# Patient Record
Sex: MALE | Race: WHITE | Employment: UNEMPLOYED | URBAN - METROPOLITAN AREA
[De-identification: names, ages, dates, MRNs, and addresses within clinical notes are randomized per-mention and may not be internally consistent; named-entity substitution may affect disease eponyms.]

---

## 2018-01-15 PROBLEM — I73.9 PVD (PERIPHERAL VASCULAR DISEASE) (HCC): Chronic | Status: ACTIVE | Noted: 2018-01-15

## 2018-01-15 PROBLEM — E11.51 TYPE 2 DIABETES MELLITUS WITH DIABETIC PERIPHERAL ANGIOPATHY WITHOUT GANGRENE, WITHOUT LONG-TERM CURRENT USE OF INSULIN (HCC): Chronic | Status: ACTIVE | Noted: 2018-01-15

## 2018-01-15 PROBLEM — Z71.6 TOBACCO ABUSE COUNSELING: Chronic | Status: ACTIVE | Noted: 2018-01-15

## 2018-04-20 ENCOUNTER — TELEPHONE (OUTPATIENT)
Dept: VASCULAR SURGERY | Age: 48
End: 2018-04-20

## 2018-06-11 ENCOUNTER — OFFICE VISIT (OUTPATIENT)
Dept: VASCULAR SURGERY | Age: 48
End: 2018-06-11
Payer: COMMERCIAL

## 2018-06-11 DIAGNOSIS — I70.212 ATHEROSCLER OF NATIVE ARTERY OF LEFT LEG WITH INTERMIT CLAUDICATION (HCC): ICD-10-CM

## 2018-06-11 DIAGNOSIS — I73.9 PVD (PERIPHERAL VASCULAR DISEASE) (HCC): Primary | ICD-10-CM

## 2018-06-11 PROCEDURE — 1036F TOBACCO NON-USER: CPT | Performed by: SURGERY

## 2018-06-11 PROCEDURE — G8598 ASA/ANTIPLAT THER USED: HCPCS | Performed by: SURGERY

## 2018-06-11 PROCEDURE — 99213 OFFICE O/P EST LOW 20 MIN: CPT | Performed by: NURSE PRACTITIONER

## 2018-06-11 PROCEDURE — G8427 DOCREV CUR MEDS BY ELIG CLIN: HCPCS | Performed by: SURGERY

## 2018-06-11 PROCEDURE — G8421 BMI NOT CALCULATED: HCPCS | Performed by: NURSE PRACTITIONER

## 2018-06-11 PROCEDURE — G8598 ASA/ANTIPLAT THER USED: HCPCS | Performed by: NURSE PRACTITIONER

## 2018-06-11 PROCEDURE — G8421 BMI NOT CALCULATED: HCPCS | Performed by: SURGERY

## 2018-06-11 PROCEDURE — G8427 DOCREV CUR MEDS BY ELIG CLIN: HCPCS | Performed by: NURSE PRACTITIONER

## 2018-06-11 PROCEDURE — 1036F TOBACCO NON-USER: CPT | Performed by: NURSE PRACTITIONER

## 2018-06-19 ENCOUNTER — HOSPITAL ENCOUNTER (OUTPATIENT)
Dept: CARDIAC CATH/INVASIVE PROCEDURES | Age: 48
Discharge: HOME | End: 2018-06-20
Attending: SURGERY | Admitting: SURGERY
Payer: COMMERCIAL

## 2018-06-19 LAB
ABO/RH: NORMAL
ANION GAP SERPL CALCULATED.3IONS-SCNC: 11 MMOL/L (ref 7–16)
ANTIBODY SCREEN: NORMAL
BUN BLDV-MCNC: 15 MG/DL (ref 6–20)
CALCIUM SERPL-MCNC: 8.9 MG/DL (ref 8.6–10.2)
CHLORIDE BLD-SCNC: 100 MMOL/L (ref 98–107)
CO2: 28 MMOL/L (ref 22–29)
CREAT SERPL-MCNC: 0.8 MG/DL (ref 0.7–1.2)
GFR AFRICAN AMERICAN: >60
GFR NON-AFRICAN AMERICAN: >60 ML/MIN/1.73
GLUCOSE BLD-MCNC: 153 MG/DL (ref 74–109)
HBA1C MFR BLD: 8.7 % (ref 4.8–5.9)
HCT VFR BLD CALC: 37.3 % (ref 37–54)
HEMOGLOBIN: 12.8 G/DL (ref 12.5–16.5)
INR BLD: 1
MCH RBC QN AUTO: 31.6 PG (ref 26–35)
MCHC RBC AUTO-ENTMCNC: 34.3 % (ref 32–34.5)
MCV RBC AUTO: 92.1 FL (ref 80–99.9)
METER GLUCOSE: 135 MG/DL (ref 70–110)
METER GLUCOSE: 143 MG/DL (ref 70–110)
METER GLUCOSE: 261 MG/DL (ref 70–110)
PDW BLD-RTO: 12.8 FL (ref 11.5–15)
PLATELET # BLD: 222 E9/L (ref 130–450)
PMV BLD AUTO: 9.5 FL (ref 7–12)
POTASSIUM REFLEX MAGNESIUM: 4.3 MMOL/L (ref 3.5–5)
PROTHROMBIN TIME: 11.4 SEC (ref 9.3–12.4)
RBC # BLD: 4.05 E12/L (ref 3.8–5.8)
SODIUM BLD-SCNC: 139 MMOL/L (ref 132–146)
WBC # BLD: 6.5 E9/L (ref 4.5–11.5)

## 2018-06-19 PROCEDURE — 86850 RBC ANTIBODY SCREEN: CPT

## 2018-06-19 PROCEDURE — 6370000000 HC RX 637 (ALT 250 FOR IP): Performed by: SURGERY

## 2018-06-19 PROCEDURE — 2709999900 HC NON-CHARGEABLE SUPPLY

## 2018-06-19 PROCEDURE — C1894 INTRO/SHEATH, NON-LASER: HCPCS

## 2018-06-19 PROCEDURE — C2623 CATH, TRANSLUMIN, DRUG-COAT: HCPCS

## 2018-06-19 PROCEDURE — C1714 CATH, TRANS ATHERECTOMY, DIR: HCPCS

## 2018-06-19 PROCEDURE — 85027 COMPLETE CBC AUTOMATED: CPT

## 2018-06-19 PROCEDURE — C1884 EMBOLIZATION PROTECT SYST: HCPCS

## 2018-06-19 PROCEDURE — 2500000003 HC RX 250 WO HCPCS

## 2018-06-19 PROCEDURE — 6360000002 HC RX W HCPCS: Performed by: SURGERY

## 2018-06-19 PROCEDURE — 37225 HC FEM POP TERRITORY ATHERECTOMY: CPT | Performed by: SURGERY

## 2018-06-19 PROCEDURE — 83036 HEMOGLOBIN GLYCOSYLATED A1C: CPT

## 2018-06-19 PROCEDURE — 82962 GLUCOSE BLOOD TEST: CPT

## 2018-06-19 PROCEDURE — 75774 ARTERY X-RAY EACH VESSEL: CPT | Performed by: SURGERY

## 2018-06-19 PROCEDURE — 2580000003 HC RX 258: Performed by: SURGERY

## 2018-06-19 PROCEDURE — 86900 BLOOD TYPING SEROLOGIC ABO: CPT

## 2018-06-19 PROCEDURE — C1769 GUIDE WIRE: HCPCS

## 2018-06-19 PROCEDURE — 37225 PR REVSC OPN/PRQ FEM/POP W/ATHRC/ANGIOP SM VSL: CPT | Performed by: SURGERY

## 2018-06-19 PROCEDURE — 85610 PROTHROMBIN TIME: CPT

## 2018-06-19 PROCEDURE — 36415 COLL VENOUS BLD VENIPUNCTURE: CPT

## 2018-06-19 PROCEDURE — 75716 ARTERY X-RAYS ARMS/LEGS: CPT | Performed by: SURGERY

## 2018-06-19 PROCEDURE — 6360000002 HC RX W HCPCS

## 2018-06-19 PROCEDURE — C1725 CATH, TRANSLUMIN NON-LASER: HCPCS

## 2018-06-19 PROCEDURE — 86901 BLOOD TYPING SEROLOGIC RH(D): CPT

## 2018-06-19 PROCEDURE — 80048 BASIC METABOLIC PNL TOTAL CA: CPT

## 2018-06-19 PROCEDURE — C1887 CATHETER, GUIDING: HCPCS

## 2018-06-19 RX ORDER — LISINOPRIL 20 MG/1
40 TABLET ORAL DAILY
Status: DISCONTINUED | OUTPATIENT
Start: 2018-06-20 | End: 2018-06-20 | Stop reason: HOSPADM

## 2018-06-19 RX ORDER — GLIPIZIDE 5 MG/1
5 TABLET ORAL
Status: DISCONTINUED | OUTPATIENT
Start: 2018-06-20 | End: 2018-06-20 | Stop reason: HOSPADM

## 2018-06-19 RX ORDER — SODIUM CHLORIDE 9 MG/ML
INJECTION, SOLUTION INTRAVENOUS CONTINUOUS
Status: DISCONTINUED | OUTPATIENT
Start: 2018-06-19 | End: 2018-06-19

## 2018-06-19 RX ORDER — SODIUM CHLORIDE 0.9 % (FLUSH) 0.9 %
10 SYRINGE (ML) INJECTION EVERY 12 HOURS SCHEDULED
Status: DISCONTINUED | OUTPATIENT
Start: 2018-06-19 | End: 2018-06-20 | Stop reason: HOSPADM

## 2018-06-19 RX ORDER — ATORVASTATIN CALCIUM 40 MG/1
40 TABLET, FILM COATED ORAL DAILY
Status: DISCONTINUED | OUTPATIENT
Start: 2018-06-19 | End: 2018-06-20 | Stop reason: HOSPADM

## 2018-06-19 RX ORDER — ASPIRIN 81 MG/1
81 TABLET, CHEWABLE ORAL DAILY
Status: DISCONTINUED | OUTPATIENT
Start: 2018-06-19 | End: 2018-06-20 | Stop reason: HOSPADM

## 2018-06-19 RX ORDER — DEXTROSE MONOHYDRATE 50 MG/ML
100 INJECTION, SOLUTION INTRAVENOUS PRN
Status: DISCONTINUED | OUTPATIENT
Start: 2018-06-19 | End: 2018-06-20 | Stop reason: HOSPADM

## 2018-06-19 RX ORDER — DEXTROSE MONOHYDRATE 25 G/50ML
12.5 INJECTION, SOLUTION INTRAVENOUS PRN
Status: DISCONTINUED | OUTPATIENT
Start: 2018-06-19 | End: 2018-06-20 | Stop reason: HOSPADM

## 2018-06-19 RX ORDER — NICOTINE POLACRILEX 4 MG
15 LOZENGE BUCCAL PRN
Status: DISCONTINUED | OUTPATIENT
Start: 2018-06-19 | End: 2018-06-20 | Stop reason: HOSPADM

## 2018-06-19 RX ORDER — CLOPIDOGREL BISULFATE 75 MG/1
75 TABLET ORAL DAILY
Status: DISCONTINUED | OUTPATIENT
Start: 2018-06-19 | End: 2018-06-20 | Stop reason: HOSPADM

## 2018-06-19 RX ORDER — SODIUM CHLORIDE 0.9 % (FLUSH) 0.9 %
10 SYRINGE (ML) INJECTION PRN
Status: DISCONTINUED | OUTPATIENT
Start: 2018-06-19 | End: 2018-06-20 | Stop reason: HOSPADM

## 2018-06-19 RX ORDER — CITALOPRAM 20 MG/1
40 TABLET ORAL DAILY
Status: DISCONTINUED | OUTPATIENT
Start: 2018-06-19 | End: 2018-06-20 | Stop reason: HOSPADM

## 2018-06-19 RX ORDER — CILOSTAZOL 100 MG/1
100 TABLET ORAL 2 TIMES DAILY
Status: DISCONTINUED | OUTPATIENT
Start: 2018-06-19 | End: 2018-06-20 | Stop reason: HOSPADM

## 2018-06-19 RX ORDER — PANTOPRAZOLE SODIUM 40 MG/1
40 TABLET, DELAYED RELEASE ORAL
Status: DISCONTINUED | OUTPATIENT
Start: 2018-06-20 | End: 2018-06-20 | Stop reason: HOSPADM

## 2018-06-19 RX ORDER — SODIUM CHLORIDE 9 MG/ML
INJECTION, SOLUTION INTRAVENOUS CONTINUOUS
Status: ACTIVE | OUTPATIENT
Start: 2018-06-19 | End: 2018-06-19

## 2018-06-19 RX ADMIN — INSULIN LISPRO 2 UNITS: 100 INJECTION, SOLUTION INTRAVENOUS; SUBCUTANEOUS at 13:24

## 2018-06-19 RX ADMIN — CILOSTAZOL 100 MG: 100 TABLET ORAL at 21:47

## 2018-06-19 RX ADMIN — SODIUM CHLORIDE: 9 INJECTION, SOLUTION INTRAVENOUS at 12:08

## 2018-06-19 RX ADMIN — CEFAZOLIN SODIUM 2 G: 2 SOLUTION INTRAVENOUS at 09:33

## 2018-06-19 RX ADMIN — INSULIN LISPRO 6 UNITS: 100 INJECTION, SOLUTION INTRAVENOUS; SUBCUTANEOUS at 18:14

## 2018-06-19 RX ADMIN — CITALOPRAM 40 MG: 20 TABLET, FILM COATED ORAL at 14:28

## 2018-06-19 RX ADMIN — SODIUM CHLORIDE: 9 INJECTION, SOLUTION INTRAVENOUS at 14:38

## 2018-06-19 RX ADMIN — CLOPIDOGREL 75 MG: 75 TABLET, FILM COATED ORAL at 14:28

## 2018-06-19 RX ADMIN — ASPIRIN 81 MG CHEWABLE TABLET 81 MG: 81 TABLET CHEWABLE at 12:44

## 2018-06-19 RX ADMIN — Medication 10 ML: at 21:47

## 2018-06-19 RX ADMIN — DESMOPRESSIN ACETATE 40 MG: 0.2 TABLET ORAL at 14:29

## 2018-06-19 RX ADMIN — CILOSTAZOL 100 MG: 100 TABLET ORAL at 14:29

## 2018-06-19 ASSESSMENT — PAIN SCALES - GENERAL
PAINLEVEL_OUTOF10: 0

## 2018-06-20 ENCOUNTER — TELEPHONE (OUTPATIENT)
Dept: VASCULAR SURGERY | Age: 48
End: 2018-06-20

## 2018-06-20 VITALS
HEIGHT: 70 IN | OXYGEN SATURATION: 95 % | RESPIRATION RATE: 16 BRPM | BODY MASS INDEX: 28.63 KG/M2 | WEIGHT: 200 LBS | DIASTOLIC BLOOD PRESSURE: 73 MMHG | TEMPERATURE: 97.6 F | SYSTOLIC BLOOD PRESSURE: 116 MMHG | HEART RATE: 96 BPM

## 2018-06-20 LAB
ANION GAP SERPL CALCULATED.3IONS-SCNC: 13 MMOL/L (ref 7–16)
BUN BLDV-MCNC: 12 MG/DL (ref 6–20)
CALCIUM SERPL-MCNC: 8.4 MG/DL (ref 8.6–10.2)
CHLORIDE BLD-SCNC: 99 MMOL/L (ref 98–107)
CO2: 28 MMOL/L (ref 22–29)
CREAT SERPL-MCNC: 0.9 MG/DL (ref 0.7–1.2)
GFR AFRICAN AMERICAN: >60
GFR NON-AFRICAN AMERICAN: >60 ML/MIN/1.73
GLUCOSE BLD-MCNC: 146 MG/DL (ref 74–109)
HCT VFR BLD CALC: 36.3 % (ref 37–54)
HEMOGLOBIN: 12.6 G/DL (ref 12.5–16.5)
MCH RBC QN AUTO: 32.3 PG (ref 26–35)
MCHC RBC AUTO-ENTMCNC: 34.7 % (ref 32–34.5)
MCV RBC AUTO: 93.1 FL (ref 80–99.9)
PDW BLD-RTO: 13.1 FL (ref 11.5–15)
PLATELET # BLD: 200 E9/L (ref 130–450)
PMV BLD AUTO: 9.6 FL (ref 7–12)
POTASSIUM SERPL-SCNC: 4.3 MMOL/L (ref 3.5–5)
RBC # BLD: 3.9 E12/L (ref 3.8–5.8)
SODIUM BLD-SCNC: 140 MMOL/L (ref 132–146)
WBC # BLD: 9.8 E9/L (ref 4.5–11.5)

## 2018-06-20 PROCEDURE — 85027 COMPLETE CBC AUTOMATED: CPT

## 2018-06-20 PROCEDURE — 6360000002 HC RX W HCPCS: Performed by: SURGERY

## 2018-06-20 PROCEDURE — 36415 COLL VENOUS BLD VENIPUNCTURE: CPT

## 2018-06-20 PROCEDURE — 80048 BASIC METABOLIC PNL TOTAL CA: CPT

## 2018-06-20 PROCEDURE — 6370000000 HC RX 637 (ALT 250 FOR IP): Performed by: SURGERY

## 2018-06-20 RX ADMIN — ASPIRIN 81 MG CHEWABLE TABLET 81 MG: 81 TABLET CHEWABLE at 10:19

## 2018-06-20 RX ADMIN — CILOSTAZOL 100 MG: 100 TABLET ORAL at 08:58

## 2018-06-20 RX ADMIN — INSULIN LISPRO 2 UNITS: 100 INJECTION, SOLUTION INTRAVENOUS; SUBCUTANEOUS at 09:05

## 2018-06-20 RX ADMIN — DESMOPRESSIN ACETATE 40 MG: 0.2 TABLET ORAL at 09:03

## 2018-06-20 RX ADMIN — PANTOPRAZOLE SODIUM 40 MG: 40 TABLET, DELAYED RELEASE ORAL at 06:40

## 2018-06-20 RX ADMIN — GLIPIZIDE 5 MG: 5 TABLET ORAL at 06:40

## 2018-06-20 RX ADMIN — LISINOPRIL 40 MG: 20 TABLET ORAL at 08:58

## 2018-06-20 RX ADMIN — ENOXAPARIN SODIUM 40 MG: 40 INJECTION SUBCUTANEOUS at 09:01

## 2018-06-20 RX ADMIN — CLOPIDOGREL 75 MG: 75 TABLET, FILM COATED ORAL at 08:58

## 2018-06-20 RX ADMIN — CITALOPRAM 40 MG: 20 TABLET, FILM COATED ORAL at 08:58

## 2018-06-20 ASSESSMENT — PAIN SCALES - GENERAL: PAINLEVEL_OUTOF10: 0

## 2018-07-03 ENCOUNTER — HOSPITAL ENCOUNTER (OUTPATIENT)
Dept: CARDIAC CATH/INVASIVE PROCEDURES | Age: 48
Discharge: HOME | End: 2018-07-04
Attending: SURGERY | Admitting: SURGERY
Payer: COMMERCIAL

## 2018-07-03 DIAGNOSIS — I73.9 PERIPHERAL VASCULAR DISEASE OF LOWER EXTREMITY (HCC): ICD-10-CM

## 2018-07-03 DIAGNOSIS — I73.9 PVD (PERIPHERAL VASCULAR DISEASE) (HCC): Chronic | ICD-10-CM

## 2018-07-03 LAB
ABO/RH: NORMAL
ANION GAP SERPL CALCULATED.3IONS-SCNC: 16 MMOL/L (ref 7–16)
ANTIBODY SCREEN: NORMAL
BUN BLDV-MCNC: 14 MG/DL (ref 6–20)
CALCIUM SERPL-MCNC: 8.9 MG/DL (ref 8.6–10.2)
CHLORIDE BLD-SCNC: 99 MMOL/L (ref 98–107)
CO2: 23 MMOL/L (ref 22–29)
CREAT SERPL-MCNC: 0.8 MG/DL (ref 0.7–1.2)
GFR AFRICAN AMERICAN: >60
GFR NON-AFRICAN AMERICAN: >60 ML/MIN/1.73
GLUCOSE BLD-MCNC: 161 MG/DL (ref 74–109)
HBA1C MFR BLD: 8.2 % (ref 4–5.6)
HCT VFR BLD CALC: 35.4 % (ref 37–54)
HEMOGLOBIN: 12.8 G/DL (ref 12.5–16.5)
INR BLD: 1.1
MCH RBC QN AUTO: 32.7 PG (ref 26–35)
MCHC RBC AUTO-ENTMCNC: 36.2 % (ref 32–34.5)
MCV RBC AUTO: 90.3 FL (ref 80–99.9)
METER GLUCOSE: 147 MG/DL (ref 70–110)
METER GLUCOSE: 174 MG/DL (ref 70–110)
METER GLUCOSE: 288 MG/DL (ref 70–110)
PDW BLD-RTO: 12.4 FL (ref 11.5–15)
PLATELET # BLD: 305 E9/L (ref 130–450)
PMV BLD AUTO: 9.2 FL (ref 7–12)
POTASSIUM REFLEX MAGNESIUM: 4 MMOL/L (ref 3.5–5)
PROTHROMBIN TIME: 12.3 SEC (ref 9.3–12.4)
RBC # BLD: 3.92 E12/L (ref 3.8–5.8)
SODIUM BLD-SCNC: 138 MMOL/L (ref 132–146)
WBC # BLD: 10.3 E9/L (ref 4.5–11.5)

## 2018-07-03 PROCEDURE — 86850 RBC ANTIBODY SCREEN: CPT

## 2018-07-03 PROCEDURE — 75710 ARTERY X-RAYS ARM/LEG: CPT | Performed by: SURGERY

## 2018-07-03 PROCEDURE — 37224 HC FEM POP TERRITORY PLASTY: CPT | Performed by: SURGERY

## 2018-07-03 PROCEDURE — 2580000003 HC RX 258: Performed by: SURGERY

## 2018-07-03 PROCEDURE — 85610 PROTHROMBIN TIME: CPT

## 2018-07-03 PROCEDURE — C1769 GUIDE WIRE: HCPCS

## 2018-07-03 PROCEDURE — C1725 CATH, TRANSLUMIN NON-LASER: HCPCS

## 2018-07-03 PROCEDURE — 80048 BASIC METABOLIC PNL TOTAL CA: CPT

## 2018-07-03 PROCEDURE — C1894 INTRO/SHEATH, NON-LASER: HCPCS

## 2018-07-03 PROCEDURE — 83036 HEMOGLOBIN GLYCOSYLATED A1C: CPT

## 2018-07-03 PROCEDURE — 6360000002 HC RX W HCPCS

## 2018-07-03 PROCEDURE — C1760 CLOSURE DEV, VASC: HCPCS

## 2018-07-03 PROCEDURE — 85027 COMPLETE CBC AUTOMATED: CPT

## 2018-07-03 PROCEDURE — 6370000000 HC RX 637 (ALT 250 FOR IP): Performed by: SURGERY

## 2018-07-03 PROCEDURE — 76937 US GUIDE VASCULAR ACCESS: CPT | Performed by: SURGERY

## 2018-07-03 PROCEDURE — 82962 GLUCOSE BLOOD TEST: CPT

## 2018-07-03 PROCEDURE — 36415 COLL VENOUS BLD VENIPUNCTURE: CPT

## 2018-07-03 PROCEDURE — C2623 CATH, TRANSLUMIN, DRUG-COAT: HCPCS

## 2018-07-03 PROCEDURE — 37224 PR REVSC OPN/PRG FEM/POP W/ANGIOPLASTY UNI: CPT | Performed by: SURGERY

## 2018-07-03 PROCEDURE — 2709999900 HC NON-CHARGEABLE SUPPLY

## 2018-07-03 PROCEDURE — C1887 CATHETER, GUIDING: HCPCS

## 2018-07-03 PROCEDURE — 86900 BLOOD TYPING SEROLOGIC ABO: CPT

## 2018-07-03 PROCEDURE — 2500000003 HC RX 250 WO HCPCS

## 2018-07-03 PROCEDURE — 86901 BLOOD TYPING SEROLOGIC RH(D): CPT

## 2018-07-03 RX ORDER — GLIPIZIDE 5 MG/1
10 TABLET ORAL
Status: DISCONTINUED | OUTPATIENT
Start: 2018-07-04 | End: 2018-07-04 | Stop reason: HOSPADM

## 2018-07-03 RX ORDER — NICOTINE POLACRILEX 4 MG
15 LOZENGE BUCCAL PRN
Status: DISCONTINUED | OUTPATIENT
Start: 2018-07-03 | End: 2018-07-04 | Stop reason: HOSPADM

## 2018-07-03 RX ORDER — SODIUM CHLORIDE 0.9 % (FLUSH) 0.9 %
10 SYRINGE (ML) INJECTION PRN
Status: DISCONTINUED | OUTPATIENT
Start: 2018-07-03 | End: 2018-07-03

## 2018-07-03 RX ORDER — SODIUM CHLORIDE 0.9 % (FLUSH) 0.9 %
10 SYRINGE (ML) INJECTION EVERY 12 HOURS SCHEDULED
Status: DISCONTINUED | OUTPATIENT
Start: 2018-07-03 | End: 2018-07-04 | Stop reason: HOSPADM

## 2018-07-03 RX ORDER — ATORVASTATIN CALCIUM 40 MG/1
40 TABLET, FILM COATED ORAL DAILY
Status: DISCONTINUED | OUTPATIENT
Start: 2018-07-03 | End: 2018-07-04 | Stop reason: HOSPADM

## 2018-07-03 RX ORDER — NICOTINE 21 MG/24HR
1 PATCH, TRANSDERMAL 24 HOURS TRANSDERMAL EVERY 24 HOURS
Status: DISCONTINUED | OUTPATIENT
Start: 2018-07-03 | End: 2018-07-04 | Stop reason: HOSPADM

## 2018-07-03 RX ORDER — SODIUM CHLORIDE 9 MG/ML
INJECTION, SOLUTION INTRAVENOUS CONTINUOUS
Status: DISCONTINUED | OUTPATIENT
Start: 2018-07-03 | End: 2018-07-03

## 2018-07-03 RX ORDER — CITALOPRAM 20 MG/1
40 TABLET ORAL DAILY
Status: DISCONTINUED | OUTPATIENT
Start: 2018-07-03 | End: 2018-07-04 | Stop reason: HOSPADM

## 2018-07-03 RX ORDER — DEXTROSE MONOHYDRATE 50 MG/ML
100 INJECTION, SOLUTION INTRAVENOUS PRN
Status: DISCONTINUED | OUTPATIENT
Start: 2018-07-03 | End: 2018-07-04 | Stop reason: HOSPADM

## 2018-07-03 RX ORDER — ASPIRIN 81 MG/1
81 TABLET, CHEWABLE ORAL DAILY
Status: DISCONTINUED | OUTPATIENT
Start: 2018-07-03 | End: 2018-07-04 | Stop reason: HOSPADM

## 2018-07-03 RX ORDER — CILOSTAZOL 100 MG/1
100 TABLET ORAL
Status: DISCONTINUED | OUTPATIENT
Start: 2018-07-03 | End: 2018-07-04 | Stop reason: HOSPADM

## 2018-07-03 RX ORDER — CLOPIDOGREL BISULFATE 75 MG/1
75 TABLET ORAL DAILY
Status: DISCONTINUED | OUTPATIENT
Start: 2018-07-03 | End: 2018-07-04 | Stop reason: HOSPADM

## 2018-07-03 RX ORDER — SODIUM CHLORIDE 0.9 % (FLUSH) 0.9 %
10 SYRINGE (ML) INJECTION PRN
Status: DISCONTINUED | OUTPATIENT
Start: 2018-07-03 | End: 2018-07-04 | Stop reason: HOSPADM

## 2018-07-03 RX ORDER — LISINOPRIL 20 MG/1
40 TABLET ORAL DAILY
Status: DISCONTINUED | OUTPATIENT
Start: 2018-07-03 | End: 2018-07-04 | Stop reason: HOSPADM

## 2018-07-03 RX ORDER — DEXTROSE MONOHYDRATE 25 G/50ML
12.5 INJECTION, SOLUTION INTRAVENOUS PRN
Status: DISCONTINUED | OUTPATIENT
Start: 2018-07-03 | End: 2018-07-04 | Stop reason: HOSPADM

## 2018-07-03 RX ORDER — SODIUM CHLORIDE 9 MG/ML
INJECTION, SOLUTION INTRAVENOUS CONTINUOUS
Status: ACTIVE | OUTPATIENT
Start: 2018-07-03 | End: 2018-07-03

## 2018-07-03 RX ORDER — SODIUM CHLORIDE 0.9 % (FLUSH) 0.9 %
10 SYRINGE (ML) INJECTION EVERY 12 HOURS SCHEDULED
Status: DISCONTINUED | OUTPATIENT
Start: 2018-07-03 | End: 2018-07-03

## 2018-07-03 RX ORDER — PANTOPRAZOLE SODIUM 40 MG/1
40 TABLET, DELAYED RELEASE ORAL
Status: DISCONTINUED | OUTPATIENT
Start: 2018-07-04 | End: 2018-07-04 | Stop reason: HOSPADM

## 2018-07-03 RX ADMIN — CILOSTAZOL 100 MG: 100 TABLET ORAL at 16:16

## 2018-07-03 RX ADMIN — SODIUM CHLORIDE: 9 INJECTION, SOLUTION INTRAVENOUS at 11:42

## 2018-07-03 RX ADMIN — INSULIN LISPRO 2 UNITS: 100 INJECTION, SOLUTION INTRAVENOUS; SUBCUTANEOUS at 17:12

## 2018-07-03 RX ADMIN — CITALOPRAM 40 MG: 20 TABLET, FILM COATED ORAL at 11:42

## 2018-07-03 RX ADMIN — SODIUM CHLORIDE: 9 INJECTION, SOLUTION INTRAVENOUS at 06:50

## 2018-07-03 RX ADMIN — CLOPIDOGREL 75 MG: 75 TABLET, FILM COATED ORAL at 11:42

## 2018-07-03 RX ADMIN — ASPIRIN 81 MG CHEWABLE TABLET 81 MG: 81 TABLET CHEWABLE at 11:41

## 2018-07-03 RX ADMIN — SODIUM CHLORIDE: 9 INJECTION, SOLUTION INTRAVENOUS at 15:44

## 2018-07-03 RX ADMIN — DESMOPRESSIN ACETATE 40 MG: 0.2 TABLET ORAL at 11:41

## 2018-07-03 RX ADMIN — Medication 10 ML: at 22:16

## 2018-07-03 RX ADMIN — INSULIN LISPRO 6 UNITS: 100 INJECTION, SOLUTION INTRAVENOUS; SUBCUTANEOUS at 11:44

## 2018-07-03 ASSESSMENT — PAIN SCALES - GENERAL
PAINLEVEL_OUTOF10: 0

## 2018-07-03 NOTE — OP NOTE
Cardiovascular Lab Procedure Report    Katt Rubalcava  1970    Date : 7/3/2018  Surgeon: Franny Moses M.D. Pre-procedure Diagnosis: L LE lifestyle limiting claudication  Post-procedure Diagnosis: Same  Procedure:      Right common femoral artery access   with US guidance    TF Right lower extremity angiogram   [id number] Angiogram with catheter in left common femoral, superficial femoral, and profunda artery   [id number] Left Common femoral plasty with 7x60 DCB (Impact), 8x40 evercross  Left profunda plasty with 5x60 evercross   Anesthesia: Local with IV sedation  Assistants: Cath Lab Staff  Estimated Blood Loss: Minimal  Complications: none  Findings: Abdominal aorta : Patent   Left Left s/p intervention   Common Femoral Art > 90% stenosis < 30% distally residual stenosis   Superficial Femoral Art     Profunda Femoral Art > 90% stenosis at orifice Patent, < 30% residual stenosis   AK Popliteal Art Recons from collateral, > 60% stenosis at knee Recons from collateral, > 60% stenosis at knee   BK Popliteal Art Patent Patent   Anterior Tibial Art Patent, primary runoff to foot Patent, to foot, slow flow   Tibioperoneal Trunk Patent Patent   Peroneal Art Patent slow flow, to distal calf Patent to ankle   Posterior Tibial Art Patent, slow flow to distal calf Patent, primary outflow to foot     Procedure Details : There was previous CTA  or catheter based diagnostic imaging preformed prior to today's procedure. Timeout preformed identifying pt and procedure. Groins prepped and draped in sterile fashion. Patient given sedation as needed throughout the case. Right common femoral artery was noted to be patent and was accessed under ultrasound guidance after infiltrating with local.  A printer was used to print out an image. Micropuncture placed, exchanged out for 5 fr sheath. Advantage glide wire and contra 2 catheter advanced into distal aorta.   Contra 2 and wire used to access Left iliac system and catheter placed at common femoral and angiogram of the left lower extremity preformed. Significant occlusive disease was noted in the fem pop distribution. Patient was given 9000 units of heparin and than a 7 fr destination sheath advanced to common femoral artery on the left. Imaging with the catheter in the sfa to further evaluate the sfa 0.35 Trail blazer catheter and advantage glide wire used to traverse stenosis but unsuccessful. The catheter was than placed in the profunda and angiogram was preformed. Proximal flow seemed poor which had been better earlier in the case. Due to these issues decision was made to treat common femoral disease and profunda as it was the primary outflow. The profunda was treated with 5x60 balloon. The common femoral was treated with 7x60 DCB and than post dilation with 8x40 evercross. There was evidence of some residual stenosis. Completion angiogram noted much improved filling distally and brisk flow though the stenotic area. Sheath and wire pulled back to Left iliac system. A short  7 fr sheath was exchanged. 7 fr exoseal used for closure.       Postop Exam  Right DP 1+  PT biphasic  Left DP monophasic  PT monophasic    Plan  Continue Medical Management with asa, plavix and statin  Encourage continued tobacco cessation    Will have seen as outpatient for cardiac risk stratification  Will likely need left common femoral endarterectomy, profundoplasty and fem pop bypass in future if symtpoms not improved    Pracucoek S [employer], DO

## 2018-07-03 NOTE — H&P
Vascular Surgery History & Physical Exam    Chief Complaint: Peripheral vascular disease,   L LE lifestyle limiting claudication    HISTORY OF PRESENT ILLNESS:    The patient is a 52 y.o. male who presents to the hospital for elective arteriogram with possible intervention. The patient has a history of peripheral vascular disease and L LE lifestyle limiting claudication.     ROS : All others Negative if blank [], Positive if [x]  General   [] Fevers   [] Chills   [] Weight Loss   Skin   [] Tissue Loss   Eyes   [x] Wears Glasses/Contacts   [] Vision Changes   Respiratory    [] Shortness of breath   Cardiovascular   [] Chest Pain   [] Shortness of breath with exertion   Gastrointestinal   [] Abdominal Pain     Past Medical History:   Diagnosis Date    Anxiety     Diabetes ([facility]     GERD (gastroesophageal reflux disease)     Gout     Hyperlipidemia     Hypertension     Leg pain     [facility] 1/15/2018    Tobacco abuse counseling 1/15/2018    Type 2 diabetes mellitus with diabetic peripheral angiopathy without gangrene, without long-term current use of insulin ([facility] 1/15/2018     Past Surgical History:   Procedure Laterality Date    OTHER SURGICAL HISTORY  10/04/2016    Dr Stella Moise - athrectomy/pci right fem/pop    OTHER SURGICAL HISTORY  06/19/2018    Dr Stella Moise - PCI w/ athrectomy RLE Common Illiac to Popliteal    WISDOM TOOTH EXTRACTION       Current Medications:     Current Outpatient Prescriptions:     cilostazol (PLETAL) 100 MG tablet, TAKE 1 TABLET TWICE A DAY, Disp: 90 tablet, Rfl: 3    clopidogrel (PLAVIX) 75 MG tablet, Take 1 tablet by mouth daily, Disp: 90 tablet, Rfl: 3    GLIPIZIDE XL 5 MG extended release tablet, Take 10 mg by mouth daily , Disp: , Rfl:     atorvastatin (LIPITOR) 20 MG tablet, Take 2 tablets by mouth daily, Disp: 30 tablet, Rfl: 3    lisinopril (PRINIVIL;ZESTRIL) 40 MG tablet, Take 40 mg by mouth daily, Disp: , Rfl:     metFORMIN (GLUCOPHAGE) 500 MG tablet, Take 500 mg by mouth 2 times daily (with meals) , Disp: , Rfl:     citalopram (CELEXA) 40 MG tablet, Take 40 mg by mouth daily, Disp: , Rfl:     omeprazole (PRILOSEC) 20 MG delayed release capsule, Take 20 mg by mouth as needed, Disp: , Rfl:     aspirin (ASPIRIN CHILDRENS) 81 MG chewable tablet, Take 1 tablet by mouth daily, Disp: 30 tablet, Rfl: 3    nicotine (NICODERM CQ) 21 MG/24HR, Place 1 patch onto the skin every 24 hours, Disp: 30 patch, Rfl: 3    Current Facility-Administered Medications:     0.9 % sodium chloride infusion, , Intravenous, Continuous, Teresa Guillermo MD, Last Rate: 75 mL/hr at 07/03/18 0650    sodium chloride flush 0.9 % injection 10 mL, 10 mL, Intravenous, 2 times per day, Teresa Guillermo MD    sodium chloride flush 0.9 % injection 10 mL, 10 mL, Intravenous, PRN, Teresa Guillermo MD  Allergies:  Patient has no known allergies. Social History     Social History    Marital status:      Spouse name: N/A    Number of children: N/A    Years of education: N/A     Occupational History    Not on file.      Social History Main Topics    Smoking status: Former Smoker     Packs/day: 1.50     Types: Cigarettes     Quit date: 10/15/2016    Smokeless tobacco: Former User    Alcohol use No    Drug use: No    Sexual activity: Not on file     Other Topics Concern    Not on file     Social History Narrative    No narrative on file     Family History   Problem Relation Age of Onset    Asthma Mother     Other Father         vascular problems    Cancer Father         prostate, lung    Diabetes Father      PHYSICAL EXAM:    Vitals:    07/03/18 0653   BP: 135/82   Pulse: 92   Temp: 97.3 °F (36.3 °C)   SpO2: 97%     CONSTITUTIONAL:  awake, alert, cooperative, no apparent distress, and appears stated age  NECK:  supple, symmetrical, trachea midline  LUNGS:  no increased work of breathing, good air exchange and clear to auscultation  CARDIOVASCULAR:  regular rate and rhythm  ABDOMEN:  soft, non-distended and non-tenderl   Pulse Exam   R femoral 1+ L femoral Weakly palp   R dorsalis pedis 1+ L dorsalis pedis monophasic   R posterior tibial biphasic L posterior tibial monophasic     LABS:    Lab Results   Component Value Date    WBC 10.3 07/03/2018    HGB 12.8 07/03/2018    HCT 35.4 (L) 07/03/2018     07/03/2018    PROTIME 12.3 07/03/2018    INR 1.1 07/03/2018    K 4.0 07/03/2018    BUN 14 07/03/2018    CREATININE 0.8 07/03/2018     Assesment:  Peripheral vascular disease. L LE lifestyle limiting claudication  PLAN:    · Aortogram,  Left lower extremity arteriogram, possible intervention. · I reviewed the procedure with the patient and family as available. I discussed the procedure, risks, benefits, complications, and alternatives of the procedure. They understand and consent.   All questions were answered    Gia Suarez

## 2018-07-04 VITALS
RESPIRATION RATE: 16 BRPM | HEIGHT: 70 IN | TEMPERATURE: 98.3 F | HEART RATE: 91 BPM | WEIGHT: 210 LBS | BODY MASS INDEX: 30.06 KG/M2 | OXYGEN SATURATION: 96 % | DIASTOLIC BLOOD PRESSURE: 55 MMHG | SYSTOLIC BLOOD PRESSURE: 109 MMHG

## 2018-07-04 LAB
ANION GAP SERPL CALCULATED.3IONS-SCNC: 12 MMOL/L (ref 7–16)
BUN BLDV-MCNC: 13 MG/DL (ref 6–20)
CALCIUM SERPL-MCNC: 8.7 MG/DL (ref 8.6–10.2)
CHLORIDE BLD-SCNC: 102 MMOL/L (ref 98–107)
CO2: 23 MMOL/L (ref 22–29)
CREAT SERPL-MCNC: 0.8 MG/DL (ref 0.7–1.2)
GFR AFRICAN AMERICAN: >60
GFR NON-AFRICAN AMERICAN: >60 ML/MIN/1.73
GLUCOSE BLD-MCNC: 151 MG/DL (ref 74–109)
HCT VFR BLD CALC: 35.6 % (ref 37–54)
HEMOGLOBIN: 12.2 G/DL (ref 12.5–16.5)
MCH RBC QN AUTO: 31.9 PG (ref 26–35)
MCHC RBC AUTO-ENTMCNC: 34.3 % (ref 32–34.5)
MCV RBC AUTO: 93.2 FL (ref 80–99.9)
METER GLUCOSE: 165 MG/DL (ref 70–110)
PDW BLD-RTO: 12.5 FL (ref 11.5–15)
PLATELET # BLD: 282 E9/L (ref 130–450)
PMV BLD AUTO: 9.6 FL (ref 7–12)
POTASSIUM SERPL-SCNC: 4 MMOL/L (ref 3.5–5)
RBC # BLD: 3.82 E12/L (ref 3.8–5.8)
SODIUM BLD-SCNC: 137 MMOL/L (ref 132–146)
WBC # BLD: 9.7 E9/L (ref 4.5–11.5)

## 2018-07-04 PROCEDURE — 36415 COLL VENOUS BLD VENIPUNCTURE: CPT

## 2018-07-04 PROCEDURE — 82962 GLUCOSE BLOOD TEST: CPT

## 2018-07-04 PROCEDURE — 6370000000 HC RX 637 (ALT 250 FOR IP): Performed by: SURGERY

## 2018-07-04 PROCEDURE — 2580000003 HC RX 258: Performed by: SURGERY

## 2018-07-04 PROCEDURE — 80048 BASIC METABOLIC PNL TOTAL CA: CPT

## 2018-07-04 PROCEDURE — 85027 COMPLETE CBC AUTOMATED: CPT

## 2018-07-04 RX ADMIN — LISINOPRIL 40 MG: 20 TABLET ORAL at 08:26

## 2018-07-04 RX ADMIN — CITALOPRAM 40 MG: 20 TABLET, FILM COATED ORAL at 08:26

## 2018-07-04 RX ADMIN — CILOSTAZOL 100 MG: 100 TABLET ORAL at 06:52

## 2018-07-04 RX ADMIN — PANTOPRAZOLE SODIUM 40 MG: 40 TABLET, DELAYED RELEASE ORAL at 06:52

## 2018-07-04 RX ADMIN — Medication 10 ML: at 08:26

## 2018-07-04 RX ADMIN — INSULIN LISPRO 2 UNITS: 100 INJECTION, SOLUTION INTRAVENOUS; SUBCUTANEOUS at 08:34

## 2018-07-04 RX ADMIN — GLIPIZIDE 10 MG: 5 TABLET ORAL at 07:15

## 2018-07-04 RX ADMIN — ASPIRIN 81 MG CHEWABLE TABLET 81 MG: 81 TABLET CHEWABLE at 08:26

## 2018-07-04 RX ADMIN — CLOPIDOGREL 75 MG: 75 TABLET, FILM COATED ORAL at 08:26

## 2018-07-04 RX ADMIN — DESMOPRESSIN ACETATE 40 MG: 0.2 TABLET ORAL at 08:26

## 2018-07-04 ASSESSMENT — PAIN SCALES - GENERAL
PAINLEVEL_OUTOF10: 0
PAINLEVEL_OUTOF10: 0

## 2018-07-04 NOTE — PLAN OF CARE
Problem: Falls - Risk of:  Goal: Will remain free from falls  Will remain free from falls   Outcome: Met This Shift      Problem: Infection - Surgical Site:  Goal: Will show no infection signs and symptoms  Will show no infection signs and symptoms  Outcome: Met This Shift

## 2018-07-04 NOTE — PLAN OF CARE
Problem: Infection - Surgical Site:  Goal: Will show no infection signs and symptoms  Will show no infection signs and symptoms   Outcome: Ongoing

## 2018-07-05 ENCOUNTER — TELEPHONE (OUTPATIENT)
Dept: VASCULAR SURGERY | Age: 48
End: 2018-07-05

## 2018-07-05 DIAGNOSIS — I73.9 PVD (PERIPHERAL VASCULAR DISEASE) WITH CLAUDICATION (HCC): Primary | ICD-10-CM

## 2018-07-06 ENCOUNTER — TELEPHONE (OUTPATIENT)
Dept: ADMINISTRATIVE | Age: 48
End: 2018-07-06

## 2018-07-23 ENCOUNTER — OFFICE VISIT (OUTPATIENT)
Dept: CARDIOLOGY CLINIC | Age: 48
End: 2018-07-23
Payer: COMMERCIAL

## 2018-07-23 VITALS
HEART RATE: 105 BPM | BODY MASS INDEX: 29.49 KG/M2 | HEIGHT: 70 IN | SYSTOLIC BLOOD PRESSURE: 124 MMHG | RESPIRATION RATE: 18 BRPM | WEIGHT: 206 LBS | DIASTOLIC BLOOD PRESSURE: 78 MMHG

## 2018-07-23 DIAGNOSIS — I73.9 PVD (PERIPHERAL VASCULAR DISEASE) (HCC): Primary | Chronic | ICD-10-CM

## 2018-07-23 DIAGNOSIS — E78.00 PURE HYPERCHOLESTEROLEMIA: ICD-10-CM

## 2018-07-23 DIAGNOSIS — E11.51 TYPE 2 DIABETES MELLITUS WITH DIABETIC PERIPHERAL ANGIOPATHY WITHOUT GANGRENE, WITHOUT LONG-TERM CURRENT USE OF INSULIN (HCC): Chronic | ICD-10-CM

## 2018-07-23 PROCEDURE — 99244 OFF/OP CNSLTJ NEW/EST MOD 40: CPT | Performed by: INTERNAL MEDICINE

## 2018-07-23 PROCEDURE — G8419 CALC BMI OUT NRM PARAM NOF/U: HCPCS | Performed by: INTERNAL MEDICINE

## 2018-07-23 PROCEDURE — G8427 DOCREV CUR MEDS BY ELIG CLIN: HCPCS | Performed by: INTERNAL MEDICINE

## 2018-07-23 PROCEDURE — 93000 ELECTROCARDIOGRAM COMPLETE: CPT | Performed by: INTERNAL MEDICINE

## 2018-07-23 PROCEDURE — 2022F DILAT RTA XM EVC RTNOPTHY: CPT | Performed by: INTERNAL MEDICINE

## 2018-07-23 RX ORDER — ALLOPURINOL 300 MG/1
300 TABLET ORAL DAILY
COMMUNITY
End: 2021-03-09 | Stop reason: ALTCHOICE

## 2018-07-23 NOTE — PROGRESS NOTES
needed      aspirin (ASPIRIN CHILDRENS) 81 MG chewable tablet Take 1 tablet by mouth daily 30 tablet 3     No current facility-administered medications for this visit. Physical Exam:  /78   Pulse 105   Resp 18   Ht 5' 10\" (1.778 m)   Wt 206 lb (93.4 kg)   BMI 29.56 kg/m²   Wt Readings from Last 3 Encounters:   07/23/18 206 lb (93.4 kg)   07/03/18 210 lb (95.3 kg)   06/19/18 200 lb (90.7 kg)     The patient is awake, alert and in no discomfort or distress. No gross musculoskeletal deformity or lymphadenopathy are present. No significant skin or nail changes are present. Gross examination of head, eyes, nose and throat are negative. Jugular venous pressure is normal and no carotid bruits are present. No thyromegaly is noted. Normal respiratory effort is noted with no accessory muscle usage present. Lung fields are clear to ascultation. Cardiac examination is notable for a regular rate and rhythm with no palpable thrill. No gallop rhythm or cardiac murmur are identified. A benign abdominal examination is present with no masses or organomegaly. A normal abdominal aortic pulsation is present. Intact pulses are present throughout all extremities and no peripheral edema is present. No focal neurologic deficits are present. Laboratory Tests:  Lab Results   Component Value Date    CREATININE 0.8 07/04/2018    BUN 13 07/04/2018     07/04/2018    K 4.0 07/04/2018     07/04/2018    CO2 23 07/04/2018     No results found for: BNP  Lab Results   Component Value Date    WBC 9.7 07/04/2018    RBC 3.82 07/04/2018    HGB 12.2 07/04/2018    HCT 35.6 07/04/2018    MCV 93.2 07/04/2018    MCH 31.9 07/04/2018    MCHC 34.3 07/04/2018    RDW 12.5 07/04/2018     07/04/2018    MPV 9.6 07/04/2018     No results for input(s): ALKPHOS, ALT, AST, PROT, BILITOT, BILIDIR, LABALBU in the last 72 hours.   No results found for: MG  Lab Results   Component Value Date    PROTIME 12.3 07/03/2018    INR 1.1

## 2018-10-08 ENCOUNTER — TELEPHONE (OUTPATIENT)
Dept: VASCULAR SURGERY | Age: 48
End: 2018-10-08

## 2018-10-08 ENCOUNTER — HOSPITAL ENCOUNTER (OUTPATIENT)
Age: 48
Discharge: HOME | End: 2018-10-10
Payer: COMMERCIAL

## 2018-10-08 ENCOUNTER — OFFICE VISIT (OUTPATIENT)
Dept: VASCULAR SURGERY | Age: 48
End: 2018-10-08

## 2018-10-08 DIAGNOSIS — I73.9 PVD (PERIPHERAL VASCULAR DISEASE) WITH CLAUDICATION (HCC): Primary | ICD-10-CM

## 2018-10-08 LAB
BASOPHILS ABSOLUTE: 0.05 E9/L (ref 0–0.2)
BASOPHILS RELATIVE PERCENT: 0.6 % (ref 0–2)
EOSINOPHILS ABSOLUTE: 0.2 E9/L (ref 0.05–0.5)
EOSINOPHILS RELATIVE PERCENT: 2.5 % (ref 0–6)
HCT VFR BLD CALC: 46 % (ref 37–54)
HEMOGLOBIN: 14.8 G/DL (ref 12.5–16.5)
IMMATURE GRANULOCYTES #: 0.03 E9/L
IMMATURE GRANULOCYTES %: 0.4 % (ref 0–5)
LYMPHOCYTES ABSOLUTE: 2.22 E9/L (ref 1.5–4)
LYMPHOCYTES RELATIVE PERCENT: 28 % (ref 20–42)
MCH RBC QN AUTO: 31.8 PG (ref 26–35)
MCHC RBC AUTO-ENTMCNC: 32.2 % (ref 32–34.5)
MCV RBC AUTO: 98.7 FL (ref 80–99.9)
MONOCYTES ABSOLUTE: 0.65 E9/L (ref 0.1–0.95)
MONOCYTES RELATIVE PERCENT: 8.2 % (ref 2–12)
NEUTROPHILS ABSOLUTE: 4.79 E9/L (ref 1.8–7.3)
NEUTROPHILS RELATIVE PERCENT: 60.3 % (ref 43–80)
PDW BLD-RTO: 12.7 FL (ref 11.5–15)
PLATELET # BLD: 115 E9/L (ref 130–450)
PMV BLD AUTO: 9.7 FL (ref 7–12)
RBC # BLD: 4.66 E12/L (ref 3.8–5.8)
WBC # BLD: 7.9 E9/L (ref 4.5–11.5)

## 2018-10-08 PROCEDURE — 83036 HEMOGLOBIN GLYCOSYLATED A1C: CPT

## 2018-10-08 PROCEDURE — 99024 POSTOP FOLLOW-UP VISIT: CPT | Performed by: SURGERY

## 2018-10-08 PROCEDURE — 85025 COMPLETE CBC W/AUTO DIFF WBC: CPT

## 2018-10-08 PROCEDURE — 80061 LIPID PANEL: CPT

## 2018-10-08 PROCEDURE — 80053 COMPREHEN METABOLIC PANEL: CPT

## 2018-10-08 NOTE — PROGRESS NOTES
10/8/2018    Melissaadrianne Pham  1970    Previous Procedures  Previous Procedures  10/4/16 R EIA plasty  R CFA, SFA, Popliteal atherectomy/plasty  R SFA, Popliteal plasty with DCB   6/19/18 R CFA, SFA, popliteal therectomy  R CFA plasty 6x150  R SFA, popliteal plasty 6x150 DCB   7/3/18 L CFA plasty with 7x60 DCB, 8x40 evercross  L profunda plasty 5x60 evercross      Patient returns for post operative evaluation. It was done for lifestyle limiting claudication and currently patient has had significant improvement in this issue. His right leg has no limitations. He is able to walk about 200 yards on the left until he develops cramping in his left calf. His pain associated with this is a 5/10. Crampy achey. Overall it has improved and he recovers more quickly. The patient denies any unexpected problems since the procedure.      Physical Exam:    Gen Awake and Alert  [facility] RRR   Right LE   - The femoral puncture site is soft without evidence of infection or hematoma    Left LE   - wounds are not present        Right      Left   Femoral 1+ 1=     Dorsalis Pedis 1+ monophasic   Posterior Tibial biphasic monophasic       A/P Lifestyle limiting claudication bilateral lower extremity  · R LE claudication resolved  · L LE claudication improved but still present at about 200 yards  · Continue Medical management with ASA, plavix and statin  · pt is a diabetic - emphasized importance of well controlled blood sugars  · Discussed with pt tobacco use and significant relationship to PVD and potential to cause increased problems post intervention   pt currently is not a smoker  · Walking Program  · Emphasized importance of foot care, and to call if develops any wounds or ulcerations, changes in appearance or symptoms  · I reviewed with the patient that normal activities can be resumed as tolerated  · Would need left common femoral endarterectomy, profundoplasty and fem pop bypass in future if needed  · No surgical

## 2018-10-09 LAB
ALBUMIN SERPL-MCNC: 4.6 G/DL (ref 3.5–5.2)
ALP BLD-CCNC: 80 U/L (ref 40–129)
ALT SERPL-CCNC: 16 U/L (ref 0–40)
ANION GAP SERPL CALCULATED.3IONS-SCNC: 25 MMOL/L (ref 7–16)
AST SERPL-CCNC: 18 U/L (ref 0–39)
BILIRUB SERPL-MCNC: 0.3 MG/DL (ref 0–1.2)
BUN BLDV-MCNC: 26 MG/DL (ref 6–20)
CALCIUM SERPL-MCNC: 10 MG/DL (ref 8.6–10.2)
CHLORIDE BLD-SCNC: 100 MMOL/L (ref 98–107)
CHOLESTEROL, TOTAL: 192 MG/DL (ref 0–199)
CO2: 17 MMOL/L (ref 22–29)
CREAT SERPL-MCNC: 1 MG/DL (ref 0.7–1.2)
GFR AFRICAN AMERICAN: >60
GFR NON-AFRICAN AMERICAN: >60 ML/MIN/1.73
GLUCOSE BLD-MCNC: 150 MG/DL (ref 74–109)
HBA1C MFR BLD: 7.7 % (ref 4–5.6)
HDLC SERPL-MCNC: 51 MG/DL
LDL CHOLESTEROL CALCULATED: 112 MG/DL (ref 0–99)
POTASSIUM SERPL-SCNC: 5 MMOL/L (ref 3.5–5)
SODIUM BLD-SCNC: 142 MMOL/L (ref 132–146)
TOTAL PROTEIN: 7.9 G/DL (ref 6.4–8.3)
TRIGL SERPL-MCNC: 143 MG/DL (ref 0–149)
VLDLC SERPL CALC-MCNC: 29 MG/DL

## 2018-10-26 ENCOUNTER — HOSPITAL ENCOUNTER (OUTPATIENT)
Dept: INTERVENTIONAL RADIOLOGY/VASCULAR | Age: 48
Discharge: HOME | End: 2018-10-28
Payer: COMMERCIAL

## 2018-10-26 DIAGNOSIS — I73.9 PVD (PERIPHERAL VASCULAR DISEASE) WITH CLAUDICATION (HCC): ICD-10-CM

## 2018-11-23 ENCOUNTER — HOSPITAL ENCOUNTER (OUTPATIENT)
Dept: INTERVENTIONAL RADIOLOGY/VASCULAR | Age: 48
Discharge: HOME | End: 2018-11-25
Payer: COMMERCIAL

## 2018-11-23 PROCEDURE — 93923 UPR/LXTR ART STDY 3+ LVLS: CPT

## 2019-01-14 ENCOUNTER — OFFICE VISIT (OUTPATIENT)
Dept: VASCULAR SURGERY | Age: 49
End: 2019-01-14
Payer: COMMERCIAL

## 2019-01-14 DIAGNOSIS — I73.9 PVD (PERIPHERAL VASCULAR DISEASE) (HCC): Primary | Chronic | ICD-10-CM

## 2019-01-14 PROCEDURE — 1036F TOBACCO NON-USER: CPT | Performed by: NURSE PRACTITIONER

## 2019-01-14 PROCEDURE — G8427 DOCREV CUR MEDS BY ELIG CLIN: HCPCS | Performed by: NURSE PRACTITIONER

## 2019-01-14 PROCEDURE — G8484 FLU IMMUNIZE NO ADMIN: HCPCS | Performed by: NURSE PRACTITIONER

## 2019-01-14 PROCEDURE — 99213 OFFICE O/P EST LOW 20 MIN: CPT | Performed by: NURSE PRACTITIONER

## 2019-01-14 PROCEDURE — G8419 CALC BMI OUT NRM PARAM NOF/U: HCPCS | Performed by: NURSE PRACTITIONER

## 2019-01-14 RX ORDER — SULFAMETHOXAZOLE AND TRIMETHOPRIM 800; 160 MG/1; MG/1
1 TABLET ORAL 2 TIMES DAILY
Qty: 14 TABLET | Refills: 0 | Status: SHIPPED | OUTPATIENT
Start: 2019-01-14 | End: 2019-01-21

## 2019-02-04 ENCOUNTER — HOSPITAL ENCOUNTER (OUTPATIENT)
Age: 49
Discharge: HOME | End: 2019-02-06
Payer: COMMERCIAL

## 2019-02-04 DIAGNOSIS — E11.8 TYPE 2 DIABETES MELLITUS WITH COMPLICATION, WITHOUT LONG-TERM CURRENT USE OF INSULIN (HCC): ICD-10-CM

## 2019-02-04 DIAGNOSIS — I10 BENIGN HYPERTENSION: ICD-10-CM

## 2019-02-04 DIAGNOSIS — Z12.5 SCREENING FOR PROSTATE CANCER: ICD-10-CM

## 2019-02-04 DIAGNOSIS — R53.83 FATIGUE, UNSPECIFIED TYPE: ICD-10-CM

## 2019-02-04 LAB
ALBUMIN SERPL-MCNC: 4.3 G/DL (ref 3.5–5.2)
ALP BLD-CCNC: 81 U/L (ref 40–129)
ALT SERPL-CCNC: 18 U/L (ref 0–40)
ANION GAP SERPL CALCULATED.3IONS-SCNC: 12 MMOL/L (ref 7–16)
AST SERPL-CCNC: 13 U/L (ref 0–39)
BILIRUB SERPL-MCNC: 0.2 MG/DL (ref 0–1.2)
BUN BLDV-MCNC: 19 MG/DL (ref 6–20)
CALCIUM SERPL-MCNC: 9.5 MG/DL (ref 8.6–10.2)
CHLORIDE BLD-SCNC: 105 MMOL/L (ref 98–107)
CHOLESTEROL, TOTAL: 177 MG/DL (ref 0–199)
CO2: 24 MMOL/L (ref 22–29)
CREAT SERPL-MCNC: 0.9 MG/DL (ref 0.7–1.2)
GFR AFRICAN AMERICAN: >60
GFR NON-AFRICAN AMERICAN: >60 ML/MIN/1.73
GLUCOSE BLD-MCNC: 155 MG/DL (ref 74–99)
HBA1C MFR BLD: 8.1 % (ref 4–5.6)
HDLC SERPL-MCNC: 46 MG/DL
LDL CHOLESTEROL CALCULATED: 100 MG/DL (ref 0–99)
POTASSIUM SERPL-SCNC: 4.3 MMOL/L (ref 3.5–5)
PROSTATE SPECIFIC ANTIGEN: 0.69 NG/ML (ref 0–4)
SODIUM BLD-SCNC: 141 MMOL/L (ref 132–146)
TOTAL PROTEIN: 7.5 G/DL (ref 6.4–8.3)
TRIGL SERPL-MCNC: 155 MG/DL (ref 0–149)
VLDLC SERPL CALC-MCNC: 31 MG/DL

## 2019-02-04 PROCEDURE — 83036 HEMOGLOBIN GLYCOSYLATED A1C: CPT

## 2019-02-04 PROCEDURE — 80061 LIPID PANEL: CPT

## 2019-02-04 PROCEDURE — 80053 COMPREHEN METABOLIC PANEL: CPT

## 2019-02-04 PROCEDURE — G0103 PSA SCREENING: HCPCS

## 2019-02-04 PROCEDURE — 85025 COMPLETE CBC W/AUTO DIFF WBC: CPT

## 2019-02-05 LAB
BASOPHILS ABSOLUTE: 0.09 E9/L (ref 0–0.2)
BASOPHILS RELATIVE PERCENT: 1.1 % (ref 0–2)
EOSINOPHILS ABSOLUTE: 0.25 E9/L (ref 0.05–0.5)
EOSINOPHILS RELATIVE PERCENT: 3 % (ref 0–6)
HCT VFR BLD CALC: 41.1 % (ref 37–54)
HEMOGLOBIN: 13.7 G/DL (ref 12.5–16.5)
IMMATURE GRANULOCYTES #: 0.04 E9/L
IMMATURE GRANULOCYTES %: 0.5 % (ref 0–5)
LYMPHOCYTES ABSOLUTE: 2.25 E9/L (ref 1.5–4)
LYMPHOCYTES RELATIVE PERCENT: 27.4 % (ref 20–42)
MCH RBC QN AUTO: 32.1 PG (ref 26–35)
MCHC RBC AUTO-ENTMCNC: 33.3 % (ref 32–34.5)
MCV RBC AUTO: 96.3 FL (ref 80–99.9)
MONOCYTES ABSOLUTE: 0.52 E9/L (ref 0.1–0.95)
MONOCYTES RELATIVE PERCENT: 6.3 % (ref 2–12)
NEUTROPHILS ABSOLUTE: 5.07 E9/L (ref 1.8–7.3)
NEUTROPHILS RELATIVE PERCENT: 61.7 % (ref 43–80)
PDW BLD-RTO: 12.8 FL (ref 11.5–15)
PLATELET # BLD: 222 E9/L (ref 130–450)
PMV BLD AUTO: 10.1 FL (ref 7–12)
RBC # BLD: 4.27 E12/L (ref 3.8–5.8)
WBC # BLD: 8.2 E9/L (ref 4.5–11.5)

## 2019-02-22 PROBLEM — E11.41 DIABETIC MONONEUROPATHY ASSOCIATED WITH TYPE 2 DIABETES MELLITUS (HCC): Status: ACTIVE | Noted: 2019-02-22

## 2019-07-15 ENCOUNTER — TELEPHONE (OUTPATIENT)
Dept: VASCULAR SURGERY | Age: 49
End: 2019-07-15

## 2019-07-15 ENCOUNTER — OFFICE VISIT (OUTPATIENT)
Dept: VASCULAR SURGERY | Age: 49
End: 2019-07-15
Payer: COMMERCIAL

## 2019-07-15 DIAGNOSIS — I73.9 PVD (PERIPHERAL VASCULAR DISEASE) WITH CLAUDICATION (HCC): Primary | ICD-10-CM

## 2019-07-15 DIAGNOSIS — Z71.6 TOBACCO ABUSE COUNSELING: Chronic | ICD-10-CM

## 2019-07-15 PROCEDURE — 1036F TOBACCO NON-USER: CPT | Performed by: SURGERY

## 2019-07-15 PROCEDURE — G8427 DOCREV CUR MEDS BY ELIG CLIN: HCPCS | Performed by: SURGERY

## 2019-07-15 PROCEDURE — 99213 OFFICE O/P EST LOW 20 MIN: CPT | Performed by: SURGERY

## 2019-07-15 PROCEDURE — G8417 CALC BMI ABV UP PARAM F/U: HCPCS | Performed by: SURGERY

## 2019-07-15 NOTE — PROGRESS NOTES
Smokeless tobacco: Former User     Quit date: 10/15/2016   Substance and Sexual Activity    Alcohol use: Yes     Comment: once a week; coffee 1 cup a day and 1 mountain dew  daily    Drug use: No    Sexual activity: Not on file   Lifestyle    Physical activity:     Days per week: Not on file     Minutes per session: Not on file    Stress: Not on file   Relationships    Social connections:     Talks on phone: Not on file     Gets together: Not on file     Attends Protestant service: Not on file     Active member of club or organization: Not on file     Attends meetings of clubs or organizations: Not on file     Relationship status: Not on file    Intimate partner violence:     Fear of current or ex partner: Not on file     Emotionally abused: Not on file     Physically abused: Not on file     Forced sexual activity: Not on file   Other Topics Concern    Not on file   Social History Narrative    Not on file     Family History   Problem Relation Age of Onset    Asthma Mother     Other Father         vascular problems    Cancer Father         prostate, lung    Diabetes Father      Labs  Lab Results   Component Value Date    WBC 8.2 02/04/2019    HGB 13.7 02/04/2019    HCT 41.1 02/04/2019     02/04/2019    PROTIME 12.3 07/03/2018    INR 1.1 07/03/2018    K 4.3 02/04/2019    BUN 19 02/04/2019    CREATININE 0.9 02/04/2019     PHYSICAL EXAM:    There were no vitals taken for this visit.   CONSTITUTIONAL:   Awake, alert, cooperative  PSYCHIATRIC :  Oriented to time, place and person     Appropriate insight to disease process  EYES: Lids and lashes normal  ENT:  External ears and nose without lesions  NECK: Supple, symmetrical, trachea midline   Carotid bruit absent  LUNGS:  No increased work of breathing                 Clear to auscultation  CARDIOVASCULAR:  regular rate and rhythm   ABDOMEN:  soft, non-distended, non-tender  SKIN:   Normal skin color   Texture and turgor normal, no

## 2019-07-15 NOTE — TELEPHONE ENCOUNTER
Notified patient's wife of lower extremity arterial doppler study at [facility] on Tuesday, 7-16-19, at 1:00 pm.

## 2019-07-16 ENCOUNTER — HOSPITAL ENCOUNTER (OUTPATIENT)
Dept: INTERVENTIONAL RADIOLOGY/VASCULAR | Age: 49
Discharge: HOME | End: 2019-07-18
Payer: COMMERCIAL

## 2019-07-16 DIAGNOSIS — I73.9 PVD (PERIPHERAL VASCULAR DISEASE) (HCC): Chronic | ICD-10-CM

## 2019-07-16 PROCEDURE — 93923 UPR/LXTR ART STDY 3+ LVLS: CPT

## 2019-07-22 ENCOUNTER — TELEPHONE (OUTPATIENT)
Dept: CARDIAC CATH/INVASIVE PROCEDURES | Age: 49
End: 2019-07-22

## 2019-07-22 NOTE — TELEPHONE ENCOUNTER
Reminded patient of scheduled procedure on 7/23/19 @ 0930, to arrive by 0730 and to go to registration first.

## 2019-07-23 ENCOUNTER — HOSPITAL ENCOUNTER (OUTPATIENT)
Dept: CARDIAC CATH/INVASIVE PROCEDURES | Age: 49
Discharge: HOME | End: 2019-07-23
Payer: COMMERCIAL

## 2019-07-23 VITALS
RESPIRATION RATE: 18 BRPM | DIASTOLIC BLOOD PRESSURE: 77 MMHG | SYSTOLIC BLOOD PRESSURE: 119 MMHG | HEART RATE: 79 BPM | TEMPERATURE: 98 F

## 2019-07-23 DIAGNOSIS — I10 HYPERTENSION, UNSPECIFIED TYPE: Primary | ICD-10-CM

## 2019-07-23 DIAGNOSIS — I73.9 PVD (PERIPHERAL VASCULAR DISEASE) (HCC): Chronic | ICD-10-CM

## 2019-07-23 LAB
ABO/RH: NORMAL
ANION GAP SERPL CALCULATED.3IONS-SCNC: 11 MMOL/L (ref 7–16)
ANTIBODY SCREEN: NORMAL
BUN BLDV-MCNC: 21 MG/DL (ref 6–20)
CALCIUM SERPL-MCNC: 9.1 MG/DL (ref 8.6–10.2)
CHLORIDE BLD-SCNC: 104 MMOL/L (ref 98–107)
CO2: 22 MMOL/L (ref 22–29)
CREAT SERPL-MCNC: 0.9 MG/DL (ref 0.7–1.2)
GFR AFRICAN AMERICAN: >60
GFR NON-AFRICAN AMERICAN: >60 ML/MIN/1.73
GLUCOSE BLD-MCNC: 141 MG/DL (ref 74–99)
HCT VFR BLD CALC: 36.4 % (ref 37–54)
HEMOGLOBIN: 12.8 G/DL (ref 12.5–16.5)
INR BLD: 1
MCH RBC QN AUTO: 33.3 PG (ref 26–35)
MCHC RBC AUTO-ENTMCNC: 35.2 % (ref 32–34.5)
MCV RBC AUTO: 94.8 FL (ref 80–99.9)
PDW BLD-RTO: 12 FL (ref 11.5–15)
PLATELET # BLD: 244 E9/L (ref 130–450)
PMV BLD AUTO: 9.3 FL (ref 7–12)
POTASSIUM REFLEX MAGNESIUM: 6.1 MMOL/L (ref 3.5–5)
POTASSIUM SERPL-SCNC: 4.5 MMOL/L (ref 3.5–5)
PROTHROMBIN TIME: 11.4 SEC (ref 9.3–12.4)
RBC # BLD: 3.84 E12/L (ref 3.8–5.8)
SODIUM BLD-SCNC: 137 MMOL/L (ref 132–146)
WBC # BLD: 7.8 E9/L (ref 4.5–11.5)

## 2019-07-23 PROCEDURE — 86901 BLOOD TYPING SEROLOGIC RH(D): CPT

## 2019-07-23 PROCEDURE — 37224 PR REVSC OPN/PRG FEM/POP W/ANGIOPLASTY UNI: CPT | Performed by: SURGERY

## 2019-07-23 PROCEDURE — 36415 COLL VENOUS BLD VENIPUNCTURE: CPT

## 2019-07-23 PROCEDURE — 86900 BLOOD TYPING SEROLOGIC ABO: CPT

## 2019-07-23 PROCEDURE — C1760 CLOSURE DEV, VASC: HCPCS

## 2019-07-23 PROCEDURE — 80048 BASIC METABOLIC PNL TOTAL CA: CPT

## 2019-07-23 PROCEDURE — 85610 PROTHROMBIN TIME: CPT

## 2019-07-23 PROCEDURE — 2709999900 HC NON-CHARGEABLE SUPPLY

## 2019-07-23 PROCEDURE — C1769 GUIDE WIRE: HCPCS

## 2019-07-23 PROCEDURE — C1894 INTRO/SHEATH, NON-LASER: HCPCS

## 2019-07-23 PROCEDURE — C1887 CATHETER, GUIDING: HCPCS

## 2019-07-23 PROCEDURE — 86850 RBC ANTIBODY SCREEN: CPT

## 2019-07-23 PROCEDURE — 2500000003 HC RX 250 WO HCPCS

## 2019-07-23 PROCEDURE — 75710 ARTERY X-RAYS ARM/LEG: CPT | Performed by: SURGERY

## 2019-07-23 PROCEDURE — 85027 COMPLETE CBC AUTOMATED: CPT

## 2019-07-23 PROCEDURE — C1725 CATH, TRANSLUMIN NON-LASER: HCPCS

## 2019-07-23 PROCEDURE — 84132 ASSAY OF SERUM POTASSIUM: CPT

## 2019-07-23 PROCEDURE — 6360000002 HC RX W HCPCS

## 2019-07-23 PROCEDURE — 37224 HC FEM POP TERRITORY PLASTY: CPT | Performed by: SURGERY

## 2019-07-23 PROCEDURE — 6360000002 HC RX W HCPCS: Performed by: SURGERY

## 2019-07-23 RX ORDER — SODIUM CHLORIDE 0.9 % (FLUSH) 0.9 %
10 SYRINGE (ML) INJECTION EVERY 12 HOURS SCHEDULED
Status: DISCONTINUED | OUTPATIENT
Start: 2019-07-23 | End: 2019-07-24 | Stop reason: HOSPADM

## 2019-07-23 RX ORDER — CEFAZOLIN SODIUM 2 G/50ML
2 SOLUTION INTRAVENOUS
Status: COMPLETED | OUTPATIENT
Start: 2019-07-23 | End: 2019-07-23

## 2019-07-23 RX ORDER — SODIUM CHLORIDE 0.9 % (FLUSH) 0.9 %
10 SYRINGE (ML) INJECTION PRN
Status: DISCONTINUED | OUTPATIENT
Start: 2019-07-23 | End: 2019-07-24 | Stop reason: HOSPADM

## 2019-07-23 RX ORDER — SODIUM CHLORIDE 9 MG/ML
INJECTION, SOLUTION INTRAVENOUS CONTINUOUS
Status: DISCONTINUED | OUTPATIENT
Start: 2019-07-23 | End: 2019-07-24 | Stop reason: HOSPADM

## 2019-07-23 RX ADMIN — CEFAZOLIN SODIUM 2 G: 2 SOLUTION INTRAVENOUS at 09:03

## 2019-07-30 ENCOUNTER — OFFICE VISIT (OUTPATIENT)
Dept: CARDIOLOGY CLINIC | Age: 49
End: 2019-07-30
Payer: COMMERCIAL

## 2019-07-30 VITALS
BODY MASS INDEX: 30.06 KG/M2 | HEIGHT: 70 IN | SYSTOLIC BLOOD PRESSURE: 120 MMHG | HEART RATE: 96 BPM | DIASTOLIC BLOOD PRESSURE: 76 MMHG | WEIGHT: 210 LBS | RESPIRATION RATE: 16 BRPM

## 2019-07-30 DIAGNOSIS — Z01.810 PREOPERATIVE CARDIOVASCULAR EXAMINATION: ICD-10-CM

## 2019-07-30 DIAGNOSIS — E11.51 TYPE 2 DIABETES MELLITUS WITH DIABETIC PERIPHERAL ANGIOPATHY WITHOUT GANGRENE, WITHOUT LONG-TERM CURRENT USE OF INSULIN (HCC): Chronic | ICD-10-CM

## 2019-07-30 DIAGNOSIS — J44.9 CHRONIC OBSTRUCTIVE PULMONARY DISEASE, UNSPECIFIED COPD TYPE (HCC): ICD-10-CM

## 2019-07-30 DIAGNOSIS — E66.8 MODERATE OBESITY: ICD-10-CM

## 2019-07-30 DIAGNOSIS — I73.9 PERIPHERAL VASCULAR DISEASE OF LOWER EXTREMITY (HCC): Primary | ICD-10-CM

## 2019-07-30 DIAGNOSIS — E78.00 PURE HYPERCHOLESTEROLEMIA: ICD-10-CM

## 2019-07-30 PROCEDURE — 93000 ELECTROCARDIOGRAM COMPLETE: CPT | Performed by: INTERNAL MEDICINE

## 2019-07-30 PROCEDURE — G8926 SPIRO NO PERF OR DOC: HCPCS | Performed by: INTERNAL MEDICINE

## 2019-07-30 PROCEDURE — 1036F TOBACCO NON-USER: CPT | Performed by: INTERNAL MEDICINE

## 2019-07-30 PROCEDURE — 3045F PR MOST RECENT HEMOGLOBIN A1C LEVEL 7.0-9.0%: CPT | Performed by: INTERNAL MEDICINE

## 2019-07-30 PROCEDURE — G8427 DOCREV CUR MEDS BY ELIG CLIN: HCPCS | Performed by: INTERNAL MEDICINE

## 2019-07-30 PROCEDURE — 3023F SPIROM DOC REV: CPT | Performed by: INTERNAL MEDICINE

## 2019-07-30 PROCEDURE — G8417 CALC BMI ABV UP PARAM F/U: HCPCS | Performed by: INTERNAL MEDICINE

## 2019-07-30 PROCEDURE — 99214 OFFICE O/P EST MOD 30 MIN: CPT | Performed by: INTERNAL MEDICINE

## 2019-07-30 PROCEDURE — 2022F DILAT RTA XM EVC RTNOPTHY: CPT | Performed by: INTERNAL MEDICINE

## 2019-07-30 SDOH — HEALTH STABILITY: MENTAL HEALTH: HOW MANY STANDARD DRINKS CONTAINING ALCOHOL DO YOU HAVE ON A TYPICAL DAY?: 1 OR 2

## 2019-07-30 SDOH — HEALTH STABILITY: MENTAL HEALTH: HOW OFTEN DO YOU HAVE A DRINK CONTAINING ALCOHOL?: 2-4 TIMES A MONTH

## 2019-08-01 ENCOUNTER — TELEPHONE (OUTPATIENT)
Dept: CARDIOLOGY CLINIC | Age: 49
End: 2019-08-01

## 2019-08-01 ENCOUNTER — HOSPITAL ENCOUNTER (OUTPATIENT)
Dept: CARDIOLOGY | Age: 49
Discharge: HOME | End: 2019-08-01
Payer: COMMERCIAL

## 2019-08-01 VITALS
BODY MASS INDEX: 30.06 KG/M2 | DIASTOLIC BLOOD PRESSURE: 80 MMHG | SYSTOLIC BLOOD PRESSURE: 110 MMHG | HEART RATE: 109 BPM | WEIGHT: 210 LBS | HEIGHT: 70 IN

## 2019-08-01 DIAGNOSIS — I73.9 PERIPHERAL VASCULAR DISEASE OF LOWER EXTREMITY (HCC): ICD-10-CM

## 2019-08-01 DIAGNOSIS — Z01.810 PREOPERATIVE CARDIOVASCULAR EXAMINATION: ICD-10-CM

## 2019-08-01 PROCEDURE — 2580000003 HC RX 258: Performed by: INTERNAL MEDICINE

## 2019-08-01 PROCEDURE — 6360000002 HC RX W HCPCS: Performed by: INTERNAL MEDICINE

## 2019-08-01 PROCEDURE — 78452 HT MUSCLE IMAGE SPECT MULT: CPT

## 2019-08-01 PROCEDURE — A9502 TC99M TETROFOSMIN: HCPCS | Performed by: INTERNAL MEDICINE

## 2019-08-01 PROCEDURE — 3430000000 HC RX DIAGNOSTIC RADIOPHARMACEUTICAL: Performed by: INTERNAL MEDICINE

## 2019-08-01 PROCEDURE — 93017 CV STRESS TEST TRACING ONLY: CPT

## 2019-08-01 RX ORDER — SODIUM CHLORIDE 0.9 % (FLUSH) 0.9 %
10 SYRINGE (ML) INJECTION PRN
Status: DISCONTINUED | OUTPATIENT
Start: 2019-08-01 | End: 2019-08-02 | Stop reason: HOSPADM

## 2019-08-01 RX ADMIN — TETROFOSMIN 27.4 MILLICURIE: 0.23 INJECTION, POWDER, LYOPHILIZED, FOR SOLUTION INTRAVENOUS at 11:32

## 2019-08-01 RX ADMIN — Medication 10 ML: at 09:33

## 2019-08-01 RX ADMIN — Medication 10 ML: at 11:25

## 2019-08-01 RX ADMIN — REGADENOSON 0.4 MG: 0.08 INJECTION, SOLUTION INTRAVENOUS at 11:32

## 2019-08-01 RX ADMIN — TETROFOSMIN 8.2 MILLICURIE: 0.23 INJECTION, POWDER, LYOPHILIZED, FOR SOLUTION INTRAVENOUS at 09:33

## 2019-08-01 RX ADMIN — Medication 10 ML: at 11:32

## 2019-08-01 NOTE — PROCEDURES
[id number] [address],[facility] - [address]. JOSSELINE guerra, [address] [place] [address]  [phone]               Pharmacologic Stress Nuclear Gated SPECT Study    Name: [address] [place] [address] Account Number: [de-identified]    :  1970          Sex: male         Date of Study:  2019    Height: 5' 10\" (177.8 cm)         Weight: 210 lb (95.3 kg)     Ordering Provider: Isabel Castro          PCP: Daphne Medley DO      Cardiologist: Isabel Castro             Interpreting Physician: Isabel Castro  _________________________________________________________________________________    Indication:   Preoperative Risk Stratification    Clinical History:   Patient has no known history of coronary artery disease. Resting ECG:     HR 85 bpm  Normal sinus rhythm    Procedure:   Pharmacologic stress testing was performed with regadenoson 0.4 mg for 15 seconds. The heart rate was 85 at baseline and kevin to 106 beats during the infusion. The blood pressure at baseline was 110/80 and blood pressure at the end of infusion was 102/70. Blood pressure response was normal during the stress procedure. The patient tolerated the infusion well. ECG during the infusion did not change. IMAGING: Myocardial perfusion imaging was performed at rest 30-35 minutes following the intravenous injection of 8.2 mCi of (Tc-tetrofosmin) followed by 10 ml of Normal Saline. As per infusion protocol, the patient was injected intravenously with 27.4 mCi of (Tc-tetrofosmin) followed by 10 ml of Normal Saline. Gated post-stress tomographic imaging was performed 45 minutes after stress. FINDINGS: The overall quality of the study was good. Left ventricular cavity size was noted to be normal.    Rotational analog analysis demonstrated no patient motion or abnormal extracardiac radioactivity.     The gated SPECT stress imaging in the short, vertical long, and horizontal long axis demonstrated normal homogeneous tracer distribution throughout the myocardium both on the post regadenoson and resting images. Gated SPECT left ventricular ejection fraction was calculated to be 71%, with normal myocardial thickening and wall motion. Impression:    1. Electrocardiographically normal regadenoson infusion with a clinicallynonischemic response. 2. Myocardial perfusion imaging was normal.    3. Overall left ventricular systolic function was normal without regional wall motion abnormalities. 4.   Intermediate risk pre-operative pharmacologic stress test    Thank you for sending your patient to this [employer].      Electronically signed by Dulce Quintero MD on 8/1/19 at 1:22 PM

## 2019-08-05 ENCOUNTER — OFFICE VISIT (OUTPATIENT)
Dept: VASCULAR SURGERY | Age: 49
End: 2019-08-05

## 2019-08-05 DIAGNOSIS — I73.9 PVD (PERIPHERAL VASCULAR DISEASE) WITH CLAUDICATION (HCC): Primary | ICD-10-CM

## 2019-08-05 PROCEDURE — 99024 POSTOP FOLLOW-UP VISIT: CPT | Performed by: SURGERY

## 2019-08-05 NOTE — PROGRESS NOTES
Vascular Surgery Post Operative Note    PCP : Mallory Thompson DO   Podiatrist: Myrtle Roman DPM    8/5/2019    Conrad Martinez  1970    Previous Procedures    10/4/16 R EIA plasty  R CFA, SFA, Popliteal atherectomy/plasty  R SFA, Popliteal plasty with DCB   6/19/18 R CFA, SFA, popliteal therectomy  R CFA plasty 6x150  R SFA, popliteal plasty 6x150 DCB   7/3/18 L CFA plasty with 7x60 DCB, 8x40 evercross  L profunda plasty 5x60 evercross   7/23/19 Left angiogram, Left Common femoral and profunda plasty with 6x80 evercross, 9x80 evercross      Patient returns for post operative evaluation. It was done for peripheral vascular disease and  B/L LE lifestyle limiting claudication and currently patient has had no significant  improvement in this issue. He states that his symptoms have remained the same but have not worsened. He continues to have B LE lifestyle limiting claudication symptoms with L > R. The pain is still described as cramping and shooting down the back of the calf. He is still only able to walk about 50 yards before he begins to experience L LE symptoms and needs to rest for approximately 10 minutes. He has not been taking anything for pain. 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 He denies rest pain or tissue loss. Physical Exam:    Gen Awake and Alert  CVS RRR   Right LE   - The femoral puncture site is soft without evidence of infection or hematoma    Left LE   - wounds are not present        Right      Left   Femoral 1+    Dorsalis Pedis monophasic monophasic   Posterior Tibial monophasic monophasic     A/P S/P Left angiogram, Left Common femoral and profunda plasty with 6x80 evercross, 9x80 evercross   · Left common femoral endarterectomy with femoral popliteal bypass  · Will arrange and schedule at the earliest available date and time.    · Pt was cleared for surgical procedure on 8/1/19 by Dr. Ajit Chu MD - caridiology  · Continue Medical management with ASA, Plavix, Pletal and statin  · pt is a diabetic - emphasized importance of well controlled blood sugars  · Discussed with pt tobacco use and significant relationship to PVD and potential to cause increased problems post intervention    pt currently is not a smoker      GIOVANNI Saucedo, RN  Student Nurse Practitioner    Patient seen and examined  Please see above addendum's  Patient scheduled for left lower extremity surgery  · Femoral endarterectomy, profundoplasty, fem ak popliteal bypass with vein possible prosthetic possible popliteal intervention, angiogram  I reviewed the procedure with the patient and family as available. I discussed the procedure, risks, benefits, complications, and alternatives of the procedure. They understand and consent.   All questions were answered    Yasir Coreas

## 2019-08-06 NOTE — PROGRESS NOTES
[facility] PRE-ADMISSION TESTING GENERAL INSTRUCTIONS- formerly [facility]-phone number:[phone]    GENERAL INSTRUCTIONS  [x] Antibacterial Soap shower Night before and/or AM of Surgery. [x] Nothing by mouth after midnight, including gum, candy, mints, or water. [x] You may brush your teeth, gargle, but do NOT swallow water. [x]No smoking, chewing tobacco, illegal drugs, or alcohol within 24 hours of your surgery. [x] Jewelry, valuables or body piercing's should not be brought to the hospital. All body and/or tongue piercing's must be removed prior to arriving to hospital.  ALL hair pins must be removed. [x] Arrange transportation with a responsible adult  to and from the hospital. If you do not have a responsible adult  to transport you, you will need to make arrangements with a medical transportation company (i.e. [employer]. A Uber/taxi/bus is not appropriate unless you are accompanied by a responsible adult ). Arrange for someone to be with you for the remainder of the day and for 24 hours after your procedure due to having had anesthesia. Who will be your  for transportation?___________WIFE_______ _  [x] Bring insurance card and photo ID. [x] Transfusion Bracelet: Please bring with you to hospital, day of surgery  [x] Bring copy of living will or healthcare power of  papers to be placed in your electronic record. PARKING INSTRUCTIONS:   [x] Arrival Time:___0630__________  · [x] Parking lot '\"I\"  is located on [facility] (the corner of [facility]). To enter, press the button and the gate will lift. A free token will be provided to exit the lot. One car per patient is allowed to park in this lot. All other cars are to park on [address] [place] [address] Street either in the parking garage or the handicap lot.       EDUCATION INSTRUCTIONS:    .  [x] Pre-admission Testing educational folder given  [x] Incentive Spirometry,coughing & deep

## 2019-08-08 ENCOUNTER — ANESTHESIA EVENT (OUTPATIENT)
Dept: OPERATING ROOM | Age: 49
DRG: 253 | End: 2019-08-08
Payer: COMMERCIAL

## 2019-08-08 ENCOUNTER — HOSPITAL ENCOUNTER (OUTPATIENT)
Dept: PREADMISSION TESTING | Age: 49
Discharge: HOME | End: 2019-08-08
Payer: COMMERCIAL

## 2019-08-08 ENCOUNTER — HOSPITAL ENCOUNTER (OUTPATIENT)
Dept: GENERAL RADIOLOGY | Age: 49
Discharge: HOME | End: 2019-08-10
Payer: COMMERCIAL

## 2019-08-08 VITALS
HEART RATE: 102 BPM | SYSTOLIC BLOOD PRESSURE: 107 MMHG | DIASTOLIC BLOOD PRESSURE: 61 MMHG | OXYGEN SATURATION: 96 % | WEIGHT: 210 LBS | BODY MASS INDEX: 30.06 KG/M2 | HEIGHT: 70 IN | RESPIRATION RATE: 18 BRPM | TEMPERATURE: 98.5 F

## 2019-08-08 DIAGNOSIS — Z01.818 PRE-OP TESTING: Primary | ICD-10-CM

## 2019-08-08 DIAGNOSIS — I73.9 PVD (PERIPHERAL VASCULAR DISEASE) (HCC): ICD-10-CM

## 2019-08-08 LAB
ABO/RH: NORMAL
ANION GAP SERPL CALCULATED.3IONS-SCNC: 14 MMOL/L (ref 7–16)
ANTIBODY SCREEN: NORMAL
BUN BLDV-MCNC: 14 MG/DL (ref 6–20)
CALCIUM SERPL-MCNC: 9.3 MG/DL (ref 8.6–10.2)
CHLORIDE BLD-SCNC: 99 MMOL/L (ref 98–107)
CO2: 26 MMOL/L (ref 22–29)
CREAT SERPL-MCNC: 0.9 MG/DL (ref 0.7–1.2)
GFR AFRICAN AMERICAN: >60
GFR NON-AFRICAN AMERICAN: >60 ML/MIN/1.73
GLUCOSE BLD-MCNC: 162 MG/DL (ref 74–99)
HCT VFR BLD CALC: 39.1 % (ref 37–54)
HEMOGLOBIN: 13.6 G/DL (ref 12.5–16.5)
INR BLD: 1.1
MCH RBC QN AUTO: 33.2 PG (ref 26–35)
MCHC RBC AUTO-ENTMCNC: 34.8 % (ref 32–34.5)
MCV RBC AUTO: 95.4 FL (ref 80–99.9)
PDW BLD-RTO: 12.3 FL (ref 11.5–15)
PLATELET # BLD: 261 E9/L (ref 130–450)
PMV BLD AUTO: 9.5 FL (ref 7–12)
POTASSIUM REFLEX MAGNESIUM: 4.8 MMOL/L (ref 3.5–5)
PROTHROMBIN TIME: 12.2 SEC (ref 9.3–12.4)
RBC # BLD: 4.1 E12/L (ref 3.8–5.8)
SODIUM BLD-SCNC: 139 MMOL/L (ref 132–146)
WBC # BLD: 8.4 E9/L (ref 4.5–11.5)

## 2019-08-08 PROCEDURE — 71046 X-RAY EXAM CHEST 2 VIEWS: CPT

## 2019-08-08 PROCEDURE — 36415 COLL VENOUS BLD VENIPUNCTURE: CPT

## 2019-08-08 PROCEDURE — 87081 CULTURE SCREEN ONLY: CPT

## 2019-08-08 PROCEDURE — 86901 BLOOD TYPING SEROLOGIC RH(D): CPT

## 2019-08-08 PROCEDURE — 85027 COMPLETE CBC AUTOMATED: CPT

## 2019-08-08 PROCEDURE — 80048 BASIC METABOLIC PNL TOTAL CA: CPT

## 2019-08-08 PROCEDURE — 86923 COMPATIBILITY TEST ELECTRIC: CPT

## 2019-08-08 PROCEDURE — 86900 BLOOD TYPING SEROLOGIC ABO: CPT

## 2019-08-08 PROCEDURE — 86850 RBC ANTIBODY SCREEN: CPT

## 2019-08-08 PROCEDURE — 85610 PROTHROMBIN TIME: CPT

## 2019-08-08 NOTE — ANESTHESIA PRE PROCEDURE
tobacco: Former User     Types: Snuff     Quit date: 1/1/2004   Substance Use Topics    Alcohol use: Yes     Alcohol/week: 12.0 standard drinks     Types: 12 Cans of beer per week     Frequency: 2-4 times a month     Drinks per session: 1 or 2     Comment: 2 X WEEK                                Counseling given: Not Answered      Vital Signs (Current):   Vitals:    08/16/19 0600   BP: 139/89   Pulse: 92   Resp: 18   Temp: 97.8 °F (36.6 °C)   TempSrc: Temporal   SpO2: 95%   Weight: 210 lb (95.3 kg)   Height: 5' 10\" (1.778 m)                                              BP Readings from Last 3 Encounters:   08/16/19 139/89   08/08/19 107/61   08/01/19 110/80       NPO Status: Time of last liquid consumption: 1900before midnight                        Time of last solid consumption: 1900                        Date of last liquid consumption: 08/15/19                        Date of last solid food consumption: 08/15/19    BMI:   Wt Readings from Last 3 Encounters:   08/16/19 210 lb (95.3 kg)   08/08/19 210 lb (95.3 kg)   08/01/19 210 lb (95.3 kg)     Body mass index is 30.13 kg/m². CBC:   Lab Results   Component Value Date    WBC 8.4 08/08/2019    RBC 4.10 08/08/2019    HGB 13.6 08/08/2019    HCT 39.1 08/08/2019    MCV 95.4 08/08/2019    RDW 12.3 08/08/2019     08/08/2019       CMP:   Lab Results   Component Value Date     08/08/2019    K 4.8 08/08/2019    CL 99 08/08/2019    CO2 26 08/08/2019    BUN 14 08/08/2019    CREATININE 0.9 08/08/2019    GFRAA >60 08/08/2019    LABGLOM >60 08/08/2019    GLUCOSE 162 08/08/2019    PROT 7.5 02/04/2019    CALCIUM 9.3 08/08/2019    BILITOT 0.2 02/04/2019    ALKPHOS 81 02/04/2019    AST 13 02/04/2019    ALT 18 02/04/2019       POC Tests: No results for input(s): POCGLU, POCNA, POCK, POCCL, POCBUN, POCHEMO, POCHCT in the last 72 hours.     Coags:   Lab Results   Component Value Date    PROTIME 12.2 08/08/2019    INR 1.1 08/08/2019       HCG (If Applicable): No results

## 2019-08-09 LAB — MRSA CULTURE ONLY: NORMAL

## 2019-08-16 ENCOUNTER — HOSPITAL ENCOUNTER (INPATIENT)
Age: 49
LOS: 3 days | Discharge: HOME HEALTH | DRG: 253 | End: 2019-08-19
Attending: SURGERY | Admitting: SURGERY
Payer: COMMERCIAL

## 2019-08-16 ENCOUNTER — ANESTHESIA (OUTPATIENT)
Dept: OPERATING ROOM | Age: 49
DRG: 253 | End: 2019-08-16
Payer: COMMERCIAL

## 2019-08-16 VITALS — OXYGEN SATURATION: 96 % | SYSTOLIC BLOOD PRESSURE: 91 MMHG | DIASTOLIC BLOOD PRESSURE: 55 MMHG | TEMPERATURE: 98.4 F

## 2019-08-16 DIAGNOSIS — Z01.818 PRE-OP TESTING: ICD-10-CM

## 2019-08-16 DIAGNOSIS — I73.9 PERIPHERAL VASCULAR DISEASE OF LOWER EXTREMITY (HCC): ICD-10-CM

## 2019-08-16 DIAGNOSIS — I73.9 PVD (PERIPHERAL VASCULAR DISEASE) WITH CLAUDICATION (HCC): Primary | ICD-10-CM

## 2019-08-16 LAB
ALBUMIN SERPL-MCNC: 3.8 G/DL (ref 3.5–5.2)
ALP BLD-CCNC: 58 U/L (ref 40–129)
ALT SERPL-CCNC: 16 U/L (ref 0–40)
ANION GAP SERPL CALCULATED.3IONS-SCNC: 17 MMOL/L (ref 7–16)
APTT: 24.8 SEC (ref 24.5–35.1)
AST SERPL-CCNC: 17 U/L (ref 0–39)
B.E.: -11 MMOL/L (ref -3–3)
B.E.: -3.3 MMOL/L (ref -3–0)
B.E.: -8.1 MMOL/L (ref -3–0)
B.E.: -9 MMOL/L (ref -3–0)
BASOPHILS ABSOLUTE: 0.03 E9/L (ref 0–0.2)
BASOPHILS RELATIVE PERCENT: 0.2 % (ref 0–2)
BILIRUB SERPL-MCNC: 0.4 MG/DL (ref 0–1.2)
BUN BLDV-MCNC: 15 MG/DL (ref 6–20)
CALCIUM IONIZED: 1.17 MMOL/L (ref 1.15–1.33)
CALCIUM SERPL-MCNC: 8 MG/DL (ref 8.6–10.2)
CARDIOPULMONARY BYPASS: NO
CARDIOPULMONARY BYPASS: NO
CARDIOPULMONARY BYPASS: YES
CHLORIDE BLD-SCNC: 110 MMOL/L (ref 98–107)
CO2: 18 MMOL/L (ref 22–29)
COHB: 0.2 % (ref 0–1.5)
CREAT SERPL-MCNC: 0.8 MG/DL (ref 0.7–1.2)
CRITICAL NOTIFICATION: YES
CRITICAL: ABNORMAL
DATE ANALYZED: ABNORMAL
DATE OF COLLECTION: ABNORMAL
DEVICE: ABNORMAL
EOSINOPHILS ABSOLUTE: 0 E9/L (ref 0.05–0.5)
EOSINOPHILS RELATIVE PERCENT: 0 % (ref 0–6)
GFR AFRICAN AMERICAN: >60
GFR NON-AFRICAN AMERICAN: >60 ML/MIN/1.73
GLUCOSE BLD-MCNC: 274 MG/DL (ref 74–99)
HCO3 ARTERIAL: 17.5 MMOL/L (ref 22–26)
HCO3 ARTERIAL: 20.3 MMOL/L (ref 22–26)
HCO3 ARTERIAL: 21.8 MMOL/L (ref 22–26)
HCO3: 15.4 MMOL/L (ref 22–26)
HCT VFR BLD CALC: 31.6 % (ref 37–54)
HCT,ARTERIAL: 27 % (ref 37–54)
HCT,ARTERIAL: 28 % (ref 37–54)
HCT,ARTERIAL: 31 % (ref 37–54)
HEMOGLOBIN: 10.4 G/DL (ref 12.5–16.5)
HGB, ARTERIAL: 10.6 G/DL (ref 12.5–15.5)
HGB, ARTERIAL: 9.3 G/DL (ref 12.5–15.5)
HGB, ARTERIAL: 9.7 G/DL (ref 12.5–15.5)
HHB: 3 % (ref 0–5)
IMMATURE GRANULOCYTES #: 0.08 E9/L
IMMATURE GRANULOCYTES %: 0.7 % (ref 0–5)
LAB: ABNORMAL
LACTIC ACID: 7.9 MMOL/L (ref 0.5–2.2)
LYMPHOCYTES ABSOLUTE: 1.03 E9/L (ref 1.5–4)
LYMPHOCYTES RELATIVE PERCENT: 8.4 % (ref 20–42)
Lab: ABNORMAL
MAGNESIUM: 0.9 MG/DL (ref 1.6–2.6)
MCH RBC QN AUTO: 32.2 PG (ref 26–35)
MCHC RBC AUTO-ENTMCNC: 32.9 % (ref 32–34.5)
MCV RBC AUTO: 97.8 FL (ref 80–99.9)
METER GLUCOSE: 119 MG/DL (ref 74–99)
METER GLUCOSE: 157 MG/DL (ref 74–99)
METER GLUCOSE: 195 MG/DL (ref 74–99)
METER GLUCOSE: 199 MG/DL (ref 74–99)
METER GLUCOSE: 210 MG/DL (ref 74–99)
METER GLUCOSE: 223 MG/DL (ref 74–99)
METER GLUCOSE: 280 MG/DL (ref 74–99)
METER GLUCOSE: 289 MG/DL (ref 74–99)
METER GLUCOSE: 290 MG/DL (ref 74–99)
METHB: 0.4 % (ref 0–1.5)
MODE: ABNORMAL
MONOCYTES ABSOLUTE: 0.28 E9/L (ref 0.1–0.95)
MONOCYTES RELATIVE PERCENT: 2.3 % (ref 2–12)
NEUTROPHILS ABSOLUTE: 10.77 E9/L (ref 1.8–7.3)
NEUTROPHILS RELATIVE PERCENT: 88.4 % (ref 43–80)
O2 CONTENT: 15.6 ML/DL
O2 SATURATION: 97 % (ref 92–98.5)
O2 SATURATION: 99.1 % (ref 92–98.5)
O2 SATURATION: 99.8 % (ref 92–98.5)
O2 SATURATION: 99.9 % (ref 92–98.5)
O2HB: 96.4 % (ref 94–97)
OPERATOR ID: ABNORMAL
PATIENT TEMP: 37 C
PCO2 ARTERIAL: 38.4 MMHG (ref 35–45)
PCO2 ARTERIAL: 39.5 MMHG (ref 35–45)
PCO2 ARTERIAL: 55.4 MMHG (ref 35–45)
PCO2: 36.6 MMHG (ref 35–45)
PDW BLD-RTO: 13.2 FL (ref 11.5–15)
PH BLOOD GAS: 7.17 (ref 7.35–7.45)
PH BLOOD GAS: 7.24 (ref 7.35–7.45)
PH BLOOD GAS: 7.25 (ref 7.35–7.45)
PH BLOOD GAS: 7.36 (ref 7.35–7.45)
PHOSPHORUS: 3.9 MG/DL (ref 2.5–4.5)
PLATELET # BLD: 173 E9/L (ref 130–450)
PMV BLD AUTO: 9.8 FL (ref 7–12)
PO2 ARTERIAL: 158.3 MMHG (ref 80–100)
PO2 ARTERIAL: 242.6 MMHG (ref 80–100)
PO2 ARTERIAL: 388.6 MMHG (ref 80–100)
PO2: 109.9 MMHG (ref 60–100)
POTASSIUM SERPL-SCNC: 4 MMOL/L (ref 3.5–5.5)
POTASSIUM SERPL-SCNC: 4.2 MMOL/L (ref 3.5–5)
POTASSIUM SERPL-SCNC: 4.7 MMOL/L (ref 3.5–5.5)
POTASSIUM SERPL-SCNC: 4.8 MMOL/L (ref 3.5–5.5)
RBC # BLD: 3.23 E12/L (ref 3.8–5.8)
SODIUM BLD-SCNC: 145 MMOL/L (ref 132–146)
SOURCE, BLOOD GAS: ABNORMAL
THB: 11.4 G/DL (ref 11.5–16.5)
TIME ANALYZED: 1726
TOTAL PROTEIN: 5.8 G/DL (ref 6.4–8.3)
WBC # BLD: 12.2 E9/L (ref 4.5–11.5)

## 2019-08-16 PROCEDURE — C1768 GRAFT, VASCULAR: HCPCS | Performed by: SURGERY

## 2019-08-16 PROCEDURE — 88304 TISSUE EXAM BY PATHOLOGIST: CPT

## 2019-08-16 PROCEDURE — 6360000002 HC RX W HCPCS: Performed by: SURGERY

## 2019-08-16 PROCEDURE — 82330 ASSAY OF CALCIUM: CPT

## 2019-08-16 PROCEDURE — 2580000003 HC RX 258: Performed by: STUDENT IN AN ORGANIZED HEALTH CARE EDUCATION/TRAINING PROGRAM

## 2019-08-16 PROCEDURE — 3700000001 HC ADD 15 MINUTES (ANESTHESIA): Performed by: SURGERY

## 2019-08-16 PROCEDURE — 2000000000 HC ICU R&B

## 2019-08-16 PROCEDURE — 82962 GLUCOSE BLOOD TEST: CPT

## 2019-08-16 PROCEDURE — 85730 THROMBOPLASTIN TIME PARTIAL: CPT

## 2019-08-16 PROCEDURE — 3600000015 HC SURGERY LEVEL 5 ADDTL 15MIN: Performed by: SURGERY

## 2019-08-16 PROCEDURE — 6360000002 HC RX W HCPCS: Performed by: STUDENT IN AN ORGANIZED HEALTH CARE EDUCATION/TRAINING PROGRAM

## 2019-08-16 PROCEDURE — C1769 GUIDE WIRE: HCPCS | Performed by: SURGERY

## 2019-08-16 PROCEDURE — 2580000003 HC RX 258: Performed by: NURSE ANESTHETIST, CERTIFIED REGISTERED

## 2019-08-16 PROCEDURE — 35656 BPG FEMORAL-POPLITEAL: CPT | Performed by: SURGERY

## 2019-08-16 PROCEDURE — 6370000000 HC RX 637 (ALT 250 FOR IP): Performed by: NURSE ANESTHETIST, CERTIFIED REGISTERED

## 2019-08-16 PROCEDURE — P9041 ALBUMIN (HUMAN),5%, 50ML: HCPCS | Performed by: NURSE ANESTHETIST, CERTIFIED REGISTERED

## 2019-08-16 PROCEDURE — 2500000003 HC RX 250 WO HCPCS: Performed by: NURSE ANESTHETIST, CERTIFIED REGISTERED

## 2019-08-16 PROCEDURE — 3600000005 HC SURGERY LEVEL 5 BASE: Performed by: SURGERY

## 2019-08-16 PROCEDURE — 6360000002 HC RX W HCPCS: Performed by: NURSE ANESTHETIST, CERTIFIED REGISTERED

## 2019-08-16 PROCEDURE — 2580000003 HC RX 258: Performed by: SURGERY

## 2019-08-16 PROCEDURE — 6360000002 HC RX W HCPCS

## 2019-08-16 PROCEDURE — 85025 COMPLETE CBC W/AUTO DIFF WBC: CPT

## 2019-08-16 PROCEDURE — 36620 INSERTION CATHETER ARTERY: CPT

## 2019-08-16 PROCEDURE — C2623 CATH, TRANSLUMIN, DRUG-COAT: HCPCS | Performed by: SURGERY

## 2019-08-16 PROCEDURE — 2500000003 HC RX 250 WO HCPCS: Performed by: SURGERY

## 2019-08-16 PROCEDURE — 6370000000 HC RX 637 (ALT 250 FOR IP): Performed by: SURGERY

## 2019-08-16 PROCEDURE — 83605 ASSAY OF LACTIC ACID: CPT

## 2019-08-16 PROCEDURE — 84100 ASSAY OF PHOSPHORUS: CPT

## 2019-08-16 PROCEDURE — 041L0JL BYPASS LEFT FEMORAL ARTERY TO POPLITEAL ARTERY WITH SYNTHETIC SUBSTITUTE, OPEN APPROACH: ICD-10-PCS | Performed by: SURGERY

## 2019-08-16 PROCEDURE — 04CL0ZZ EXTIRPATION OF MATTER FROM LEFT FEMORAL ARTERY, OPEN APPROACH: ICD-10-PCS | Performed by: SURGERY

## 2019-08-16 PROCEDURE — 82803 BLOOD GASES ANY COMBINATION: CPT

## 2019-08-16 PROCEDURE — P9016 RBC LEUKOCYTES REDUCED: HCPCS

## 2019-08-16 PROCEDURE — 85347 COAGULATION TIME ACTIVATED: CPT

## 2019-08-16 PROCEDURE — 2709999900 HC NON-CHARGEABLE SUPPLY: Performed by: SURGERY

## 2019-08-16 PROCEDURE — 04UL0JZ SUPPLEMENT LEFT FEMORAL ARTERY WITH SYNTHETIC SUBSTITUTE, OPEN APPROACH: ICD-10-PCS | Performed by: SURGERY

## 2019-08-16 PROCEDURE — 3700000000 HC ANESTHESIA ATTENDED CARE: Performed by: SURGERY

## 2019-08-16 PROCEDURE — 82805 BLOOD GASES W/O2 SATURATION: CPT

## 2019-08-16 PROCEDURE — B41G1ZZ FLUOROSCOPY OF LEFT LOWER EXTREMITY ARTERIES USING LOW OSMOLAR CONTRAST: ICD-10-PCS | Performed by: SURGERY

## 2019-08-16 PROCEDURE — C1894 INTRO/SHEATH, NON-LASER: HCPCS | Performed by: SURGERY

## 2019-08-16 PROCEDURE — C1725 CATH, TRANSLUMIN NON-LASER: HCPCS | Performed by: SURGERY

## 2019-08-16 PROCEDURE — 6370000000 HC RX 637 (ALT 250 FOR IP): Performed by: STUDENT IN AN ORGANIZED HEALTH CARE EDUCATION/TRAINING PROGRAM

## 2019-08-16 PROCEDURE — 6360000004 HC RX CONTRAST MEDICATION: Performed by: SURGERY

## 2019-08-16 PROCEDURE — 83735 ASSAY OF MAGNESIUM: CPT

## 2019-08-16 PROCEDURE — 047N0Z1 DILATION OF LEFT POPLITEAL ARTERY USING DRUG-COATED BALLOON, OPEN APPROACH: ICD-10-PCS | Performed by: SURGERY

## 2019-08-16 PROCEDURE — 80053 COMPREHEN METABOLIC PANEL: CPT

## 2019-08-16 PROCEDURE — 36415 COLL VENOUS BLD VENIPUNCTURE: CPT

## 2019-08-16 DEVICE — GRAFT VASC L80CM DIA8MM PTFE CBAS HEP SURF THN WALLED REM: Type: IMPLANTABLE DEVICE | Site: LEG | Status: FUNCTIONAL

## 2019-08-16 RX ORDER — OXYCODONE HYDROCHLORIDE 5 MG/1
10 TABLET ORAL EVERY 4 HOURS PRN
Status: DISCONTINUED | OUTPATIENT
Start: 2019-08-16 | End: 2019-08-19 | Stop reason: HOSPADM

## 2019-08-16 RX ORDER — ROCURONIUM BROMIDE 10 MG/ML
INJECTION, SOLUTION INTRAVENOUS PRN
Status: DISCONTINUED | OUTPATIENT
Start: 2019-08-16 | End: 2019-08-16 | Stop reason: SDUPTHER

## 2019-08-16 RX ORDER — ONDANSETRON 2 MG/ML
INJECTION INTRAMUSCULAR; INTRAVENOUS PRN
Status: DISCONTINUED | OUTPATIENT
Start: 2019-08-16 | End: 2019-08-16 | Stop reason: SDUPTHER

## 2019-08-16 RX ORDER — SODIUM CHLORIDE 9 MG/ML
INJECTION, SOLUTION INTRAVENOUS CONTINUOUS
Status: DISCONTINUED | OUTPATIENT
Start: 2019-08-16 | End: 2019-08-17

## 2019-08-16 RX ORDER — SODIUM CHLORIDE 9 MG/ML
INJECTION, SOLUTION INTRAVENOUS CONTINUOUS
Status: DISCONTINUED | OUTPATIENT
Start: 2019-08-16 | End: 2019-08-16

## 2019-08-16 RX ORDER — SODIUM CHLORIDE 0.9 % (FLUSH) 0.9 %
10 SYRINGE (ML) INJECTION EVERY 12 HOURS SCHEDULED
Status: DISCONTINUED | OUTPATIENT
Start: 2019-08-16 | End: 2019-08-19 | Stop reason: HOSPADM

## 2019-08-16 RX ORDER — DOCUSATE SODIUM 100 MG/1
100 CAPSULE, LIQUID FILLED ORAL 2 TIMES DAILY
Status: DISCONTINUED | OUTPATIENT
Start: 2019-08-16 | End: 2019-08-19 | Stop reason: HOSPADM

## 2019-08-16 RX ORDER — HEPARIN SODIUM 1000 [USP'U]/ML
INJECTION, SOLUTION INTRAVENOUS; SUBCUTANEOUS PRN
Status: DISCONTINUED | OUTPATIENT
Start: 2019-08-16 | End: 2019-08-16 | Stop reason: SDUPTHER

## 2019-08-16 RX ORDER — SODIUM CHLORIDE, SODIUM LACTATE, POTASSIUM CHLORIDE, CALCIUM CHLORIDE 600; 310; 30; 20 MG/100ML; MG/100ML; MG/100ML; MG/100ML
1000 INJECTION, SOLUTION INTRAVENOUS ONCE
Status: COMPLETED | OUTPATIENT
Start: 2019-08-16 | End: 2019-08-16

## 2019-08-16 RX ORDER — PROPOFOL 10 MG/ML
INJECTION, EMULSION INTRAVENOUS PRN
Status: DISCONTINUED | OUTPATIENT
Start: 2019-08-16 | End: 2019-08-16 | Stop reason: SDUPTHER

## 2019-08-16 RX ORDER — DEXTROSE MONOHYDRATE 50 MG/ML
100 INJECTION, SOLUTION INTRAVENOUS PRN
Status: DISCONTINUED | OUTPATIENT
Start: 2019-08-16 | End: 2019-08-19 | Stop reason: HOSPADM

## 2019-08-16 RX ORDER — CILOSTAZOL 100 MG/1
100 TABLET ORAL 2 TIMES DAILY
Status: DISCONTINUED | OUTPATIENT
Start: 2019-08-17 | End: 2019-08-19 | Stop reason: HOSPADM

## 2019-08-16 RX ORDER — LISINOPRIL 20 MG/1
40 TABLET ORAL DAILY
Status: DISCONTINUED | OUTPATIENT
Start: 2019-08-17 | End: 2019-08-19 | Stop reason: HOSPADM

## 2019-08-16 RX ORDER — SODIUM CHLORIDE 9 MG/ML
INJECTION, SOLUTION INTRAVENOUS CONTINUOUS PRN
Status: DISCONTINUED | OUTPATIENT
Start: 2019-08-16 | End: 2019-08-16 | Stop reason: SDUPTHER

## 2019-08-16 RX ORDER — ACETAMINOPHEN 325 MG/1
650 TABLET ORAL EVERY 4 HOURS
Status: DISCONTINUED | OUTPATIENT
Start: 2019-08-16 | End: 2019-08-19 | Stop reason: HOSPADM

## 2019-08-16 RX ORDER — SODIUM CHLORIDE 0.9 % (FLUSH) 0.9 %
10 SYRINGE (ML) INJECTION PRN
Status: DISCONTINUED | OUTPATIENT
Start: 2019-08-16 | End: 2019-08-19 | Stop reason: HOSPADM

## 2019-08-16 RX ORDER — ALBUMIN, HUMAN INJ 5% 5 %
SOLUTION INTRAVENOUS PRN
Status: DISCONTINUED | OUTPATIENT
Start: 2019-08-16 | End: 2019-08-16 | Stop reason: SDUPTHER

## 2019-08-16 RX ORDER — ATORVASTATIN CALCIUM 40 MG/1
40 TABLET, FILM COATED ORAL DAILY
Status: DISCONTINUED | OUTPATIENT
Start: 2019-08-16 | End: 2019-08-19 | Stop reason: HOSPADM

## 2019-08-16 RX ORDER — MAGNESIUM SULFATE IN WATER 40 MG/ML
INJECTION, SOLUTION INTRAVENOUS
Status: COMPLETED
Start: 2019-08-16 | End: 2019-08-16

## 2019-08-16 RX ORDER — NICOTINE POLACRILEX 4 MG
15 LOZENGE BUCCAL PRN
Status: DISCONTINUED | OUTPATIENT
Start: 2019-08-16 | End: 2019-08-19 | Stop reason: HOSPADM

## 2019-08-16 RX ORDER — MAGNESIUM SULFATE IN WATER 40 MG/ML
2 INJECTION, SOLUTION INTRAVENOUS
Status: DISCONTINUED | OUTPATIENT
Start: 2019-08-16 | End: 2019-08-16 | Stop reason: DRUGHIGH

## 2019-08-16 RX ORDER — GLYCOPYRROLATE 1 MG/5 ML
SYRINGE (ML) INTRAVENOUS PRN
Status: DISCONTINUED | OUTPATIENT
Start: 2019-08-16 | End: 2019-08-16 | Stop reason: SDUPTHER

## 2019-08-16 RX ORDER — ALBUTEROL SULFATE 90 UG/1
AEROSOL, METERED RESPIRATORY (INHALATION) PRN
Status: DISCONTINUED | OUTPATIENT
Start: 2019-08-16 | End: 2019-08-16 | Stop reason: SDUPTHER

## 2019-08-16 RX ORDER — ALLOPURINOL 300 MG/1
300 TABLET ORAL DAILY
Status: DISCONTINUED | OUTPATIENT
Start: 2019-08-16 | End: 2019-08-19 | Stop reason: HOSPADM

## 2019-08-16 RX ORDER — SODIUM CHLORIDE 0.9 % (FLUSH) 0.9 %
10 SYRINGE (ML) INJECTION EVERY 12 HOURS SCHEDULED
Status: DISCONTINUED | OUTPATIENT
Start: 2019-08-16 | End: 2019-08-16 | Stop reason: HOSPADM

## 2019-08-16 RX ORDER — EPHEDRINE SULFATE/0.9% NACL/PF 50 MG/5 ML
SYRINGE (ML) INTRAVENOUS PRN
Status: DISCONTINUED | OUTPATIENT
Start: 2019-08-16 | End: 2019-08-16 | Stop reason: SDUPTHER

## 2019-08-16 RX ORDER — DEXTROSE MONOHYDRATE 25 G/50ML
12.5 INJECTION, SOLUTION INTRAVENOUS PRN
Status: DISCONTINUED | OUTPATIENT
Start: 2019-08-16 | End: 2019-08-19 | Stop reason: HOSPADM

## 2019-08-16 RX ORDER — GABAPENTIN 100 MG/1
100 CAPSULE ORAL 2 TIMES DAILY
Status: DISCONTINUED | OUTPATIENT
Start: 2019-08-16 | End: 2019-08-19 | Stop reason: HOSPADM

## 2019-08-16 RX ORDER — PROTAMINE SULFATE 10 MG/ML
INJECTION, SOLUTION INTRAVENOUS PRN
Status: DISCONTINUED | OUTPATIENT
Start: 2019-08-16 | End: 2019-08-16 | Stop reason: SDUPTHER

## 2019-08-16 RX ORDER — DEXAMETHASONE SODIUM PHOSPHATE 10 MG/ML
INJECTION INTRAMUSCULAR; INTRAVENOUS PRN
Status: DISCONTINUED | OUTPATIENT
Start: 2019-08-16 | End: 2019-08-16 | Stop reason: SDUPTHER

## 2019-08-16 RX ORDER — CEFAZOLIN SODIUM 2 G/50ML
2 SOLUTION INTRAVENOUS
Status: COMPLETED | OUTPATIENT
Start: 2019-08-16 | End: 2019-08-16

## 2019-08-16 RX ORDER — CITALOPRAM 20 MG/1
40 TABLET ORAL DAILY
Status: DISCONTINUED | OUTPATIENT
Start: 2019-08-16 | End: 2019-08-19 | Stop reason: HOSPADM

## 2019-08-16 RX ORDER — FENTANYL CITRATE 50 UG/ML
INJECTION, SOLUTION INTRAMUSCULAR; INTRAVENOUS PRN
Status: DISCONTINUED | OUTPATIENT
Start: 2019-08-16 | End: 2019-08-16 | Stop reason: SDUPTHER

## 2019-08-16 RX ORDER — MIDAZOLAM HYDROCHLORIDE 1 MG/ML
INJECTION INTRAMUSCULAR; INTRAVENOUS PRN
Status: DISCONTINUED | OUTPATIENT
Start: 2019-08-16 | End: 2019-08-16 | Stop reason: SDUPTHER

## 2019-08-16 RX ORDER — OXYCODONE HYDROCHLORIDE 5 MG/1
5 TABLET ORAL EVERY 4 HOURS PRN
Status: DISCONTINUED | OUTPATIENT
Start: 2019-08-16 | End: 2019-08-19 | Stop reason: HOSPADM

## 2019-08-16 RX ORDER — GLIPIZIDE 5 MG/1
10 TABLET ORAL
Status: DISCONTINUED | OUTPATIENT
Start: 2019-08-17 | End: 2019-08-19 | Stop reason: HOSPADM

## 2019-08-16 RX ORDER — ONDANSETRON 2 MG/ML
4 INJECTION INTRAMUSCULAR; INTRAVENOUS EVERY 6 HOURS PRN
Status: DISCONTINUED | OUTPATIENT
Start: 2019-08-16 | End: 2019-08-19 | Stop reason: HOSPADM

## 2019-08-16 RX ORDER — PANTOPRAZOLE SODIUM 40 MG/1
40 TABLET, DELAYED RELEASE ORAL
Status: DISCONTINUED | OUTPATIENT
Start: 2019-08-17 | End: 2019-08-19 | Stop reason: HOSPADM

## 2019-08-16 RX ORDER — SODIUM CHLORIDE 0.9 % (FLUSH) 0.9 %
10 SYRINGE (ML) INJECTION PRN
Status: DISCONTINUED | OUTPATIENT
Start: 2019-08-16 | End: 2019-08-16 | Stop reason: HOSPADM

## 2019-08-16 RX ORDER — POLYETHYLENE GLYCOL 3350 17 G/17G
17 POWDER, FOR SOLUTION ORAL DAILY PRN
Status: DISCONTINUED | OUTPATIENT
Start: 2019-08-16 | End: 2019-08-19 | Stop reason: HOSPADM

## 2019-08-16 RX ORDER — CALCIUM CHLORIDE 100 MG/ML
INJECTION INTRAVENOUS; INTRAVENTRICULAR PRN
Status: DISCONTINUED | OUTPATIENT
Start: 2019-08-16 | End: 2019-08-16 | Stop reason: SDUPTHER

## 2019-08-16 RX ORDER — NEOSTIGMINE METHYLSULFATE 1 MG/ML
INJECTION, SOLUTION INTRAVENOUS PRN
Status: DISCONTINUED | OUTPATIENT
Start: 2019-08-16 | End: 2019-08-16 | Stop reason: SDUPTHER

## 2019-08-16 RX ADMIN — ROCURONIUM BROMIDE 10 MG: 10 INJECTION, SOLUTION INTRAVENOUS at 15:00

## 2019-08-16 RX ADMIN — INSULIN LISPRO 9 UNITS: 100 INJECTION, SOLUTION INTRAVENOUS; SUBCUTANEOUS at 17:13

## 2019-08-16 RX ADMIN — PHENYLEPHRINE HYDROCHLORIDE 100 MCG: 10 INJECTION INTRAVENOUS at 07:57

## 2019-08-16 RX ADMIN — SODIUM BICARBONATE 50 MEQ: 84 INJECTION, SOLUTION INTRAVENOUS at 15:31

## 2019-08-16 RX ADMIN — Medication 3 MG: at 16:01

## 2019-08-16 RX ADMIN — INSULIN HUMAN 5 UNITS: 100 INJECTION, SOLUTION PARENTERAL at 14:38

## 2019-08-16 RX ADMIN — CITALOPRAM 40 MG: 20 TABLET, FILM COATED ORAL at 18:11

## 2019-08-16 RX ADMIN — ROCURONIUM BROMIDE 20 MG: 10 INJECTION, SOLUTION INTRAVENOUS at 10:22

## 2019-08-16 RX ADMIN — OXYCODONE HYDROCHLORIDE 10 MG: 5 TABLET ORAL at 23:35

## 2019-08-16 RX ADMIN — MAGNESIUM SULFATE HEPTAHYDRATE 2 G: 40 INJECTION, SOLUTION INTRAVENOUS at 20:38

## 2019-08-16 RX ADMIN — ROCURONIUM BROMIDE 30 MG: 10 INJECTION, SOLUTION INTRAVENOUS at 08:10

## 2019-08-16 RX ADMIN — PHENYLEPHRINE HYDROCHLORIDE 100 MCG: 10 INJECTION INTRAVENOUS at 08:26

## 2019-08-16 RX ADMIN — ROCURONIUM BROMIDE 10 MG: 10 INJECTION, SOLUTION INTRAVENOUS at 11:22

## 2019-08-16 RX ADMIN — PHENYLEPHRINE HYDROCHLORIDE 100 MCG: 10 INJECTION INTRAVENOUS at 12:43

## 2019-08-16 RX ADMIN — CALCIUM CHLORIDE 1 G: 100 INJECTION, SOLUTION INTRAVENOUS; INTRAVENTRICULAR at 15:09

## 2019-08-16 RX ADMIN — ALBUTEROL SULFATE 2 PUFF: 90 AEROSOL, METERED RESPIRATORY (INHALATION) at 14:46

## 2019-08-16 RX ADMIN — FENTANYL CITRATE 50 MCG: 50 INJECTION, SOLUTION INTRAMUSCULAR; INTRAVENOUS at 11:28

## 2019-08-16 RX ADMIN — HEPARIN SODIUM 2000 UNITS: 1000 INJECTION INTRAVENOUS; SUBCUTANEOUS at 13:17

## 2019-08-16 RX ADMIN — ROCURONIUM BROMIDE 10 MG: 10 INJECTION, SOLUTION INTRAVENOUS at 10:51

## 2019-08-16 RX ADMIN — ROCURONIUM BROMIDE 20 MG: 10 INJECTION, SOLUTION INTRAVENOUS at 13:21

## 2019-08-16 RX ADMIN — ROCURONIUM BROMIDE 30 MG: 10 INJECTION, SOLUTION INTRAVENOUS at 09:42

## 2019-08-16 RX ADMIN — PHENYLEPHRINE HYDROCHLORIDE 200 MCG: 10 INJECTION INTRAVENOUS at 07:48

## 2019-08-16 RX ADMIN — ATORVASTATIN CALCIUM 40 MG: 40 TABLET, FILM COATED ORAL at 18:11

## 2019-08-16 RX ADMIN — PHENYLEPHRINE HYDROCHLORIDE 100 MCG: 10 INJECTION INTRAVENOUS at 08:53

## 2019-08-16 RX ADMIN — SODIUM CHLORIDE: 9 INJECTION, SOLUTION INTRAVENOUS at 12:28

## 2019-08-16 RX ADMIN — PHENYLEPHRINE HYDROCHLORIDE 100 MCG: 10 INJECTION INTRAVENOUS at 08:47

## 2019-08-16 RX ADMIN — PHENYLEPHRINE HYDROCHLORIDE 100 MCG: 10 INJECTION INTRAVENOUS at 08:44

## 2019-08-16 RX ADMIN — Medication 10 ML: at 17:17

## 2019-08-16 RX ADMIN — HEPARIN SODIUM 5000 UNITS: 1000 INJECTION INTRAVENOUS; SUBCUTANEOUS at 12:43

## 2019-08-16 RX ADMIN — PHENYLEPHRINE HYDROCHLORIDE 100 MCG: 10 INJECTION INTRAVENOUS at 08:08

## 2019-08-16 RX ADMIN — CEFAZOLIN SODIUM 2 G: 2 SOLUTION INTRAVENOUS at 07:46

## 2019-08-16 RX ADMIN — FENTANYL CITRATE 100 MCG: 50 INJECTION, SOLUTION INTRAMUSCULAR; INTRAVENOUS at 09:42

## 2019-08-16 RX ADMIN — MAGNESIUM SULFATE HEPTAHYDRATE 2 G: 40 INJECTION, SOLUTION INTRAVENOUS at 22:38

## 2019-08-16 RX ADMIN — PHENYLEPHRINE HYDROCHLORIDE 100 MCG: 10 INJECTION INTRAVENOUS at 08:24

## 2019-08-16 RX ADMIN — SODIUM CHLORIDE: 9 INJECTION, SOLUTION INTRAVENOUS at 07:48

## 2019-08-16 RX ADMIN — ALLOPURINOL 300 MG: 300 TABLET ORAL at 18:11

## 2019-08-16 RX ADMIN — HEPARIN SODIUM 3000 UNITS: 1000 INJECTION INTRAVENOUS; SUBCUTANEOUS at 11:39

## 2019-08-16 RX ADMIN — SODIUM CHLORIDE, POTASSIUM CHLORIDE, SODIUM LACTATE AND CALCIUM CHLORIDE 1000 ML: 600; 310; 30; 20 INJECTION, SOLUTION INTRAVENOUS at 18:11

## 2019-08-16 RX ADMIN — MAGNESIUM SULFATE 2 G: 2 INJECTION INTRAVENOUS at 18:24

## 2019-08-16 RX ADMIN — SODIUM CHLORIDE: 9 INJECTION, SOLUTION INTRAVENOUS at 16:41

## 2019-08-16 RX ADMIN — INSULIN HUMAN 5 UNITS: 100 INJECTION, SOLUTION PARENTERAL at 11:45

## 2019-08-16 RX ADMIN — INSULIN LISPRO 6 UNITS: 100 INJECTION, SOLUTION INTRAVENOUS; SUBCUTANEOUS at 20:42

## 2019-08-16 RX ADMIN — ALBUMIN (HUMAN) 500 ML: 12.5 INJECTION, SOLUTION INTRAVENOUS at 15:47

## 2019-08-16 RX ADMIN — PHENYLEPHRINE HYDROCHLORIDE 100 MCG: 10 INJECTION INTRAVENOUS at 08:28

## 2019-08-16 RX ADMIN — MIDAZOLAM HYDROCHLORIDE 2 MG: 1 INJECTION, SOLUTION INTRAMUSCULAR; INTRAVENOUS at 07:29

## 2019-08-16 RX ADMIN — ACETAMINOPHEN 650 MG: 325 TABLET, FILM COATED ORAL at 20:34

## 2019-08-16 RX ADMIN — SODIUM CHLORIDE: 9 INJECTION, SOLUTION INTRAVENOUS at 10:56

## 2019-08-16 RX ADMIN — DOCUSATE SODIUM 100 MG: 100 CAPSULE, LIQUID FILLED ORAL at 20:34

## 2019-08-16 RX ADMIN — SODIUM CHLORIDE 5 UNITS/HR: 9 INJECTION, SOLUTION INTRAVENOUS at 14:44

## 2019-08-16 RX ADMIN — ROCURONIUM BROMIDE 10 MG: 10 INJECTION, SOLUTION INTRAVENOUS at 12:45

## 2019-08-16 RX ADMIN — PHENYLEPHRINE HYDROCHLORIDE 100 MCG: 10 INJECTION INTRAVENOUS at 07:44

## 2019-08-16 RX ADMIN — PROTAMINE SULFATE 50 MG: 10 INJECTION, SOLUTION INTRAVENOUS at 15:10

## 2019-08-16 RX ADMIN — ROCURONIUM BROMIDE 20 MG: 10 INJECTION, SOLUTION INTRAVENOUS at 11:39

## 2019-08-16 RX ADMIN — Medication 10 MG: at 08:02

## 2019-08-16 RX ADMIN — Medication 10 MG: at 07:57

## 2019-08-16 RX ADMIN — HEPARIN SODIUM 10000 UNITS: 1000 INJECTION INTRAVENOUS; SUBCUTANEOUS at 10:35

## 2019-08-16 RX ADMIN — DEXAMETHASONE SODIUM PHOSPHATE 10 MG: 10 INJECTION INTRAMUSCULAR; INTRAVENOUS at 08:05

## 2019-08-16 RX ADMIN — ROCURONIUM BROMIDE 50 MG: 10 INJECTION, SOLUTION INTRAVENOUS at 07:39

## 2019-08-16 RX ADMIN — HYDROMORPHONE HYDROCHLORIDE 0.25 MG: 1 INJECTION, SOLUTION INTRAMUSCULAR; INTRAVENOUS; SUBCUTANEOUS at 20:33

## 2019-08-16 RX ADMIN — SODIUM CHLORIDE: 9 INJECTION, SOLUTION INTRAVENOUS at 06:27

## 2019-08-16 RX ADMIN — PHENYLEPHRINE HYDROCHLORIDE 200 MCG: 10 INJECTION INTRAVENOUS at 07:54

## 2019-08-16 RX ADMIN — GABAPENTIN 100 MG: 100 CAPSULE ORAL at 20:34

## 2019-08-16 RX ADMIN — PHENYLEPHRINE HYDROCHLORIDE 100 MCG: 10 INJECTION INTRAVENOUS at 08:02

## 2019-08-16 RX ADMIN — SODIUM BICARBONATE 100 MEQ: 84 INJECTION, SOLUTION INTRAVENOUS at 14:18

## 2019-08-16 RX ADMIN — PHENYLEPHRINE HYDROCHLORIDE 100 MCG: 10 INJECTION INTRAVENOUS at 08:22

## 2019-08-16 RX ADMIN — PHENYLEPHRINE HYDROCHLORIDE 200 MCG: 10 INJECTION INTRAVENOUS at 09:02

## 2019-08-16 RX ADMIN — Medication 10 MG: at 08:28

## 2019-08-16 RX ADMIN — PHENYLEPHRINE HYDROCHLORIDE 10 MCG/MIN: 10 INJECTION INTRAVENOUS at 09:31

## 2019-08-16 RX ADMIN — Medication 2 G: at 20:03

## 2019-08-16 RX ADMIN — CEFAZOLIN SODIUM 2 G: 2 SOLUTION INTRAVENOUS at 11:42

## 2019-08-16 RX ADMIN — ROCURONIUM BROMIDE 20 MG: 10 INJECTION, SOLUTION INTRAVENOUS at 14:20

## 2019-08-16 RX ADMIN — ACETAMINOPHEN 650 MG: 325 TABLET, FILM COATED ORAL at 18:11

## 2019-08-16 RX ADMIN — HYDROMORPHONE HYDROCHLORIDE 0.25 MG: 1 INJECTION, SOLUTION INTRAMUSCULAR; INTRAVENOUS; SUBCUTANEOUS at 17:15

## 2019-08-16 RX ADMIN — PHENYLEPHRINE HYDROCHLORIDE 100 MCG: 10 INJECTION INTRAVENOUS at 12:47

## 2019-08-16 RX ADMIN — Medication 10 MG: at 08:08

## 2019-08-16 RX ADMIN — ONDANSETRON HYDROCHLORIDE 4 MG: 2 INJECTION, SOLUTION INTRAMUSCULAR; INTRAVENOUS at 15:40

## 2019-08-16 RX ADMIN — PHENYLEPHRINE HYDROCHLORIDE 100 MCG: 10 INJECTION INTRAVENOUS at 08:40

## 2019-08-16 RX ADMIN — FENTANYL CITRATE 150 MCG: 50 INJECTION, SOLUTION INTRAMUSCULAR; INTRAVENOUS at 07:39

## 2019-08-16 RX ADMIN — ROCURONIUM BROMIDE 20 MG: 10 INJECTION, SOLUTION INTRAVENOUS at 09:02

## 2019-08-16 RX ADMIN — PROPOFOL 200 MG: 10 INJECTION, EMULSION INTRAVENOUS at 07:39

## 2019-08-16 RX ADMIN — Medication 0.6 MG: at 16:01

## 2019-08-16 RX ADMIN — PHENYLEPHRINE HYDROCHLORIDE 200 MCG: 10 INJECTION INTRAVENOUS at 09:18

## 2019-08-16 RX ADMIN — FENTANYL CITRATE 50 MCG: 50 INJECTION, SOLUTION INTRAMUSCULAR; INTRAVENOUS at 10:28

## 2019-08-16 RX ADMIN — HEPARIN SODIUM 7000 UNITS: 1000 INJECTION INTRAVENOUS; SUBCUTANEOUS at 11:09

## 2019-08-16 ASSESSMENT — PULMONARY FUNCTION TESTS
PIF_VALUE: 31
PIF_VALUE: 31
PIF_VALUE: 35
PIF_VALUE: 25
PIF_VALUE: 28
PIF_VALUE: 35
PIF_VALUE: 31
PIF_VALUE: 33
PIF_VALUE: 30
PIF_VALUE: 18
PIF_VALUE: 34
PIF_VALUE: 35
PIF_VALUE: 37
PIF_VALUE: 38
PIF_VALUE: 38
PIF_VALUE: 36
PIF_VALUE: 28
PIF_VALUE: 32
PIF_VALUE: 36
PIF_VALUE: 36
PIF_VALUE: 35
PIF_VALUE: 35
PIF_VALUE: 32
PIF_VALUE: 35
PIF_VALUE: 32
PIF_VALUE: 30
PIF_VALUE: 25
PIF_VALUE: 32
PIF_VALUE: 32
PIF_VALUE: 27
PIF_VALUE: 32
PIF_VALUE: 37
PIF_VALUE: 33
PIF_VALUE: 1
PIF_VALUE: 37
PIF_VALUE: 33
PIF_VALUE: 24
PIF_VALUE: 0
PIF_VALUE: 35
PIF_VALUE: 18
PIF_VALUE: 35
PIF_VALUE: 32
PIF_VALUE: 35
PIF_VALUE: 29
PIF_VALUE: 30
PIF_VALUE: 37
PIF_VALUE: 36
PIF_VALUE: 35
PIF_VALUE: 24
PIF_VALUE: 35
PIF_VALUE: 30
PIF_VALUE: 9
PIF_VALUE: 32
PIF_VALUE: 35
PIF_VALUE: 28
PIF_VALUE: 33
PIF_VALUE: 35
PIF_VALUE: 36
PIF_VALUE: 35
PIF_VALUE: 31
PIF_VALUE: 25
PIF_VALUE: 29
PIF_VALUE: 38
PIF_VALUE: 35
PIF_VALUE: 36
PIF_VALUE: 26
PIF_VALUE: 30
PIF_VALUE: 30
PIF_VALUE: 5
PIF_VALUE: 24
PIF_VALUE: 24
PIF_VALUE: 35
PIF_VALUE: 25
PIF_VALUE: 31
PIF_VALUE: 29
PIF_VALUE: 27
PIF_VALUE: 40
PIF_VALUE: 31
PIF_VALUE: 33
PIF_VALUE: 28
PIF_VALUE: 27
PIF_VALUE: 26
PIF_VALUE: 36
PIF_VALUE: 24
PIF_VALUE: 34
PIF_VALUE: 35
PIF_VALUE: 32
PIF_VALUE: 35
PIF_VALUE: 31
PIF_VALUE: 24
PIF_VALUE: 27
PIF_VALUE: 22
PIF_VALUE: 25
PIF_VALUE: 35
PIF_VALUE: 0
PIF_VALUE: 34
PIF_VALUE: 32
PIF_VALUE: 24
PIF_VALUE: 24
PIF_VALUE: 30
PIF_VALUE: 25
PIF_VALUE: 38
PIF_VALUE: 36
PIF_VALUE: 32
PIF_VALUE: 38
PIF_VALUE: 35
PIF_VALUE: 34
PIF_VALUE: 24
PIF_VALUE: 25
PIF_VALUE: 35
PIF_VALUE: 35
PIF_VALUE: 27
PIF_VALUE: 28
PIF_VALUE: 33
PIF_VALUE: 35
PIF_VALUE: 26
PIF_VALUE: 25
PIF_VALUE: 35
PIF_VALUE: 29
PIF_VALUE: 35
PIF_VALUE: 31
PIF_VALUE: 36
PIF_VALUE: 27
PIF_VALUE: 26
PIF_VALUE: 25
PIF_VALUE: 24
PIF_VALUE: 34
PIF_VALUE: 37
PIF_VALUE: 37
PIF_VALUE: 25
PIF_VALUE: 36
PIF_VALUE: 37
PIF_VALUE: 38
PIF_VALUE: 35
PIF_VALUE: 30
PIF_VALUE: 35
PIF_VALUE: 36
PIF_VALUE: 35
PIF_VALUE: 25
PIF_VALUE: 29
PIF_VALUE: 35
PIF_VALUE: 39
PIF_VALUE: 32
PIF_VALUE: 35
PIF_VALUE: 31
PIF_VALUE: 24
PIF_VALUE: 29
PIF_VALUE: 26
PIF_VALUE: 30
PIF_VALUE: 25
PIF_VALUE: 35
PIF_VALUE: 35
PIF_VALUE: 31
PIF_VALUE: 24
PIF_VALUE: 27
PIF_VALUE: 35
PIF_VALUE: 32
PIF_VALUE: 25
PIF_VALUE: 35
PIF_VALUE: 35
PIF_VALUE: 29
PIF_VALUE: 35
PIF_VALUE: 32
PIF_VALUE: 36
PIF_VALUE: 24
PIF_VALUE: 3
PIF_VALUE: 0
PIF_VALUE: 26
PIF_VALUE: 40
PIF_VALUE: 37
PIF_VALUE: 31
PIF_VALUE: 25
PIF_VALUE: 35
PIF_VALUE: 33
PIF_VALUE: 37
PIF_VALUE: 37
PIF_VALUE: 35
PIF_VALUE: 24
PIF_VALUE: 35
PIF_VALUE: 32
PIF_VALUE: 35
PIF_VALUE: 30
PIF_VALUE: 35
PIF_VALUE: 37
PIF_VALUE: 37
PIF_VALUE: 35
PIF_VALUE: 32
PIF_VALUE: 35
PIF_VALUE: 31
PIF_VALUE: 35
PIF_VALUE: 35
PIF_VALUE: 36
PIF_VALUE: 32
PIF_VALUE: 34
PIF_VALUE: 37
PIF_VALUE: 35
PIF_VALUE: 30
PIF_VALUE: 27
PIF_VALUE: 35
PIF_VALUE: 35
PIF_VALUE: 29
PIF_VALUE: 26
PIF_VALUE: 35
PIF_VALUE: 37
PIF_VALUE: 35
PIF_VALUE: 34
PIF_VALUE: 44
PIF_VALUE: 26
PIF_VALUE: 35
PIF_VALUE: 25
PIF_VALUE: 35
PIF_VALUE: 32
PIF_VALUE: 32
PIF_VALUE: 26
PIF_VALUE: 35
PIF_VALUE: 33
PIF_VALUE: 24
PIF_VALUE: 25
PIF_VALUE: 32
PIF_VALUE: 1
PIF_VALUE: 30
PIF_VALUE: 26
PIF_VALUE: 26
PIF_VALUE: 35
PIF_VALUE: 27
PIF_VALUE: 32
PIF_VALUE: 35
PIF_VALUE: 17
PIF_VALUE: 32
PIF_VALUE: 33
PIF_VALUE: 28
PIF_VALUE: 27
PIF_VALUE: 25
PIF_VALUE: 28
PIF_VALUE: 35
PIF_VALUE: 29
PIF_VALUE: 25
PIF_VALUE: 24
PIF_VALUE: 30
PIF_VALUE: 25
PIF_VALUE: 35
PIF_VALUE: 33
PIF_VALUE: 0
PIF_VALUE: 36
PIF_VALUE: 30
PIF_VALUE: 32
PIF_VALUE: 30
PIF_VALUE: 33
PIF_VALUE: 1
PIF_VALUE: 42
PIF_VALUE: 32
PIF_VALUE: 1
PIF_VALUE: 35
PIF_VALUE: 24
PIF_VALUE: 24
PIF_VALUE: 33
PIF_VALUE: 25
PIF_VALUE: 29
PIF_VALUE: 36
PIF_VALUE: 35
PIF_VALUE: 32
PIF_VALUE: 26
PIF_VALUE: 29
PIF_VALUE: 32
PIF_VALUE: 35
PIF_VALUE: 18
PIF_VALUE: 34
PIF_VALUE: 36
PIF_VALUE: 25
PIF_VALUE: 34
PIF_VALUE: 26
PIF_VALUE: 28
PIF_VALUE: 38
PIF_VALUE: 24
PIF_VALUE: 28
PIF_VALUE: 30
PIF_VALUE: 25
PIF_VALUE: 24
PIF_VALUE: 25
PIF_VALUE: 27
PIF_VALUE: 35
PIF_VALUE: 24
PIF_VALUE: 32
PIF_VALUE: 35
PIF_VALUE: 3
PIF_VALUE: 32
PIF_VALUE: 35
PIF_VALUE: 26
PIF_VALUE: 18
PIF_VALUE: 24
PIF_VALUE: 36
PIF_VALUE: 35
PIF_VALUE: 25
PIF_VALUE: 30
PIF_VALUE: 0
PIF_VALUE: 35
PIF_VALUE: 35
PIF_VALUE: 32
PIF_VALUE: 35
PIF_VALUE: 35
PIF_VALUE: 24
PIF_VALUE: 27
PIF_VALUE: 30
PIF_VALUE: 25
PIF_VALUE: 26
PIF_VALUE: 35
PIF_VALUE: 32
PIF_VALUE: 37
PIF_VALUE: 35
PIF_VALUE: 25
PIF_VALUE: 32
PIF_VALUE: 35
PIF_VALUE: 27
PIF_VALUE: 32
PIF_VALUE: 31
PIF_VALUE: 36
PIF_VALUE: 25
PIF_VALUE: 26
PIF_VALUE: 35
PIF_VALUE: 35
PIF_VALUE: 30
PIF_VALUE: 32
PIF_VALUE: 35
PIF_VALUE: 35
PIF_VALUE: 32
PIF_VALUE: 32
PIF_VALUE: 34
PIF_VALUE: 1
PIF_VALUE: 25
PIF_VALUE: 35
PIF_VALUE: 35
PIF_VALUE: 26
PIF_VALUE: 30
PIF_VALUE: 33
PIF_VALUE: 36
PIF_VALUE: 33
PIF_VALUE: 61
PIF_VALUE: 37
PIF_VALUE: 35
PIF_VALUE: 26
PIF_VALUE: 35
PIF_VALUE: 26
PIF_VALUE: 35
PIF_VALUE: 35
PIF_VALUE: 32
PIF_VALUE: 35
PIF_VALUE: 24
PIF_VALUE: 25
PIF_VALUE: 32
PIF_VALUE: 32
PIF_VALUE: 36
PIF_VALUE: 29
PIF_VALUE: 25
PIF_VALUE: 0
PIF_VALUE: 8
PIF_VALUE: 25
PIF_VALUE: 35
PIF_VALUE: 29
PIF_VALUE: 35
PIF_VALUE: 28
PIF_VALUE: 33
PIF_VALUE: 29
PIF_VALUE: 32
PIF_VALUE: 35
PIF_VALUE: 31
PIF_VALUE: 27
PIF_VALUE: 31
PIF_VALUE: 35
PIF_VALUE: 30
PIF_VALUE: 35
PIF_VALUE: 35
PIF_VALUE: 26
PIF_VALUE: 32
PIF_VALUE: 26
PIF_VALUE: 25
PIF_VALUE: 24
PIF_VALUE: 35
PIF_VALUE: 32
PIF_VALUE: 28
PIF_VALUE: 35
PIF_VALUE: 32
PIF_VALUE: 30
PIF_VALUE: 37
PIF_VALUE: 30
PIF_VALUE: 35
PIF_VALUE: 25
PIF_VALUE: 29
PIF_VALUE: 36
PIF_VALUE: 35
PIF_VALUE: 32
PIF_VALUE: 24
PIF_VALUE: 1
PIF_VALUE: 26
PIF_VALUE: 35
PIF_VALUE: 27
PIF_VALUE: 31
PIF_VALUE: 33
PIF_VALUE: 25
PIF_VALUE: 31
PIF_VALUE: 1
PIF_VALUE: 30
PIF_VALUE: 24
PIF_VALUE: 35
PIF_VALUE: 35
PIF_VALUE: 32
PIF_VALUE: 32
PIF_VALUE: 29
PIF_VALUE: 23
PIF_VALUE: 36
PIF_VALUE: 30
PIF_VALUE: 33
PIF_VALUE: 25
PIF_VALUE: 25
PIF_VALUE: 24
PIF_VALUE: 35
PIF_VALUE: 2
PIF_VALUE: 35
PIF_VALUE: 37
PIF_VALUE: 36
PIF_VALUE: 35
PIF_VALUE: 26
PIF_VALUE: 35
PIF_VALUE: 37
PIF_VALUE: 24
PIF_VALUE: 28
PIF_VALUE: 32
PIF_VALUE: 32
PIF_VALUE: 35
PIF_VALUE: 26
PIF_VALUE: 32
PIF_VALUE: 28
PIF_VALUE: 18
PIF_VALUE: 27
PIF_VALUE: 36
PIF_VALUE: 35
PIF_VALUE: 34
PIF_VALUE: 35
PIF_VALUE: 24
PIF_VALUE: 26
PIF_VALUE: 32
PIF_VALUE: 37
PIF_VALUE: 33
PIF_VALUE: 35
PIF_VALUE: 29
PIF_VALUE: 36
PIF_VALUE: 30
PIF_VALUE: 34
PIF_VALUE: 25
PIF_VALUE: 24
PIF_VALUE: 27
PIF_VALUE: 35
PIF_VALUE: 32
PIF_VALUE: 36
PIF_VALUE: 29
PIF_VALUE: 32
PIF_VALUE: 24
PIF_VALUE: 30
PIF_VALUE: 35
PIF_VALUE: 37
PIF_VALUE: 34
PIF_VALUE: 35
PIF_VALUE: 26
PIF_VALUE: 35
PIF_VALUE: 26
PIF_VALUE: 28
PIF_VALUE: 35
PIF_VALUE: 37
PIF_VALUE: 27
PIF_VALUE: 35
PIF_VALUE: 38
PIF_VALUE: 31
PIF_VALUE: 35
PIF_VALUE: 24
PIF_VALUE: 35
PIF_VALUE: 20
PIF_VALUE: 31
PIF_VALUE: 35
PIF_VALUE: 1
PIF_VALUE: 34
PIF_VALUE: 35
PIF_VALUE: 35
PIF_VALUE: 37
PIF_VALUE: 25
PIF_VALUE: 34
PIF_VALUE: 37
PIF_VALUE: 35
PIF_VALUE: 53
PIF_VALUE: 29
PIF_VALUE: 25
PIF_VALUE: 30
PIF_VALUE: 35
PIF_VALUE: 25
PIF_VALUE: 24
PIF_VALUE: 35
PIF_VALUE: 31
PIF_VALUE: 35
PIF_VALUE: 35
PIF_VALUE: 32
PIF_VALUE: 32
PIF_VALUE: 26
PIF_VALUE: 3
PIF_VALUE: 29
PIF_VALUE: 0

## 2019-08-16 ASSESSMENT — PAIN SCALES - GENERAL
PAINLEVEL_OUTOF10: 2
PAINLEVEL_OUTOF10: 7
PAINLEVEL_OUTOF10: 2
PAINLEVEL_OUTOF10: 0
PAINLEVEL_OUTOF10: 3
PAINLEVEL_OUTOF10: 5
PAINLEVEL_OUTOF10: 6
PAINLEVEL_OUTOF10: 5
PAINLEVEL_OUTOF10: 0

## 2019-08-16 ASSESSMENT — PAIN - FUNCTIONAL ASSESSMENT
PAIN_FUNCTIONAL_ASSESSMENT: PREVENTS OR INTERFERES SOME ACTIVE ACTIVITIES AND ADLS
PAIN_FUNCTIONAL_ASSESSMENT: 0-10
PAIN_FUNCTIONAL_ASSESSMENT: ACTIVITIES ARE NOT PREVENTED
PAIN_FUNCTIONAL_ASSESSMENT: PREVENTS OR INTERFERES SOME ACTIVE ACTIVITIES AND ADLS
PAIN_FUNCTIONAL_ASSESSMENT: PREVENTS OR INTERFERES SOME ACTIVE ACTIVITIES AND ADLS

## 2019-08-16 ASSESSMENT — PAIN DESCRIPTION - PROGRESSION: CLINICAL_PROGRESSION: NOT CHANGED

## 2019-08-16 ASSESSMENT — PAIN DESCRIPTION - ORIENTATION
ORIENTATION: LEFT;UPPER
ORIENTATION: LEFT;LOWER
ORIENTATION: LEFT
ORIENTATION: LEFT;UPPER

## 2019-08-16 ASSESSMENT — PAIN DESCRIPTION - LOCATION
LOCATION: LEG

## 2019-08-16 ASSESSMENT — PAIN DESCRIPTION - PAIN TYPE
TYPE: ACUTE PAIN;SURGICAL PAIN
TYPE: ACUTE PAIN;SURGICAL PAIN
TYPE: SURGICAL PAIN
TYPE: ACUTE PAIN;SURGICAL PAIN

## 2019-08-16 ASSESSMENT — PAIN DESCRIPTION - DESCRIPTORS
DESCRIPTORS: ACHING;CONSTANT;DISCOMFORT;NAGGING;SORE
DESCRIPTORS: ACHING;DISCOMFORT;DULL
DESCRIPTORS: ACHING;DISCOMFORT;NAGGING
DESCRIPTORS: ACHING;CONSTANT;DISCOMFORT;SORE;THROBBING

## 2019-08-16 ASSESSMENT — PAIN DESCRIPTION - ONSET: ONSET: ON-GOING

## 2019-08-16 ASSESSMENT — PAIN DESCRIPTION - FREQUENCY: FREQUENCY: CONTINUOUS

## 2019-08-16 NOTE — H&P
Vascular Surgery History & Physical Exam      Chief Complaint: LLE claudication    HISTORY OF PRESENT ILLNESS:                The patient is a 50 y.o. male who presents to the hospital for elective femoral popliteal bypass with femoral endarterectomy secondary to claudication symptoms refractory to angioplasty of the common femoral and profunda  (2016, 2018 x2, 2019). He has never had surgery on his leg other than the angioplasties. He denies any recent issues or illnesses since his outpatient visit last week. Past Medical History:   Diagnosis Date    Anxiety     Chronic obstructive pulmonary disease ([facility] [address] 7/30/2019    Diabetes ([facility] [address]     GERD (gastroesophageal reflux disease)     Gout     Hyperlipidemia     Hypertension     Leg pain     PVD (peripheral vascular disease) ([facility] [address] 1/15/2018    Type 2 diabetes mellitus with diabetic peripheral angiopathy without gangrene, without long-term current use of insulin ([facility] [address] 1/15/2018        Past Surgical History:   Procedure Laterality Date    OTHER SURGICAL HISTORY  10/04/2016    Dr Trina Grey - athrectomy/pci right fem/pop    OTHER SURGICAL HISTORY  06/19/2018    Dr Trina Grey - PCI w/ athrectomy RLE Common Illiac to Popliteal    VASCULAR SURGERY  07/2019    ANGIOGRAM    WISDOM TOOTH EXTRACTION         Current Medications:     Current Facility-Administered Medications:     sodium chloride flush 0.9 % injection 10 mL, 10 mL, Intravenous, 2 times per day, Luis F Gonzales MD    sodium chloride flush 0.9 % injection 10 mL, 10 mL, Intravenous, PRN, Luis F Gonzales MD    ceFAZolin (ANCEF) 2 g in dextrose 3 % 50 mL IVPB (duplex), 2 g, Intravenous, On Call to OR, Luis F Gonzales MD    0.9 % sodium chloride infusion, , Intravenous, Continuous, Luis F Gonzales MD, Last Rate: 100 mL/hr at 08/16/19 6803    Allergies:  Patient has no known allergies.     Social History     Socioeconomic History    Marital status:  loss  Hematological and Lymphatic ROS: negative for - bleeding problems or blood clots  Respiratory ROS: no cough, shortness of breath, or wheezing  Cardiovascular ROS: no chest pain or dyspnea on exertion  Gastrointestinal ROS: no abdominal pain, change in bowel habits, or black or bloody stools, no hernias, hemorrhoids, or new lumps  Genito-Urinary ROS: no dysuria, trouble voiding, or hematuria  Musculoskeletal ROS: negative for - gait disturbance  Neurological ROS: no TIA or stroke symptoms    PHYSICAL EXAM  General: No apparent distress, comfortable  HEENT: Trachea midline, no masses, Pupils equal round  Chest: Respiratory effort was normal with no retractions or use of accessory muscles. Cardiovascular: Heart sounds were normal with a regular rate and rhythm. Abdomen:  Soft and non distended. No tenderness, guarding, rebound, or rigidity. Extremities: Moves all 4 extremeties, No pedal edema, no wounds, 5/5 strength BL      LABS:    Lab Results   Component Value Date    WBC 8.4 2019    HGB 13.6 2019    HCT 39.1 2019     2019    PROTIME 12.2 2019    INR 1.1 2019    K 4.8 2019    BUN 14 2019    CREATININE 0.9 2019       RADIOLOGY:    Xr Chest Standard (2 Vw)    Result Date: 2019  Patient MRN: [id number] : 1970 Age:  50 years Gender: Male Order Date: 2019 8:40 AM Exam: XR CHEST (2 VW) Number of Images: 2 views Indication:  I73.9 PVD (peripheral vascular disease) (HCC) pre op clearance Comparison: None. Findings: Examination of the chest in PA and lateral views shows that both lungs are free of active disease. The heart is normal in size and configuration. The trachea is in the midline. The mediastinum and both hemidiaphragms are normal. The bony thorax is intact. NO ACUTE CARDIOPULMONARY PROCESS       ASSESSMENT:  50 y.o. male with refractory claudication of the left leg worse than right    PLAN:   1.  Left femoral endarterectomy

## 2019-08-16 NOTE — ANESTHESIA PROCEDURE NOTES
Arterial Line:    An arterial line was placed using ultrasound guidance and surface landmarks, in the OR for the following indication(s): continuous blood pressure monitoring and blood sampling needed. A 20 gauge (size), 1 and 3/4 inch (length), Arrow (type) catheter was placed, Seldinger technique used, into the right radial artery, secured by tape. Anesthesia type: Local  Local infiltration: Injection    Events:  patient tolerated procedure well with no complications.   Anesthesiologist: Rickey Palma MD  Performed: Anesthesiologist   Preanesthetic Checklist  Completed: patient identified, site marked, surgical consent, pre-op evaluation, timeout performed, IV checked, risks and benefits discussed, monitors and equipment checked, anesthesia consent given, oxygen available and patient being monitored

## 2019-08-16 NOTE — ANESTHESIA POSTPROCEDURE EVALUATION
Department of Anesthesiology  Postprocedure Note    Patient: Jody Cardona  MRN: [id number]  YOB: 1970  Date of evaluation: 8/16/2019  Time:  5:06 PM     Procedure Summary     Date:  08/16/19 Room / Location:  [facility]    Anesthesia Start:  5994 Anesthesia Stop:  1627    Procedure:  FEMORAL POPLITEAL BYPASS WITH FEMORAL ENDARTERECTOMY LEFT LEG (Left ) Diagnosis:  (PERIPHERAL VASCULAR DIS.)    Surgeon:  Corby Moody MD Responsible Provider:  Allie Garcia MD    Anesthesia Type:  general ASA Status:  3          Anesthesia Type: general    Meek Phase I: Meek Score: 10    Meek Phase II:      Last vitals: Reviewed and per EMR flowsheets.        Anesthesia Post Evaluation    Patient location during evaluation: ICU  Patient participation: complete - patient participated  Level of consciousness: awake and alert  Airway patency: patent  Nausea & Vomiting: no nausea and no vomiting  Complications: no  Cardiovascular status: hemodynamically stable and blood pressure returned to baseline  Respiratory status: acceptable  Hydration status: euvolemic

## 2019-08-17 LAB
ALBUMIN SERPL-MCNC: 3.6 G/DL (ref 3.5–5.2)
ALP BLD-CCNC: 54 U/L (ref 40–129)
ALT SERPL-CCNC: 29 U/L (ref 0–40)
ANION GAP SERPL CALCULATED.3IONS-SCNC: 7 MMOL/L (ref 7–16)
AST SERPL-CCNC: 88 U/L (ref 0–39)
BILIRUB SERPL-MCNC: <0.2 MG/DL (ref 0–1.2)
BUN BLDV-MCNC: 12 MG/DL (ref 6–20)
CALCIUM SERPL-MCNC: 7.9 MG/DL (ref 8.6–10.2)
CHLORIDE BLD-SCNC: 111 MMOL/L (ref 98–107)
CO2: 26 MMOL/L (ref 22–29)
CREAT SERPL-MCNC: 0.8 MG/DL (ref 0.7–1.2)
GFR AFRICAN AMERICAN: >60
GFR NON-AFRICAN AMERICAN: >60 ML/MIN/1.73
GLUCOSE BLD-MCNC: 148 MG/DL (ref 74–99)
HCT VFR BLD CALC: 26.5 % (ref 37–54)
HEMOGLOBIN: 8.9 G/DL (ref 12.5–16.5)
LACTIC ACID: 2 MMOL/L (ref 0.5–2.2)
MAGNESIUM: 1.9 MG/DL (ref 1.6–2.6)
MCH RBC QN AUTO: 32.4 PG (ref 26–35)
MCHC RBC AUTO-ENTMCNC: 33.6 % (ref 32–34.5)
MCV RBC AUTO: 96.4 FL (ref 80–99.9)
METER GLUCOSE: 133 MG/DL (ref 74–99)
METER GLUCOSE: 134 MG/DL (ref 74–99)
METER GLUCOSE: 136 MG/DL (ref 74–99)
METER GLUCOSE: 149 MG/DL (ref 74–99)
METER GLUCOSE: 159 MG/DL (ref 74–99)
METER GLUCOSE: 218 MG/DL (ref 74–99)
PDW BLD-RTO: 13.8 FL (ref 11.5–15)
PLATELET # BLD: 154 E9/L (ref 130–450)
PMV BLD AUTO: 9.7 FL (ref 7–12)
POTASSIUM REFLEX MAGNESIUM: 4.4 MMOL/L (ref 3.5–5)
RBC # BLD: 2.75 E12/L (ref 3.8–5.8)
SODIUM BLD-SCNC: 144 MMOL/L (ref 132–146)
TOTAL PROTEIN: 5.5 G/DL (ref 6.4–8.3)
WBC # BLD: 9.5 E9/L (ref 4.5–11.5)

## 2019-08-17 PROCEDURE — 6360000002 HC RX W HCPCS: Performed by: SURGERY

## 2019-08-17 PROCEDURE — 2580000003 HC RX 258: Performed by: STUDENT IN AN ORGANIZED HEALTH CARE EDUCATION/TRAINING PROGRAM

## 2019-08-17 PROCEDURE — 6370000000 HC RX 637 (ALT 250 FOR IP): Performed by: STUDENT IN AN ORGANIZED HEALTH CARE EDUCATION/TRAINING PROGRAM

## 2019-08-17 PROCEDURE — 83735 ASSAY OF MAGNESIUM: CPT

## 2019-08-17 PROCEDURE — 37799 UNLISTED PX VASCULAR SURGERY: CPT

## 2019-08-17 PROCEDURE — 85027 COMPLETE CBC AUTOMATED: CPT

## 2019-08-17 PROCEDURE — 2140000000 HC CCU INTERMEDIATE R&B

## 2019-08-17 PROCEDURE — 36415 COLL VENOUS BLD VENIPUNCTURE: CPT

## 2019-08-17 PROCEDURE — 82962 GLUCOSE BLOOD TEST: CPT

## 2019-08-17 PROCEDURE — 6360000002 HC RX W HCPCS: Performed by: STUDENT IN AN ORGANIZED HEALTH CARE EDUCATION/TRAINING PROGRAM

## 2019-08-17 PROCEDURE — 80053 COMPREHEN METABOLIC PANEL: CPT

## 2019-08-17 PROCEDURE — 83605 ASSAY OF LACTIC ACID: CPT

## 2019-08-17 RX ORDER — CALCIUM CARBONATE 200(500)MG
500 TABLET,CHEWABLE ORAL 3 TIMES DAILY PRN
Status: DISCONTINUED | OUTPATIENT
Start: 2019-08-17 | End: 2019-08-19 | Stop reason: HOSPADM

## 2019-08-17 RX ADMIN — INSULIN LISPRO 3 UNITS: 100 INJECTION, SOLUTION INTRAVENOUS; SUBCUTANEOUS at 04:32

## 2019-08-17 RX ADMIN — ACETAMINOPHEN 650 MG: 325 TABLET, FILM COATED ORAL at 08:55

## 2019-08-17 RX ADMIN — ACETAMINOPHEN 650 MG: 325 TABLET, FILM COATED ORAL at 04:32

## 2019-08-17 RX ADMIN — GABAPENTIN 100 MG: 100 CAPSULE ORAL at 20:11

## 2019-08-17 RX ADMIN — Medication 10 ML: at 08:55

## 2019-08-17 RX ADMIN — SODIUM CHLORIDE: 9 INJECTION, SOLUTION INTRAVENOUS at 04:01

## 2019-08-17 RX ADMIN — DOCUSATE SODIUM 100 MG: 100 CAPSULE, LIQUID FILLED ORAL at 20:11

## 2019-08-17 RX ADMIN — OXYCODONE HYDROCHLORIDE 5 MG: 5 TABLET ORAL at 09:48

## 2019-08-17 RX ADMIN — GABAPENTIN 100 MG: 100 CAPSULE ORAL at 08:55

## 2019-08-17 RX ADMIN — CITALOPRAM 40 MG: 20 TABLET, FILM COATED ORAL at 09:22

## 2019-08-17 RX ADMIN — Medication 2 G: at 16:02

## 2019-08-17 RX ADMIN — Medication 10 ML: at 04:07

## 2019-08-17 RX ADMIN — ACETAMINOPHEN 650 MG: 325 TABLET, FILM COATED ORAL at 16:02

## 2019-08-17 RX ADMIN — ACETAMINOPHEN 650 MG: 325 TABLET, FILM COATED ORAL at 00:34

## 2019-08-17 RX ADMIN — OXYCODONE HYDROCHLORIDE 10 MG: 5 TABLET ORAL at 16:01

## 2019-08-17 RX ADMIN — OXYCODONE HYDROCHLORIDE 10 MG: 5 TABLET ORAL at 20:11

## 2019-08-17 RX ADMIN — Medication 10 ML: at 20:12

## 2019-08-17 RX ADMIN — CILOSTAZOL 100 MG: 100 TABLET ORAL at 20:10

## 2019-08-17 RX ADMIN — ENOXAPARIN SODIUM 40 MG: 40 INJECTION SUBCUTANEOUS at 11:45

## 2019-08-17 RX ADMIN — ATORVASTATIN CALCIUM 40 MG: 40 TABLET, FILM COATED ORAL at 16:01

## 2019-08-17 RX ADMIN — ACETAMINOPHEN 650 MG: 325 TABLET, FILM COATED ORAL at 20:10

## 2019-08-17 RX ADMIN — CILOSTAZOL 100 MG: 100 TABLET ORAL at 09:22

## 2019-08-17 RX ADMIN — CALCIUM CARBONATE (ANTACID) CHEW TAB 500 MG 500 MG: 500 CHEW TAB at 12:55

## 2019-08-17 RX ADMIN — INSULIN LISPRO 6 UNITS: 100 INJECTION, SOLUTION INTRAVENOUS; SUBCUTANEOUS at 11:45

## 2019-08-17 RX ADMIN — Medication 2 G: at 04:01

## 2019-08-17 RX ADMIN — PANTOPRAZOLE SODIUM 40 MG: 40 TABLET, DELAYED RELEASE ORAL at 08:55

## 2019-08-17 RX ADMIN — ALLOPURINOL 300 MG: 300 TABLET ORAL at 09:22

## 2019-08-17 RX ADMIN — INSULIN LISPRO 3 UNITS: 100 INJECTION, SOLUTION INTRAVENOUS; SUBCUTANEOUS at 16:01

## 2019-08-17 ASSESSMENT — PAIN DESCRIPTION - PAIN TYPE
TYPE: ACUTE PAIN
TYPE: ACUTE PAIN;SURGICAL PAIN
TYPE: ACUTE PAIN
TYPE_2: SURGICAL
TYPE: ACUTE PAIN;SURGICAL PAIN
TYPE_2: SURGICAL
TYPE: ACUTE PAIN;SURGICAL PAIN

## 2019-08-17 ASSESSMENT — PAIN DESCRIPTION - ONSET
ONSET: ON-GOING
ONSET_2: ON-GOING
ONSET: ON-GOING
ONSET_2: ON-GOING
ONSET: ON-GOING

## 2019-08-17 ASSESSMENT — PAIN DESCRIPTION - DURATION
DURATION_2: CONTINUOUS
DURATION_2: CONTINUOUS

## 2019-08-17 ASSESSMENT — PAIN DESCRIPTION - DESCRIPTORS
DESCRIPTORS_2: SORE
DESCRIPTORS: ACHING;DISCOMFORT;SORE
DESCRIPTORS_2: SORE
DESCRIPTORS: ACHING
DESCRIPTORS: ACHING;CONSTANT;BURNING;DISCOMFORT
DESCRIPTORS: CONSTANT;DISCOMFORT;SORE
DESCRIPTORS: ACHING;SORE
DESCRIPTORS: ACHING
DESCRIPTORS: ACHING;SORE;DISCOMFORT

## 2019-08-17 ASSESSMENT — PAIN DESCRIPTION - INTENSITY
RATING_2: 4
RATING_2: 4

## 2019-08-17 ASSESSMENT — PAIN DESCRIPTION - PROGRESSION
CLINICAL_PROGRESSION: GRADUALLY IMPROVING
CLINICAL_PROGRESSION: NOT CHANGED
CLINICAL_PROGRESSION_2: NOT CHANGED
CLINICAL_PROGRESSION: GRADUALLY WORSENING
CLINICAL_PROGRESSION: NOT CHANGED
CLINICAL_PROGRESSION_2: NOT CHANGED
CLINICAL_PROGRESSION: NOT CHANGED
CLINICAL_PROGRESSION: NOT CHANGED

## 2019-08-17 ASSESSMENT — PAIN SCALES - GENERAL
PAINLEVEL_OUTOF10: 5
PAINLEVEL_OUTOF10: 7
PAINLEVEL_OUTOF10: 4
PAINLEVEL_OUTOF10: 0
PAINLEVEL_OUTOF10: 0
PAINLEVEL_OUTOF10: 6
PAINLEVEL_OUTOF10: 0
PAINLEVEL_OUTOF10: 7

## 2019-08-17 ASSESSMENT — PAIN DESCRIPTION - FREQUENCY
FREQUENCY: CONTINUOUS

## 2019-08-17 ASSESSMENT — PAIN DESCRIPTION - ORIENTATION
ORIENTATION_2: LEFT
ORIENTATION: LEFT;LOWER
ORIENTATION: RIGHT;LEFT
ORIENTATION: LEFT;RIGHT
ORIENTATION: RIGHT
ORIENTATION_2: LEFT
ORIENTATION: LEFT;RIGHT
ORIENTATION: RIGHT;LEFT
ORIENTATION: RIGHT

## 2019-08-17 ASSESSMENT — PAIN - FUNCTIONAL ASSESSMENT

## 2019-08-17 ASSESSMENT — PAIN DESCRIPTION - LOCATION
LOCATION: LEG
LOCATION: ANKLE;LEG
LOCATION: ANKLE;LEG
LOCATION_2: LEG
LOCATION_2: LEG

## 2019-08-18 PROBLEM — D62 POSTOPERATIVE ANEMIA DUE TO ACUTE BLOOD LOSS: Status: ACTIVE | Noted: 2019-08-18

## 2019-08-18 LAB
ALBUMIN SERPL-MCNC: 3.8 G/DL (ref 3.5–5.2)
ALP BLD-CCNC: 60 U/L (ref 40–129)
ALT SERPL-CCNC: 33 U/L (ref 0–40)
ANION GAP SERPL CALCULATED.3IONS-SCNC: 15 MMOL/L (ref 7–16)
AST SERPL-CCNC: 87 U/L (ref 0–39)
BILIRUB SERPL-MCNC: 0.4 MG/DL (ref 0–1.2)
BUN BLDV-MCNC: 11 MG/DL (ref 6–20)
CALCIUM SERPL-MCNC: 8.6 MG/DL (ref 8.6–10.2)
CHLORIDE BLD-SCNC: 105 MMOL/L (ref 98–107)
CO2: 23 MMOL/L (ref 22–29)
CREAT SERPL-MCNC: 0.8 MG/DL (ref 0.7–1.2)
GFR AFRICAN AMERICAN: >60
GFR NON-AFRICAN AMERICAN: >60 ML/MIN/1.73
GLUCOSE BLD-MCNC: 137 MG/DL (ref 74–99)
HCT VFR BLD CALC: 27.6 % (ref 37–54)
HEMOGLOBIN: 9.2 G/DL (ref 12.5–16.5)
MCH RBC QN AUTO: 32.7 PG (ref 26–35)
MCHC RBC AUTO-ENTMCNC: 33.3 % (ref 32–34.5)
MCV RBC AUTO: 98.2 FL (ref 80–99.9)
METER GLUCOSE: 101 MG/DL (ref 74–99)
METER GLUCOSE: 143 MG/DL (ref 74–99)
METER GLUCOSE: 172 MG/DL (ref 74–99)
METER GLUCOSE: 200 MG/DL (ref 74–99)
METER GLUCOSE: 210 MG/DL (ref 74–99)
PDW BLD-RTO: 13.4 FL (ref 11.5–15)
PLATELET # BLD: 151 E9/L (ref 130–450)
PMV BLD AUTO: 9.7 FL (ref 7–12)
POTASSIUM REFLEX MAGNESIUM: 3.8 MMOL/L (ref 3.5–5)
RBC # BLD: 2.81 E12/L (ref 3.8–5.8)
SODIUM BLD-SCNC: 143 MMOL/L (ref 132–146)
TOTAL PROTEIN: 6.2 G/DL (ref 6.4–8.3)
WBC # BLD: 9.6 E9/L (ref 4.5–11.5)

## 2019-08-18 PROCEDURE — 36415 COLL VENOUS BLD VENIPUNCTURE: CPT

## 2019-08-18 PROCEDURE — 85027 COMPLETE CBC AUTOMATED: CPT

## 2019-08-18 PROCEDURE — 6370000000 HC RX 637 (ALT 250 FOR IP): Performed by: STUDENT IN AN ORGANIZED HEALTH CARE EDUCATION/TRAINING PROGRAM

## 2019-08-18 PROCEDURE — 2580000003 HC RX 258: Performed by: STUDENT IN AN ORGANIZED HEALTH CARE EDUCATION/TRAINING PROGRAM

## 2019-08-18 PROCEDURE — 82962 GLUCOSE BLOOD TEST: CPT

## 2019-08-18 PROCEDURE — 6360000002 HC RX W HCPCS: Performed by: STUDENT IN AN ORGANIZED HEALTH CARE EDUCATION/TRAINING PROGRAM

## 2019-08-18 PROCEDURE — 6360000002 HC RX W HCPCS: Performed by: SURGERY

## 2019-08-18 PROCEDURE — 80053 COMPREHEN METABOLIC PANEL: CPT

## 2019-08-18 PROCEDURE — 2140000000 HC CCU INTERMEDIATE R&B

## 2019-08-18 RX ORDER — METHOCARBAMOL 500 MG/1
500 TABLET, FILM COATED ORAL 4 TIMES DAILY
Status: DISCONTINUED | OUTPATIENT
Start: 2019-08-18 | End: 2019-08-19 | Stop reason: HOSPADM

## 2019-08-18 RX ORDER — CLOPIDOGREL BISULFATE 75 MG/1
75 TABLET ORAL DAILY
Status: DISCONTINUED | OUTPATIENT
Start: 2019-08-18 | End: 2019-08-19 | Stop reason: HOSPADM

## 2019-08-18 RX ORDER — ASPIRIN 81 MG/1
81 TABLET, CHEWABLE ORAL DAILY
Status: DISCONTINUED | OUTPATIENT
Start: 2019-08-18 | End: 2019-08-19 | Stop reason: HOSPADM

## 2019-08-18 RX ORDER — OXYCODONE HYDROCHLORIDE 5 MG/1
5 TABLET ORAL EVERY 6 HOURS PRN
Qty: 25 TABLET | Refills: 0 | Status: SHIPPED | OUTPATIENT
Start: 2019-08-18 | End: 2019-08-19

## 2019-08-18 RX ADMIN — Medication 10 ML: at 08:51

## 2019-08-18 RX ADMIN — INSULIN LISPRO 6 UNITS: 100 INJECTION, SOLUTION INTRAVENOUS; SUBCUTANEOUS at 17:10

## 2019-08-18 RX ADMIN — CILOSTAZOL 100 MG: 100 TABLET ORAL at 08:47

## 2019-08-18 RX ADMIN — ALLOPURINOL 300 MG: 300 TABLET ORAL at 08:45

## 2019-08-18 RX ADMIN — Medication 10 ML: at 11:22

## 2019-08-18 RX ADMIN — ACETAMINOPHEN 650 MG: 325 TABLET, FILM COATED ORAL at 08:48

## 2019-08-18 RX ADMIN — GABAPENTIN 100 MG: 100 CAPSULE ORAL at 21:35

## 2019-08-18 RX ADMIN — OXYCODONE HYDROCHLORIDE 10 MG: 5 TABLET ORAL at 14:14

## 2019-08-18 RX ADMIN — CITALOPRAM 40 MG: 20 TABLET, FILM COATED ORAL at 08:47

## 2019-08-18 RX ADMIN — ACETAMINOPHEN 650 MG: 325 TABLET, FILM COATED ORAL at 16:31

## 2019-08-18 RX ADMIN — Medication 10 ML: at 21:35

## 2019-08-18 RX ADMIN — DOCUSATE SODIUM 100 MG: 100 CAPSULE, LIQUID FILLED ORAL at 21:35

## 2019-08-18 RX ADMIN — GLIPIZIDE 10 MG: 5 TABLET ORAL at 06:35

## 2019-08-18 RX ADMIN — OXYCODONE HYDROCHLORIDE 10 MG: 5 TABLET ORAL at 08:43

## 2019-08-18 RX ADMIN — OXYCODONE HYDROCHLORIDE 10 MG: 5 TABLET ORAL at 01:20

## 2019-08-18 RX ADMIN — ACETAMINOPHEN 650 MG: 325 TABLET, FILM COATED ORAL at 06:32

## 2019-08-18 RX ADMIN — HYDROMORPHONE HYDROCHLORIDE 0.5 MG: 1 INJECTION, SOLUTION INTRAMUSCULAR; INTRAVENOUS; SUBCUTANEOUS at 11:23

## 2019-08-18 RX ADMIN — METHOCARBAMOL TABLETS 500 MG: 500 TABLET, COATED ORAL at 16:31

## 2019-08-18 RX ADMIN — METHOCARBAMOL TABLETS 500 MG: 500 TABLET, COATED ORAL at 21:35

## 2019-08-18 RX ADMIN — GABAPENTIN 100 MG: 100 CAPSULE ORAL at 08:42

## 2019-08-18 RX ADMIN — ACETAMINOPHEN 650 MG: 325 TABLET, FILM COATED ORAL at 14:13

## 2019-08-18 RX ADMIN — ACETAMINOPHEN 650 MG: 325 TABLET, FILM COATED ORAL at 00:54

## 2019-08-18 RX ADMIN — ASPIRIN 81 MG 81 MG: 81 TABLET ORAL at 11:21

## 2019-08-18 RX ADMIN — METHOCARBAMOL TABLETS 500 MG: 500 TABLET, COATED ORAL at 08:57

## 2019-08-18 RX ADMIN — INSULIN LISPRO 3 UNITS: 100 INJECTION, SOLUTION INTRAVENOUS; SUBCUTANEOUS at 21:37

## 2019-08-18 RX ADMIN — CLOPIDOGREL 75 MG: 75 TABLET, FILM COATED ORAL at 11:20

## 2019-08-18 RX ADMIN — CILOSTAZOL 100 MG: 100 TABLET ORAL at 21:34

## 2019-08-18 RX ADMIN — ATORVASTATIN CALCIUM 40 MG: 40 TABLET, FILM COATED ORAL at 17:39

## 2019-08-18 RX ADMIN — ENOXAPARIN SODIUM 40 MG: 40 INJECTION SUBCUTANEOUS at 08:47

## 2019-08-18 RX ADMIN — DOCUSATE SODIUM 100 MG: 100 CAPSULE, LIQUID FILLED ORAL at 08:42

## 2019-08-18 RX ADMIN — INSULIN LISPRO 6 UNITS: 100 INJECTION, SOLUTION INTRAVENOUS; SUBCUTANEOUS at 08:49

## 2019-08-18 RX ADMIN — METHOCARBAMOL TABLETS 500 MG: 500 TABLET, COATED ORAL at 14:13

## 2019-08-18 RX ADMIN — PANTOPRAZOLE SODIUM 40 MG: 40 TABLET, DELAYED RELEASE ORAL at 06:32

## 2019-08-18 RX ADMIN — OXYCODONE HYDROCHLORIDE 5 MG: 5 TABLET ORAL at 21:35

## 2019-08-18 ASSESSMENT — PAIN - FUNCTIONAL ASSESSMENT
PAIN_FUNCTIONAL_ASSESSMENT: PREVENTS OR INTERFERES SOME ACTIVE ACTIVITIES AND ADLS
PAIN_FUNCTIONAL_ASSESSMENT: PREVENTS OR INTERFERES SOME ACTIVE ACTIVITIES AND ADLS
PAIN_FUNCTIONAL_ASSESSMENT: PREVENTS OR INTERFERES WITH MANY ACTIVE NOT PASSIVE ACTIVITIES
PAIN_FUNCTIONAL_ASSESSMENT: PREVENTS OR INTERFERES SOME ACTIVE ACTIVITIES AND ADLS
PAIN_FUNCTIONAL_ASSESSMENT: PREVENTS OR INTERFERES WITH MANY ACTIVE NOT PASSIVE ACTIVITIES
PAIN_FUNCTIONAL_ASSESSMENT: PREVENTS OR INTERFERES SOME ACTIVE ACTIVITIES AND ADLS

## 2019-08-18 ASSESSMENT — PAIN SCALES - GENERAL
PAINLEVEL_OUTOF10: 7
PAINLEVEL_OUTOF10: 8
PAINLEVEL_OUTOF10: 7
PAINLEVEL_OUTOF10: 0
PAINLEVEL_OUTOF10: 5
PAINLEVEL_OUTOF10: 7
PAINLEVEL_OUTOF10: 8
PAINLEVEL_OUTOF10: 7
PAINLEVEL_OUTOF10: 2
PAINLEVEL_OUTOF10: 0
PAINLEVEL_OUTOF10: 10
PAINLEVEL_OUTOF10: 5
PAINLEVEL_OUTOF10: 5
PAINLEVEL_OUTOF10: 0

## 2019-08-18 ASSESSMENT — PAIN DESCRIPTION - ORIENTATION
ORIENTATION: LEFT

## 2019-08-18 ASSESSMENT — PAIN DESCRIPTION - DESCRIPTORS
DESCRIPTORS: ACHING;DISCOMFORT;SORE
DESCRIPTORS: ACHING;DISCOMFORT
DESCRIPTORS: ACHING;DISCOMFORT;SORE
DESCRIPTORS: ACHING;DISCOMFORT;SORE

## 2019-08-18 ASSESSMENT — PAIN DESCRIPTION - PROGRESSION
CLINICAL_PROGRESSION: NOT CHANGED

## 2019-08-18 ASSESSMENT — PAIN DESCRIPTION - FREQUENCY
FREQUENCY: INTERMITTENT
FREQUENCY: CONTINUOUS
FREQUENCY: INTERMITTENT
FREQUENCY: CONTINUOUS
FREQUENCY: INTERMITTENT

## 2019-08-18 ASSESSMENT — PAIN DESCRIPTION - LOCATION
LOCATION: GROIN;LEG
LOCATION: LEG
LOCATION: GROIN;LEG
LOCATION: GROIN;LEG

## 2019-08-18 ASSESSMENT — PAIN DESCRIPTION - ONSET
ONSET: ON-GOING
ONSET: ON-GOING
ONSET: GRADUAL
ONSET: GRADUAL
ONSET: ON-GOING

## 2019-08-18 ASSESSMENT — PAIN DESCRIPTION - PAIN TYPE
TYPE: SURGICAL PAIN
TYPE: ACUTE PAIN;SURGICAL PAIN
TYPE: SURGICAL PAIN

## 2019-08-18 NOTE — PROGRESS NOTES
Vascular SURGERY  DAILY PROGRESS NOTE  8/18/2019    Subjective:  No issues overnight. Lactic acid normalize. Pain controlled. Passing gas, no BM. Complaining of pain in this R lateral foot.      Objective:  BP (!) 109/56   Pulse 88   Temp 98.3 °F (36.8 °C) (Temporal)   Resp 16   Ht 5' 10\" (1.778 m)   Wt 229 lb 15 oz (104.3 kg)   SpO2 96%   BMI 32.99 kg/m²     GENERAL:  Laying in bed, awake, alert, cooperative, no apparent distress  HEAD: Normocephalic, atraumatic  EYES: No sclera icterus, pupils equal  LUNGS:  No increased work of breathing  CARDIOVASCULAR:  Reg rate, normotensive  ABDOMEN:  Soft, non-tender, non-distended  EXTREMITIES: LLE: groin and thigh incisions CDI,no swelling or erythema, Motor sensory intact, triphasic DP/PT  SKIN: Warm and dry    Assessment/Plan:  50 y.o. male claudication s/p left femoral endarterectomy and fem-pop bypass 8/16  - carb control diet  - acidosis cleared, UOP excellent  - Acute blood loss anemia, continue to monitor - hgb stabilized  - PT/OT, ambulate   - continue home meds   - continue neurovascular checks  - resume antiplatelet medication  - Plan will be discussed with Dr. Manuela Munguia    Electronically signed by Prosper King MD on 8/18/2019 at 7:22 AM     Pt seen and examined    Vascular exam stable -  L DP and PT biphasic  Incision c/d/i    R LE some soreness but improving 5/5 DF/PF    Acute postop blood loss anemia - hgb stable today, continue to monitor  PT OT  Asa, plavix, pletal resume  Continue lovenox  Excellent uo  BS reasonably controlled    Ambulate  Hopeful for discharge tommorow or Tuesday    Pratheek FLETCHER Yadav

## 2019-08-19 VITALS
RESPIRATION RATE: 16 BRPM | DIASTOLIC BLOOD PRESSURE: 60 MMHG | SYSTOLIC BLOOD PRESSURE: 116 MMHG | HEIGHT: 70 IN | BODY MASS INDEX: 31.73 KG/M2 | HEART RATE: 86 BPM | WEIGHT: 221.6 LBS | TEMPERATURE: 97.8 F | OXYGEN SATURATION: 98 %

## 2019-08-19 LAB
ALBUMIN SERPL-MCNC: 3.6 G/DL (ref 3.5–5.2)
ALP BLD-CCNC: 58 U/L (ref 40–129)
ALT SERPL-CCNC: 28 U/L (ref 0–40)
ANION GAP SERPL CALCULATED.3IONS-SCNC: 13 MMOL/L (ref 7–16)
AST SERPL-CCNC: 59 U/L (ref 0–39)
BILIRUB SERPL-MCNC: 0.4 MG/DL (ref 0–1.2)
BUN BLDV-MCNC: 9 MG/DL (ref 6–20)
CALCIUM SERPL-MCNC: 8.7 MG/DL (ref 8.6–10.2)
CHLORIDE BLD-SCNC: 102 MMOL/L (ref 98–107)
CO2: 26 MMOL/L (ref 22–29)
CREAT SERPL-MCNC: 0.8 MG/DL (ref 0.7–1.2)
GFR AFRICAN AMERICAN: >60
GFR NON-AFRICAN AMERICAN: >60 ML/MIN/1.73
GLUCOSE BLD-MCNC: 156 MG/DL (ref 74–99)
HCT VFR BLD CALC: 25.9 % (ref 37–54)
HEMOGLOBIN: 8.8 G/DL (ref 12.5–16.5)
MCH RBC QN AUTO: 33.1 PG (ref 26–35)
MCHC RBC AUTO-ENTMCNC: 34 % (ref 32–34.5)
MCV RBC AUTO: 97.4 FL (ref 80–99.9)
METER GLUCOSE: 107 MG/DL (ref 74–99)
METER GLUCOSE: 155 MG/DL (ref 74–99)
METER GLUCOSE: 160 MG/DL (ref 74–99)
PDW BLD-RTO: 13.1 FL (ref 11.5–15)
PLATELET # BLD: 160 E9/L (ref 130–450)
PMV BLD AUTO: 9.7 FL (ref 7–12)
POC ACT LR: 125 SECONDS
POC ACT LR: 133 SECONDS
POC ACT LR: 228 SECONDS
POC ACT LR: 234 SECONDS
POC ACT LR: 277 SECONDS
POC ACT LR: 280 SECONDS
POC ACT LR: 297 SECONDS
POC ACT LR: 304 SECONDS
POC ACT LR: 321 SECONDS
POTASSIUM REFLEX MAGNESIUM: 4 MMOL/L (ref 3.5–5)
RBC # BLD: 2.66 E12/L (ref 3.8–5.8)
SODIUM BLD-SCNC: 141 MMOL/L (ref 132–146)
TOTAL PROTEIN: 6.3 G/DL (ref 6.4–8.3)
WBC # BLD: 8.2 E9/L (ref 4.5–11.5)

## 2019-08-19 PROCEDURE — 6370000000 HC RX 637 (ALT 250 FOR IP): Performed by: STUDENT IN AN ORGANIZED HEALTH CARE EDUCATION/TRAINING PROGRAM

## 2019-08-19 PROCEDURE — 6360000002 HC RX W HCPCS: Performed by: SURGERY

## 2019-08-19 PROCEDURE — 97530 THERAPEUTIC ACTIVITIES: CPT

## 2019-08-19 PROCEDURE — 82962 GLUCOSE BLOOD TEST: CPT

## 2019-08-19 PROCEDURE — 97535 SELF CARE MNGMENT TRAINING: CPT

## 2019-08-19 PROCEDURE — 80053 COMPREHEN METABOLIC PANEL: CPT

## 2019-08-19 PROCEDURE — 2580000003 HC RX 258: Performed by: STUDENT IN AN ORGANIZED HEALTH CARE EDUCATION/TRAINING PROGRAM

## 2019-08-19 PROCEDURE — 97165 OT EVAL LOW COMPLEX 30 MIN: CPT

## 2019-08-19 PROCEDURE — 36415 COLL VENOUS BLD VENIPUNCTURE: CPT

## 2019-08-19 PROCEDURE — 97161 PT EVAL LOW COMPLEX 20 MIN: CPT

## 2019-08-19 PROCEDURE — 85027 COMPLETE CBC AUTOMATED: CPT

## 2019-08-19 RX ORDER — METHOCARBAMOL 500 MG/1
500 TABLET, FILM COATED ORAL 4 TIMES DAILY
Qty: 40 TABLET | Refills: 0 | Status: SHIPPED | OUTPATIENT
Start: 2019-08-19 | End: 2019-08-29

## 2019-08-19 RX ORDER — OXYCODONE HYDROCHLORIDE 5 MG/1
5 TABLET ORAL EVERY 6 HOURS PRN
Qty: 25 TABLET | Refills: 0 | Status: SHIPPED | OUTPATIENT
Start: 2019-08-19 | End: 2019-08-19

## 2019-08-19 RX ORDER — OXYCODONE HYDROCHLORIDE 5 MG/1
5 TABLET ORAL EVERY 6 HOURS PRN
Qty: 25 TABLET | Refills: 0 | Status: SHIPPED | OUTPATIENT
Start: 2019-08-19 | End: 2019-08-26

## 2019-08-19 RX ADMIN — ATORVASTATIN CALCIUM 40 MG: 40 TABLET, FILM COATED ORAL at 17:05

## 2019-08-19 RX ADMIN — METHOCARBAMOL TABLETS 500 MG: 500 TABLET, COATED ORAL at 17:01

## 2019-08-19 RX ADMIN — ALLOPURINOL 300 MG: 300 TABLET ORAL at 08:59

## 2019-08-19 RX ADMIN — Medication 10 ML: at 08:58

## 2019-08-19 RX ADMIN — INSULIN LISPRO 3 UNITS: 100 INJECTION, SOLUTION INTRAVENOUS; SUBCUTANEOUS at 17:05

## 2019-08-19 RX ADMIN — ACETAMINOPHEN 650 MG: 325 TABLET, FILM COATED ORAL at 06:26

## 2019-08-19 RX ADMIN — DOCUSATE SODIUM 100 MG: 100 CAPSULE, LIQUID FILLED ORAL at 08:58

## 2019-08-19 RX ADMIN — CITALOPRAM 40 MG: 20 TABLET, FILM COATED ORAL at 08:59

## 2019-08-19 RX ADMIN — LISINOPRIL 40 MG: 20 TABLET ORAL at 08:59

## 2019-08-19 RX ADMIN — ACETAMINOPHEN 650 MG: 325 TABLET, FILM COATED ORAL at 10:40

## 2019-08-19 RX ADMIN — INSULIN LISPRO 3 UNITS: 100 INJECTION, SOLUTION INTRAVENOUS; SUBCUTANEOUS at 08:59

## 2019-08-19 RX ADMIN — CLOPIDOGREL 75 MG: 75 TABLET, FILM COATED ORAL at 08:59

## 2019-08-19 RX ADMIN — METHOCARBAMOL TABLETS 500 MG: 500 TABLET, COATED ORAL at 08:59

## 2019-08-19 RX ADMIN — GLIPIZIDE 10 MG: 5 TABLET ORAL at 06:26

## 2019-08-19 RX ADMIN — GABAPENTIN 100 MG: 100 CAPSULE ORAL at 08:58

## 2019-08-19 RX ADMIN — ASPIRIN 81 MG 81 MG: 81 TABLET ORAL at 08:58

## 2019-08-19 RX ADMIN — OXYCODONE HYDROCHLORIDE 5 MG: 5 TABLET ORAL at 03:27

## 2019-08-19 RX ADMIN — ACETAMINOPHEN 650 MG: 325 TABLET, FILM COATED ORAL at 17:01

## 2019-08-19 RX ADMIN — CILOSTAZOL 100 MG: 100 TABLET ORAL at 08:59

## 2019-08-19 RX ADMIN — OXYCODONE HYDROCHLORIDE 10 MG: 5 TABLET ORAL at 15:58

## 2019-08-19 RX ADMIN — PANTOPRAZOLE SODIUM 40 MG: 40 TABLET, DELAYED RELEASE ORAL at 06:26

## 2019-08-19 RX ADMIN — METHOCARBAMOL TABLETS 500 MG: 500 TABLET, COATED ORAL at 13:06

## 2019-08-19 RX ADMIN — ENOXAPARIN SODIUM 40 MG: 40 INJECTION SUBCUTANEOUS at 08:58

## 2019-08-19 RX ADMIN — OXYCODONE HYDROCHLORIDE 10 MG: 5 TABLET ORAL at 09:08

## 2019-08-19 ASSESSMENT — PAIN SCALES - GENERAL
PAINLEVEL_OUTOF10: 9
PAINLEVEL_OUTOF10: 4
PAINLEVEL_OUTOF10: 9
PAINLEVEL_OUTOF10: 5
PAINLEVEL_OUTOF10: 3
PAINLEVEL_OUTOF10: 5

## 2019-08-19 ASSESSMENT — PAIN DESCRIPTION - FREQUENCY: FREQUENCY: INTERMITTENT

## 2019-08-19 ASSESSMENT — PAIN DESCRIPTION - ONSET: ONSET: ON-GOING

## 2019-08-19 ASSESSMENT — PAIN DESCRIPTION - PAIN TYPE: TYPE: SURGICAL PAIN

## 2019-08-19 ASSESSMENT — PAIN DESCRIPTION - LOCATION: LOCATION: GROIN;LEG

## 2019-08-19 ASSESSMENT — PAIN DESCRIPTION - ORIENTATION: ORIENTATION: LEFT

## 2019-08-19 ASSESSMENT — PAIN DESCRIPTION - DESCRIPTORS: DESCRIPTORS: DISCOMFORT;SORE;TENDER

## 2019-08-19 NOTE — PROGRESS NOTES
Occupational Therapy  OCCUPATIONAL THERAPY INITIAL EVALUATION      Date:2019  Patient Name: Eloise Richardson  MRN: [id number]  : 1970  Room: [address] [place] [address]    Evaluating OT: Sherren Seaman OTR/L #[id number]     AM-PAC Daily Activity Raw Score:     Recommended Adaptive Equipment:  TBD     Diagnosis: PVD     Surgery: s/p left femoral endarterectomy and fem-pop bypass   Pertinent Medical History: anxiety, COPD, DM, HTN, PVD     Precautions:  Falls     Home Living: Pt lives with wife in a 1 story home with level entry    Bathroom setup: tub/shower combo   Equipment owned: none    Prior Level of Function: Independent with ADLs , Independent with IADLs; ambulated without AD  Driving: yes  Occupation: yes; factory     Pain Level: Pt reports L LE incisional pain rated at ;  Therapist facilitated repositioning to address pain      Cognition: A&O: 4/4; Follows 2 step directions   Memory:  good   Sequencing:  good   Problem solving:  good   Judgement/safety:  fair     Functional Assessment:   Initial Eval Status  Date: 19 Treatment Status  Date: Short Term Goals  Treatment frequency: PRN   Feeding Independent       Grooming Stand by Assist   (standing)  Modified [place]    UB Dressing Stand by Assist   Modified [place]    LB Dressing Minimal Assist   Modified [place]    Bathing Minimal Assist  Modified [place]    Toileting Stand by Assist   Modified [place]    Bed Mobility  Supine to sit: Supervision   Sit to supine: Supervision   Supine to sit: Modified [place]   Sit to supine: Modified [place]    Functional Transfers Stand by Assist   (various surfaces)  Modified [place]    Functional Mobility Stand by Assist   Ambulated in room with w/w - cuing on posture, w/w management and safety   Modified [place]    Balance Sitting:     Static:  indep    Dynamic:supervision   Standing: SBA     Activity Tolerance F-  F+   Visual/  Perceptual Glasses: yes  wfl                  Hand [] Delirium []                  Motor Control []    Plan of Care:   ADL retraining [x]   Equipment needs [x]   Neuromuscular re-education [x] Energy Conservation Techniques [x]  Functional Transfer training [x] Patient and/or Family Education [x]  Functional Mobility training [x]  Environmental Modifications [x]  Cognitive re-training []   Compensatory techniques for ADLs [x]  Splinting Needs []   Positioning to improve overall function [x]   Therapeutic Activity [x]  Therapeutic Exercise  [x]  Visual/Perceptual: []    Delirium prevention/treatment  []   Other:  []    Rehab Potential: Good for established goals     Patient / Family Goal:  Not stated     Patient and/or family were instructed diagnosis, prognosis/goals and plan of care. Demonstrated fair understanding. [] Malnutrition indicators have been identified and nursing has been notified to ensure a dietitian consult is ordered.        AM-PAC Daily Activity Inpatient   How much help for putting on and taking off regular lower body clothing?: A Little  How much help for Bathing?: A Little  How much help for Toileting?: A Little  How much help for putting on and taking off regular upper body clothing?: A Little  How much help for taking care of personal grooming?: A Little  How much help for eating meals?: None  AM-PAC Inpatient Daily Activity Raw Score: 19  AM-PAC Inpatient ADL T-Scale Score : 40.22  ADL Inpatient CMS 0-100% Score: 42.8  ADL Inpatient CMS G-Code Modifier : CK       low Evaluation + 11 timed treatment minutes  Tx Time in: 815  Tx Time out: [address] [facility] OTR/L #[id number]

## 2019-08-19 NOTE — PLAN OF CARE
Problem: Falls - Risk of:  Goal: Will remain free from falls  Description  Will remain free from falls  Outcome: Met This Shift     Problem: Infection:  Goal: Will remain free from infection  Description  Will remain free from infection  Outcome: Met This Shift     Problem: Safety:  Goal: Free from accidental physical injury  Description  Free from accidental physical injury  8/19/2019 1229 by Charu Hernández RN  Outcome: Met This Shift

## 2019-08-19 NOTE — DISCHARGE SUMMARY
08/18/2019     08/18/2019     08/18/2019    K 3.8 08/18/2019    BUN 11 08/18/2019    CREATININE 0.8 08/18/2019    GLUCOSE 137 08/18/2019    LABGLOM >60 08/18/2019    PROTIME 12.2 08/08/2019    INR 1.1 08/08/2019    LABALBU 3.8 08/18/2019    PROT 6.2 08/18/2019    CALCIUM 8.6 08/18/2019    MG 1.9 08/17/2019    PHOS 3.9 08/16/2019    BILITOT 0.4 08/18/2019    ALKPHOS 60 08/18/2019    AST 87 08/18/2019    ALT 33 08/18/2019       Discharge Exam:   VITALS: BP (!) 106/51   Pulse 99   Temp 100.9 °F (38.3 °C) (Temporal)   Resp 16   Ht 5' 10\" (1.778 m)   Wt 229 lb 15 oz (104.3 kg)   SpO2 96%   BMI 32.99 kg/m²     PHYSICAL EXAM  PHYSICAL EXAM  General: No apparent distress, comfortable  HEENT: Trachea midline, no masses, Pupils equal round  Chest: Respiratory effort was normal with no retractions or use of accessory muscles. Cardiovascular: Heart sounds were normal with a regular rate and rhythm. Abdomen:  Soft and non distended. No tenderness, guarding, rebound, or rigidity. Extremities: Moves all 4 extremeties, mild left sided edema, biphasic DP, PT on L, monophasic DP, PT on R, incisions c/d/i with no surrounding ecchymosis        Disposition: home       Medication List      START taking these medications    methocarbamol 500 MG tablet  Commonly known as:  ROBAXIN  Take 1 tablet by mouth 4 times daily for 10 days     oxyCODONE 5 MG immediate release tablet  Commonly known as:  ROXICODONE  Take 1 tablet by mouth every 6 hours as needed for Pain for up to 7 days.         CONTINUE taking these medications    allopurinol 300 MG tablet  Commonly known as:  ZYLOPRIM     aspirin 81 MG chewable tablet  Take 1 tablet by mouth daily     atorvastatin 40 MG tablet  Commonly known as:  LIPITOR     cilostazol 100 MG tablet  Commonly known as:  PLETAL  TAKE 1 TABLET TWICE A DAY     citalopram 40 MG tablet  Commonly known as:  CELEXA     clopidogrel 75 MG tablet  Commonly known as:  PLAVIX  TAKE 1 TABLET

## 2019-08-20 LAB
BLOOD BANK DISPENSE STATUS: NORMAL
BLOOD BANK PRODUCT CODE: NORMAL
BPU ID: NORMAL
DESCRIPTION BLOOD BANK: NORMAL

## 2019-08-21 RX ORDER — CILOSTAZOL 100 MG/1
TABLET ORAL
Qty: 90 TABLET | Refills: 8 | Status: SHIPPED | OUTPATIENT
Start: 2019-08-21 | End: 2019-11-04

## 2019-08-27 ENCOUNTER — HOSPITAL ENCOUNTER (OUTPATIENT)
Age: 49
Discharge: HOME | End: 2019-08-29
Payer: COMMERCIAL

## 2019-08-27 DIAGNOSIS — E11.51 TYPE 2 DIABETES MELLITUS WITH DIABETIC PERIPHERAL ANGIOPATHY WITHOUT GANGRENE, WITHOUT LONG-TERM CURRENT USE OF INSULIN (HCC): Chronic | ICD-10-CM

## 2019-08-27 LAB
ALBUMIN SERPL-MCNC: 4 G/DL (ref 3.5–5.2)
ALP BLD-CCNC: 70 U/L (ref 40–129)
ALT SERPL-CCNC: 19 U/L (ref 0–40)
ANION GAP SERPL CALCULATED.3IONS-SCNC: 17 MMOL/L (ref 7–16)
AST SERPL-CCNC: 18 U/L (ref 0–39)
BASOPHILS ABSOLUTE: 0.04 E9/L (ref 0–0.2)
BASOPHILS RELATIVE PERCENT: 0.5 % (ref 0–2)
BILIRUB SERPL-MCNC: 0.2 MG/DL (ref 0–1.2)
BUN BLDV-MCNC: 13 MG/DL (ref 6–20)
CALCIUM SERPL-MCNC: 9.3 MG/DL (ref 8.6–10.2)
CHLORIDE BLD-SCNC: 103 MMOL/L (ref 98–107)
CHOLESTEROL, TOTAL: 174 MG/DL (ref 0–199)
CO2: 22 MMOL/L (ref 22–29)
CREAT SERPL-MCNC: 0.8 MG/DL (ref 0.7–1.2)
EOSINOPHILS ABSOLUTE: 0.25 E9/L (ref 0.05–0.5)
EOSINOPHILS RELATIVE PERCENT: 3.2 % (ref 0–6)
GFR AFRICAN AMERICAN: >60
GFR NON-AFRICAN AMERICAN: >60 ML/MIN/1.73
GLUCOSE BLD-MCNC: 147 MG/DL (ref 74–99)
HBA1C MFR BLD: 6.4 % (ref 4–5.6)
HCT VFR BLD CALC: 30.9 % (ref 37–54)
HDLC SERPL-MCNC: 31 MG/DL
HEMOGLOBIN: 9.7 G/DL (ref 12.5–16.5)
IMMATURE GRANULOCYTES #: 0.05 E9/L
IMMATURE GRANULOCYTES %: 0.6 % (ref 0–5)
LDL CHOLESTEROL CALCULATED: 115 MG/DL (ref 0–99)
LYMPHOCYTES ABSOLUTE: 1.97 E9/L (ref 1.5–4)
LYMPHOCYTES RELATIVE PERCENT: 25.4 % (ref 20–42)
MCH RBC QN AUTO: 32.6 PG (ref 26–35)
MCHC RBC AUTO-ENTMCNC: 31.4 % (ref 32–34.5)
MCV RBC AUTO: 103.7 FL (ref 80–99.9)
MONOCYTES ABSOLUTE: 0.53 E9/L (ref 0.1–0.95)
MONOCYTES RELATIVE PERCENT: 6.8 % (ref 2–12)
NEUTROPHILS ABSOLUTE: 4.91 E9/L (ref 1.8–7.3)
NEUTROPHILS RELATIVE PERCENT: 63.5 % (ref 43–80)
PDW BLD-RTO: 13.5 FL (ref 11.5–15)
PLATELET # BLD: 222 E9/L (ref 130–450)
PMV BLD AUTO: 9.1 FL (ref 7–12)
POTASSIUM SERPL-SCNC: 4.5 MMOL/L (ref 3.5–5)
RBC # BLD: 2.98 E12/L (ref 3.8–5.8)
SODIUM BLD-SCNC: 142 MMOL/L (ref 132–146)
TOTAL PROTEIN: 6.8 G/DL (ref 6.4–8.3)
TRIGL SERPL-MCNC: 141 MG/DL (ref 0–149)
VLDLC SERPL CALC-MCNC: 28 MG/DL
WBC # BLD: 7.8 E9/L (ref 4.5–11.5)

## 2019-08-27 PROCEDURE — 83036 HEMOGLOBIN GLYCOSYLATED A1C: CPT

## 2019-08-27 PROCEDURE — 80061 LIPID PANEL: CPT

## 2019-08-27 PROCEDURE — 80053 COMPREHEN METABOLIC PANEL: CPT

## 2019-08-27 PROCEDURE — 85025 COMPLETE CBC W/AUTO DIFF WBC: CPT

## 2019-09-09 ENCOUNTER — OFFICE VISIT (OUTPATIENT)
Dept: VASCULAR SURGERY | Age: 49
End: 2019-09-09

## 2019-09-09 DIAGNOSIS — T81.31XA WOUND DEHISCENCE, SURGICAL, INITIAL ENCOUNTER: ICD-10-CM

## 2019-09-09 DIAGNOSIS — I73.9 PVD (PERIPHERAL VASCULAR DISEASE) WITH CLAUDICATION (HCC): Primary | ICD-10-CM

## 2019-09-09 DIAGNOSIS — Z95.828 S/P FEMORAL-POPLITEAL BYPASS SURGERY: ICD-10-CM

## 2019-09-09 PROCEDURE — 99024 POSTOP FOLLOW-UP VISIT: CPT | Performed by: SURGERY

## 2019-09-09 NOTE — PROGRESS NOTES
9/9/2019    Renetta Li  1970     Previous Procedures     10/4/16 R EIA plasty  R CFA, SFA, Popliteal atherectomy/plasty  R SFA, Popliteal plasty with DCB   6/19/18 R CFA, SFA, popliteal therectomy  R CFA plasty 6x150  R SFA, popliteal plasty 6x150 DCB   7/3/18 L CFA plasty with 7x60 DCB, 8x40 [place]  L profunda plasty 5x60 [place]   7/23/19 Left angiogram, Left Common femoral and profunda plasty with 6x80 evercross, 9x80 evercross    8/16/19 Left distal external iliac and femoral endarterectomy  Left Deep femoral endarterectomy with profundoplasty   Left fem ak pop bypass with 8 mm ringed propatent PTFE graft  Angiogram with plasty of bk pop with 5x150 [facility]          Patient returns for post operative evaluation. It was done for L LE lifestyle limiting claudication and currently patient has had complete resolution of this issues. The patient developed slight wound dehiscence of groin incision. No drainage. No cellulitis, fevers, or erythema.        Physical Exam:    Gen Awake and Alert  [facility] RRR   Left LE   Groin incision slight wound dehiscence, with some fibrinous   exudate < 0.2 cm in depth   - no cellulitis, or erythema   Thigh incision c/d/i   DP and PT biphasic    A/P S/P L femoral endarterectomy, fem pop bypass for lifestyle limiting claudication  · No longer has L LE claudication sxs  · Continue Medical management with ASA, pletal, plavix and statin  · pt is a diabetic - emphasized importance of well controlled blood sugars  · Discussed with pt tobacco use and significant relationship to PVD and potential to cause increased problems post intervention   pt currently is not a smoker  · Walking Program  · Emphasized importance of foot care, and to call if develops any wounds or ulcerations, changes in appearance or symptoms  · I reviewed with the patient that normal activities can be resumed as tolerated  · Return in 2 weeks for follow-up office visit  · Arterial studies ordered 4

## 2019-09-23 ENCOUNTER — OFFICE VISIT (OUTPATIENT)
Dept: VASCULAR SURGERY | Age: 49
End: 2019-09-23

## 2019-09-23 DIAGNOSIS — I73.9 PERIPHERAL VASCULAR DISEASE OF LOWER EXTREMITY (HCC): ICD-10-CM

## 2019-09-23 DIAGNOSIS — T81.31XA WOUND DEHISCENCE, SURGICAL, INITIAL ENCOUNTER: ICD-10-CM

## 2019-09-23 DIAGNOSIS — Z95.828 S/P FEMORAL-POPLITEAL BYPASS SURGERY: Primary | ICD-10-CM

## 2019-09-23 PROCEDURE — 99024 POSTOP FOLLOW-UP VISIT: CPT | Performed by: PHYSICIAN ASSISTANT

## 2019-10-07 ENCOUNTER — OFFICE VISIT (OUTPATIENT)
Dept: VASCULAR SURGERY | Age: 49
End: 2019-10-07

## 2019-10-07 VITALS — DIASTOLIC BLOOD PRESSURE: 72 MMHG | SYSTOLIC BLOOD PRESSURE: 128 MMHG

## 2019-10-07 DIAGNOSIS — I73.9 PVD (PERIPHERAL VASCULAR DISEASE) WITH CLAUDICATION (HCC): Primary | ICD-10-CM

## 2019-10-07 PROCEDURE — 99024 POSTOP FOLLOW-UP VISIT: CPT | Performed by: NURSE PRACTITIONER

## 2019-10-11 ENCOUNTER — HOSPITAL ENCOUNTER (OUTPATIENT)
Dept: CARDIOLOGY | Age: 49
Discharge: HOME | End: 2019-10-11
Payer: COMMERCIAL

## 2019-10-11 DIAGNOSIS — I73.9 PVD (PERIPHERAL VASCULAR DISEASE) WITH CLAUDICATION (HCC): ICD-10-CM

## 2019-10-11 DIAGNOSIS — Z95.828 S/P FEMORAL-POPLITEAL BYPASS SURGERY: ICD-10-CM

## 2019-10-11 PROCEDURE — 93923 UPR/LXTR ART STDY 3+ LVLS: CPT

## 2019-10-14 ENCOUNTER — OFFICE VISIT (OUTPATIENT)
Dept: VASCULAR SURGERY | Age: 49
End: 2019-10-14

## 2019-10-14 VITALS — DIASTOLIC BLOOD PRESSURE: 82 MMHG | SYSTOLIC BLOOD PRESSURE: 136 MMHG

## 2019-10-14 DIAGNOSIS — I73.9 PVD (PERIPHERAL VASCULAR DISEASE) WITH CLAUDICATION (HCC): ICD-10-CM

## 2019-10-14 DIAGNOSIS — M25.452 SWELLING OF JOINT OF PELVIC REGION OR THIGH, LEFT: Primary | ICD-10-CM

## 2019-10-14 PROCEDURE — 99024 POSTOP FOLLOW-UP VISIT: CPT | Performed by: SURGERY

## 2019-11-04 ENCOUNTER — TELEPHONE (OUTPATIENT)
Dept: ADMINISTRATIVE | Age: 49
End: 2019-11-04

## 2019-11-04 ENCOUNTER — OFFICE VISIT (OUTPATIENT)
Dept: VASCULAR SURGERY | Age: 49
End: 2019-11-04

## 2019-11-04 VITALS
RESPIRATION RATE: 16 BRPM | BODY MASS INDEX: 30.06 KG/M2 | DIASTOLIC BLOOD PRESSURE: 90 MMHG | HEIGHT: 70 IN | WEIGHT: 210 LBS | SYSTOLIC BLOOD PRESSURE: 129 MMHG | HEART RATE: 102 BPM

## 2019-11-04 DIAGNOSIS — I73.9 PVD (PERIPHERAL VASCULAR DISEASE) WITH CLAUDICATION (HCC): Primary | ICD-10-CM

## 2019-11-04 DIAGNOSIS — I73.9 PVD (PERIPHERAL VASCULAR DISEASE) (HCC): Primary | Chronic | ICD-10-CM

## 2019-11-04 PROCEDURE — 99024 POSTOP FOLLOW-UP VISIT: CPT | Performed by: SURGERY

## 2019-11-04 RX ORDER — CILOSTAZOL 100 MG/1
100 TABLET ORAL 2 TIMES DAILY
Qty: 60 TABLET | Refills: 5 | Status: SHIPPED | OUTPATIENT
Start: 2019-11-04 | End: 2019-11-15 | Stop reason: SDUPTHER

## 2019-11-04 RX ORDER — CLOPIDOGREL BISULFATE 75 MG/1
75 TABLET ORAL DAILY
Qty: 30 TABLET | Refills: 3 | Status: SHIPPED | OUTPATIENT
Start: 2019-11-04 | End: 2019-11-15 | Stop reason: SDUPTHER

## 2019-11-12 ENCOUNTER — OFFICE VISIT (OUTPATIENT)
Dept: CARDIOLOGY CLINIC | Age: 49
End: 2019-11-12
Payer: COMMERCIAL

## 2019-11-12 VITALS
RESPIRATION RATE: 18 BRPM | HEIGHT: 70 IN | SYSTOLIC BLOOD PRESSURE: 120 MMHG | BODY MASS INDEX: 30.84 KG/M2 | HEART RATE: 88 BPM | WEIGHT: 215.4 LBS | DIASTOLIC BLOOD PRESSURE: 86 MMHG

## 2019-11-12 DIAGNOSIS — I47.1 SVT (SUPRAVENTRICULAR TACHYCARDIA) (HCC): ICD-10-CM

## 2019-11-12 DIAGNOSIS — Z01.810 PREOP CARDIOVASCULAR EXAM: Primary | ICD-10-CM

## 2019-11-12 PROCEDURE — 93000 ELECTROCARDIOGRAM COMPLETE: CPT | Performed by: INTERNAL MEDICINE

## 2019-11-12 PROCEDURE — 99213 OFFICE O/P EST LOW 20 MIN: CPT | Performed by: NURSE PRACTITIONER

## 2019-11-15 RX ORDER — CILOSTAZOL 100 MG/1
100 TABLET ORAL 2 TIMES DAILY
Qty: 60 TABLET | Refills: 5 | Status: SHIPPED
Start: 2019-11-15 | End: 2020-02-10

## 2019-11-15 RX ORDER — CLOPIDOGREL BISULFATE 75 MG/1
75 TABLET ORAL DAILY
Qty: 30 TABLET | Refills: 3 | Status: SHIPPED | OUTPATIENT
Start: 2019-11-15 | End: 2020-01-28

## 2019-11-19 ENCOUNTER — HOSPITAL ENCOUNTER (OUTPATIENT)
Age: 49
Setting detail: SURGERY ADMIT
End: 2019-11-19
Attending: SURGERY | Admitting: SURGERY
Payer: COMMERCIAL

## 2019-11-19 ENCOUNTER — TELEPHONE (OUTPATIENT)
Dept: VASCULAR SURGERY | Age: 49
End: 2019-11-19

## 2019-11-19 DIAGNOSIS — Z01.818 PRE-OP TESTING: ICD-10-CM

## 2019-11-19 DIAGNOSIS — I73.9 PVD (PERIPHERAL VASCULAR DISEASE) WITH CLAUDICATION (HCC): Primary | ICD-10-CM

## 2019-12-07 PROBLEM — I73.9 PERIPHERAL VASCULAR DISEASE OF LOWER EXTREMITY (HCC): Status: RESOLVED | Noted: 2018-07-03 | Resolved: 2019-12-07

## 2019-12-07 PROBLEM — D62 POSTOPERATIVE ANEMIA DUE TO ACUTE BLOOD LOSS: Status: RESOLVED | Noted: 2019-08-18 | Resolved: 2019-12-07

## 2019-12-07 PROBLEM — I73.9 PVD (PERIPHERAL VASCULAR DISEASE) (HCC): Chronic | Status: RESOLVED | Noted: 2018-01-15 | Resolved: 2019-12-07

## 2019-12-07 PROBLEM — T81.31XA WOUND DEHISCENCE, SURGICAL, INITIAL ENCOUNTER: Status: RESOLVED | Noted: 2019-09-09 | Resolved: 2019-12-07

## 2019-12-07 RX ORDER — CEFAZOLIN SODIUM 2 G/50ML
2 SOLUTION INTRAVENOUS
Status: CANCELLED | OUTPATIENT
Start: 2019-12-07 | End: 2019-12-07

## 2019-12-07 RX ORDER — SODIUM CHLORIDE 0.9 % (FLUSH) 0.9 %
10 SYRINGE (ML) INJECTION EVERY 12 HOURS SCHEDULED
Status: CANCELLED | OUTPATIENT
Start: 2019-12-07

## 2019-12-07 RX ORDER — SODIUM CHLORIDE 0.9 % (FLUSH) 0.9 %
10 SYRINGE (ML) INJECTION PRN
Status: CANCELLED | OUTPATIENT
Start: 2019-12-07

## 2019-12-18 ENCOUNTER — HOSPITAL ENCOUNTER (OUTPATIENT)
Dept: PREADMISSION TESTING | Age: 49
Setting detail: SURGERY ADMIT
Discharge: HOME | End: 2019-12-18
Payer: COMMERCIAL

## 2019-12-18 ENCOUNTER — ANESTHESIA EVENT (OUTPATIENT)
Dept: OPERATING ROOM | Age: 49
End: 2019-12-18

## 2019-12-18 VITALS
DIASTOLIC BLOOD PRESSURE: 67 MMHG | HEIGHT: 70 IN | OXYGEN SATURATION: 96 % | TEMPERATURE: 98.3 F | BODY MASS INDEX: 30.78 KG/M2 | WEIGHT: 215 LBS | SYSTOLIC BLOOD PRESSURE: 114 MMHG | RESPIRATION RATE: 20 BRPM | HEART RATE: 100 BPM

## 2019-12-18 DIAGNOSIS — I73.9 PVD (PERIPHERAL VASCULAR DISEASE) WITH CLAUDICATION (HCC): ICD-10-CM

## 2019-12-18 DIAGNOSIS — Z01.818 PRE-OP TESTING: ICD-10-CM

## 2019-12-18 LAB
ABO/RH: NORMAL
ANION GAP SERPL CALCULATED.3IONS-SCNC: 15 MMOL/L (ref 7–16)
ANTIBODY SCREEN: NORMAL
BUN BLDV-MCNC: 18 MG/DL (ref 6–20)
CALCIUM SERPL-MCNC: 9.4 MG/DL (ref 8.6–10.2)
CHLORIDE BLD-SCNC: 100 MMOL/L (ref 98–107)
CO2: 25 MMOL/L (ref 22–29)
CREAT SERPL-MCNC: 0.9 MG/DL (ref 0.7–1.2)
GFR AFRICAN AMERICAN: >60
GFR NON-AFRICAN AMERICAN: >60 ML/MIN/1.73
GLUCOSE BLD-MCNC: 169 MG/DL (ref 74–99)
HCT VFR BLD CALC: 43.4 % (ref 37–54)
HEMOGLOBIN: 14.4 G/DL (ref 12.5–16.5)
INR BLD: 1
MCH RBC QN AUTO: 30.6 PG (ref 26–35)
MCHC RBC AUTO-ENTMCNC: 33.2 % (ref 32–34.5)
MCV RBC AUTO: 92.1 FL (ref 80–99.9)
PDW BLD-RTO: 12.6 FL (ref 11.5–15)
PLATELET # BLD: 259 E9/L (ref 130–450)
PMV BLD AUTO: 9.6 FL (ref 7–12)
POTASSIUM REFLEX MAGNESIUM: 4.2 MMOL/L (ref 3.5–5)
PROTHROMBIN TIME: 10.7 SEC (ref 9.3–12.4)
RBC # BLD: 4.71 E12/L (ref 3.8–5.8)
SODIUM BLD-SCNC: 140 MMOL/L (ref 132–146)
WBC # BLD: 8.2 E9/L (ref 4.5–11.5)

## 2019-12-18 PROCEDURE — 86900 BLOOD TYPING SEROLOGIC ABO: CPT

## 2019-12-18 PROCEDURE — 86850 RBC ANTIBODY SCREEN: CPT

## 2019-12-18 PROCEDURE — 85027 COMPLETE CBC AUTOMATED: CPT

## 2019-12-18 PROCEDURE — 36415 COLL VENOUS BLD VENIPUNCTURE: CPT

## 2019-12-18 PROCEDURE — 85610 PROTHROMBIN TIME: CPT

## 2019-12-18 PROCEDURE — 87081 CULTURE SCREEN ONLY: CPT

## 2019-12-18 PROCEDURE — 86901 BLOOD TYPING SEROLOGIC RH(D): CPT

## 2019-12-18 PROCEDURE — 86923 COMPATIBILITY TEST ELECTRIC: CPT

## 2019-12-18 PROCEDURE — 80048 BASIC METABOLIC PNL TOTAL CA: CPT

## 2019-12-18 ASSESSMENT — COPD QUESTIONNAIRES: CAT_SEVERITY: MILD

## 2019-12-19 ENCOUNTER — TELEPHONE (OUTPATIENT)
Dept: VASCULAR SURGERY | Age: 49
End: 2019-12-19

## 2019-12-19 LAB — MRSA CULTURE ONLY: NORMAL

## 2019-12-20 ENCOUNTER — ANESTHESIA (OUTPATIENT)
Dept: OPERATING ROOM | Age: 49
End: 2019-12-20

## 2020-01-09 ENCOUNTER — TELEPHONE (OUTPATIENT)
Dept: VASCULAR SURGERY | Age: 50
End: 2020-01-09

## 2020-01-24 ENCOUNTER — HOSPITAL ENCOUNTER (OUTPATIENT)
Dept: PREADMISSION TESTING | Age: 50
Discharge: HOME | End: 2020-01-24
Payer: COMMERCIAL

## 2020-01-24 VITALS
OXYGEN SATURATION: 96 % | TEMPERATURE: 98.4 F | DIASTOLIC BLOOD PRESSURE: 58 MMHG | HEART RATE: 122 BPM | RESPIRATION RATE: 20 BRPM | SYSTOLIC BLOOD PRESSURE: 128 MMHG

## 2020-01-24 LAB
ABO/RH: NORMAL
ANION GAP SERPL CALCULATED.3IONS-SCNC: 12 MMOL/L (ref 7–16)
ANTIBODY SCREEN: NORMAL
BUN BLDV-MCNC: 10 MG/DL (ref 6–20)
CALCIUM SERPL-MCNC: 9.3 MG/DL (ref 8.6–10.2)
CHLORIDE BLD-SCNC: 98 MMOL/L (ref 98–107)
CO2: 24 MMOL/L (ref 22–29)
CREAT SERPL-MCNC: 0.7 MG/DL (ref 0.7–1.2)
GFR AFRICAN AMERICAN: >60
GFR NON-AFRICAN AMERICAN: >60 ML/MIN/1.73
GLUCOSE BLD-MCNC: 223 MG/DL (ref 74–99)
HCT VFR BLD CALC: 36.7 % (ref 37–54)
HEMOGLOBIN: 12.4 G/DL (ref 12.5–16.5)
MCH RBC QN AUTO: 31.9 PG (ref 26–35)
MCHC RBC AUTO-ENTMCNC: 33.8 % (ref 32–34.5)
MCV RBC AUTO: 94.3 FL (ref 80–99.9)
PDW BLD-RTO: 12.6 FL (ref 11.5–15)
PLATELET # BLD: 338 E9/L (ref 130–450)
PMV BLD AUTO: 9.4 FL (ref 7–12)
POTASSIUM REFLEX MAGNESIUM: 4.6 MMOL/L (ref 3.5–5)
RBC # BLD: 3.89 E12/L (ref 3.8–5.8)
SODIUM BLD-SCNC: 134 MMOL/L (ref 132–146)
WBC # BLD: 8.8 E9/L (ref 4.5–11.5)

## 2020-01-24 PROCEDURE — 86923 COMPATIBILITY TEST ELECTRIC: CPT

## 2020-01-24 PROCEDURE — 36415 COLL VENOUS BLD VENIPUNCTURE: CPT

## 2020-01-24 PROCEDURE — 86850 RBC ANTIBODY SCREEN: CPT

## 2020-01-24 PROCEDURE — 85027 COMPLETE CBC AUTOMATED: CPT

## 2020-01-24 PROCEDURE — 86900 BLOOD TYPING SEROLOGIC ABO: CPT

## 2020-01-24 PROCEDURE — 80048 BASIC METABOLIC PNL TOTAL CA: CPT

## 2020-01-24 PROCEDURE — 86901 BLOOD TYPING SEROLOGIC RH(D): CPT

## 2020-01-24 PROCEDURE — 87081 CULTURE SCREEN ONLY: CPT

## 2020-01-25 LAB — MRSA CULTURE ONLY: NORMAL

## 2020-01-27 NOTE — PROGRESS NOTES
While reviewing chart no pt was done 1-24 day of pre admission testing Active order in epic system Reordered for am of surgery

## 2020-01-28 RX ORDER — CLOPIDOGREL BISULFATE 75 MG/1
TABLET ORAL
Qty: 90 TABLET | Refills: 4 | Status: SHIPPED
Start: 2020-01-28 | End: 2020-02-10

## 2020-01-30 ENCOUNTER — ANESTHESIA EVENT (OUTPATIENT)
Dept: OPERATING ROOM | Age: 50
DRG: 271 | End: 2020-01-30
Payer: COMMERCIAL

## 2020-01-31 ENCOUNTER — ANESTHESIA (OUTPATIENT)
Dept: OPERATING ROOM | Age: 50
DRG: 271 | End: 2020-01-31
Payer: COMMERCIAL

## 2020-01-31 ENCOUNTER — HOSPITAL ENCOUNTER (INPATIENT)
Age: 50
LOS: 1 days | Discharge: HOME | DRG: 271 | End: 2020-02-01
Attending: SURGERY | Admitting: SURGERY
Payer: COMMERCIAL

## 2020-01-31 VITALS — SYSTOLIC BLOOD PRESSURE: 81 MMHG | OXYGEN SATURATION: 91 % | DIASTOLIC BLOOD PRESSURE: 55 MMHG | TEMPERATURE: 98.8 F

## 2020-01-31 PROBLEM — I70.201 ATHEROSCLEROSIS OF NATIVE ARTERY OF RIGHT LOWER EXTREMITY (HCC): Status: ACTIVE | Noted: 2020-01-31

## 2020-01-31 LAB
B.E.: -4.3 MMOL/L (ref -3–0)
CARDIOPULMONARY BYPASS: NO
DEVICE: ABNORMAL
FIO2 ARTERIAL: 60
HBA1C MFR BLD: 8.8 % (ref 4–5.6)
HCO3 ARTERIAL: 21.9 MMOL/L (ref 22–26)
HCT (EST): 25 % (ref 37–54)
HGB, (EST): 8.4 G/DL (ref 12.5–15.5)
INR BLD: 1.1
METER GLUCOSE: 157 MG/DL (ref 74–99)
METER GLUCOSE: 183 MG/DL (ref 74–99)
METER GLUCOSE: 183 MG/DL (ref 74–99)
METER GLUCOSE: 211 MG/DL (ref 74–99)
METER GLUCOSE: 234 MG/DL (ref 74–99)
METER GLUCOSE: 238 MG/DL (ref 74–99)
METER GLUCOSE: 261 MG/DL (ref 74–99)
METER GLUCOSE: 281 MG/DL (ref 74–99)
O2 SATURATION: 99.3 % (ref 92–98.5)
OPERATOR ID: 4510
PCO2 ARTERIAL: 44.5 MMHG (ref 35–45)
PH BLOOD GAS: 7.3 (ref 7.35–7.45)
PO2 ARTERIAL: 165.5 MMHG (ref 80–100)
POTASSIUM SERPL-SCNC: 4.6 MMOL/L (ref 3.5–5.5)
PROTHROMBIN TIME: 12.4 SEC (ref 9.3–12.4)
SOURCE, BLOOD GAS: ABNORMAL

## 2020-01-31 PROCEDURE — 82803 BLOOD GASES ANY COMBINATION: CPT

## 2020-01-31 PROCEDURE — 82962 GLUCOSE BLOOD TEST: CPT

## 2020-01-31 PROCEDURE — 04CH0ZZ EXTIRPATION OF MATTER FROM RIGHT EXTERNAL ILIAC ARTERY, OPEN APPROACH: ICD-10-PCS | Performed by: SURGERY

## 2020-01-31 PROCEDURE — 36415 COLL VENOUS BLD VENIPUNCTURE: CPT

## 2020-01-31 PROCEDURE — 2500000003 HC RX 250 WO HCPCS: Performed by: NURSE ANESTHETIST, CERTIFIED REGISTERED

## 2020-01-31 PROCEDURE — 2580000003 HC RX 258: Performed by: NURSE ANESTHETIST, CERTIFIED REGISTERED

## 2020-01-31 PROCEDURE — 2709999900 HC NON-CHARGEABLE SUPPLY: Performed by: SURGERY

## 2020-01-31 PROCEDURE — 88304 TISSUE EXAM BY PATHOLOGIST: CPT

## 2020-01-31 PROCEDURE — 6370000000 HC RX 637 (ALT 250 FOR IP): Performed by: SURGERY

## 2020-01-31 PROCEDURE — 3600000015 HC SURGERY LEVEL 5 ADDTL 15MIN: Performed by: SURGERY

## 2020-01-31 PROCEDURE — 3700000001 HC ADD 15 MINUTES (ANESTHESIA): Performed by: SURGERY

## 2020-01-31 PROCEDURE — 85347 COAGULATION TIME ACTIVATED: CPT

## 2020-01-31 PROCEDURE — 3600000005 HC SURGERY LEVEL 5 BASE: Performed by: SURGERY

## 2020-01-31 PROCEDURE — 7100000001 HC PACU RECOVERY - ADDTL 15 MIN

## 2020-01-31 PROCEDURE — 04CK0ZZ EXTIRPATION OF MATTER FROM RIGHT FEMORAL ARTERY, OPEN APPROACH: ICD-10-PCS | Performed by: SURGERY

## 2020-01-31 PROCEDURE — P9041 ALBUMIN (HUMAN),5%, 50ML: HCPCS | Performed by: NURSE ANESTHETIST, CERTIFIED REGISTERED

## 2020-01-31 PROCEDURE — 6360000002 HC RX W HCPCS: Performed by: SURGERY

## 2020-01-31 PROCEDURE — 04UK0KZ SUPPLEMENT RIGHT FEMORAL ARTERY WITH NONAUTOLOGOUS TISSUE SUBSTITUTE, OPEN APPROACH: ICD-10-PCS | Performed by: SURGERY

## 2020-01-31 PROCEDURE — 2580000003 HC RX 258: Performed by: SURGERY

## 2020-01-31 PROCEDURE — 6360000002 HC RX W HCPCS: Performed by: NURSE ANESTHETIST, CERTIFIED REGISTERED

## 2020-01-31 PROCEDURE — 7100000000 HC PACU RECOVERY - FIRST 15 MIN

## 2020-01-31 PROCEDURE — 35355 RECHANNELING OF ARTERY: CPT | Performed by: PHYSICIAN ASSISTANT

## 2020-01-31 PROCEDURE — 94660 CPAP INITIATION&MGMT: CPT

## 2020-01-31 PROCEDURE — 83036 HEMOGLOBIN GLYCOSYLATED A1C: CPT

## 2020-01-31 PROCEDURE — 85610 PROTHROMBIN TIME: CPT

## 2020-01-31 PROCEDURE — 3700000000 HC ANESTHESIA ATTENDED CARE: Performed by: SURGERY

## 2020-01-31 PROCEDURE — C1768 GRAFT, VASCULAR: HCPCS | Performed by: SURGERY

## 2020-01-31 PROCEDURE — 04UH0KZ SUPPLEMENT RIGHT EXTERNAL ILIAC ARTERY WITH NONAUTOLOGOUS TISSUE SUBSTITUTE, OPEN APPROACH: ICD-10-PCS | Performed by: SURGERY

## 2020-01-31 PROCEDURE — 35355 RECHANNELING OF ARTERY: CPT | Performed by: SURGERY

## 2020-01-31 PROCEDURE — 2000000000 HC ICU R&B

## 2020-01-31 PROCEDURE — 6370000000 HC RX 637 (ALT 250 FOR IP): Performed by: NURSE ANESTHETIST, CERTIFIED REGISTERED

## 2020-01-31 PROCEDURE — 88311 DECALCIFY TISSUE: CPT

## 2020-01-31 DEVICE — XENOSURE BIOLOGIC PATCH, 0.8CM X 8CM, EIFU
Type: IMPLANTABLE DEVICE | Site: GROIN | Status: FUNCTIONAL
Brand: XENOSURE BIOLOGIC PATCH

## 2020-01-31 RX ORDER — DEXTROSE MONOHYDRATE 25 G/50ML
12.5 INJECTION, SOLUTION INTRAVENOUS PRN
Status: DISCONTINUED | OUTPATIENT
Start: 2020-01-31 | End: 2020-02-01 | Stop reason: HOSPADM

## 2020-01-31 RX ORDER — NEOSTIGMINE METHYLSULFATE 1 MG/ML
INJECTION, SOLUTION INTRAVENOUS PRN
Status: DISCONTINUED | OUTPATIENT
Start: 2020-01-31 | End: 2020-01-31 | Stop reason: SDUPTHER

## 2020-01-31 RX ORDER — SODIUM CHLORIDE 0.9 % (FLUSH) 0.9 %
10 SYRINGE (ML) INJECTION PRN
Status: DISCONTINUED | OUTPATIENT
Start: 2020-01-31 | End: 2020-01-31 | Stop reason: HOSPADM

## 2020-01-31 RX ORDER — ASPIRIN 81 MG/1
81 TABLET, CHEWABLE ORAL DAILY
Status: DISCONTINUED | OUTPATIENT
Start: 2020-01-31 | End: 2020-02-01 | Stop reason: HOSPADM

## 2020-01-31 RX ORDER — OXYCODONE HYDROCHLORIDE AND ACETAMINOPHEN 5; 325 MG/1; MG/1
2 TABLET ORAL EVERY 4 HOURS PRN
Status: DISCONTINUED | OUTPATIENT
Start: 2020-01-31 | End: 2020-02-01 | Stop reason: HOSPADM

## 2020-01-31 RX ORDER — PHENYLEPHRINE HYDROCHLORIDE 10 MG/ML
INJECTION INTRAVENOUS PRN
Status: DISCONTINUED | OUTPATIENT
Start: 2020-01-31 | End: 2020-01-31 | Stop reason: SDUPTHER

## 2020-01-31 RX ORDER — CALCIUM CHLORIDE 100 MG/ML
INJECTION INTRAVENOUS; INTRAVENTRICULAR PRN
Status: DISCONTINUED | OUTPATIENT
Start: 2020-01-31 | End: 2020-01-31 | Stop reason: SDUPTHER

## 2020-01-31 RX ORDER — NICOTINE POLACRILEX 4 MG
15 LOZENGE BUCCAL PRN
Status: DISCONTINUED | OUTPATIENT
Start: 2020-01-31 | End: 2020-02-01 | Stop reason: HOSPADM

## 2020-01-31 RX ORDER — SODIUM CHLORIDE 9 MG/ML
INJECTION, SOLUTION INTRAVENOUS CONTINUOUS PRN
Status: DISCONTINUED | OUTPATIENT
Start: 2020-01-31 | End: 2020-01-31 | Stop reason: SDUPTHER

## 2020-01-31 RX ORDER — ATORVASTATIN CALCIUM 40 MG/1
40 TABLET, FILM COATED ORAL DAILY
Status: DISCONTINUED | OUTPATIENT
Start: 2020-01-31 | End: 2020-02-01 | Stop reason: HOSPADM

## 2020-01-31 RX ORDER — SODIUM CHLORIDE 9 MG/ML
INJECTION, SOLUTION INTRAVENOUS CONTINUOUS
Status: DISCONTINUED | OUTPATIENT
Start: 2020-01-31 | End: 2020-02-01

## 2020-01-31 RX ORDER — SODIUM CHLORIDE 0.9 % (FLUSH) 0.9 %
10 SYRINGE (ML) INJECTION EVERY 12 HOURS SCHEDULED
Status: DISCONTINUED | OUTPATIENT
Start: 2020-01-31 | End: 2020-02-01 | Stop reason: HOSPADM

## 2020-01-31 RX ORDER — VECURONIUM BROMIDE 1 MG/ML
INJECTION, POWDER, LYOPHILIZED, FOR SOLUTION INTRAVENOUS PRN
Status: DISCONTINUED | OUTPATIENT
Start: 2020-01-31 | End: 2020-01-31 | Stop reason: SDUPTHER

## 2020-01-31 RX ORDER — DEXTROSE MONOHYDRATE 50 MG/ML
100 INJECTION, SOLUTION INTRAVENOUS PRN
Status: DISCONTINUED | OUTPATIENT
Start: 2020-01-31 | End: 2020-02-01 | Stop reason: HOSPADM

## 2020-01-31 RX ORDER — EPHEDRINE SULFATE/0.9% NACL/PF 50 MG/5 ML
SYRINGE (ML) INTRAVENOUS PRN
Status: DISCONTINUED | OUTPATIENT
Start: 2020-01-31 | End: 2020-01-31 | Stop reason: SDUPTHER

## 2020-01-31 RX ORDER — SODIUM CHLORIDE 0.9 % (FLUSH) 0.9 %
10 SYRINGE (ML) INJECTION PRN
Status: DISCONTINUED | OUTPATIENT
Start: 2020-01-31 | End: 2020-02-01 | Stop reason: HOSPADM

## 2020-01-31 RX ORDER — ACETAMINOPHEN 325 MG/1
650 TABLET ORAL EVERY 4 HOURS PRN
Status: DISCONTINUED | OUTPATIENT
Start: 2020-01-31 | End: 2020-02-01 | Stop reason: HOSPADM

## 2020-01-31 RX ORDER — GLYCOPYRROLATE 1 MG/5 ML
SYRINGE (ML) INTRAVENOUS PRN
Status: DISCONTINUED | OUTPATIENT
Start: 2020-01-31 | End: 2020-01-31 | Stop reason: SDUPTHER

## 2020-01-31 RX ORDER — LISINOPRIL 20 MG/1
40 TABLET ORAL DAILY
Status: DISCONTINUED | OUTPATIENT
Start: 2020-02-01 | End: 2020-02-01 | Stop reason: HOSPADM

## 2020-01-31 RX ORDER — CEFAZOLIN SODIUM 1 G/3ML
INJECTION, POWDER, FOR SOLUTION INTRAMUSCULAR; INTRAVENOUS PRN
Status: DISCONTINUED | OUTPATIENT
Start: 2020-01-31 | End: 2020-01-31 | Stop reason: SDUPTHER

## 2020-01-31 RX ORDER — DEXAMETHASONE SODIUM PHOSPHATE 10 MG/ML
INJECTION INTRAMUSCULAR; INTRAVENOUS PRN
Status: DISCONTINUED | OUTPATIENT
Start: 2020-01-31 | End: 2020-01-31 | Stop reason: SDUPTHER

## 2020-01-31 RX ORDER — CITALOPRAM 20 MG/1
40 TABLET ORAL DAILY
Status: DISCONTINUED | OUTPATIENT
Start: 2020-02-01 | End: 2020-02-01 | Stop reason: HOSPADM

## 2020-01-31 RX ORDER — ALBUMIN, HUMAN INJ 5% 5 %
SOLUTION INTRAVENOUS PRN
Status: DISCONTINUED | OUTPATIENT
Start: 2020-01-31 | End: 2020-01-31 | Stop reason: SDUPTHER

## 2020-01-31 RX ORDER — HEPARIN SODIUM 1000 [USP'U]/ML
INJECTION, SOLUTION INTRAVENOUS; SUBCUTANEOUS PRN
Status: DISCONTINUED | OUTPATIENT
Start: 2020-01-31 | End: 2020-01-31 | Stop reason: SDUPTHER

## 2020-01-31 RX ORDER — SODIUM CHLORIDE 0.9 % (FLUSH) 0.9 %
10 SYRINGE (ML) INJECTION EVERY 12 HOURS SCHEDULED
Status: DISCONTINUED | OUTPATIENT
Start: 2020-01-31 | End: 2020-01-31 | Stop reason: HOSPADM

## 2020-01-31 RX ORDER — ONDANSETRON 2 MG/ML
INJECTION INTRAMUSCULAR; INTRAVENOUS PRN
Status: DISCONTINUED | OUTPATIENT
Start: 2020-01-31 | End: 2020-01-31 | Stop reason: SDUPTHER

## 2020-01-31 RX ORDER — ALLOPURINOL 300 MG/1
300 TABLET ORAL DAILY
Status: DISCONTINUED | OUTPATIENT
Start: 2020-01-31 | End: 2020-02-01 | Stop reason: HOSPADM

## 2020-01-31 RX ORDER — OXYCODONE HYDROCHLORIDE AND ACETAMINOPHEN 5; 325 MG/1; MG/1
1 TABLET ORAL EVERY 4 HOURS PRN
Status: DISCONTINUED | OUTPATIENT
Start: 2020-01-31 | End: 2020-02-01 | Stop reason: HOSPADM

## 2020-01-31 RX ORDER — CILOSTAZOL 100 MG/1
100 TABLET ORAL 2 TIMES DAILY
Status: DISCONTINUED | OUTPATIENT
Start: 2020-01-31 | End: 2020-02-01 | Stop reason: HOSPADM

## 2020-01-31 RX ORDER — ONDANSETRON 2 MG/ML
4 INJECTION INTRAMUSCULAR; INTRAVENOUS EVERY 6 HOURS PRN
Status: DISCONTINUED | OUTPATIENT
Start: 2020-01-31 | End: 2020-02-01 | Stop reason: HOSPADM

## 2020-01-31 RX ORDER — MIDAZOLAM HYDROCHLORIDE 1 MG/ML
INJECTION INTRAMUSCULAR; INTRAVENOUS PRN
Status: DISCONTINUED | OUTPATIENT
Start: 2020-01-31 | End: 2020-01-31 | Stop reason: SDUPTHER

## 2020-01-31 RX ORDER — CEFAZOLIN SODIUM 2 G/50ML
2 SOLUTION INTRAVENOUS ONCE
Status: DISCONTINUED | OUTPATIENT
Start: 2020-01-31 | End: 2020-02-01 | Stop reason: HOSPADM

## 2020-01-31 RX ORDER — FENTANYL CITRATE 50 UG/ML
INJECTION, SOLUTION INTRAMUSCULAR; INTRAVENOUS PRN
Status: DISCONTINUED | OUTPATIENT
Start: 2020-01-31 | End: 2020-01-31 | Stop reason: SDUPTHER

## 2020-01-31 RX ORDER — PANTOPRAZOLE SODIUM 20 MG/1
20 TABLET, DELAYED RELEASE ORAL DAILY
Status: DISCONTINUED | OUTPATIENT
Start: 2020-02-01 | End: 2020-02-01 | Stop reason: HOSPADM

## 2020-01-31 RX ORDER — PROPOFOL 10 MG/ML
INJECTION, EMULSION INTRAVENOUS PRN
Status: DISCONTINUED | OUTPATIENT
Start: 2020-01-31 | End: 2020-01-31 | Stop reason: SDUPTHER

## 2020-01-31 RX ORDER — PROTAMINE SULFATE 10 MG/ML
INJECTION, SOLUTION INTRAVENOUS PRN
Status: DISCONTINUED | OUTPATIENT
Start: 2020-01-31 | End: 2020-01-31 | Stop reason: SDUPTHER

## 2020-01-31 RX ORDER — VASOPRESSIN 20 U/ML
INJECTION PARENTERAL PRN
Status: DISCONTINUED | OUTPATIENT
Start: 2020-01-31 | End: 2020-01-31 | Stop reason: SDUPTHER

## 2020-01-31 RX ORDER — CEFAZOLIN SODIUM 2 G/50ML
2 SOLUTION INTRAVENOUS EVERY 8 HOURS
Status: COMPLETED | OUTPATIENT
Start: 2020-01-31 | End: 2020-02-01

## 2020-01-31 RX ORDER — GABAPENTIN 100 MG/1
100 CAPSULE ORAL 3 TIMES DAILY
Status: DISCONTINUED | OUTPATIENT
Start: 2020-01-31 | End: 2020-02-01 | Stop reason: HOSPADM

## 2020-01-31 RX ORDER — CLOPIDOGREL BISULFATE 75 MG/1
75 TABLET ORAL DAILY
Status: DISCONTINUED | OUTPATIENT
Start: 2020-01-31 | End: 2020-02-01 | Stop reason: HOSPADM

## 2020-01-31 RX ORDER — GLIPIZIDE 5 MG/1
10 TABLET ORAL
Status: DISCONTINUED | OUTPATIENT
Start: 2020-02-01 | End: 2020-02-01 | Stop reason: HOSPADM

## 2020-01-31 RX ORDER — LIDOCAINE HYDROCHLORIDE 20 MG/ML
INJECTION, SOLUTION INTRAVENOUS PRN
Status: DISCONTINUED | OUTPATIENT
Start: 2020-01-31 | End: 2020-01-31 | Stop reason: SDUPTHER

## 2020-01-31 RX ADMIN — PHENYLEPHRINE HYDROCHLORIDE 100 MCG: 10 INJECTION INTRAVENOUS at 11:25

## 2020-01-31 RX ADMIN — INSULIN HUMAN 4 UNITS: 100 INJECTION, SOLUTION PARENTERAL at 10:19

## 2020-01-31 RX ADMIN — LIDOCAINE HYDROCHLORIDE 100 MG: 20 INJECTION, SOLUTION INTRAVENOUS at 07:46

## 2020-01-31 RX ADMIN — PHENYLEPHRINE HYDROCHLORIDE 100 MCG: 10 INJECTION INTRAVENOUS at 09:44

## 2020-01-31 RX ADMIN — VASOPRESSIN 1 UNITS: 20 INJECTION INTRAVENOUS at 08:30

## 2020-01-31 RX ADMIN — CALCIUM CHLORIDE 0.5 G: 100 INJECTION, SOLUTION INTRAVENOUS; INTRAVENTRICULAR at 10:59

## 2020-01-31 RX ADMIN — VASOPRESSIN 1 UNITS: 20 INJECTION INTRAVENOUS at 08:34

## 2020-01-31 RX ADMIN — CILOSTAZOL 100 MG: 100 TABLET ORAL at 20:25

## 2020-01-31 RX ADMIN — FENTANYL CITRATE 50 MCG: 50 INJECTION, SOLUTION INTRAMUSCULAR; INTRAVENOUS at 08:18

## 2020-01-31 RX ADMIN — OXYCODONE HYDROCHLORIDE AND ACETAMINOPHEN 1 TABLET: 5; 325 TABLET ORAL at 13:41

## 2020-01-31 RX ADMIN — PHENYLEPHRINE HYDROCHLORIDE 100 MCG: 10 INJECTION INTRAVENOUS at 11:28

## 2020-01-31 RX ADMIN — Medication 5 MG: at 08:43

## 2020-01-31 RX ADMIN — HEPARIN SODIUM 5000 UNITS: 1000 INJECTION INTRAVENOUS; SUBCUTANEOUS at 09:02

## 2020-01-31 RX ADMIN — SODIUM CHLORIDE: 9 INJECTION, SOLUTION INTRAVENOUS at 08:59

## 2020-01-31 RX ADMIN — VECURONIUM BROMIDE FOR INJECTION 2 MG: 1 INJECTION, POWDER, LYOPHILIZED, FOR SOLUTION INTRAVENOUS at 09:04

## 2020-01-31 RX ADMIN — VECURONIUM BROMIDE FOR INJECTION 2 MG: 1 INJECTION, POWDER, LYOPHILIZED, FOR SOLUTION INTRAVENOUS at 08:41

## 2020-01-31 RX ADMIN — Medication 5 MG: at 09:01

## 2020-01-31 RX ADMIN — FENTANYL CITRATE 50 MCG: 50 INJECTION, SOLUTION INTRAMUSCULAR; INTRAVENOUS at 09:49

## 2020-01-31 RX ADMIN — SODIUM CHLORIDE: 9 INJECTION, SOLUTION INTRAVENOUS at 07:12

## 2020-01-31 RX ADMIN — PHENYLEPHRINE HYDROCHLORIDE 100 MCG: 10 INJECTION INTRAVENOUS at 07:46

## 2020-01-31 RX ADMIN — PROPOFOL 20 MG: 10 INJECTION, EMULSION INTRAVENOUS at 11:15

## 2020-01-31 RX ADMIN — VASOPRESSIN 1 UNITS: 20 INJECTION INTRAVENOUS at 08:38

## 2020-01-31 RX ADMIN — FENTANYL CITRATE 100 MCG: 50 INJECTION, SOLUTION INTRAMUSCULAR; INTRAVENOUS at 07:46

## 2020-01-31 RX ADMIN — CLOPIDOGREL 75 MG: 75 TABLET, FILM COATED ORAL at 13:39

## 2020-01-31 RX ADMIN — ASPIRIN 81 MG 81 MG: 81 TABLET ORAL at 13:39

## 2020-01-31 RX ADMIN — FENTANYL CITRATE 50 MCG: 50 INJECTION, SOLUTION INTRAMUSCULAR; INTRAVENOUS at 11:21

## 2020-01-31 RX ADMIN — SODIUM CHLORIDE: 9 INJECTION, SOLUTION INTRAVENOUS at 12:27

## 2020-01-31 RX ADMIN — GABAPENTIN 100 MG: 100 CAPSULE ORAL at 13:39

## 2020-01-31 RX ADMIN — DEXAMETHASONE SODIUM PHOSPHATE 10 MG: 10 INJECTION INTRAMUSCULAR; INTRAVENOUS at 08:10

## 2020-01-31 RX ADMIN — VECURONIUM BROMIDE FOR INJECTION 6 MG: 1 INJECTION, POWDER, LYOPHILIZED, FOR SOLUTION INTRAVENOUS at 09:49

## 2020-01-31 RX ADMIN — VASOPRESSIN 1 UNITS: 20 INJECTION INTRAVENOUS at 08:08

## 2020-01-31 RX ADMIN — INSULIN LISPRO 1 UNITS: 100 INJECTION, SOLUTION INTRAVENOUS; SUBCUTANEOUS at 20:27

## 2020-01-31 RX ADMIN — PHENYLEPHRINE HYDROCHLORIDE 100 MCG: 10 INJECTION INTRAVENOUS at 09:56

## 2020-01-31 RX ADMIN — SODIUM CHLORIDE: 9 INJECTION, SOLUTION INTRAVENOUS at 18:06

## 2020-01-31 RX ADMIN — ALBUMIN (HUMAN) 500 ML: 12.5 INJECTION, SOLUTION INTRAVENOUS at 10:08

## 2020-01-31 RX ADMIN — SODIUM CHLORIDE, PRESERVATIVE FREE 10 ML: 5 INJECTION INTRAVENOUS at 20:25

## 2020-01-31 RX ADMIN — Medication 3 MG: at 11:20

## 2020-01-31 RX ADMIN — Medication 0.4 MG: at 11:20

## 2020-01-31 RX ADMIN — PHENYLEPHRINE HYDROCHLORIDE 200 MCG: 10 INJECTION INTRAVENOUS at 08:06

## 2020-01-31 RX ADMIN — PROPOFOL 30 MG: 10 INJECTION, EMULSION INTRAVENOUS at 11:21

## 2020-01-31 RX ADMIN — GABAPENTIN 100 MG: 100 CAPSULE ORAL at 20:25

## 2020-01-31 RX ADMIN — HEPARIN SODIUM 3000 UNITS: 1000 INJECTION INTRAVENOUS; SUBCUTANEOUS at 09:34

## 2020-01-31 RX ADMIN — Medication 5 MG: at 10:04

## 2020-01-31 RX ADMIN — ALLOPURINOL 300 MG: 300 TABLET ORAL at 13:39

## 2020-01-31 RX ADMIN — SODIUM CHLORIDE: 9 INJECTION, SOLUTION INTRAVENOUS at 07:54

## 2020-01-31 RX ADMIN — INSULIN LISPRO 6 UNITS: 100 INJECTION, SOLUTION INTRAVENOUS; SUBCUTANEOUS at 16:38

## 2020-01-31 RX ADMIN — HEPARIN SODIUM 10000 UNITS: 1000 INJECTION INTRAVENOUS; SUBCUTANEOUS at 08:54

## 2020-01-31 RX ADMIN — PHENYLEPHRINE HYDROCHLORIDE 100 MCG: 10 INJECTION INTRAVENOUS at 09:12

## 2020-01-31 RX ADMIN — CEFAZOLIN 2000 MG: 1 INJECTION, POWDER, FOR SOLUTION INTRAMUSCULAR; INTRAVENOUS at 07:57

## 2020-01-31 RX ADMIN — PHENYLEPHRINE HYDROCHLORIDE 200 MCG: 10 INJECTION INTRAVENOUS at 08:40

## 2020-01-31 RX ADMIN — HEPARIN SODIUM 2000 UNITS: 1000 INJECTION INTRAVENOUS; SUBCUTANEOUS at 10:14

## 2020-01-31 RX ADMIN — INSULIN HUMAN 3 UNITS: 100 INJECTION, SOLUTION PARENTERAL at 11:05

## 2020-01-31 RX ADMIN — CALCIUM CHLORIDE 0.5 G: 100 INJECTION, SOLUTION INTRAVENOUS; INTRAVENTRICULAR at 10:33

## 2020-01-31 RX ADMIN — MIDAZOLAM 2 MG: 1 INJECTION INTRAMUSCULAR; INTRAVENOUS at 07:38

## 2020-01-31 RX ADMIN — PROTAMINE SULFATE 50 MG: 10 INJECTION, SOLUTION INTRAVENOUS at 10:55

## 2020-01-31 RX ADMIN — PHENYLEPHRINE HYDROCHLORIDE 100 MCG: 10 INJECTION INTRAVENOUS at 10:19

## 2020-01-31 RX ADMIN — VASOPRESSIN 1 UNITS: 20 INJECTION INTRAVENOUS at 09:59

## 2020-01-31 RX ADMIN — ATORVASTATIN CALCIUM 40 MG: 40 TABLET, FILM COATED ORAL at 13:39

## 2020-01-31 RX ADMIN — VECURONIUM BROMIDE FOR INJECTION 10 MG: 1 INJECTION, POWDER, LYOPHILIZED, FOR SOLUTION INTRAVENOUS at 07:46

## 2020-01-31 RX ADMIN — VASOPRESSIN 1 UNITS: 20 INJECTION INTRAVENOUS at 10:12

## 2020-01-31 RX ADMIN — PROPOFOL 200 MG: 10 INJECTION, EMULSION INTRAVENOUS at 07:46

## 2020-01-31 RX ADMIN — CEFAZOLIN SODIUM 2 G: 2 SOLUTION INTRAVENOUS at 16:32

## 2020-01-31 RX ADMIN — INSULIN LISPRO 6 UNITS: 100 INJECTION, SOLUTION INTRAVENOUS; SUBCUTANEOUS at 13:54

## 2020-01-31 RX ADMIN — INSULIN HUMAN 3 UNITS: 100 INJECTION, SOLUTION PARENTERAL at 10:44

## 2020-01-31 RX ADMIN — ONDANSETRON HYDROCHLORIDE 4 MG: 2 INJECTION, SOLUTION INTRAMUSCULAR; INTRAVENOUS at 10:59

## 2020-01-31 RX ADMIN — PHENYLEPHRINE HYDROCHLORIDE 100 MCG: 10 INJECTION INTRAVENOUS at 10:07

## 2020-01-31 RX ADMIN — VASOPRESSIN 1 UNITS: 20 INJECTION INTRAVENOUS at 10:27

## 2020-01-31 ASSESSMENT — PULMONARY FUNCTION TESTS
PIF_VALUE: 26
PIF_VALUE: 24
PIF_VALUE: 25
PIF_VALUE: 25
PIF_VALUE: 24
PIF_VALUE: 4
PIF_VALUE: 22
PIF_VALUE: 0
PIF_VALUE: 25
PIF_VALUE: 9
PIF_VALUE: 26
PIF_VALUE: 25
PIF_VALUE: 26
PIF_VALUE: 26
PIF_VALUE: 25
PIF_VALUE: 26
PIF_VALUE: 26
PIF_VALUE: 24
PIF_VALUE: 25
PIF_VALUE: 26
PIF_VALUE: 1
PIF_VALUE: 1
PIF_VALUE: 25
PIF_VALUE: 3
PIF_VALUE: 24
PIF_VALUE: 24
PIF_VALUE: 28
PIF_VALUE: 24
PIF_VALUE: 26
PIF_VALUE: 24
PIF_VALUE: 25
PIF_VALUE: 26
PIF_VALUE: 26
PIF_VALUE: 2
PIF_VALUE: 25
PIF_VALUE: 26
PIF_VALUE: 26
PIF_VALUE: 23
PIF_VALUE: 28
PIF_VALUE: 9
PIF_VALUE: 10
PIF_VALUE: 22
PIF_VALUE: 3
PIF_VALUE: 25
PIF_VALUE: 25
PIF_VALUE: 24
PIF_VALUE: 4
PIF_VALUE: 25
PIF_VALUE: 3
PIF_VALUE: 24
PIF_VALUE: 15
PIF_VALUE: 25
PIF_VALUE: 27
PIF_VALUE: 24
PIF_VALUE: 26
PIF_VALUE: 24
PIF_VALUE: 25
PIF_VALUE: 25
PIF_VALUE: 26
PIF_VALUE: 24
PIF_VALUE: 22
PIF_VALUE: 4
PIF_VALUE: 27
PIF_VALUE: 25
PIF_VALUE: 25
PIF_VALUE: 24
PIF_VALUE: 27
PIF_VALUE: 27
PIF_VALUE: 25
PIF_VALUE: 4
PIF_VALUE: 26
PIF_VALUE: 23
PIF_VALUE: 2
PIF_VALUE: 24
PIF_VALUE: 25
PIF_VALUE: 25
PIF_VALUE: 31
PIF_VALUE: 27
PIF_VALUE: 25
PIF_VALUE: 25
PIF_VALUE: 24
PIF_VALUE: 9
PIF_VALUE: 24
PIF_VALUE: 29
PIF_VALUE: 25
PIF_VALUE: 26
PIF_VALUE: 26
PIF_VALUE: 24
PIF_VALUE: 25
PIF_VALUE: 27
PIF_VALUE: 24
PIF_VALUE: 24
PIF_VALUE: 26
PIF_VALUE: 3
PIF_VALUE: 25
PIF_VALUE: 1
PIF_VALUE: 2
PIF_VALUE: 25
PIF_VALUE: 2
PIF_VALUE: 25
PIF_VALUE: 20
PIF_VALUE: 27
PIF_VALUE: 14
PIF_VALUE: 17
PIF_VALUE: 25
PIF_VALUE: 26
PIF_VALUE: 14
PIF_VALUE: 24
PIF_VALUE: 25
PIF_VALUE: 24
PIF_VALUE: 25
PIF_VALUE: 25
PIF_VALUE: 24
PIF_VALUE: 5
PIF_VALUE: 26
PIF_VALUE: 27
PIF_VALUE: 25
PIF_VALUE: 27
PIF_VALUE: 26
PIF_VALUE: 24
PIF_VALUE: 26
PIF_VALUE: 26
PIF_VALUE: 0
PIF_VALUE: 25
PIF_VALUE: 30
PIF_VALUE: 25
PIF_VALUE: 25
PIF_VALUE: 5
PIF_VALUE: 25
PIF_VALUE: 24
PIF_VALUE: 25
PIF_VALUE: 25
PIF_VALUE: 27
PIF_VALUE: 24
PIF_VALUE: 25
PIF_VALUE: 26
PIF_VALUE: 24
PIF_VALUE: 3
PIF_VALUE: 23
PIF_VALUE: 4
PIF_VALUE: 24
PIF_VALUE: 25
PIF_VALUE: 22
PIF_VALUE: 24
PIF_VALUE: 22
PIF_VALUE: 1
PIF_VALUE: 25
PIF_VALUE: 25
PIF_VALUE: 26
PIF_VALUE: 25
PIF_VALUE: 26
PIF_VALUE: 7
PIF_VALUE: 14
PIF_VALUE: 25
PIF_VALUE: 26
PIF_VALUE: 26
PIF_VALUE: 27
PIF_VALUE: 9
PIF_VALUE: 26
PIF_VALUE: 26
PIF_VALUE: 24
PIF_VALUE: 2
PIF_VALUE: 24
PIF_VALUE: 23
PIF_VALUE: 25
PIF_VALUE: 24
PIF_VALUE: 25
PIF_VALUE: 26
PIF_VALUE: 26
PIF_VALUE: 25
PIF_VALUE: 22
PIF_VALUE: 25
PIF_VALUE: 26
PIF_VALUE: 25
PIF_VALUE: 26
PIF_VALUE: 25
PIF_VALUE: 4
PIF_VALUE: 25
PIF_VALUE: 29
PIF_VALUE: 25
PIF_VALUE: 4
PIF_VALUE: 26
PIF_VALUE: 26
PIF_VALUE: 1
PIF_VALUE: 35
PIF_VALUE: 25
PIF_VALUE: 24
PIF_VALUE: 24
PIF_VALUE: 25
PIF_VALUE: 24
PIF_VALUE: 2
PIF_VALUE: 25
PIF_VALUE: 25
PIF_VALUE: 26
PIF_VALUE: 26
PIF_VALUE: 24
PIF_VALUE: 23
PIF_VALUE: 25
PIF_VALUE: 0
PIF_VALUE: 26
PIF_VALUE: 26
PIF_VALUE: 25
PIF_VALUE: 14
PIF_VALUE: 25
PIF_VALUE: 24
PIF_VALUE: 4
PIF_VALUE: 5
PIF_VALUE: 24
PIF_VALUE: 25
PIF_VALUE: 24
PIF_VALUE: 3

## 2020-01-31 ASSESSMENT — PAIN SCALES - GENERAL
PAINLEVEL_OUTOF10: 0
PAINLEVEL_OUTOF10: 0
PAINLEVEL_OUTOF10: 7
PAINLEVEL_OUTOF10: 0
PAINLEVEL_OUTOF10: 8
PAINLEVEL_OUTOF10: 6
PAINLEVEL_OUTOF10: 0

## 2020-01-31 ASSESSMENT — PAIN - FUNCTIONAL ASSESSMENT: PAIN_FUNCTIONAL_ASSESSMENT: 0-10

## 2020-01-31 ASSESSMENT — COPD QUESTIONNAIRES: CAT_SEVERITY: MILD

## 2020-01-31 NOTE — H&P
Marital status:      Spouse name: Not on file    Number of children: Not on file    Years of education: Not on file    Highest education level: Not on file   Occupational History    Not on file   Social Needs    Financial resource strain: Not on file    Food insecurity:     Worry: Not on file     Inability: Not on file    Transportation needs:     Medical: Not on file     Non-medical: Not on file   Tobacco Use    Smoking status: Former Smoker     Packs/day: 1.50     Years: 28.00     Pack years: 42.00     Types: Cigarettes     Start date: 1988     Last attempt to quit: 10/15/2016     Years since quitting: 3.2    Smokeless tobacco: Former User     Types: Snuff     Quit date: 1/1/2004   Substance and Sexual Activity    Alcohol use:  Yes     Alcohol/week: 12.0 standard drinks     Types: 12 Cans of beer per week     Frequency: 2-4 times a month     Drinks per session: 1 or 2     Comment: HAS NOT DRANK SINCE 12/13/19    Drug use: Never    Sexual activity: Not on file   Lifestyle    Physical activity:     Days per week: Not on file     Minutes per session: Not on file    Stress: Not on file   Relationships    Social connections:     Talks on phone: Not on file     Gets together: Not on file     Attends Caodaism service: Not on file     Active member of club or organization: Not on file     Attends meetings of clubs or organizations: Not on file     Relationship status: Not on file    Intimate partner violence:     Fear of current or ex partner: Not on file     Emotionally abused: Not on file     Physically abused: Not on file     Forced sexual activity: Not on file   Other Topics Concern    Not on file   Social History Narrative    Occ [employer]; 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        Family History   Problem Relation Age of Onset    Asthma Mother     Other Father         vascular problems    Cancer Father         prostate, lung    Diabetes Father        REVIEW OF SYSTEMS:   Review of Systems -   General ROS: negative for - chills, fatigue, fever, night sweats, sleep disturbance or weight loss  Hematological and Lymphatic ROS: negative for - bleeding problems or blood clots  Respiratory ROS: no cough, shortness of breath, or wheezing  Cardiovascular ROS: no chest pain or dyspnea on exertion  Gastrointestinal ROS: no abdominal pain, change in bowel habits, or black or bloody stools, no hernias, hemorrhoids, or new lumps  Genito-Urinary ROS: no dysuria, trouble voiding, or hematuria  Musculoskeletal ROS: negative for - gait disturbance  Neurological ROS: no TIA or stroke symptoms    PHYSICAL EXAM  General: No apparent distress, comfortable  HEENT: Trachea midline, no masses, Pupils equal round  Chest: Respiratory effort was normal with no retractions or use of accessory muscles. Cardiovascular: Heart sounds were normal with a regular rate and rhythm. Abdomen:  Soft and non distended. No tenderness, guarding, rebound, or rigidity. Extremities: Moves all 4 extremeties, No pedal edema, no wounds, 5/5 strength BL      LABS:    Lab Results   Component Value Date    WBC 8.8 2020    HGB 12.4 (L) 2020    HCT 36.7 (L) 2020     2020    PROTIME 12.4 2020    INR 1.1 2020    APTT 24.8 2019    K 4.6 2020    BUN 10 2020    CREATININE 0.7 2020       RADIOLOGY:    Xr Chest Standard (2 Vw)    Result Date: 2019  Patient MRN: [id number] : 1970 Age:  50 years Gender: Male Order Date: 2019 8:40 AM Exam: XR CHEST (2 VW) Number of Images: 2 views Indication:  I73.9 PVD (peripheral vascular disease) (HCC) pre op clearance Comparison: None. Findings: Examination of the chest in PA and lateral views shows that both lungs are free of active disease. The heart is normal in size and configuration. The trachea is in the midline. The mediastinum and both hemidiaphragms are normal. The bony thorax is intact.      NO ACUTE CARDIOPULMONARY PROCESS       ASSESSMENT:  52 y.o. male with refractory claudication of the left leg worse than right    PLAN:   1. Right femoral endarterectomy with profundoplasty    Patient agreeable to procedure and denies any questions    I reviewed the procedure with the patient and family as available. I discussed the procedure, risks, benefits, complications, and alternatives of the procedure. They understand and consent.   All questions were answered    Gold Flores        Electronically signed by Gold Flores M.D, on 1/31/2020 at 7:34 AM

## 2020-01-31 NOTE — OP NOTE
Raj Riley  1970      DATE OF PROCEDURE: 1/31/2020     SURGEON: Bria Hdez M.D.     ASSISTANT: Emiliana GUTIERREZ     PREOPERATIVE DIAGNOSIS: Peripheral Vascular Disease         R LE lifestyle limiting claudication     POSTOPERATIVE DIAGNOSIS: Same    OPERATION: Right distal external iliac and  femoral endarterectomy with repair with bovine pericardial patch   Deep femoral endarterectomy with profundoplasty     ANESTHESIA: General     ESTIMATED BLOOD LOSS: 554 ml     COMPLICATIONS: None     DESCRIPTION OF PROCEDURE: With the patient in a supine position, the right groin, the lower abdomen, and the entire right leg were prepped and draped in the usual fashion after satisfactory induction of  general endotracheal anesthesia. A longitudinal incision was made over the right groin, deepened to the  fascia, which was incised and dissection down to the femoral vessels was completed. Crossing veins were ligated with silk ties. The right common, profunda, and the superficial femoral artery was dissected and controlled with vessel loops. The patient has extensive and severe calcification of the right external iliac, common femoral artery. There is a significant stenosis at the level of the profunda  femoris artery and superficial femoral artery also due to significant plaque formation. The profunda femoris artery was dissected approximately 2.5 to 3 cm in length and the superficial artery was similarly dissected free. Next, the inguinal ligament was mobilized. The external iliac artery was dissected free approximately 2 to 3 cm above the circumflex branches and controlled with vessel loop. After giving the patient heparin  intravenously, the profunda branches, superficial femoral and external iliac artery were occluded.       A longitudinal arteriotomy was made on the common extending proximally all the way to above the circumflex branches external iliac artery and then subsequently the arteriotomy  was extended onto the superficial femoral artery by approximately 2 cm at least because of atherosclerotic plaque buildup. Severe calcification caused luminal  narrowing of at least > 90% at the takeoff of the profunda femoris and superficial femoral artery. Endarterectomy common femoral artery, the distal external iliac artery and distally 2 cm onto superficial femoral artery and profunda femoris artery was performed. The distal intima was sutured down with 6-0 Prolene. Next, a pericardialpatch was used to repair the arteriotomy with running 5-0 Prolene suture. Prior to completion artery was flushed, back bled. After the repair was completed, flow was restored. Doppler signals were appreciated in in the superficial femoral artery and the profunda femoris artery. Doppler signals were appreciated in the foot. Protamine 50 units was given. Hemostasis was obtained. Incisions were closed in layers by approximatingthe fascia with 2-0 Vicryl, subcutaneous with 3-0 Vicryl, and skin with 5-0Vicryl, dressed with aquacell ag dressing    Needle, sponge, and instrument counts were reported as correct x2. The patient tolerated the procedure and was transferred to the Cardiovascular ICU in satisfactory condition. BRENDA Felton was scrubbed and assisted during the entire procedure. No qualified resident was available.       CC : Alyse Vega DO

## 2020-01-31 NOTE — ANESTHESIA PRE PROCEDURE
Intravenous PRN Jacquelyn Ascencio PA-C           Allergies:  No Known Allergies    Problem List:    Patient Active Problem List   Diagnosis Code    Type 2 diabetes mellitus with diabetic peripheral angiopathy without gangrene, without long-term current use of insulin ([facility]) E11.51    Tobacco abuse counseling Z71.6    PVD (peripheral vascular disease) with claudication ([facility] I73.9    Pure hypercholesterolemia E78.00    Diabetic mononeuropathy associated with type 2 diabetes mellitus ([facility] E11.41    Chronic obstructive pulmonary disease ([facility] J44.9    S/P femoral-popliteal bypass surgery Z95.828    Swelling of joint of pelvic region or thigh, left M25.452    Atherosclerosis of native artery of right lower extremity ([facility] I70.201       Past Medical History:        Diagnosis Date    Anxiety     Chronic obstructive pulmonary disease ([facility] 7/30/2019    Diabetes ([facility]     GERD (gastroesophageal reflux disease)     Gout     Hyperlipidemia     Hypertension     Leg pain     Postoperative anemia due to acute blood loss 8/18/2019    PVD (peripheral vascular disease) ([facility] 1/15/2018    S/P femoral-popliteal bypass surgery 9/9/2019    Type 2 diabetes mellitus with diabetic peripheral angiopathy without gangrene, without long-term current use of insulin ([facility] 1/15/2018       Past Surgical History:        Procedure Laterality Date    FEMORAL BYPASS Left 8/16/2019    FEMORAL POPLITEAL BYPASS WITH FEMORAL ENDARTERECTOMY LEFT LEG performed by Yolanda Kapoor MD at [facility] 114 HISTORY  10/04/2016    Dr Kailash Tavares athrectomy/pci right fem/pop    OTHER SURGICAL HISTORY  06/19/2018    Dr Kailash Tavares PCI w/ athrectomy RLE Common Illiac to Popliteal    VASCULAR SURGERY  07/2019    ANGIOGRAM    WISDOM TOOTH EXTRACTION         Social History:    Social History     Tobacco Use    Smoking status: Former Smoker     Packs/day: 1.50     Years: 28.00     Pack years: 42.00     Types: Cigarettes Start date: 80     Last attempt to quit: 10/15/2016     Years since quitting: 3.2    Smokeless tobacco: Former User     Types: Snuff     Quit date: 1/1/2004   Substance Use Topics    Alcohol use: Yes     Alcohol/week: 12.0 standard drinks     Types: 12 Cans of beer per week     Frequency: 2-4 times a month     Drinks per session: 1 or 2     Comment: HAS NOT DRANK SINCE 12/13/19                                Counseling given: Not Answered      Vital Signs (Current):   Vitals:    01/21/20 1434 01/31/20 0556   BP:  124/81   Pulse:  106   Resp:  18   Temp:  36.7 °C (98 °F)   TempSrc:  Temporal   SpO2:  98%   Weight: 215 lb (97.5 kg) 215 lb (97.5 kg)   Height: 5' 10\" (1.778 m) 5' 10\" (1.778 m)                                              BP Readings from Last 3 Encounters:   01/31/20 124/81   01/24/20 (!) 128/58   12/18/19 114/67       NPO Status: Time of last liquid consumption: 2230                        Time of last solid consumption: 1700                        Date of last liquid consumption: 01/30/20                        Date of last solid food consumption: 01/30/20    BMI:   Wt Readings from Last 3 Encounters:   01/31/20 215 lb (97.5 kg)   12/18/19 215 lb (97.5 kg)   11/12/19 215 lb 6.4 oz (97.7 kg)     Body mass index is 30.85 kg/m².     CBC:   Lab Results   Component Value Date    WBC 8.8 01/24/2020    RBC 3.89 01/24/2020    HGB 12.4 01/24/2020    HCT 36.7 01/24/2020    MCV 94.3 01/24/2020    RDW 12.6 01/24/2020     01/24/2020       CMP:   Lab Results   Component Value Date     01/24/2020    K 4.6 01/24/2020    CL 98 01/24/2020    CO2 24 01/24/2020    BUN 10 01/24/2020    CREATININE 0.7 01/24/2020    GFRAA >60 01/24/2020    LABGLOM >60 01/24/2020    GLUCOSE 223 01/24/2020    PROT 6.8 08/27/2019    CALCIUM 9.3 01/24/2020    BILITOT 0.2 08/27/2019    ALKPHOS 70 08/27/2019    AST 18 08/27/2019    ALT 19 08/27/2019       POC Tests: No results for input(s): POCGLU, POCNA, POCK, POCCL, POCBUN, Heike So in the last 72 hours. Coags:   Lab Results   Component Value Date    PROTIME 12.4 01/31/2020    INR 1.1 01/31/2020    APTT 24.8 08/16/2019       HCG (If Applicable): No results found for: PREGTESTUR, PREGSERUM, HCG, HCGQUANT     ABGs:   Lab Results   Component Value Date    PO2ART 158.3 08/16/2019    VFC0NEN 39.5 08/16/2019    VLR6NGZ 17.5 08/16/2019        Type & Screen (If Applicable):  No results found for: LABABO, LABRH     EKG 11/12/19  Sinus  Tachycardia   Early repolarization. Stress test 8/1/19  Impression:    1. Electrocardiographically normal regadenoson infusion with a   clinicallynonischemic response. 2. Myocardial perfusion imaging was normal.    3. Overall left ventricular systolic function was normal without   regional wall motion abnormalities. 4.   Intermediate risk pre-operative pharmacologic stress test    Thank you for sending your patient to this [facility]. Electronically signed by Lesli Portillo MD on 8/1/19 at 1:22 PM    Chest x ray  8/8/19     Findings: Examination of the chest in PA and lateral views shows that   both lungs are free of active disease. The heart is normal in size and   configuration. The trachea is in the midline.  The mediastinum and both   hemidiaphragms are normal. The bony thorax is intact.            Impression   NO ACUTE CARDIOPULMONARY PROCESS            Anesthesia Evaluation  Patient summary reviewed no history of anesthetic complications:   Airway: Mallampati: III  TM distance: <3 FB   Neck ROM: full  Mouth opening: > = 3 FB Dental: normal exam         Pulmonary:   (+) COPD: mild,                             Cardiovascular:  Exercise tolerance: good (>4 METS),   (+) hypertension: moderate, hyperlipidemia      ECG reviewed        Stress test reviewed       Beta Blocker:  Dose within 24 Hrs         Neuro/Psych:   (+) neuromuscular disease:, depression/anxiety             GI/Hepatic/Renal:   (+) GERD: well controlled,           Endo/Other:    (+) DiabetesType II DM, well controlled, , .                 Abdominal:           Vascular:   + PVD, aortic or cerebral, . Anesthesia Plan      general     ASA 3       Induction: intravenous. BIS        Plan discussed with attending. Myles Gonzalez, APRN - CRNA   1/31/2020      Pt seen, examined, chart reviewed, plan discussed.   Myles Gonzalez  1/31/2020  6:36 AM

## 2020-01-31 NOTE — ANESTHESIA PRE PROCEDURE
Intravenous PRN Jacquelyn Ascencio PA-C           Allergies:  No Known Allergies    Problem List:    Patient Active Problem List   Diagnosis Code    Type 2 diabetes mellitus with diabetic peripheral angiopathy without gangrene, without long-term current use of insulin ([facility]) E11.51    Tobacco abuse counseling Z71.6    PVD (peripheral vascular disease) with claudication ([facility] I73.9    Pure hypercholesterolemia E78.00    Diabetic mononeuropathy associated with type 2 diabetes mellitus ([facility] E11.41    Chronic obstructive pulmonary disease ([facility] J44.9    S/P femoral-popliteal bypass surgery Z95.828    Swelling of joint of pelvic region or thigh, left M25.452    Atherosclerosis of native artery of right lower extremity ([facility] I70.201       Past Medical History:        Diagnosis Date    Anxiety     Chronic obstructive pulmonary disease ([facility] 7/30/2019    Diabetes ([facility]     GERD (gastroesophageal reflux disease)     Gout     Hyperlipidemia     Hypertension     Leg pain     Postoperative anemia due to acute blood loss 8/18/2019    PVD (peripheral vascular disease) ([facility] 1/15/2018    S/P femoral-popliteal bypass surgery 9/9/2019    Type 2 diabetes mellitus with diabetic peripheral angiopathy without gangrene, without long-term current use of insulin ([facility] 1/15/2018       Past Surgical History:        Procedure Laterality Date    FEMORAL BYPASS Left 8/16/2019    FEMORAL POPLITEAL BYPASS WITH FEMORAL ENDARTERECTOMY LEFT LEG performed by MD Brandi at [facility] 114 HISTORY  10/04/2016    Dr Seun Ruff - athrectomy/pci right fem/pop    OTHER SURGICAL HISTORY  06/19/2018    Dr Seun Ruff - PCI w/ athrectomy RLE Common Illiac to Popliteal    VASCULAR SURGERY  07/2019    ANGIOGRAM    WISDOM TOOTH EXTRACTION         Social History:    Social History     Tobacco Use    Smoking status: Former Smoker     Packs/day: 1.50     Years: 28.00     Pack years: 42.00     Types: Cigarettes Start date: 80     Last attempt to quit: 10/15/2016     Years since quitting: 3.2    Smokeless tobacco: Former User     Types: Snuff     Quit date: 1/1/2004   Substance Use Topics    Alcohol use: Yes     Alcohol/week: 12.0 standard drinks     Types: 12 Cans of beer per week     Frequency: 2-4 times a month     Drinks per session: 1 or 2     Comment: HAS NOT DRANK SINCE 12/13/19                                Counseling given: Not Answered      Vital Signs (Current):   Vitals:    01/21/20 1434 01/31/20 0556   BP:  124/81   Pulse:  106   Resp:  18   Temp:  98 °F (36.7 °C)   TempSrc:  Temporal   SpO2:  98%   Weight: 215 lb (97.5 kg) 215 lb (97.5 kg)   Height: 5' 10\" (1.778 m) 5' 10\" (1.778 m)                                              BP Readings from Last 3 Encounters:   01/31/20 124/81   01/24/20 (!) 128/58   12/18/19 114/67       NPO Status: Time of last liquid consumption: 2230                        Time of last solid consumption: 1700                        Date of last liquid consumption: 01/30/20                        Date of last solid food consumption: 01/30/20    BMI:   Wt Readings from Last 3 Encounters:   01/31/20 215 lb (97.5 kg)   12/18/19 215 lb (97.5 kg)   11/12/19 215 lb 6.4 oz (97.7 kg)     Body mass index is 30.85 kg/m².     CBC:   Lab Results   Component Value Date    WBC 8.8 01/24/2020    RBC 3.89 01/24/2020    HGB 12.4 01/24/2020    HCT 36.7 01/24/2020    MCV 94.3 01/24/2020    RDW 12.6 01/24/2020     01/24/2020       CMP:   Lab Results   Component Value Date     01/24/2020    K 4.6 01/24/2020    CL 98 01/24/2020    CO2 24 01/24/2020    BUN 10 01/24/2020    CREATININE 0.7 01/24/2020    GFRAA >60 01/24/2020    LABGLOM >60 01/24/2020    GLUCOSE 223 01/24/2020    PROT 6.8 08/27/2019    CALCIUM 9.3 01/24/2020    BILITOT 0.2 08/27/2019    ALKPHOS 70 08/27/2019    AST 18 08/27/2019    ALT 19 08/27/2019       POC Tests: No results for input(s): POCGLU, POCNA, POCK, POCCL, POCBUN,

## 2020-01-31 NOTE — ANESTHESIA PROCEDURE NOTES
Arterial Line:    An arterial line was placed using surface landmarks, in the OR for the following indication(s): continuous blood pressure monitoring and blood sampling needed. A 20 gauge (size), 1 and 3/8 inch (length), Arrow (type) catheter was placed, Seldinger technique not used, into the left radial artery, secured by tape. Anesthesia type: Local  Local infiltration: Injection    Events:  patient tolerated procedure well with no complications.   Anesthesiologist: Buster Waters DO  Resident/CRNA: DRAKE Walsh CRNA  Performed: Resident/CRNA   Preanesthetic Checklist  Completed: patient identified, site marked, surgical consent, pre-op evaluation, timeout performed, IV checked, risks and benefits discussed, monitors and equipment checked, anesthesia consent given, oxygen available and patient being monitored

## 2020-01-31 NOTE — PROGRESS NOTES
CVICU Admission Note    Name: Boston Khan  MRN: [id number]    CC: Postoperative Critical Care Management     Indication for Surgery/Procedure: PVD, RLE lifestyle limiting claudication     Important/Relevant PMH/PSH: PVD s/p Left femoral popliteal bypass with endarterectomy, HTN, HPLDM, COPD, GERD, Gout, anxiety, former smoker     Procedure/Surgeries: 1/31/2020 Right distal external iliac and  femoral endarterectomy with repair with bovine pericardial patch   Deep femoral endarterectomy with profundoplasty       Physical Exam:    /70   Pulse 102   Temp 97.7 °F (36.5 °C) (Temporal)   Resp 16   Ht 5' 10\" (1.778 m)   Wt 215 lb (97.5 kg)   SpO2 95%   BMI 30.85 kg/m²       General Appearance: Awake, hemodynamically stable. C/o some incisional pain. Eyes: PERRL  Pulmonary: Diminished bibasilar. No wheezes, no accessory muscle use noted on 6 L O2 NC  Cardiovascular: RRR, no heaves or thrills palpated   Telemetry: SR/ST   Abdomen: Soft, nontender  Extremities: RLE +DP/PT weak biphasic signals, LLE 1+ DP;  no edema   Neurologic/Psych: A&Ox3, MAEx4 to command   Skin: Warm and dry  Incision: Right groin with silver dressing intact, soft to palpation       Assessment/Plan: Day of Surgery     1.  PVD S/p Right distal external iliac and femoral endarterectomy    - Frequent neurovascular checks, monitor incision   - DAPT  - Ancef  - NPO, IVF @125cc/hr   - Bedrest  - PRN dilaudid/percocet for pain management   - Lovenox for DVT prophylaxis while inpatient   - DM- SSI for glycemic control       Electronically signed by DRAKE Sneed - CNP on 1/31/2020 at 1:01 PM

## 2020-02-01 VITALS
WEIGHT: 223.8 LBS | TEMPERATURE: 97.7 F | OXYGEN SATURATION: 97 % | DIASTOLIC BLOOD PRESSURE: 78 MMHG | BODY MASS INDEX: 32.04 KG/M2 | RESPIRATION RATE: 22 BRPM | HEIGHT: 70 IN | HEART RATE: 105 BPM | SYSTOLIC BLOOD PRESSURE: 118 MMHG

## 2020-02-01 LAB
ANION GAP SERPL CALCULATED.3IONS-SCNC: 16 MMOL/L (ref 7–16)
BUN BLDV-MCNC: 12 MG/DL (ref 6–20)
CALCIUM SERPL-MCNC: 8.6 MG/DL (ref 8.6–10.2)
CHLORIDE BLD-SCNC: 104 MMOL/L (ref 98–107)
CO2: 19 MMOL/L (ref 22–29)
CREAT SERPL-MCNC: 0.7 MG/DL (ref 0.7–1.2)
GFR AFRICAN AMERICAN: >60
GFR NON-AFRICAN AMERICAN: >60 ML/MIN/1.73
GLUCOSE BLD-MCNC: 152 MG/DL (ref 74–99)
HCT VFR BLD CALC: 29.5 % (ref 37–54)
HEMOGLOBIN: 10.2 G/DL (ref 12.5–16.5)
HEMOGLOBIN: 9.7 G/DL (ref 12.5–16.5)
MCH RBC QN AUTO: 32.1 PG (ref 26–35)
MCHC RBC AUTO-ENTMCNC: 32.9 % (ref 32–34.5)
MCV RBC AUTO: 97.7 FL (ref 80–99.9)
METER GLUCOSE: 151 MG/DL (ref 74–99)
METER GLUCOSE: 161 MG/DL (ref 74–99)
METER GLUCOSE: 185 MG/DL (ref 74–99)
PDW BLD-RTO: 12.4 FL (ref 11.5–15)
PLATELET # BLD: 311 E9/L (ref 130–450)
PMV BLD AUTO: 9.1 FL (ref 7–12)
POTASSIUM SERPL-SCNC: 4.2 MMOL/L (ref 3.5–5)
RBC # BLD: 3.02 E12/L (ref 3.8–5.8)
SODIUM BLD-SCNC: 139 MMOL/L (ref 132–146)
WBC # BLD: 9.4 E9/L (ref 4.5–11.5)

## 2020-02-01 PROCEDURE — 85027 COMPLETE CBC AUTOMATED: CPT

## 2020-02-01 PROCEDURE — 2580000003 HC RX 258: Performed by: SURGERY

## 2020-02-01 PROCEDURE — 85018 HEMOGLOBIN: CPT

## 2020-02-01 PROCEDURE — 82962 GLUCOSE BLOOD TEST: CPT

## 2020-02-01 PROCEDURE — 6370000000 HC RX 637 (ALT 250 FOR IP): Performed by: SURGERY

## 2020-02-01 PROCEDURE — 6360000002 HC RX W HCPCS: Performed by: SURGERY

## 2020-02-01 PROCEDURE — 80048 BASIC METABOLIC PNL TOTAL CA: CPT

## 2020-02-01 PROCEDURE — 36415 COLL VENOUS BLD VENIPUNCTURE: CPT

## 2020-02-01 RX ORDER — OXYCODONE HYDROCHLORIDE AND ACETAMINOPHEN 5; 325 MG/1; MG/1
1 TABLET ORAL EVERY 6 HOURS PRN
Qty: 25 TABLET | Refills: 0 | Status: SHIPPED | OUTPATIENT
Start: 2020-02-01 | End: 2020-02-15

## 2020-02-01 RX ADMIN — PANTOPRAZOLE SODIUM 20 MG: 20 TABLET, DELAYED RELEASE ORAL at 06:46

## 2020-02-01 RX ADMIN — CITALOPRAM 40 MG: 20 TABLET, FILM COATED ORAL at 09:00

## 2020-02-01 RX ADMIN — ATORVASTATIN CALCIUM 40 MG: 40 TABLET, FILM COATED ORAL at 09:00

## 2020-02-01 RX ADMIN — CLOPIDOGREL 75 MG: 75 TABLET, FILM COATED ORAL at 09:00

## 2020-02-01 RX ADMIN — ALLOPURINOL 300 MG: 300 TABLET ORAL at 09:00

## 2020-02-01 RX ADMIN — CILOSTAZOL 100 MG: 100 TABLET ORAL at 09:00

## 2020-02-01 RX ADMIN — METFORMIN HYDROCHLORIDE 500 MG: 500 TABLET ORAL at 09:00

## 2020-02-01 RX ADMIN — INSULIN LISPRO 2 UNITS: 100 INJECTION, SOLUTION INTRAVENOUS; SUBCUTANEOUS at 08:56

## 2020-02-01 RX ADMIN — ENOXAPARIN SODIUM 40 MG: 40 INJECTION SUBCUTANEOUS at 08:59

## 2020-02-01 RX ADMIN — GABAPENTIN 100 MG: 100 CAPSULE ORAL at 09:02

## 2020-02-01 RX ADMIN — GLIPIZIDE 10 MG: 5 TABLET ORAL at 09:27

## 2020-02-01 RX ADMIN — CEFAZOLIN SODIUM 2 G: 2 SOLUTION INTRAVENOUS at 01:05

## 2020-02-01 RX ADMIN — INSULIN LISPRO 2 UNITS: 100 INJECTION, SOLUTION INTRAVENOUS; SUBCUTANEOUS at 11:50

## 2020-02-01 RX ADMIN — ASPIRIN 81 MG 81 MG: 81 TABLET ORAL at 09:00

## 2020-02-01 RX ADMIN — LISINOPRIL 40 MG: 20 TABLET ORAL at 09:00

## 2020-02-01 RX ADMIN — SODIUM CHLORIDE, PRESERVATIVE FREE 10 ML: 5 INJECTION INTRAVENOUS at 09:30

## 2020-02-01 ASSESSMENT — PAIN SCALES - GENERAL
PAINLEVEL_OUTOF10: 0

## 2020-02-01 NOTE — PROGRESS NOTES
Vascular Surgery Progress Note    Pt is being seen in f/u today regarding R LE lifestyle limiting claudication  Subjective  Pt s/e. No acute issues overnight. Denies sob or cp. Pain in groin well controlled.     Current Medications:    dextrose      sodium chloride 125 mL/hr at 01/31/20 1806      glucose, dextrose, glucagon (rDNA), dextrose, sodium chloride flush, acetaminophen, oxyCODONE-acetaminophen **OR** oxyCODONE-acetaminophen, HYDROmorphone **OR** HYDROmorphone, ondansetron    ceFAZolin  2 g Intravenous Once    metFORMIN  500 mg Oral BID WC    pantoprazole  20 mg Oral Daily    aspirin  81 mg Oral Daily    allopurinol  300 mg Oral Daily    gabapentin  100 mg Oral TID    glipiZIDE  10 mg Oral QAM AC    lisinopril  40 mg Oral Daily    cilostazol  100 mg Oral BID    citalopram  40 mg Oral Daily    atorvastatin  40 mg Oral Daily    clopidogrel  75 mg Oral Daily    insulin lispro  0-12 Units Subcutaneous TID WC    insulin lispro  0-6 Units Subcutaneous Nightly    sodium chloride flush  10 mL Intravenous 2 times per day    enoxaparin  40 mg Subcutaneous Daily      PHYSICAL EXAM:    /64   Pulse 72   Temp 98.2 °F (36.8 °C) (Temporal)   Resp 14   Ht 5' 10\" (1.778 m)   Wt 223 lb 12.8 oz (101.5 kg)   SpO2 98%   BMI 32.11 kg/m²     Intake/Output Summary (Last 24 hours) at 2/1/2020 0847  Last data filed at 2/1/2020 0700  Gross per 24 hour   Intake 4653.75 ml   Output 3010 ml   Net 1643.75 ml        Gen awake and alert  CVS S1S2  Resp good rep excursion  Abd soft nt nd  R LE No hematoma, dresing in place, minimal strikethrough   DP and PT monophasic  L LE DP and PT biphasic  LABS:    Lab Results   Component Value Date    WBC 9.4 02/01/2020    HGB 9.7 (L) 02/01/2020    HCT 29.5 (L) 02/01/2020     02/01/2020    PROTIME 12.4 01/31/2020    INR 1.1 01/31/2020    APTT 24.8 08/16/2019    K 4.2 02/01/2020    BUN 12 02/01/2020    CREATININE 0.7 02/01/2020     A/P s/p R iliofemoral endarterectomy, profundoplasty 1/31/20 for R LE lifestyle limiting claudication  · Vasc Exam stable  · Neuro pain controlled  · CVS stable  · Resp smi  · FEN/Nut start diet  ·  adequate uo  · Nuñez remove  · Endo iss  · Heme acute postop blood loss anemia - recheck hgb later today  · DVT Risk lovenox  · PUD Risk diet  · Discharge Planning increase activity  · If ambulating, cristina po, and hgb stable will plan on dc     Negrito Abraham [facility]

## 2020-02-01 NOTE — CARE COORDINATION
SW received call that patient to discharge home today with spouse. Orders noted for Barbi Tafoya. Per patient history of HHC through [facility]. He is adament about not wanting homecare on discharge, states multiple times that he does not need it. Explained need for wound check by nurse at home but he still refuses. Referral had been made to Papito Dent at [facility] as well and she also tried to speak with patient but still refusing. No homecare will be arranged at this time.

## 2020-02-01 NOTE — PROGRESS NOTES
Patient continues to have apneic episodes while sleeping. Refusing to wear bipap. States he is supposed to wear one at home but is noncompliant.

## 2020-02-03 LAB
POC ACT LR: 144 SECONDS
POC ACT LR: 146 SECONDS
POC ACT LR: 233 SECONDS
POC ACT LR: 274 SECONDS
POC ACT LR: 304 SECONDS
POC ACT LR: 317 SECONDS

## 2020-02-10 RX ORDER — CILOSTAZOL 100 MG/1
TABLET ORAL
Qty: 180 TABLET | Refills: 3 | Status: ON HOLD | OUTPATIENT
Start: 2020-02-10 | End: 2021-08-04 | Stop reason: HOSPADM

## 2020-02-10 RX ORDER — CLOPIDOGREL BISULFATE 75 MG/1
TABLET ORAL
Qty: 30 TABLET | Refills: 3 | Status: SHIPPED
Start: 2020-02-10 | End: 2020-08-31

## 2020-02-17 ENCOUNTER — OFFICE VISIT (OUTPATIENT)
Dept: VASCULAR SURGERY | Age: 50
End: 2020-02-17

## 2020-02-17 VITALS
RESPIRATION RATE: 16 BRPM | HEART RATE: 76 BPM | HEIGHT: 70 IN | SYSTOLIC BLOOD PRESSURE: 122 MMHG | BODY MASS INDEX: 30.78 KG/M2 | DIASTOLIC BLOOD PRESSURE: 78 MMHG | WEIGHT: 215 LBS

## 2020-02-17 PROCEDURE — 99024 POSTOP FOLLOW-UP VISIT: CPT | Performed by: SURGERY

## 2020-02-17 NOTE — PROGRESS NOTES
2/17/2020    Boston Khan  1970    Previous Procedures  10/4/16 R EIA plasty  R CFA, SFA, Popliteal atherectomy/plasty  R SFA, Popliteal plasty with DCB   6/19/18 R CFA, SFA, popliteal therectomy  R CFA plasty 6x150  R SFA, popliteal plasty 6x150 DCB   7/3/18 L CFA plasty with 7x60 DCB, 8x40 evercross  L profunda plasty 5x60 evercross   7/23/19 Left angiogram, Left Common femoral and profunda plasty with 6x80 evercross, 9x80 evercross    8/16/19 Left distal external iliac and femoral endarterectomy  Left Deep femoral endarterectomy with profundoplasty   Left fem ak pop bypass with 8 mm ringed propatent PTFE graft  Angiogram with plasty of bk pop with 5x150 DCB   1/31/20 Right distal external iliac and  femoral endarterectomy with repair with bovine pericardial patch   Deep femoral endarterectomy with profundoplasty      Patient returns for post operative evaluation following R common femoral endarterectomy for R LE lifestyle limiting claudication. The patient's claudication symptoms are improved, he is able to walk about 100 yards prior to having issues. His left leg he continues to have no issues.         Physical Exam:    Gen Awake and Alert  CVS RRR   Left LE   Groin incision healed  Right LE Groin inicision inferior aspect slight dehiscence, no cellulites    DP monophasic and PT weakly biphasic    A/P R LE Lifestyle limiting claudication  S/p R Common femoral endarterectomy, profundoplasty  · Claudications sxs improved but still present  · Continue medical management with asa, pletal, plavix and statin  · Walking program  · Fu in 2 weeks  · Local wound care, dressing changes  · Call with any worsening of wound, increased pain, fevers, chills, drainage  · Avoid getting it wet    S/P L femoral endarterectomy, fem pop bypass for lifestyle limiting claudication  · No sx of claudication  · Continue Medical management with ASA, pletal, plavix and statin  · pt is a diabetic - emphasized importance of well

## 2020-03-02 ENCOUNTER — OFFICE VISIT (OUTPATIENT)
Dept: VASCULAR SURGERY | Age: 50
End: 2020-03-02

## 2020-03-02 VITALS
BODY MASS INDEX: 30.78 KG/M2 | HEART RATE: 76 BPM | SYSTOLIC BLOOD PRESSURE: 110 MMHG | DIASTOLIC BLOOD PRESSURE: 70 MMHG | HEIGHT: 70 IN | RESPIRATION RATE: 16 BRPM | WEIGHT: 215 LBS

## 2020-03-02 PROCEDURE — 99024 POSTOP FOLLOW-UP VISIT: CPT | Performed by: PHYSICIAN ASSISTANT

## 2020-03-02 NOTE — PROGRESS NOTES
3/2/2020    Raj Riley  1970    Previous Procedures  10/4/16 R EIA plasty  R CFA, SFA, Popliteal atherectomy/plasty  R SFA, Popliteal plasty with DCB   6/19/18 R CFA, SFA, popliteal therectomy  R CFA plasty 6x150  R SFA, popliteal plasty 6x150 DCB   7/3/18 L CFA plasty with 7x60 DCB, 8x40 evercross  L profunda plasty 5x60 evercross   7/23/19 Left angiogram, Left Common femoral and profunda plasty with 6x80 evercross, 9x80 evercross    8/16/19 Left distal external iliac and femoral endarterectomy  Left Deep femoral endarterectomy with profundoplasty   Left fem ak pop bypass with 8 mm ringed propatent PTFE graft  Angiogram with plasty of bk pop with 5x150 DCB   1/31/20 Right distal external iliac and  femoral endarterectomy with repair with bovine pericardial patch   Deep femoral endarterectomy with profundoplasty      Patient returns for post operative evaluation following R common femoral endarterectomy for R LE lifestyle limiting claudication. The patient's claudication symptoms are improved, he is able to walk about 100 yards or more at this point. His left leg he continues to have no issues.         Physical Exam:    Gen Awake and Alert  CVS RRR   Left LE   Groin incision healed   1+ DP, PT strong biphasic  Right LE Groin inicision inferior aspect slight dehiscence improved but still present,   no cellulitis    DP monophasic and PT weakly biphasic    A/P R LE Lifestyle limiting claudication  S/p R Common femoral endarterectomy, profundoplasty  · Claudications sxs improved but still present  · Continue medical management with asa, pletal, plavix and statin  · Walking program  · Fu in 2 weeks for wound check and ABIs  · Local wound care, dressing changes  · Call with any worsening of wound, increased pain, fevers, chills, drainage  · Avoid getting it wet    S/P L femoral endarterectomy, fem pop bypass for lifestyle limiting claudication  · No sx of claudication  · Continue Medical management with ASA,

## 2020-03-16 ENCOUNTER — OFFICE VISIT (OUTPATIENT)
Dept: VASCULAR SURGERY | Age: 50
End: 2020-03-16

## 2020-03-16 VITALS
HEIGHT: 70 IN | RESPIRATION RATE: 16 BRPM | WEIGHT: 215 LBS | BODY MASS INDEX: 30.78 KG/M2 | DIASTOLIC BLOOD PRESSURE: 70 MMHG | HEART RATE: 76 BPM | SYSTOLIC BLOOD PRESSURE: 120 MMHG

## 2020-03-16 PROCEDURE — 99024 POSTOP FOLLOW-UP VISIT: CPT | Performed by: PHYSICIAN ASSISTANT

## 2020-04-06 ENCOUNTER — TELEPHONE (OUTPATIENT)
Dept: VASCULAR SURGERY | Age: 50
End: 2020-04-06

## 2020-05-18 ENCOUNTER — HOSPITAL ENCOUNTER (OUTPATIENT)
Dept: INTERVENTIONAL RADIOLOGY/VASCULAR | Age: 50
Discharge: HOME | End: 2020-05-20
Payer: COMMERCIAL

## 2020-05-18 PROCEDURE — 93923 UPR/LXTR ART STDY 3+ LVLS: CPT

## 2020-05-26 NOTE — PROGRESS NOTES
Previous Procedures  10/4/16 R EIA plasty  R CFA, SFA, Popliteal atherectomy/plasty  R SFA, Popliteal plasty with DCB   6/19/18 R CFA, SFA, popliteal therectomy  R CFA plasty 6x150  R SFA, popliteal plasty 6x150 DCB   7/3/18 L CFA plasty with 7x60 DCB, 8x40 evercross  L profunda plasty 5x60 evercross   7/23/19 Left angiogram, Left Common femoral and profunda plasty with 6x80 evercross, 9x80 evercross    8/16/19 Left distal external iliac and femoral endarterectomy  Left Deep femoral endarterectomy with profundoplasty   Left fem ak pop bypass with 8 mm ringed propatent PTFE graft  Angiogram with plasty of bk pop with 5x150 [facility]   1/31/20 Right distal external iliac and  femoral endarterectomy with repair with bovine pericardial patch   Deep femoral endarterectomy with profundoplasty       ABIs and PVRs   Right KWAME 0.50  Left KWAME 0.86, TBI 0.71    Previous study 10/11/2019  Right KWAME 0.72, TBI 0.37  Left KWAME 0.97, TBI 0.75    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   He can't walk to end of driveway    With decrease in kwame will plan on R LE angiogram  Please schedule    Negrito Carbajal

## 2020-06-05 ENCOUNTER — TELEPHONE (OUTPATIENT)
Dept: VASCULAR SURGERY | Age: 50
End: 2020-06-05

## 2020-06-08 ENCOUNTER — TELEPHONE (OUTPATIENT)
Dept: VASCULAR SURGERY | Age: 50
End: 2020-06-08

## 2020-06-11 ENCOUNTER — HOSPITAL ENCOUNTER (OUTPATIENT)
Age: 50
Discharge: HOME | End: 2020-06-13
Payer: COMMERCIAL

## 2020-06-11 PROCEDURE — U0003 INFECTIOUS AGENT DETECTION BY NUCLEIC ACID (DNA OR RNA); SEVERE ACUTE RESPIRATORY SYNDROME CORONAVIRUS 2 (SARS-COV-2) (CORONAVIRUS DISEASE [COVID-19]), AMPLIFIED PROBE TECHNIQUE, MAKING USE OF HIGH THROUGHPUT TECHNOLOGIES AS DESCRIBED BY CMS-2020-01-R: HCPCS

## 2020-06-13 LAB
SARS-COV-2: NOT DETECTED
SOURCE: NORMAL

## 2020-06-15 PROBLEM — I73.9 PVD (PERIPHERAL VASCULAR DISEASE) (HCC): Status: ACTIVE | Noted: 2020-06-15

## 2020-06-16 ENCOUNTER — HOSPITAL ENCOUNTER (OUTPATIENT)
Dept: CARDIAC CATH/INVASIVE PROCEDURES | Age: 50
Discharge: HOME | End: 2020-06-16
Payer: COMMERCIAL

## 2020-06-16 VITALS
HEIGHT: 70 IN | OXYGEN SATURATION: 96 % | HEART RATE: 108 BPM | SYSTOLIC BLOOD PRESSURE: 197 MMHG | TEMPERATURE: 97.8 F | WEIGHT: 215 LBS | BODY MASS INDEX: 30.78 KG/M2 | DIASTOLIC BLOOD PRESSURE: 114 MMHG

## 2020-06-16 LAB
ABO/RH: NORMAL
ANION GAP SERPL CALCULATED.3IONS-SCNC: 14 MMOL/L (ref 7–16)
ANTIBODY SCREEN: NORMAL
BUN BLDV-MCNC: 12 MG/DL (ref 6–20)
CALCIUM SERPL-MCNC: 8.9 MG/DL (ref 8.6–10.2)
CHLORIDE BLD-SCNC: 101 MMOL/L (ref 98–107)
CO2: 22 MMOL/L (ref 22–29)
CREAT SERPL-MCNC: 0.7 MG/DL (ref 0.7–1.2)
GFR AFRICAN AMERICAN: >60
GFR NON-AFRICAN AMERICAN: >60 ML/MIN/1.73
GLUCOSE BLD-MCNC: 95 MG/DL (ref 74–99)
HCT VFR BLD CALC: 39.4 % (ref 37–54)
HEMOGLOBIN: 13.3 G/DL (ref 12.5–16.5)
INR BLD: 1
MCH RBC QN AUTO: 31.7 PG (ref 26–35)
MCHC RBC AUTO-ENTMCNC: 33.8 % (ref 32–34.5)
MCV RBC AUTO: 93.8 FL (ref 80–99.9)
PDW BLD-RTO: 13.7 FL (ref 11.5–15)
PLATELET # BLD: 303 E9/L (ref 130–450)
PMV BLD AUTO: 9 FL (ref 7–12)
POTASSIUM SERPL-SCNC: 3.8 MMOL/L (ref 3.5–5)
PROTHROMBIN TIME: 11.7 SEC (ref 9.3–12.4)
RBC # BLD: 4.2 E12/L (ref 3.8–5.8)
REASON FOR REJECTION: NORMAL
REJECTED TEST: NORMAL
SODIUM BLD-SCNC: 137 MMOL/L (ref 132–146)
WBC # BLD: 8.9 E9/L (ref 4.5–11.5)

## 2020-06-16 PROCEDURE — C1714 CATH, TRANS ATHERECTOMY, DIR: HCPCS

## 2020-06-16 PROCEDURE — C1887 CATHETER, GUIDING: HCPCS

## 2020-06-16 PROCEDURE — 86901 BLOOD TYPING SEROLOGIC RH(D): CPT

## 2020-06-16 PROCEDURE — C1760 CLOSURE DEV, VASC: HCPCS

## 2020-06-16 PROCEDURE — 85610 PROTHROMBIN TIME: CPT

## 2020-06-16 PROCEDURE — 37225 HC FEM POP TERRITORY ATHERECTOMY: CPT | Performed by: SURGERY

## 2020-06-16 PROCEDURE — 37225 PR REVSC OPN/PRQ FEM/POP W/ATHRC/ANGIOP SM VSL: CPT | Performed by: SURGERY

## 2020-06-16 PROCEDURE — 2709999900 HC NON-CHARGEABLE SUPPLY

## 2020-06-16 PROCEDURE — C1725 CATH, TRANSLUMIN NON-LASER: HCPCS

## 2020-06-16 PROCEDURE — 86850 RBC ANTIBODY SCREEN: CPT

## 2020-06-16 PROCEDURE — 75710 ARTERY X-RAYS ARM/LEG: CPT | Performed by: SURGERY

## 2020-06-16 PROCEDURE — 76937 US GUIDE VASCULAR ACCESS: CPT | Performed by: SURGERY

## 2020-06-16 PROCEDURE — C2623 CATH, TRANSLUMIN, DRUG-COAT: HCPCS

## 2020-06-16 PROCEDURE — 80048 BASIC METABOLIC PNL TOTAL CA: CPT

## 2020-06-16 PROCEDURE — 6360000002 HC RX W HCPCS

## 2020-06-16 PROCEDURE — 2500000003 HC RX 250 WO HCPCS

## 2020-06-16 PROCEDURE — 85027 COMPLETE CBC AUTOMATED: CPT

## 2020-06-16 PROCEDURE — 75774 ARTERY X-RAY EACH VESSEL: CPT | Performed by: SURGERY

## 2020-06-16 PROCEDURE — 2580000003 HC RX 258: Performed by: SURGERY

## 2020-06-16 PROCEDURE — 86900 BLOOD TYPING SEROLOGIC ABO: CPT

## 2020-06-16 PROCEDURE — 36415 COLL VENOUS BLD VENIPUNCTURE: CPT

## 2020-06-16 PROCEDURE — C1884 EMBOLIZATION PROTECT SYST: HCPCS

## 2020-06-16 PROCEDURE — C1769 GUIDE WIRE: HCPCS

## 2020-06-16 PROCEDURE — C1894 INTRO/SHEATH, NON-LASER: HCPCS

## 2020-06-16 RX ORDER — SODIUM CHLORIDE 9 MG/ML
INJECTION, SOLUTION INTRAVENOUS CONTINUOUS
Status: DISCONTINUED | OUTPATIENT
Start: 2020-06-16 | End: 2020-06-17 | Stop reason: HOSPADM

## 2020-06-16 RX ORDER — SODIUM CHLORIDE 0.9 % (FLUSH) 0.9 %
10 SYRINGE (ML) INJECTION EVERY 12 HOURS SCHEDULED
Status: DISCONTINUED | OUTPATIENT
Start: 2020-06-16 | End: 2020-06-17 | Stop reason: HOSPADM

## 2020-06-16 RX ORDER — SODIUM CHLORIDE 0.9 % (FLUSH) 0.9 %
10 SYRINGE (ML) INJECTION PRN
Status: DISCONTINUED | OUTPATIENT
Start: 2020-06-16 | End: 2020-06-17 | Stop reason: HOSPADM

## 2020-06-16 RX ORDER — CEFAZOLIN SODIUM 2 G/50ML
2 SOLUTION INTRAVENOUS
Status: ACTIVE | OUTPATIENT
Start: 2020-06-16 | End: 2020-06-16

## 2020-06-16 RX ADMIN — SODIUM CHLORIDE: 9 INJECTION, SOLUTION INTRAVENOUS at 06:34

## 2020-06-16 NOTE — H&P
MG tablet, Take 1 tablet by mouth daily, Disp: 90 tablet, Rfl: 2    citalopram (CELEXA) 40 MG tablet, Take 1 tablet by mouth daily, Disp: 90 tablet, Rfl: 0    glipiZIDE (GLUCOTROL XL) 10 MG extended release tablet, Take 1 tablet by mouth daily, Disp: 90 tablet, Rfl: 0    lisinopril (PRINIVIL;ZESTRIL) 40 MG tablet, Take 1 tablet by mouth daily, Disp: 90 tablet, Rfl: 1    clopidogrel (PLAVIX) 75 MG tablet, TAKE 1 TABLET BY MOUTH  DAILY, Disp: 30 tablet, Rfl: 3    cilostazol (PLETAL) 100 MG tablet, TAKE 1 TABLET BY MOUTH TWO  TIMES DAILY, Disp: 180 tablet, Rfl: 3    gabapentin (NEURONTIN) 100 MG capsule, Take 100 mg by mouth 3 times daily. , Disp: , Rfl:     allopurinol (ZYLOPRIM) 300 MG tablet, Take 300 mg by mouth daily, Disp: , Rfl:     metFORMIN (GLUCOPHAGE) 500 MG tablet, Take 500 mg by mouth 2 times daily (with meals) , Disp: , Rfl:     omeprazole (PRILOSEC) 20 MG delayed release capsule, Take 20 mg by mouth Daily , Disp: , Rfl:     aspirin (ASPIRIN CHILDRENS) 81 MG chewable tablet, Take 1 tablet by mouth daily, Disp: 30 tablet, Rfl: 3    Current Facility-Administered Medications:     0.9 % sodium chloride infusion, , Intravenous, Continuous, Martin Pond MD, Last Rate: 75 mL/hr at 06/16/20 0634    sodium chloride flush 0.9 % injection 10 mL, 10 mL, Intravenous, 2 times per day, Martin Pond MD    sodium chloride flush 0.9 % injection 10 mL, 10 mL, Intravenous, PRN, Martin Pond MD    ceFAZolin (ANCEF) 2 g in dextrose 3 % 50 mL IVPB (duplex), 2 g, Intravenous, On Call to Elizabeth Mujica MD  Allergies:  Patient has no known allergies.   Social History     Socioeconomic History    Marital status:      Spouse name: Not on file    Number of children: Not on file    Years of education: Not on file    Highest education level: Not on file   Occupational History    Not on file   Social Needs    Financial resource strain: Not on file   [facility]

## 2020-07-06 ENCOUNTER — OFFICE VISIT (OUTPATIENT)
Dept: VASCULAR SURGERY | Age: 50
End: 2020-07-06
Payer: COMMERCIAL

## 2020-07-06 VITALS — HEIGHT: 70 IN | WEIGHT: 215 LBS | RESPIRATION RATE: 16 BRPM | BODY MASS INDEX: 30.78 KG/M2

## 2020-07-06 PROCEDURE — 99213 OFFICE O/P EST LOW 20 MIN: CPT | Performed by: PHYSICIAN ASSISTANT

## 2020-07-06 NOTE — PROGRESS NOTES
Vascular Surgery Outpatient Followup    PCP : Alyse Vega DO    Previous lower extremity procedures  10/4/16 R EIA plasty  R CFA, SFA, Popliteal atherectomy/plasty  R SFA, Popliteal plasty with DCB   6/19/18 R CFA, SFA, popliteal therectomy  R CFA plasty 6x150  R SFA, popliteal plasty 6x150 DCB   7/3/18 L CFA plasty with 7x60 DCB, 8x40 evercross  L profunda plasty 5x60 evercross   7/23/19 Left angiogram, Left Common femoral and profunda plasty with 6x80 evercross, 9x80 evercross    8/16/19 Left distal external iliac and femoral endarterectomy  Left Deep femoral endarterectomy with profundoplasty   Left fem ak pop bypass with 8 mm ringed propatent PTFE graft  Angiogram with plasty of bk pop with 5x150 [facility]   1/31/20 Right distal external iliac and femoral endarterectomy with repair with bovine pericardial patch   Deep femoral endarterectomy with profundoplasty    6/16/20 R profunda plasty 5x60  R sfa, pop atherectomy, 6x250 DCB     HISTORY OF PRESENT ILLNESS:    The patient is a 52 y.o. male who is here in regards to follow up of their PVD. Pt most recently had R profunda plasty, R SFA, pop atherectomy w/ DCB on 6/16/20 for lifestyle limiting claudication. Pt states he has no claudication symptoms at this time. He is able to walk long distances without any pain. He had been only able to walk to the end of the driveway prior to his most recent angioplasty. He has no rest pain or ulceration.     Past Medical History:        Diagnosis Date    Anxiety     Chronic obstructive pulmonary disease ([address] 7/30/2019    Diabetes [facility]     GERD (gastroesophageal reflux disease)     Gout     Hyperlipidemia     Hypertension     Leg pain     Postoperative anemia due to acute blood loss 8/18/2019    PVD (peripheral vascular disease) ([address] 1/15/2018    S/P femoral-popliteal bypass surgery 9/9/2019    Type 2 diabetes mellitus with diabetic peripheral angiopathy without gangrene, without long-term current use of insulin ([address] 1/15/2018     Past Surgical History:        Procedure Laterality Date    FEMORAL BYPASS Left 8/16/2019    FEMORAL POPLITEAL BYPASS WITH FEMORAL ENDARTERECTOMY LEFT LEG performed by Luli [facility]edo [facility] at [facility] Right 1/31/2020    [facility]    Dr Jarrod Chery - athrectomy/pci right fem/pop    OTHER SURGICAL HISTORY  06/19/2018    Dr Jarrod Chery - PCI w/ athrectomy RLE Common Illiac to Popliteal    VASCULAR SURGERY  07/2019    ANGIOGRAM    WISDOM TOOTH EXTRACTION       Current Medications:   Current Outpatient Medications   Medication Sig Dispense Refill    atorvastatin (LIPITOR) 40 MG tablet Take 1 tablet by mouth daily 90 tablet 2    citalopram (CELEXA) 40 MG tablet Take 1 tablet by mouth daily 90 tablet 0    glipiZIDE (GLUCOTROL XL) 10 MG extended release tablet Take 1 tablet by mouth daily 90 tablet 0    lisinopril (PRINIVIL;ZESTRIL) 40 MG tablet Take 1 tablet by mouth daily 90 tablet 1    clopidogrel (PLAVIX) 75 MG tablet TAKE 1 TABLET BY MOUTH  DAILY 30 tablet 3    cilostazol (PLETAL) 100 MG tablet TAKE 1 TABLET BY MOUTH TWO  TIMES DAILY 180 tablet 3    gabapentin (NEURONTIN) 100 MG capsule Take 100 mg by mouth 3 times daily.  allopurinol (ZYLOPRIM) 300 MG tablet Take 300 mg by mouth daily      metFORMIN (GLUCOPHAGE) 500 MG tablet Take 500 mg by mouth 2 times daily (with meals)       omeprazole (PRILOSEC) 20 MG delayed release capsule Take 20 mg by mouth Daily       aspirin (ASPIRIN CHILDRENS) 81 MG chewable tablet Take 1 tablet by mouth daily 30 tablet 3     No current facility-administered medications for this visit. Allergies:  Patient has no known allergies.   Social History     Socioeconomic History    Marital status:      Spouse name: Not on file    Number of children: Not on file    Years of education: Not on file    Highest education level: Not on file   Occupational History    Not on file   Social Needs    Financial resource strain: Not on file    Food insecurity     Worry: Not on file     Inability: Not on file    Transportation needs     Medical: Not on file     Non-medical: Not on file   Tobacco Use    Smoking status: Former Smoker     Packs/day: 1.50     Years: 28.00     Pack years: 42.00     Types: Cigarettes     Start date: 1988     Last attempt to quit: 10/15/2016     Years since quitting: 3.7    Smokeless tobacco: Former User     Types: Snuff     Quit date: 1/1/2004   Substance and Sexual Activity    Alcohol use:  Yes     Alcohol/week: 12.0 standard drinks     Types: 12 Cans of beer per week     Frequency: 2-4 times a month     Drinks per session: 1 or 2     Comment: HAS NOT DRANK SINCE 12/13/19    Drug use: Never    Sexual activity: Not on file   Lifestyle    Physical activity     Days per week: Not on file     Minutes per session: Not on file    Stress: Not on file   Relationships    Social connections     Talks on phone: Not on file     Gets together: Not on file     Attends [employer] service: Not on file     Active member of club or organization: Not on file     Attends meetings of clubs or organizations: Not on file     Relationship status: Not on file    Intimate partner violence     Fear of current or ex partner: Not on file     Emotionally abused: Not on file     Physically abused: Not on file     Forced sexual activity: Not on file   Other Topics Concern    Not on file   Social History Narrative    Occ co[employer]; 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     Family History   Problem Relation Age of Onset    Asthma Mother     Other Father         vascular problems    Cancer Father         prostate, lung    Diabetes Father      Labs  Lab Results   Component Value Date    WBC 8.9 06/16/2020    HGB 13.3 06/16/2020    HCT 39.4 06/16/2020     06/16/2020    PROTIME 11.7 06/16/2020    INR 1.0 06/16/2020    APTT 24.8 08/16/2019    K 3.8 06/16/2020    BUN 12 06/16/2020    CREATININE 0.7 06/16/2020     PHYSICAL EXAM:    Resp 16   Ht 5' 10\" (1.778 m)   Wt 215 lb (97.5 kg)   BMI 30.85 kg/m²   CONSTITUTIONAL:   Awake, alert, cooperative  PSYCHIATRIC :  Oriented to time, place and person     Appropriate insight to disease process  EYES: Lids and lashes normal  ENT:  External ears and nose without lesions   Hearing deficits absent  NECK: Supple, symmetrical, trachea midline   Carotid bruit absent  LUNGS:  No increased work of breathing                 Clear to auscultation  CARDIOVASCULAR:  regular rate and rhythm   ABDOMEN:  soft, non-distended, non-tender  SKIN:   Normal skin color   Texture and turgor normal, no induration  EXTREMITIES:   R LE Edema absent, no open wounds   Groin incision healed  L LE Edema absent, no open wounds   No hematoma, ecchymosis or TTP to groin puncture site  R femoral 2 L femoral 2   R posterior tibial 2 L posterior tibial 2   R dorsalis pedis 2 L dorsalis pedis 2     RADIOLOGY:    A/P PVD with hx of lifestyle limiting claudication  S/P R common femoral endarterectomy, profundoplasty  S/P L femoral endarterectomy, fem pop bypass  · pt has no current claudication symptoms  · Continue medical management with asa, plavix, pletal and statin  · If vascular testing is improved and patient is still asymptomatic at the next visit, can discontinue the pletal  · Emphasized importance of continued Tobacco cessation  · Last smoked in 2016  · they understood that with tobacco use they are at increased risk of worsening of their pvd and increased risk of limb loss  · No indication for surgical intervention at this time  · Discussed with patient pathophysiology of pvd, claudication, tissues loss, rest pain, and potential for limb loss  · Discussed with patient symptoms of new onset claudication, tissue loss, rest pain, changes in lower extremity coolness, pain and they understood to go to ER immediately if any symptoms developed  · F/U as outpatient in 6 weeks with repeat vascular studies and follow up    Pt seen and plan reviewed with Dr. Ayden Delgado.      Ronold Pallas, PA-C

## 2020-07-06 NOTE — PROGRESS NOTES
Vascular Surgery Outpatient Followup    PCP : Alyse Vega DO    Previous Vascular Procedure  10/4/16 R EIA plasty  R CFA, SFA, Popliteal atherectomy/plasty  R SFA, Popliteal plasty with DCB   6/19/18 R CFA, SFA, popliteal therectomy  R CFA plasty 6x150  R SFA, popliteal plasty 6x150 DCB   7/3/18 L CFA plasty with 7x60 DCB, 8x40 evercross  L profunda plasty 5x60 evercross   7/23/19 Left angiogram, Left Common femoral and profunda plasty with 6x80 evercross, 9x80 evercross    8/16/19 Left distal external iliac and femoral endarterectomy  Left Deep femoral endarterectomy with profundoplasty   Left fem ak pop bypass with 8 mm ringed propatent PTFE graft  Angiogram with plasty of bk pop with 5x150 [facility]   1/31/20 Right distal external iliac and  femoral endarterectomy with repair with bovine pericardial patch   Deep femoral endarterectomy with profundoplasty    6/16/20 R profunda plasty 5x60  R sfa, pop atherectomy, 6x250 DCB      HISTORY OF PRESENT ILLNESS:    This is a 52 y.o. male who presents in fu regarding pvd. He previously redeveloped R LE lifestyle limiting claudication. After his procedures his sxs have completely resolved.       Past Medical History:        Diagnosis Date    Anxiety     Chronic obstructive pulmonary disease ([address] 7/30/2019    Diabetes ([address]     GERD (gastroesophageal reflux disease)     Gout     Hyperlipidemia     Hypertension     Leg pain     Postoperative anemia due to acute blood loss 8/18/2019    PVD (peripheral vascular disease) ([address] 1/15/2018    S/P femoral-popliteal bypass surgery 9/9/2019    Type 2 diabetes mellitus with diabetic peripheral angiopathy without gangrene, without long-term current use of insulin ([address] 1/15/2018     Past Surgical History:        Procedure Laterality Date    FEMORAL BYPASS Left 8/16/2019    FEMORAL POPLITEAL BYPASS WITH FEMORAL ENDARTERECTOMY LEFT LEG performed by Klarissa Gray MD at [place] [id number] Right 1/31/2020    RIGHT FEMORAL ENDARTERECTOMY performed by Davina Franco MD at [facility] [address] HISTORY  10/04/2016    Dr Patti Cardoza - athrectomy/pci right fem/pop    OTHER SURGICAL HISTORY  06/19/2018    Dr Patti Cardoza - PCI w/ athrectomy RLE Common Illiac to Popliteal    VASCULAR SURGERY  07/2019    ANGIOGRAM    WISDOM TOOTH EXTRACTION       Current Medications:   Current Outpatient Medications   Medication Sig Dispense Refill    atorvastatin (LIPITOR) 40 MG tablet Take 1 tablet by mouth daily 90 tablet 2    citalopram (CELEXA) 40 MG tablet Take 1 tablet by mouth daily 90 tablet 0    glipiZIDE (GLUCOTROL XL) 10 MG extended release tablet Take 1 tablet by mouth daily 90 tablet 0    lisinopril (PRINIVIL;ZESTRIL) 40 MG tablet Take 1 tablet by mouth daily 90 tablet 1    clopidogrel (PLAVIX) 75 MG tablet TAKE 1 TABLET BY MOUTH  DAILY 30 tablet 3    cilostazol (PLETAL) 100 MG tablet TAKE 1 TABLET BY MOUTH TWO  TIMES DAILY 180 tablet 3    gabapentin (NEURONTIN) 100 MG capsule Take 100 mg by mouth 3 times daily.  allopurinol (ZYLOPRIM) 300 MG tablet Take 300 mg by mouth daily      metFORMIN (GLUCOPHAGE) 500 MG tablet Take 500 mg by mouth 2 times daily (with meals)       omeprazole (PRILOSEC) 20 MG delayed release capsule Take 20 mg by mouth Daily       aspirin (ASPIRIN CHILDRENS) 81 MG chewable tablet Take 1 tablet by mouth daily 30 tablet 3     No current facility-administered medications for this visit. Allergies:  Patient has no known allergies.   Social History     Socioeconomic History    Marital status:      Spouse name: Not on file    Number of children: Not on file    Years of education: Not on file    Highest education level: Not on file   Occupational History    Not on file   Social Needs    Financial resource strain: Not on file    Food insecurity     Worry: Not on file     Inability: Not on file    Transportation needs     Medical: Not on file Non-medical: Not on file   Tobacco Use    Smoking status: Former Smoker     Packs/day: 1.50     Years: 28.00     Pack years: 42.00     Types: Cigarettes     Start date: 1988     Last attempt to quit: 10/15/2016     Years since quitting: 3.7    Smokeless tobacco: Former User     Types: Snuff     Quit date: 1/1/2004   Substance and Sexual Activity    Alcohol use: Yes     Alcohol/week: 12.0 standard drinks     Types: 12 Cans of beer per week     Frequency: 2-4 times a month     Drinks per session: 1 or 2     Comment: HAS NOT DRANK SINCE 12/13/19    Drug use: Never    Sexual activity: Not on file   Lifestyle    Physical activity     Days per week: Not on file     Minutes per session: Not on file    Stress: Not on file   Relationships    Social connections     Talks on phone: Not on file     Gets together: Not on file     Attends Yazdan[employer] service: Not on file     Active member of club or organization: Not on file     Attends meetings of clubs or organizations: Not on file     Relationship status: Not on file    Intimate partner violence     Fear of current or ex partner: Not on file     Emotionally abused: Not on file     Physically abused: Not on file     Forced sexual activity: Not on file   Other Topics Concern    Not on file   Social History Narrative    Occ [employer]; 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     Family History   Problem Relation Age of Onset    Asthma Mother     Other Father         vascular problems    Cancer Father         prostate, lung    Diabetes Father      Labs  Lab Results   Component Value Date    WBC 8.9 06/16/2020    HGB 13.3 06/16/2020    HCT 39.4 06/16/2020     06/16/2020    PROTIME 11.7 06/16/2020    INR 1.0 06/16/2020    APTT 24.8 08/16/2019    K 3.8 06/16/2020    BUN 12 06/16/2020    CREATININE 0.7 06/16/2020       PHYSICAL EXAM:    There were no vitals taken for this visit.   CONSTITUTIONAL:   Awake, alert, cooperative  PSYCHIATRIC :  Oriented to time, place and person     Appropriate insight to disease process  EYES: Lids and lashes normal  ENT:  External ears and nose without lesions   Hearing deficits ***  NECK: Supple, symmetrical, trachea midline   Carotid bruit ***  LUNGS:  No increased work of breathing                 Clear to auscultation  CARDIOVASCULAR:  regular rate and rhythm   ABDOMEN:  soft, non-distended, non-tender   Aorta is not palpable  SKIN:   Normal skin color   Texture and turgor normal, no induration  EXTREMITIES:   R LE Edema ***  L LE Edema ***  R femoral *** L femoral ***   R posterior tibial *** L posterior tibial ***   R dorsalis pedis *** L dorsalis pedis ***     RADIOLOGY:    A/P R LE Lifestyle limiting claudication  S/p R Common femoral endarterectomy, profundoplasty  · Claudications sxs improved but still present  · Continue medical management with asa, pletal, plavix and statin  · Walking program  · Fu in 2 weeks  · Local wound care, dressing changes  · Call with any worsening of wound, increased pain, fevers, chills, drainage  · Avoid getting it wet    S/P L femoral endarterectomy, fem pop bypass for lifestyle limiting claudication  · No sx of claudication  · Continue Medical management with ASA, pletal, plavix and statin  · pt is a diabetic - emphasized importance of well controlled blood sugars  · Discussed with pt tobacco use and significant relationship to PVD and potential to cause increased problems post intervention   pt currently is not a smoker  · Walking Program  · Emphasized importance of foot care, and to call if develops any wounds or ulcerations, changes in appearance or symptoms  · I reviewed with the patient that normal activities can be resumed as tolerated    [employer]

## 2020-08-24 ENCOUNTER — OFFICE VISIT (OUTPATIENT)
Dept: VASCULAR SURGERY | Age: 50
End: 2020-08-24
Payer: COMMERCIAL

## 2020-08-24 ENCOUNTER — HOSPITAL ENCOUNTER (OUTPATIENT)
Dept: CARDIOLOGY | Age: 50
Discharge: HOME | End: 2020-08-24
Payer: COMMERCIAL

## 2020-08-24 VITALS — HEIGHT: 70 IN | WEIGHT: 210 LBS | RESPIRATION RATE: 16 BRPM | BODY MASS INDEX: 30.06 KG/M2

## 2020-08-24 PROCEDURE — 99213 OFFICE O/P EST LOW 20 MIN: CPT | Performed by: PHYSICIAN ASSISTANT

## 2020-08-24 PROCEDURE — 93923 UPR/LXTR ART STDY 3+ LVLS: CPT

## 2020-08-24 NOTE — PROGRESS NOTES
Vascular Surgery Outpatient Followup    PCP : Alyse Vega DO    Previous lower extremity procedures  10/4/16 R EIA plasty  R CFA, SFA, Popliteal atherectomy/plasty  R SFA, Popliteal plasty with DCB   6/19/18 R CFA, SFA, popliteal therectomy  R CFA plasty 6x150  R SFA, popliteal plasty 6x150 DCB   7/3/18 L CFA plasty with 7x60 DCB, 8x40 evercross  L profunda plasty 5x60 evercross   7/23/19 Left angiogram, Left Common femoral and profunda plasty with 6x80 evercross, 9x80 evercross    8/16/19 Left distal external iliac and femoral endarterectomy  Left Deep femoral endarterectomy with profundoplasty   Left fem ak pop bypass with 8 mm ringed propatent PTFE graft  Angiogram with plasty of bk pop with 5x150 [facility]   1/31/20 Right distal external iliac and femoral endarterectomy with repair with bovine pericardial patch   Deep femoral endarterectomy with profundoplasty    6/16/20 R profunda plasty 5x60  R sfa, pop atherectomy, 6x250 DCB     HISTORY OF PRESENT ILLNESS:    The patient is a 52 y.o. male who is here in regards to follow up of their PVD. Pt most recently had R profunda plasty, R SFA, pop atherectomy w/ DCB on 6/16/20 for lifestyle limiting claudication. He continues to have no claudication symptoms at this time. He is able to walk long distances without any pain. He had been only able to walk to the end of the driveway prior to his most recent angioplasty. He has no rest pain or ulceration. He remains on asa, plavix, pletal, statin.     Past Medical History:        Diagnosis Date    Anxiety     Chronic obstructive pulmonary disease ([address] 7/30/2019    Diabetes ([address]     GERD (gastroesophageal reflux disease)     Gout     Hyperlipidemia     Hypertension     Leg pain     Postoperative anemia due to acute blood loss 8/18/2019    PVD [facility]peripheral vascular disease) [facility] 1/15/2018    S/P femoral-popliteal bypass surgery 9/9/2019    Type 2 diabetes mellitus with diabetic peripheral angiopathy without gangrene, without long-term current use of insulin ([facility] [address] 1/15/2018     Past Surgical History:        Procedure Laterality Date    FEMORAL BYPASS Left 8/16/2019    FEMORAL POPLITEAL BYPASS WITH FEMORAL ENDARTERECTOMY LEFT LEG performed by Julia Quinteros MD at [address] Right 1/31/2020    RIGHT FEMORAL ENDARTERECTOMY performed by Julia Quinteros MD at [address] [facility] [place]  10/04/2016    Dr Kory Torres - athrectomy/pci right fem/pop    OTHER SURGICAL HISTORY  06/19/2018    Dr Kory Torres - PCI w/ athrectomy RLE Common Illiac to Popliteal    VASCULAR SURGERY  07/2019    ANGIOGRAM    WISDOM TOOTH EXTRACTION       Current Medications:   Current Outpatient Medications   Medication Sig Dispense Refill    atorvastatin (LIPITOR) 40 MG tablet Take 1 tablet by mouth daily 90 tablet 2    citalopram (CELEXA) 40 MG tablet Take 1 tablet by mouth daily 90 tablet 0    glipiZIDE (GLUCOTROL XL) 10 MG extended release tablet Take 1 tablet by mouth daily 90 tablet 0    lisinopril (PRINIVIL;ZESTRIL) 40 MG tablet Take 1 tablet by mouth daily 90 tablet 1    clopidogrel (PLAVIX) 75 MG tablet TAKE 1 TABLET BY MOUTH  DAILY 30 tablet 3    cilostazol (PLETAL) 100 MG tablet TAKE 1 TABLET BY MOUTH TWO  TIMES DAILY 180 tablet 3    gabapentin (NEURONTIN) 100 MG capsule Take 100 mg by mouth 3 times daily.  allopurinol (ZYLOPRIM) 300 MG tablet Take 300 mg by mouth daily      metFORMIN (GLUCOPHAGE) 500 MG tablet Take 500 mg by mouth 2 times daily (with meals)       omeprazole (PRILOSEC) 20 MG delayed release capsule Take 20 mg by mouth Daily       aspirin (ASPIRIN CHILDRENS) 81 MG chewable tablet Take 1 tablet by mouth daily 30 tablet 3     No current facility-administered medications for this visit. Allergies:  Patient has no known allergies.   Social History     Socioeconomic History    Marital status:      Spouse name: Not on file    Number of children: Not on INR 1.0 06/16/2020    APTT 24.8 08/16/2019    K 3.8 06/16/2020    BUN 12 06/16/2020    CREATININE 0.7 06/16/2020     PHYSICAL EXAM:    Resp 16   Ht 5' 10\" (1.778 m)   Wt 210 lb (95.3 kg)   BMI 30.13 kg/m²   CONSTITUTIONAL:   Awake, alert, cooperative  PSYCHIATRIC :  Oriented to time, place and person     Appropriate insight to disease process  EYES: Lids and lashes normal  ENT:  External ears and nose without lesions   Hearing deficits absent  NECK: Supple, symmetrical, trachea midline   Carotid bruit absent  LUNGS:  No increased work of breathing                 Clear to auscultation  CARDIOVASCULAR:  regular rate and rhythm   ABDOMEN:  soft, non-distended, non-tender  SKIN:   Normal skin color   Texture and turgor normal, no induration  EXTREMITIES:   R LE Edema absent, no open wounds  L LE Edema absent, no open wounds  R femoral  L femoral    R posterior tibial 1 L posterior tibial 1   R dorsalis pedis 1 L dorsalis pedis 1     RADIOLOGY:  8/24/2020  R: MADI 0.76 TBI 0.55, improved from prior to intervention  L: MADI 0.90 TBI: 0.66    A/P PVD with hx of lifestyle limiting claudication  S/P R common femoral endarterectomy, profundoplasty  S/P L femoral endarterectomy, fem pop bypass  · Pt remains asymptomatic  · Continue medical management with asa, plavix and statin  · D/C pletal  · Plavix and asa will be lifelong  · Emphasized importance of continued Tobacco cessation  · Last smoked in 2016  · they understood that with tobacco use they are at increased risk of worsening of their pvd and increased risk of limb loss  · No indication for surgical intervention at this time  · Discussed with patient pathophysiology of pvd, claudication, tissues loss, rest pain, and potential for limb loss  · Discussed with patient symptoms of new onset claudication, tissue loss, rest pain, changes in lower extremity coolness, pain and they understood to go to ER immediately if any symptoms developed  · F/U in 6 m with lower arterial studies    Pt seen and plan reviewed with Dr. Bozena Tellez.      Rosey Babinski, PA-C

## 2020-08-31 RX ORDER — CLOPIDOGREL BISULFATE 75 MG/1
TABLET ORAL
Qty: 30 TABLET | Refills: 11 | Status: ON HOLD | OUTPATIENT
Start: 2020-08-31 | End: 2021-08-04 | Stop reason: HOSPADM

## 2020-09-14 ENCOUNTER — HOSPITAL ENCOUNTER (OUTPATIENT)
Age: 50
Discharge: HOME | End: 2020-09-16
Payer: COMMERCIAL

## 2020-09-14 LAB
BASOPHILS ABSOLUTE: 0.04 E9/L (ref 0–0.2)
BASOPHILS RELATIVE PERCENT: 0.5 % (ref 0–2)
EOSINOPHILS ABSOLUTE: 0.24 E9/L (ref 0.05–0.5)
EOSINOPHILS RELATIVE PERCENT: 2.9 % (ref 0–6)
HCT VFR BLD CALC: 42.4 % (ref 37–54)
HEMOGLOBIN: 14.2 G/DL (ref 12.5–16.5)
IMMATURE GRANULOCYTES #: 0.03 E9/L
IMMATURE GRANULOCYTES %: 0.4 % (ref 0–5)
LYMPHOCYTES ABSOLUTE: 2.13 E9/L (ref 1.5–4)
LYMPHOCYTES RELATIVE PERCENT: 25.6 % (ref 20–42)
MCH RBC QN AUTO: 32.4 PG (ref 26–35)
MCHC RBC AUTO-ENTMCNC: 33.5 % (ref 32–34.5)
MCV RBC AUTO: 96.8 FL (ref 80–99.9)
MONOCYTES ABSOLUTE: 0.68 E9/L (ref 0.1–0.95)
MONOCYTES RELATIVE PERCENT: 8.2 % (ref 2–12)
NEUTROPHILS ABSOLUTE: 5.19 E9/L (ref 1.8–7.3)
NEUTROPHILS RELATIVE PERCENT: 62.4 % (ref 43–80)
PDW BLD-RTO: 13.1 FL (ref 11.5–15)
PLATELET # BLD: 145 E9/L (ref 130–450)
PMV BLD AUTO: 10.1 FL (ref 7–12)
RBC # BLD: 4.38 E12/L (ref 3.8–5.8)
WBC # BLD: 8.3 E9/L (ref 4.5–11.5)

## 2020-09-14 PROCEDURE — 83036 HEMOGLOBIN GLYCOSYLATED A1C: CPT

## 2020-09-14 PROCEDURE — 80061 LIPID PANEL: CPT

## 2020-09-14 PROCEDURE — 84443 ASSAY THYROID STIM HORMONE: CPT

## 2020-09-14 PROCEDURE — 80053 COMPREHEN METABOLIC PANEL: CPT

## 2020-09-14 PROCEDURE — 85025 COMPLETE CBC W/AUTO DIFF WBC: CPT

## 2020-09-15 LAB
ALBUMIN SERPL-MCNC: 4.4 G/DL (ref 3.5–5.2)
ALP BLD-CCNC: 101 U/L (ref 40–129)
ALT SERPL-CCNC: 30 U/L (ref 0–40)
ANION GAP SERPL CALCULATED.3IONS-SCNC: 18 MMOL/L (ref 7–16)
AST SERPL-CCNC: 34 U/L (ref 0–39)
BILIRUB SERPL-MCNC: 0.4 MG/DL (ref 0–1.2)
BUN BLDV-MCNC: 15 MG/DL (ref 6–20)
CALCIUM SERPL-MCNC: 9.9 MG/DL (ref 8.6–10.2)
CHLORIDE BLD-SCNC: 103 MMOL/L (ref 98–107)
CHOLESTEROL, TOTAL: 171 MG/DL (ref 0–199)
CO2: 21 MMOL/L (ref 22–29)
CREAT SERPL-MCNC: 0.9 MG/DL (ref 0.7–1.2)
GFR AFRICAN AMERICAN: >60
GFR NON-AFRICAN AMERICAN: >60 ML/MIN/1.73
GLUCOSE FASTING: 143 MG/DL (ref 74–99)
HBA1C MFR BLD: 7.6 % (ref 4–5.6)
HDLC SERPL-MCNC: 49 MG/DL
LDL CHOLESTEROL CALCULATED: 91 MG/DL (ref 0–99)
POTASSIUM SERPL-SCNC: 4.1 MMOL/L (ref 3.5–5)
SODIUM BLD-SCNC: 142 MMOL/L (ref 132–146)
TOTAL PROTEIN: 7.7 G/DL (ref 6.4–8.3)
TRIGL SERPL-MCNC: 155 MG/DL (ref 0–149)
TSH SERPL DL<=0.05 MIU/L-ACNC: 3.28 UIU/ML (ref 0.27–4.2)
VLDLC SERPL CALC-MCNC: 31 MG/DL

## 2020-09-21 PROBLEM — F17.219 CIGARETTE NICOTINE DEPENDENCE WITH NICOTINE-INDUCED DISORDER: Status: ACTIVE | Noted: 2020-09-21

## 2020-11-03 PROBLEM — I73.9 PVD (PERIPHERAL VASCULAR DISEASE) (HCC): Status: RESOLVED | Noted: 2020-06-15 | Resolved: 2020-11-03

## 2021-03-01 ENCOUNTER — OFFICE VISIT (OUTPATIENT)
Dept: VASCULAR SURGERY | Age: 51
End: 2021-03-01
Payer: COMMERCIAL

## 2021-03-01 ENCOUNTER — HOSPITAL ENCOUNTER (OUTPATIENT)
Dept: CARDIOLOGY | Age: 51
Discharge: HOME | End: 2021-03-01
Payer: COMMERCIAL

## 2021-03-01 VITALS — WEIGHT: 200 LBS | BODY MASS INDEX: 28.63 KG/M2 | HEIGHT: 70 IN

## 2021-03-01 DIAGNOSIS — I73.9 PVD (PERIPHERAL VASCULAR DISEASE) WITH CLAUDICATION (HCC): Primary | ICD-10-CM

## 2021-03-01 DIAGNOSIS — I73.9 PVD (PERIPHERAL VASCULAR DISEASE) WITH CLAUDICATION (HCC): ICD-10-CM

## 2021-03-01 DIAGNOSIS — Z95.828 S/P FEMORAL-POPLITEAL BYPASS SURGERY: ICD-10-CM

## 2021-03-01 PROCEDURE — 99213 OFFICE O/P EST LOW 20 MIN: CPT | Performed by: SURGERY

## 2021-03-01 PROCEDURE — 93923 UPR/LXTR ART STDY 3+ LVLS: CPT

## 2021-03-01 NOTE — PROGRESS NOTES
vascular disease) ([facility] [address] 1/15/2018    S/P femoral-popliteal bypass surgery 9/9/2019    Type 2 diabetes mellitus with diabetic peripheral angiopathy without gangrene, without long-term current use of insulin ([facility] [address] 1/15/2018     Past Surgical History:        Procedure Laterality Date    FEMORAL BYPASS Left 8/16/2019    FEMORAL POPLITEAL BYPASS WITH FEMORAL ENDARTERECTOMY LEFT LEG [facility] 1/31/2020    RIGHT FEMORAL ENDARTERECTOMY performed by Ori Mendoza MD at [address]  10/04/2016    Dr Ana Mcgaryr - athrectomy/pci right fem/pop    OTHER SURGICAL HISTORY  06/19/2018    Dr Ana cMgarry - PCI w/ athrectomy RLE Common Illiac to Popliteal    VASCULAR SURGERY  07/2019    ANGIOGRAM    WISDOM TOOTH EXTRACTION       Current Medications:   Current Outpatient Medications   Medication Sig Dispense Refill    clopidogrel (PLAVIX) 75 MG tablet TAKE 1 TABLET BY MOUTH  DAILY 30 tablet 11    cilostazol (PLETAL) 100 MG tablet TAKE 1 TABLET BY MOUTH TWO  TIMES DAILY 180 tablet 3    gabapentin (NEURONTIN) 100 MG capsule Take 100 mg by mouth 3 times daily.        allopurinol (ZYLOPRIM) 300 MG tablet Take 300 mg by mouth daily      metFORMIN (GLUCOPHAGE) 500 MG tablet Take 500 mg by mouth 2 times daily (with meals)       omeprazole (PRILOSEC) 20 MG delayed release capsule Take 20 mg by mouth Daily       aspirin (ASPIRIN CHILDRENS) 81 MG chewable tablet Take 1 tablet by mouth daily 30 tablet 3    citalopram (CELEXA) 40 MG tablet Take 1 tablet by mouth daily Must keep appt on 9-21-20 30 tablet 0    atorvastatin (LIPITOR) 40 MG tablet Take 1 tablet by mouth daily 90 tablet 2    glipiZIDE (GLUCOTROL XL) 10 MG extended release tablet Take 1 tablet by mouth daily 90 tablet 0    lisinopril (PRINIVIL;ZESTRIL) 40 MG tablet Take 1 tablet by mouth daily 90 tablet 1     No current facility-administered medications for this visit. Allergies:  Patient has no known allergies.   Social History     Socioeconomic History    Marital status:      Spouse name: Not on file    Number of children: Not on file    Years of education: Not on file    Highest education level: Not on file   Occupational History    Not on file   Social Needs    Financial resource strain: Not on file    Food insecurity     Worry: Not on file     Inability: Not on file    Transportation needs     Medical: Not on file     Non-medical: Not on file   Tobacco Use    Smoking status: Former Smoker     Packs/day: 1.50     Years: 28.00     Pack years: 42.00     Types: Cigarettes     Start date: 80     Quit date: 10/15/2016     Years since quittin.3    Smokeless tobacco: Former User     Types: Snuff     Quit date: 2004   Substance and Sexual Activity    Alcohol use: Not Currently     Alcohol/week: 12.0 standard drinks     Types: 12 Cans of beer per week     Frequency: 2-4 times a month     Drinks per session: 1 or 2     Comment: HAS NOT DRANK SINCE 19    Drug use: Never    Sexual activity: Not on file   Lifestyle    Physical activity     Days per week: Not on file     Minutes per session: Not on file    Stress: Not on file   Relationships    Social connections     Talks on phone: Not on file     Gets together: Not on file     Attends [employer]ism service: Not on file     Active member of club or organization: Not on file     Attends meetings of clubs or organizations: Not on file     Relationship status: Not on file    Intimate partner violence     Fear of current or ex partner: Not on file     Emotionally abused: Not on file     Physically abused: Not on file     Forced sexual activity: Not on file   Other Topics Concern    Not on file   Social History Narrative    Occ [employer]; 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     Family History   Problem Relation Age of Onset    Asthma Mother     Other Father         vascular problems    Cancer Father         prostate, lung    Diabetes Father      Labs  Lab Results   Component Value Date    WBC 8.3 09/14/2020    HGB 14.2 09/14/2020    HCT 42.4 09/14/2020     09/14/2020    PROTIME 11.7 06/16/2020    INR 1.0 06/16/2020    APTT 24.8 08/16/2019    K 4.1 09/14/2020    BUN 15 09/14/2020    CREATININE 0.9 09/14/2020     PHYSICAL EXAM:    Ht 5' 10\" (1.778 m)   Wt 200 lb (90.7 kg)   BMI 28.70 kg/m²   CONSTITUTIONAL:   Awake, alert, cooperative  PSYCHIATRIC :  Oriented to time, place and person     Appropriate insight to disease process  EYES: Lids and lashes normal  ENT:  External ears and nose without lesions   Hearing deficits absent  NECK: Supple, symmetrical, trachea midline   Carotid bruit absent  LUNGS:  No increased work of breathing                 Clear to auscultation  CARDIOVASCULAR:  regular rate and rhythm   ABDOMEN:  soft, non-distended, non-tender  SKIN:   Normal skin color   Texture and turgor normal, no induration  EXTREMITIES:   R LE Edema absent, no open wounds  L LE Edema absent, no open wounds  R femoral 2+ L femoral 2   R posterior tibial monophasic L posterior tibial biphasic   R dorsalis pedis monophasic L dorsalis pedis bipohasic     RADIOLOGY:  8/24/2020  R: MADI 0.76 TBI 0.55, improved from prior to intervention  L: MADI 0.90 TBI: 0.66    3/1/21  R MADI 0.46, TBI 0.11  L MADI  0.77, TBI 0.52      A/P PVD with hx of lifestyle limiting claudication  S/P R common femoral endarterectomy, profundoplasty  S/P L femoral endarterectomy, fem pop bypass  · Pt is now sx - R LE lifestyle limiting claudication  · R LE arterial studies significantly worse  · L LE arterieal studies slightly worse - assx  · Continue medical management with asa, plavix and statin  · Plavix and asa will be lifelong  · Restart pletal 100 mg bid  · Plan for R LE angiogram  I reviewed the procedure with the patient and family as available. I discussed the procedure, risks, benefits, complications, and alternatives of the procedure.   They understand and consent.   All questions were answered  · Emphasized importance of continued Tobacco cessation  · Last smoked in 2016  · they understood that with tobacco use they are at increased risk of worsening of their pvd and increased risk of limb loss  · Discussed with patient pathophysiology of pvd, claudication, tissues loss, rest pain, and potential for limb loss  · Discussed with patient symptoms of new onset claudication, tissue loss, rest pain, changes in lower extremity coolness, pain and they understood to go to ER immediately if any symptoms developed    Enoch Hoff

## 2021-03-01 NOTE — PATIENT INSTRUCTIONS
Patient Education        Learning About Peripheral Arterial Disease of the Legs  What is peripheral arterial disease? Peripheral arterial disease (PAD) is narrowing or blockage of arteries in your arms and legs. The most common cause of PAD is the buildup of plaque on the inside of arteries. Plaque is made of extra cholesterol, calcium, and other material in your blood. Over time, plaque builds up in the walls of the arteries, including those that supply blood to your legs. This buildup leads to poor blood flow. When you have PAD, you have a risk of having plaque in other arteries in your body. This raises your risk of a heart attack and stroke. This information focuses on peripheral arterial disease of the legs, the area where it is most common. When you have PAD of the legs and you walk or exercise, your leg muscles may not get enough blood. This may cause symptoms, such as leg pain during exercise. Peripheral arterial disease is also called peripheral vascular disease. What are the symptoms? Many people who have PAD do not have any symptoms. But if you have symptoms, you may have weak or tired legs, difficulty walking or balancing, or pain. If you have pain, you might feel a tight, aching, or squeezing pain in the calf, thigh, or buttock. This pain usually happens after you have walked a certain distance. The pain goes away if you stop walking. This pain is called intermittent claudication. If PAD gets worse, you may have other symptoms that are caused by poor blood flow to your legs and feet. You may have:  · Cold or numb feet or toes. · Sores that are slow to heal.  · Leg or foot pain while you are at rest.  · Feet and toes that become pale from exercise or when elevated. · Feet that turn red when dangled. · Blue or purple marks on your legs, feet, or toes. How can you prevent PAD? · Quit smoking. Quitting smoking is one of the best things you can do to help prevent PAD.  If you need help quitting, talk to your doctor about stop-smoking programs and medicines. These can increase your chances of quitting for good. · Stay at a healthy weight. · Manage other health problems, including diabetes, high blood pressure, and high cholesterol. · Be physically active. Get at least 30 minutes of exercise on most days of the week. Walking is a good choice. You also may want to do other activities, such as running, swimming, cycling, or playing tennis or team sports. · Eat a variety of heart-healthy foods. ? Eat fruits, vegetables, whole grains (like oatmeal), dried beans and peas, nuts and seeds, soy products (like tofu), and fat-free or low-fat dairy products. ? Replace butter, margarine, and hydrogenated or partially hydrogenated oils with olive and canola oils. (Canola oil margarine without trans fat is fine.)  ? Replace red meat with fish, poultry, and soy protein (like tofu). ? Limit processed and packaged foods like chips, crackers, and cookies. How is PAD treated? Your doctor may suggest ways to relieve symptoms and lower your risk of heart attack and stroke. These may include:  · Quitting smoking. It's one of the most important things you can do. If you need help quitting, talk to your doctor about stop-smoking programs and medicines. These can increase your chances of quitting for good. · Eating heart-healthy foods. · Staying at a healthy weight. Lose weight if you need to. · Regular exercise (if your doctor says it's safe). Try walking, swimming, or biking for at least 30 minutes on most, if not all, days of the week. If you have leg symptoms when you exercise, your doctor might recommend a specialized exercise program that may relieve symptoms. The goal is to be able to walk farther without pain. · Medicines that help manage other problems such as high blood pressure and high cholesterol.   · Medicine, such as aspirin, that prevents blood clots which could cause a heart attack or stroke. · Procedures, such as opening narrowed or blocked arteries (angioplasty) or using healthy blood vessels to create detours around narrowed or blocked arteries (bypass surgery). Follow-up care is a key part of your treatment and safety. Be sure to make and go to all appointments, and call your doctor if you are having problems. It's also a good idea to know your test results and keep a list of the medicines you take. Where can you learn more? Go to https://[employer] org and sign in to your [employer] account. Enter T093 in the [employer] box to learn more about \"Learning About Peripheral Arterial Disease of the Legs. \"     If you do not have an account, please click on the \"Sign Up Now\" link. Current as of: December 16, 2019               Content Version: 12.6  © 3712-8880 [employer]. Care instructions adapted under license by [facility] ([facility]). If you have questions about a medical condition or this instruction, always ask your healthcare professional. [place] [address] any warranty or liability for your use of this information.

## 2021-03-03 DIAGNOSIS — I73.9 PVD (PERIPHERAL VASCULAR DISEASE) WITH CLAUDICATION (HCC): ICD-10-CM

## 2021-03-04 LAB
SARS-COV-2: NOT DETECTED
SOURCE: NORMAL

## 2021-03-09 ENCOUNTER — HOSPITAL ENCOUNTER (OUTPATIENT)
Dept: CARDIAC CATH/INVASIVE PROCEDURES | Age: 51
Setting detail: OBSERVATION
Discharge: HOME | End: 2021-03-10
Attending: SURGERY | Admitting: SURGERY
Payer: COMMERCIAL

## 2021-03-09 DIAGNOSIS — G89.18 POSTOPERATIVE PAIN: Primary | ICD-10-CM

## 2021-03-09 DIAGNOSIS — I73.9 PVD (PERIPHERAL VASCULAR DISEASE) (HCC): ICD-10-CM

## 2021-03-09 LAB
ABO/RH: NORMAL
ANION GAP SERPL CALCULATED.3IONS-SCNC: 10 MMOL/L (ref 7–16)
ANION GAP SERPL CALCULATED.3IONS-SCNC: 12 MMOL/L (ref 7–16)
ANTIBODY SCREEN: NORMAL
BUN BLDV-MCNC: 17 MG/DL (ref 6–20)
BUN BLDV-MCNC: 19 MG/DL (ref 6–20)
CALCIUM SERPL-MCNC: 8.7 MG/DL (ref 8.6–10.2)
CALCIUM SERPL-MCNC: 9.6 MG/DL (ref 8.6–10.2)
CHLORIDE BLD-SCNC: 100 MMOL/L (ref 98–107)
CHLORIDE BLD-SCNC: 104 MMOL/L (ref 98–107)
CO2: 22 MMOL/L (ref 22–29)
CO2: 27 MMOL/L (ref 22–29)
CREAT SERPL-MCNC: 0.7 MG/DL (ref 0.7–1.2)
CREAT SERPL-MCNC: 1 MG/DL (ref 0.7–1.2)
GFR AFRICAN AMERICAN: >60
GFR AFRICAN AMERICAN: >60
GFR NON-AFRICAN AMERICAN: >60 ML/MIN/1.73
GFR NON-AFRICAN AMERICAN: >60 ML/MIN/1.73
GLUCOSE BLD-MCNC: 277 MG/DL (ref 74–99)
GLUCOSE BLD-MCNC: 344 MG/DL (ref 74–99)
HBA1C MFR BLD: 12.7 % (ref 4–5.6)
HCT VFR BLD CALC: 36.2 % (ref 37–54)
HCT VFR BLD CALC: 37.6 % (ref 37–54)
HEMOGLOBIN: 12.3 G/DL (ref 12.5–16.5)
HEMOGLOBIN: 13.3 G/DL (ref 12.5–16.5)
INR BLD: 1.1
MCH RBC QN AUTO: 30.8 PG (ref 26–35)
MCH RBC QN AUTO: 31.5 PG (ref 26–35)
MCHC RBC AUTO-ENTMCNC: 34 % (ref 32–34.5)
MCHC RBC AUTO-ENTMCNC: 35.4 % (ref 32–34.5)
MCV RBC AUTO: 89.1 FL (ref 80–99.9)
MCV RBC AUTO: 90.7 FL (ref 80–99.9)
METER GLUCOSE: 222 MG/DL (ref 74–99)
METER GLUCOSE: 287 MG/DL (ref 74–99)
PDW BLD-RTO: 12.9 FL (ref 11.5–15)
PDW BLD-RTO: 12.9 FL (ref 11.5–15)
PLATELET # BLD: 226 E9/L (ref 130–450)
PLATELET # BLD: 269 E9/L (ref 130–450)
PMV BLD AUTO: 9.6 FL (ref 7–12)
PMV BLD AUTO: 9.9 FL (ref 7–12)
POTASSIUM REFLEX MAGNESIUM: 4.5 MMOL/L (ref 3.5–5)
POTASSIUM SERPL-SCNC: 4.6 MMOL/L (ref 3.5–5)
PROTHROMBIN TIME: 11.9 SEC (ref 9.3–12.4)
RBC # BLD: 3.99 E12/L (ref 3.8–5.8)
RBC # BLD: 4.22 E12/L (ref 3.8–5.8)
SODIUM BLD-SCNC: 137 MMOL/L (ref 132–146)
SODIUM BLD-SCNC: 138 MMOL/L (ref 132–146)
WBC # BLD: 10.4 E9/L (ref 4.5–11.5)
WBC # BLD: 9.4 E9/L (ref 4.5–11.5)

## 2021-03-09 PROCEDURE — C1894 INTRO/SHEATH, NON-LASER: HCPCS

## 2021-03-09 PROCEDURE — C1714 CATH, TRANS ATHERECTOMY, DIR: HCPCS

## 2021-03-09 PROCEDURE — G0378 HOSPITAL OBSERVATION PER HR: HCPCS

## 2021-03-09 PROCEDURE — 80048 BASIC METABOLIC PNL TOTAL CA: CPT

## 2021-03-09 PROCEDURE — C1887 CATHETER, GUIDING: HCPCS

## 2021-03-09 PROCEDURE — 6370000000 HC RX 637 (ALT 250 FOR IP): Performed by: NURSE PRACTITIONER

## 2021-03-09 PROCEDURE — C1884 EMBOLIZATION PROTECT SYST: HCPCS

## 2021-03-09 PROCEDURE — 83036 HEMOGLOBIN GLYCOSYLATED A1C: CPT

## 2021-03-09 PROCEDURE — 96374 THER/PROPH/DIAG INJ IV PUSH: CPT

## 2021-03-09 PROCEDURE — 2709999900 HC NON-CHARGEABLE SUPPLY

## 2021-03-09 PROCEDURE — 2580000003 HC RX 258: Performed by: NURSE PRACTITIONER

## 2021-03-09 PROCEDURE — 6360000002 HC RX W HCPCS: Performed by: SURGERY

## 2021-03-09 PROCEDURE — 2580000003 HC RX 258: Performed by: SURGERY

## 2021-03-09 PROCEDURE — 86901 BLOOD TYPING SEROLOGIC RH(D): CPT

## 2021-03-09 PROCEDURE — 36415 COLL VENOUS BLD VENIPUNCTURE: CPT

## 2021-03-09 PROCEDURE — 85027 COMPLETE CBC AUTOMATED: CPT

## 2021-03-09 PROCEDURE — 76937 US GUIDE VASCULAR ACCESS: CPT | Performed by: SURGERY

## 2021-03-09 PROCEDURE — 75774 ARTERY X-RAY EACH VESSEL: CPT | Performed by: SURGERY

## 2021-03-09 PROCEDURE — 6360000002 HC RX W HCPCS

## 2021-03-09 PROCEDURE — 85610 PROTHROMBIN TIME: CPT

## 2021-03-09 PROCEDURE — 37225 PR REVSC OPN/PRQ FEM/POP W/ATHRC/ANGIOP SM VSL: CPT | Performed by: SURGERY

## 2021-03-09 PROCEDURE — 86850 RBC ANTIBODY SCREEN: CPT

## 2021-03-09 PROCEDURE — C1769 GUIDE WIRE: HCPCS

## 2021-03-09 PROCEDURE — 37225 HC FEM POP TERRITORY ATHERECTOMY: CPT | Performed by: SURGERY

## 2021-03-09 PROCEDURE — 82962 GLUCOSE BLOOD TEST: CPT

## 2021-03-09 PROCEDURE — 86900 BLOOD TYPING SEROLOGIC ABO: CPT

## 2021-03-09 PROCEDURE — G0379 DIRECT REFER HOSPITAL OBSERV: HCPCS

## 2021-03-09 PROCEDURE — 75710 ARTERY X-RAYS ARM/LEG: CPT | Performed by: SURGERY

## 2021-03-09 PROCEDURE — C1725 CATH, TRANSLUMIN NON-LASER: HCPCS

## 2021-03-09 PROCEDURE — 2500000003 HC RX 250 WO HCPCS

## 2021-03-09 RX ORDER — DEXTROSE MONOHYDRATE 25 G/50ML
12.5 INJECTION, SOLUTION INTRAVENOUS PRN
Status: DISCONTINUED | OUTPATIENT
Start: 2021-03-09 | End: 2021-03-10 | Stop reason: HOSPADM

## 2021-03-09 RX ORDER — PROMETHAZINE HYDROCHLORIDE 25 MG/1
12.5 TABLET ORAL EVERY 6 HOURS PRN
Status: DISCONTINUED | OUTPATIENT
Start: 2021-03-09 | End: 2021-03-10 | Stop reason: HOSPADM

## 2021-03-09 RX ORDER — POLYETHYLENE GLYCOL 3350 17 G/17G
17 POWDER, FOR SOLUTION ORAL DAILY PRN
Status: DISCONTINUED | OUTPATIENT
Start: 2021-03-09 | End: 2021-03-10 | Stop reason: HOSPADM

## 2021-03-09 RX ORDER — NICOTINE POLACRILEX 4 MG
15 LOZENGE BUCCAL PRN
Status: DISCONTINUED | OUTPATIENT
Start: 2021-03-09 | End: 2021-03-09 | Stop reason: SDUPTHER

## 2021-03-09 RX ORDER — SODIUM CHLORIDE 0.9 % (FLUSH) 0.9 %
10 SYRINGE (ML) INJECTION PRN
Status: DISCONTINUED | OUTPATIENT
Start: 2021-03-09 | End: 2021-03-10 | Stop reason: HOSPADM

## 2021-03-09 RX ORDER — CILOSTAZOL 50 MG/1
100 TABLET ORAL
Status: DISCONTINUED | OUTPATIENT
Start: 2021-03-10 | End: 2021-03-10 | Stop reason: HOSPADM

## 2021-03-09 RX ORDER — DEXTROSE MONOHYDRATE 50 MG/ML
100 INJECTION, SOLUTION INTRAVENOUS PRN
Status: DISCONTINUED | OUTPATIENT
Start: 2021-03-09 | End: 2021-03-09 | Stop reason: SDUPTHER

## 2021-03-09 RX ORDER — SODIUM CHLORIDE 9 MG/ML
INJECTION, SOLUTION INTRAVENOUS CONTINUOUS
Status: DISCONTINUED | OUTPATIENT
Start: 2021-03-09 | End: 2021-03-09

## 2021-03-09 RX ORDER — OXYCODONE HYDROCHLORIDE AND ACETAMINOPHEN 5; 325 MG/1; MG/1
1 TABLET ORAL EVERY 4 HOURS PRN
Status: DISCONTINUED | OUTPATIENT
Start: 2021-03-09 | End: 2021-03-10 | Stop reason: HOSPADM

## 2021-03-09 RX ORDER — NICOTINE POLACRILEX 4 MG
15 LOZENGE BUCCAL PRN
Status: DISCONTINUED | OUTPATIENT
Start: 2021-03-09 | End: 2021-03-10 | Stop reason: HOSPADM

## 2021-03-09 RX ORDER — DEXTROSE MONOHYDRATE 50 MG/ML
100 INJECTION, SOLUTION INTRAVENOUS PRN
Status: DISCONTINUED | OUTPATIENT
Start: 2021-03-09 | End: 2021-03-10 | Stop reason: HOSPADM

## 2021-03-09 RX ORDER — CITALOPRAM 20 MG/1
40 TABLET ORAL DAILY
Status: DISCONTINUED | OUTPATIENT
Start: 2021-03-09 | End: 2021-03-10 | Stop reason: HOSPADM

## 2021-03-09 RX ORDER — SODIUM CHLORIDE 0.9 % (FLUSH) 0.9 %
10 SYRINGE (ML) INJECTION PRN
Status: DISCONTINUED | OUTPATIENT
Start: 2021-03-09 | End: 2021-03-09

## 2021-03-09 RX ORDER — SODIUM CHLORIDE 0.9 % (FLUSH) 0.9 %
10 SYRINGE (ML) INJECTION EVERY 12 HOURS SCHEDULED
Status: DISCONTINUED | OUTPATIENT
Start: 2021-03-09 | End: 2021-03-10 | Stop reason: HOSPADM

## 2021-03-09 RX ORDER — ONDANSETRON 2 MG/ML
4 INJECTION INTRAMUSCULAR; INTRAVENOUS EVERY 6 HOURS PRN
Status: DISCONTINUED | OUTPATIENT
Start: 2021-03-09 | End: 2021-03-10 | Stop reason: HOSPADM

## 2021-03-09 RX ORDER — ATORVASTATIN CALCIUM 40 MG/1
40 TABLET, FILM COATED ORAL DAILY
Status: DISCONTINUED | OUTPATIENT
Start: 2021-03-09 | End: 2021-03-10 | Stop reason: HOSPADM

## 2021-03-09 RX ORDER — GABAPENTIN 100 MG/1
100 CAPSULE ORAL 2 TIMES DAILY
Status: DISCONTINUED | OUTPATIENT
Start: 2021-03-09 | End: 2021-03-10 | Stop reason: HOSPADM

## 2021-03-09 RX ORDER — SODIUM CHLORIDE 0.9 % (FLUSH) 0.9 %
10 SYRINGE (ML) INJECTION EVERY 12 HOURS SCHEDULED
Status: DISCONTINUED | OUTPATIENT
Start: 2021-03-09 | End: 2021-03-09

## 2021-03-09 RX ORDER — LISINOPRIL 20 MG/1
40 TABLET ORAL DAILY
Status: DISCONTINUED | OUTPATIENT
Start: 2021-03-10 | End: 2021-03-10 | Stop reason: HOSPADM

## 2021-03-09 RX ORDER — GLIPIZIDE 5 MG/1
10 TABLET ORAL DAILY
Status: DISCONTINUED | OUTPATIENT
Start: 2021-03-10 | End: 2021-03-10 | Stop reason: HOSPADM

## 2021-03-09 RX ORDER — ASPIRIN 81 MG/1
81 TABLET, CHEWABLE ORAL DAILY
Status: DISCONTINUED | OUTPATIENT
Start: 2021-03-09 | End: 2021-03-10 | Stop reason: HOSPADM

## 2021-03-09 RX ORDER — DEXTROSE MONOHYDRATE 25 G/50ML
12.5 INJECTION, SOLUTION INTRAVENOUS PRN
Status: DISCONTINUED | OUTPATIENT
Start: 2021-03-09 | End: 2021-03-09 | Stop reason: SDUPTHER

## 2021-03-09 RX ORDER — PANTOPRAZOLE SODIUM 40 MG/1
40 TABLET, DELAYED RELEASE ORAL DAILY
Status: DISCONTINUED | OUTPATIENT
Start: 2021-03-09 | End: 2021-03-10 | Stop reason: HOSPADM

## 2021-03-09 RX ORDER — CLOPIDOGREL BISULFATE 75 MG/1
75 TABLET ORAL DAILY
Status: DISCONTINUED | OUTPATIENT
Start: 2021-03-10 | End: 2021-03-10 | Stop reason: HOSPADM

## 2021-03-09 RX ADMIN — SODIUM CHLORIDE: 9 INJECTION, SOLUTION INTRAVENOUS at 07:09

## 2021-03-09 RX ADMIN — ATORVASTATIN CALCIUM 40 MG: 40 TABLET, FILM COATED ORAL at 21:49

## 2021-03-09 RX ADMIN — Medication 2000 MG: at 07:16

## 2021-03-09 RX ADMIN — PANTOPRAZOLE SODIUM 40 MG: 40 TABLET, DELAYED RELEASE ORAL at 17:20

## 2021-03-09 RX ADMIN — CITALOPRAM 40 MG: 20 TABLET, FILM COATED ORAL at 17:24

## 2021-03-09 RX ADMIN — OXYCODONE AND ACETAMINOPHEN 1 TABLET: 5; 325 TABLET ORAL at 22:00

## 2021-03-09 RX ADMIN — SODIUM CHLORIDE, PRESERVATIVE FREE 10 ML: 5 INJECTION INTRAVENOUS at 21:49

## 2021-03-09 RX ADMIN — OXYCODONE AND ACETAMINOPHEN 1 TABLET: 5; 325 TABLET ORAL at 17:24

## 2021-03-09 RX ADMIN — INSULIN LISPRO 6 UNITS: 100 INJECTION, SOLUTION INTRAVENOUS; SUBCUTANEOUS at 17:24

## 2021-03-09 RX ADMIN — INSULIN LISPRO 2 UNITS: 100 INJECTION, SOLUTION INTRAVENOUS; SUBCUTANEOUS at 22:00

## 2021-03-09 RX ADMIN — GABAPENTIN 100 MG: 100 CAPSULE ORAL at 21:49

## 2021-03-09 ASSESSMENT — PAIN DESCRIPTION - DESCRIPTORS: DESCRIPTORS: DISCOMFORT;PRESSURE

## 2021-03-09 ASSESSMENT — PAIN DESCRIPTION - LOCATION: LOCATION: GROIN

## 2021-03-09 ASSESSMENT — PAIN DESCRIPTION - ORIENTATION: ORIENTATION: LEFT

## 2021-03-09 ASSESSMENT — PAIN DESCRIPTION - PAIN TYPE: TYPE: ACUTE PAIN

## 2021-03-09 ASSESSMENT — PAIN SCALES - GENERAL: PAINLEVEL_OUTOF10: 7

## 2021-03-09 NOTE — OP NOTE
Cardiovascular Lab Procedure Report    Mariaa Celis  1970    Date : 3/9/2021  Surgeon: Saul Posey M.D. Pre-procedure Diagnosis: R LE lifestyle limiting claudication  Post-procedure Diagnosis: Same  Procedure:      Left  common femoral artery access   with US guidance    TF Right lower extremity angiogram   59473  18578 Angiogram with catheter in Right   common femoral, superficial femoral, popliteal artery   60108 Right SFA, Popliteal atherectomy (Turbohawk - hawk one 7 fr lx)    Right SFA, Popliteal angioplasty with 4 x 80 balloon ( pacifica )    # 5 Spyder used   Anesthesia: Local with IV sedation  Assistants: Cath Lab Staff  Estimated Blood Loss: Minimal  Complications: see below  Findings: Abdominal aorta :   Right Right s/p Intervention   Common Femoral Art patent Patent   Superficial Femoral Art Multiple > 90% stenoses,  Patent, imroved flow   Profunda Femoral Art Patent but diseased proximaly Patent but disease proximally   AK Popliteal Art Short segment occlusion Patent, residual stenosis present   BK Popliteal Art patent patent   Anterior Tibial Art Patent proximally, distal flow not well visualized Patent, flow to foot   Tibioperoneal Trunk patent Patent   Peroneal Art Not well visualized Patent, flow to mid calf   Posterior Tibial Art Patent primary runoff Patent primary runoff     Procedure Details : There was not previous CTA , or catheter based diagnostic imaging preformed prior to today's procedure. Timeout preformed identifying pt and procedure. Groins prepped and draped in sterile fashion. Patient given sedation as needed throughout the case. Left  common femoral artery was noted to be patent and was accessed under ultrasound guidance after infiltrating with local.  A printer was used to print out an image. Micropuncture placed, exchanged out for 5 fr sheath. Advantage glide wire and contra catheter advanced into distal aorta.  Contra and wire used to access Right  iliac system and catheter placed at common femoral and angiogram of [facility]  lower extremity preformed. Significant occlusive disease was noted in the sfa, popliteal.  Significant amount of scarring in groin, made tracking sheaths difficulty. Patient was given 8000 units of heparin and than a 7 fr destination sheath advanced to common femoral artery on the Right. Imaging with the catheter in the sfa to further evaluate the sfa, popliteal lesion. 0.35 [place] blazer catheter and advantage glide wire used to traverse stenosis. The catheter was placed in the below knee popliteal artery and angiogram was preformed to further evaluate the tibial arteries and runoff. A # 5 spyder filter deployed. Predilation with 4x80 pacifica as hawk would not track. The sfa pop lesion was treated with atherectomy. At this point the hawk was having issues with getting pulled back into the sheath. There appeared to be wire outside of the sheath. Eventually the device was able to be pulled back into the sheath but in the process the sheath had to be pulled back to the left external iliac. The spyder was also pulled back in the process. The device would not come out of the sheath. The sheath was pulled back so device and sheath were outside the patient. The sheath was cut. There was a large amount of wire from what appeared to be the sheath and that was removed. Unfortunately a wire was not able to be replaced. Eventually the sheath was completely removed and all foreign material was able to be removed also. Pressure was held. As a result of losing access the right sfa, pop segment was not able to be plastied.         Postop Exam  Right DP monophasic  PT weakly biphasic  Left DP biphasic PT biphasic    Plan  Continue Medical Management with asa, plavix and pletal  Encourage continued tobacco cessation  No plans for surgical intervention at this time    Paul Retana    PCP : Clayton Ferreira, DO     Previous lower extremity procedures  10/4/16 R EIA plasty  R CFA, SFA, Popliteal atherectomy/plasty  R SFA, Popliteal plasty with DCB   6/19/18 R CFA, SFA, popliteal therectomy  R CFA plasty 6x150  R SFA, popliteal plasty 6x150 DCB   7/3/18 L CFA plasty with 7x60 DCB, 8x40 evercross  L profunda plasty 5x60 evercross   7/23/19 Left angiogram, Left Common femoral and profunda plasty with 6x80 evercross, 9x80 evercross    8/16/19 Left distal external iliac and femoral endarterectomy  Left Deep femoral endarterectomy with profundoplasty   Left fem ak pop bypass with 8 mm ringed propatent PTFE graft  Angiogram with plasty of bk pop with 5x150 [facility]   1/31/20 Right distal external iliac and femoral endarterectomy with repair with bovine pericardial patch   Deep femoral endarterectomy with profundoplasty    6/16/20 R profunda plasty 5x60  R sfa, pop atherectomy, 6x250 DCB   3/16/21 R sfa pop atherectomy

## 2021-03-09 NOTE — H&P
Vascular Surgery History & Physical Exam    Chief Complaint: Peripheral vascular disease, R LE lifestyle limiting claudication    HISTORY OF PRESENT ILLNESS:    The patient is a 48 y.o. male who presents to the hospital for elective arteriogram with possible intervention. The patient has a history of peripheral vascular disease and R LE lifestyle limiting claudication.       ROS : All others Negative if blank [], Positive if [x]  General   [] Fevers   [] Chills   [] Weight Loss   Skin   [] Tissue Loss   Eyes   [] Wears Glasses/Contacts   [] Vision Changes   Respiratory    [] Shortness of breath   Cardiovascular   [] Chest Pain   [] Shortness of breath with exertion   Gastrointestinal   [] Abdominal Pain     Past Medical History:   Diagnosis Date    Anxiety     Chronic obstructive pulmonary disease ([facility] 7/30/2019    Diabetes ([facility]     GERD (gastroesophageal reflux disease)     Gout     Hyperlipidemia     Hypertension     Leg pain     Postoperative anemia due to acute blood loss 8/18/2019    PVD (peripheral vascular disease) ([facility] 1/15/2018    S/P femoral-popliteal bypass surgery 9/9/2019    Type 2 diabetes mellitus with diabetic peripheral angiopathy without gangrene, without long-term current use of insulin ([facility] 1/15/2018     Past Surgical History:   Procedure Laterality Date    FEMORAL BYPASS Left 8/16/2019    FEMORAL POPLITEAL BYPASS WITH FEMORAL ENDARTERECTOMY LEFT LEG performed by Siddhartha Galicia MD at [facility] Right 1/31/2020    RIGHT FEMORAL ENDARTERECTOMY performed by Siddhartha Galicia MD at [facility] HISTORY  10/04/2016    Dr Jeremy Alvarado - athrectomy/pci right fem/pop    OTHER SURGICAL HISTORY  06/19/2018    Dr Jeremy Alvarado - PCI w/ athrectomy RLE Common Illiac to Popliteal    VASCULAR SURGERY  07/2019    ANGIOGRAM    WISDOM TOOTH EXTRACTION       Current Medications:     Current Outpatient Medications:     citalopram (CELEXA) 40 MG tablet, Take 1 tablet by mouth daily Must keep appt on 9-21-20, Disp: 30 tablet, Rfl: 0    clopidogrel (PLAVIX) 75 MG tablet, TAKE 1 TABLET BY MOUTH  DAILY, Disp: 30 tablet, Rfl: 11    atorvastatin (LIPITOR) 40 MG tablet, Take 1 tablet by mouth daily, Disp: 90 tablet, Rfl: 2    glipiZIDE (GLUCOTROL XL) 10 MG extended release tablet, Take 1 tablet by mouth daily, Disp: 90 tablet, Rfl: 0    lisinopril (PRINIVIL;ZESTRIL) 40 MG tablet, Take 1 tablet by mouth daily, Disp: 90 tablet, Rfl: 1    cilostazol (PLETAL) 100 MG tablet, TAKE 1 TABLET BY MOUTH TWO  TIMES DAILY, Disp: 180 tablet, Rfl: 3    gabapentin (NEURONTIN) 100 MG capsule, Take 100 mg by mouth 2 times daily. , Disp: , Rfl:     omeprazole (PRILOSEC) 20 MG delayed release capsule, Take 20 mg by mouth Daily , Disp: , Rfl:     aspirin (ASPIRIN CHILDRENS) 81 MG chewable tablet, Take 1 tablet by mouth daily, Disp: 30 tablet, Rfl: 3    metFORMIN (GLUCOPHAGE) 500 MG tablet, Take 500 mg by mouth 2 times daily (with meals) , Disp: , Rfl:     Current Facility-Administered Medications:     0.9 % sodium chloride infusion, , Intravenous, Continuous, Devaughn Astudillo MD, Last Rate: 75 mL/hr at 03/09/21 0709, New Bag at 03/09/21 0709    sodium chloride flush 0.9 % injection 10 mL, 10 mL, Intravenous, 2 times per day, Devaughn Astudillo MD    sodium chloride flush 0.9 % injection 10 mL, 10 mL, Intravenous, PRN, Devaughn Astudillo MD  Allergies:  Patient has no known allergies.   Social History     Socioeconomic History    Marital status:      Spouse name: Not on file    Number of children: Not on file    Years of education: Not on file    Highest education level: Not on file   Occupational History    Not on file   Social Needs    Financial resource strain: Not on file    Food insecurity     Worry: Not on file     Inability: Not on file    Transportation needs     Medical: Not on file     Non-medical: Not on file   Tobacco Use    Smoking status: Former Smoker     Packs/day: 1.50     Years: 28.00     Pack years: 42.00     Types: Cigarettes     Start date:      Quit date: 10/15/2016     Years since quittin.4    Smokeless tobacco: Former User     Types: Snuff     Quit date: 2004   Substance and Sexual Activity    Alcohol use: Not Currently     Alcohol/week: 12.0 standard drinks     Types: 12 Cans of beer per week     Frequency: 2-4 times a month     Drinks per session: 1 or 2     Comment: HAS NOT DRANK SINCE 19    Drug use: Never    Sexual activity: Not on file   Lifestyle    Physical activity     Days per week: Not on file     Minutes per session: Not on file    Stress: Not on file   Relationships    Social connections     Talks on phone: Not on file     Gets together: Not on file     Attends Mandaeism service: Not on file     Active member of club or organization: Not on file     Attends meetings of clubs or organizations: Not on file     Relationship status: Not on file    Intimate partner violence     Fear of current or ex partner: Not on file     Emotionally abused: Not on file     Physically abused: Not on file     Forced sexual activity: Not on file   Other Topics Concern    Not on file   Social History Narrative    Occ coke; 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     Family History   Problem Relation Age of Onset    Asthma Mother     Other Father         vascular problems    Cancer Father         prostate, lung    Diabetes Father      PHYSICAL EXAM:    Vitals:    21 0704   BP: 128/81   Resp: 18   Temp: 97.4 °F (36.3 °C)     CONSTITUTIONAL:  awake, alert, cooperative, no apparent distress, and appears stated age  NECK:  supple, symmetrical, trachea midline  LUNGS:  no increased work of breathing, good air exchange and good resp excursion  CARDIOVASCULAR:  regular rate and rhythm  ABDOMEN:  soft, non-distended and non-tender   Pulse Exam   R femoral 2+ L femoral 2+   R dorsalis pedis monophasic L dorsalis pedis biphasic   R posterior tibial monophasic L posterior tibial biphasic     LABS:    Lab Results   Component Value Date    WBC 8.3 09/14/2020    HGB 14.2 09/14/2020    HCT 42.4 09/14/2020     09/14/2020    PROTIME 11.7 06/16/2020    INR 1.0 06/16/2020    APTT 24.8 08/16/2019    K 4.1 09/14/2020    BUN 15 09/14/2020    CREATININE 0.9 09/14/2020     Assesment:  Peripheral vascular disease. R LE lifestyle limiting claudication  PLAN:    · Aortogram,  Right lower extremity arteriogram, possible intervention. · I reviewed the procedure with the patient and family as available. I discussed the procedure, risks, benefits, complications, and alternatives of the procedure. They understand and consent.   All questions were answered    Robby Mo

## 2021-03-10 VITALS
OXYGEN SATURATION: 98 % | HEIGHT: 70 IN | SYSTOLIC BLOOD PRESSURE: 146 MMHG | TEMPERATURE: 97.5 F | DIASTOLIC BLOOD PRESSURE: 67 MMHG | RESPIRATION RATE: 18 BRPM | BODY MASS INDEX: 28.29 KG/M2 | HEART RATE: 91 BPM | WEIGHT: 197.6 LBS

## 2021-03-10 LAB
ANION GAP SERPL CALCULATED.3IONS-SCNC: 9 MMOL/L (ref 7–16)
BUN BLDV-MCNC: 12 MG/DL (ref 6–20)
CALCIUM SERPL-MCNC: 8.9 MG/DL (ref 8.6–10.2)
CHLORIDE BLD-SCNC: 101 MMOL/L (ref 98–107)
CO2: 28 MMOL/L (ref 22–29)
CREAT SERPL-MCNC: 0.7 MG/DL (ref 0.7–1.2)
GFR AFRICAN AMERICAN: >60
GFR NON-AFRICAN AMERICAN: >60 ML/MIN/1.73
GLUCOSE BLD-MCNC: 193 MG/DL (ref 74–99)
HCT VFR BLD CALC: 37.1 % (ref 37–54)
HEMOGLOBIN: 12.5 G/DL (ref 12.5–16.5)
MCH RBC QN AUTO: 30.5 PG (ref 26–35)
MCHC RBC AUTO-ENTMCNC: 33.7 % (ref 32–34.5)
MCV RBC AUTO: 90.5 FL (ref 80–99.9)
PDW BLD-RTO: 13.1 FL (ref 11.5–15)
PLATELET # BLD: 229 E9/L (ref 130–450)
PMV BLD AUTO: 9.8 FL (ref 7–12)
POTASSIUM SERPL-SCNC: 4.1 MMOL/L (ref 3.5–5)
RBC # BLD: 4.1 E12/L (ref 3.8–5.8)
SODIUM BLD-SCNC: 138 MMOL/L (ref 132–146)
WBC # BLD: 9.4 E9/L (ref 4.5–11.5)

## 2021-03-10 PROCEDURE — 36415 COLL VENOUS BLD VENIPUNCTURE: CPT

## 2021-03-10 PROCEDURE — 6370000000 HC RX 637 (ALT 250 FOR IP): Performed by: NURSE PRACTITIONER

## 2021-03-10 PROCEDURE — 2580000003 HC RX 258: Performed by: NURSE PRACTITIONER

## 2021-03-10 PROCEDURE — 80048 BASIC METABOLIC PNL TOTAL CA: CPT

## 2021-03-10 PROCEDURE — 85027 COMPLETE CBC AUTOMATED: CPT

## 2021-03-10 PROCEDURE — 99224 PR SBSQ OBSERVATION CARE/DAY 15 MINUTES: CPT | Performed by: NURSE PRACTITIONER

## 2021-03-10 PROCEDURE — G0378 HOSPITAL OBSERVATION PER HR: HCPCS

## 2021-03-10 RX ORDER — OXYCODONE HYDROCHLORIDE AND ACETAMINOPHEN 5; 325 MG/1; MG/1
1 TABLET ORAL EVERY 6 HOURS PRN
Qty: 12 TABLET | Refills: 0 | Status: SHIPPED | OUTPATIENT
Start: 2021-03-10 | End: 2021-03-13

## 2021-03-10 RX ADMIN — GLIPIZIDE 10 MG: 5 TABLET ORAL at 09:27

## 2021-03-10 RX ADMIN — CITALOPRAM 40 MG: 20 TABLET, FILM COATED ORAL at 09:26

## 2021-03-10 RX ADMIN — PANTOPRAZOLE SODIUM 40 MG: 40 TABLET, DELAYED RELEASE ORAL at 06:07

## 2021-03-10 RX ADMIN — SODIUM CHLORIDE, PRESERVATIVE FREE 10 ML: 5 INJECTION INTRAVENOUS at 09:29

## 2021-03-10 RX ADMIN — LISINOPRIL 40 MG: 20 TABLET ORAL at 09:28

## 2021-03-10 RX ADMIN — ASPIRIN 81 MG: 81 TABLET, CHEWABLE ORAL at 09:26

## 2021-03-10 RX ADMIN — CLOPIDOGREL 75 MG: 75 TABLET, FILM COATED ORAL at 09:27

## 2021-03-10 RX ADMIN — CILOSTAZOL 100 MG: 50 TABLET ORAL at 06:07

## 2021-03-10 RX ADMIN — GABAPENTIN 100 MG: 100 CAPSULE ORAL at 09:27

## 2021-03-10 ASSESSMENT — PAIN SCALES - GENERAL
PAINLEVEL_OUTOF10: 0
PAINLEVEL_OUTOF10: 0

## 2021-03-10 NOTE — PATIENT CARE CONFERENCE
P Quality Flow/Interdisciplinary Rounds Progress Note        Quality Flow Rounds held on March 10, 2021    Disciplines Attending:  Bedside Nurse, ,  and Nursing Unit Leadership    Mk Robbins was admitted on 3/9/2021  2:54 PM    Anticipated Discharge Date:       Disposition:    Scott Score:  Scott Scale Score: 21    Readmission Risk              Risk of Unplanned Readmission:        0           Discussed patient goal for the day, patient clinical progression, and barriers to discharge.   The following Goal(s) of the Day/Commitment(s) have been identified:  discharge planning      Keli Abad  March 10, 2021

## 2021-03-10 NOTE — PROGRESS NOTES
Vascular Surgery Progress Note    Pt being seen in f/u regarding PVD    Subjective  Pt s/e. Denies sob or cp. Denies abdominal pain. Denies Left groin pain. Pain adequately controlled with pain medication. Right feet feel better. Tolerating PO. Adequate UO overnight.     Current Medications:    dextrose        sodium chloride flush, promethazine **OR** ondansetron, polyethylene glycol, glucose, dextrose, glucagon (rDNA), dextrose, oxyCODONE-acetaminophen    aspirin  81 mg Oral Daily    atorvastatin  40 mg Oral Daily    cilostazol  100 mg Oral BID AC    citalopram  40 mg Oral Daily    clopidogrel  75 mg Oral Daily    gabapentin  100 mg Oral BID    glipiZIDE  10 mg Oral Daily    lisinopril  40 mg Oral Daily    [Held by provider] metFORMIN  500 mg Oral BID WC    pantoprazole  40 mg Oral Daily    sodium chloride flush  10 mL Intravenous 2 times per day    enoxaparin  40 mg Subcutaneous Daily    insulin lispro  0-12 Units Subcutaneous TID WC    insulin lispro  0-6 Units Subcutaneous Nightly        PHYSICAL EXAM:    BP (!) 122/50   Pulse 88   Temp 98.8 °F (37.1 °C)   Resp 18   Ht 5' 10\" (1.778 m)   Wt 197 lb 9.6 oz (89.6 kg)   SpO2 97%   BMI 28.35 kg/m²     Intake/Output Summary (Last 24 hours) at 3/10/2021 0932  Last data filed at 3/9/2021 1554  Gross per 24 hour   Intake --   Output 300 ml   Net -300 ml        Gen Awake and Alert   CVS RRR  Resp Good resp exursion   Abd soft nt nd   Left LE   Groin no hematoma or ecchymosis   DP biphasic PT biphasic   Right LE   DP biphasic  PT monophasic    LABS:    Lab Results   Component Value Date    WBC 9.4 03/10/2021    HGB 12.5 03/10/2021    HCT 37.1 03/10/2021     03/10/2021    PROTIME 11.9 03/09/2021    INR 1.1 03/09/2021    APTT 24.8 08/16/2019    K 4.1 03/10/2021    BUN 12 03/10/2021    CREATININE 0.7 03/10/2021     A/P POD # 1 s/p R LE arteriogram   · Left Groin has no hematoma or ecchymosis  · vascular exam is better  · ok for discharge from vascular pov  · Bun/CR 12/0.7  · Hgb stable 12.5  · will arrange f/u    Pt seen and plan reviewed with Dr. Fran Sr.      Essie Benton, JACOB    Pt seen and examined  No complaints  Ambulated without issue  Discharge home    Dorita Judge

## 2021-03-10 NOTE — CARE COORDINATION
Met with pt at the bedside. Role of  and transition of care discussed. Pt lives in a 1 story home with his wife and daughter. Pt is completely independent, drives. Has had [facility] in the past. No hx of HAO. Per pt plan is to return home. Denies any needs. PCP Dr Emerson Marroquin. Pharmacy [facility].

## 2021-03-22 ENCOUNTER — OFFICE VISIT (OUTPATIENT)
Dept: VASCULAR SURGERY | Age: 51
End: 2021-03-22
Payer: COMMERCIAL

## 2021-03-22 DIAGNOSIS — I73.9 PVD (PERIPHERAL VASCULAR DISEASE) WITH CLAUDICATION (HCC): Primary | ICD-10-CM

## 2021-03-22 DIAGNOSIS — E11.51 TYPE 2 DIABETES MELLITUS WITH DIABETIC PERIPHERAL ANGIOPATHY WITHOUT GANGRENE, WITHOUT LONG-TERM CURRENT USE OF INSULIN (HCC): ICD-10-CM

## 2021-03-22 PROCEDURE — 99213 OFFICE O/P EST LOW 20 MIN: CPT | Performed by: SURGERY

## 2021-03-22 NOTE — PROGRESS NOTES
Vascular Surgery Outpatient Followup    PCP : Alyse Vega DO    Previous lower extremity procedures  10/4/16 R EIA plasty  R CFA, SFA, Popliteal atherectomy/plasty  R SFA, Popliteal plasty with DCB   6/19/18 R CFA, SFA, popliteal therectomy  R CFA plasty 6x150  R SFA, popliteal plasty 6x150 DCB   7/3/18 L CFA plasty with 7x60 DCB, 8x40 evercross  L profunda plasty 5x60 evercross   7/23/19 Left angiogram, Left Common femoral and profunda plasty with 6x80 evercross, 9x80 evercross    8/16/19 Left distal external iliac and femoral endarterectomy  Left Deep femoral endarterectomy with profundoplasty   Left fem ak pop bypass with 8 mm ringed propatent PTFE graft  Angiogram with plasty of bk pop with 5x150 [facility]   1/31/20 Right distal external iliac and femoral endarterectomy with repair with bovine pericardial patch   Deep femoral endarterectomy with profundoplasty    6/16/20 R profunda plasty 5x60  R sfa, pop atherectomy, 6x250 DCB   3/16/21 R sfa pop atherectomy     HISTORY OF PRESENT ILLNESS:    The patient is a 48 y.o. male who is here in regards to follow up of their PVD. Since intervention 3/16/21 he is up to 150 yards in his right lower extremity which remains lifestyle limiting. Previously since 12/2020 he had right calf claudication at 50 yards. Previously he had no significant claudication. He has no rest pain or ulceration. He remains on asa, plavix, pletal, statin. Last hgba1c is 12.7. He is currently not working. He is not on disability. He is worried about his legs causing him problems. He would like to return to working.      Past Medical History:        Diagnosis Date    Anxiety     Chronic obstructive pulmonary disease ([address] 7/30/2019    Diabetes ([address]     GERD (gastroesophageal reflux disease)     Gout     Hyperlipidemia     Hypertension     Leg pain     Postoperative anemia due to acute blood loss 8/18/2019    PVD (peripheral vascular disease) ([address] 1/15/2018    S/P femoral-popliteal bypass surgery 9/9/2019    Type 2 diabetes mellitus with diabetic peripheral angiopathy without gangrene, without long-term current use of insulin ([facility] 1/15/2018     Past Surgical History:        Procedure Laterality Date    FEMORAL BYPASS Left 8/16/2019    FEMORAL POPLITEAL BYPASS WITH FEMORAL ENDARTERECTOMY LEFT LEG performed by Juaquin Garcia MD at [place] [address] Right 1/31/2020    RIGHT FEMORAL ENDARTERECTOMY performed by Juaquin Garcia MD at [place] [address]  10/04/2016    Dr Katalina Wolf - athrectomy/pci right fem/pop    OTHER SURGICAL HISTORY  06/19/2018    Dr Katalina Wolf - PCI w/ athrectomy RLE Common Illiac to Popliteal    VASCULAR SURGERY  07/2019    ANGIOGRAM    WISDOM TOOTH EXTRACTION       Current Medications:   Current Outpatient Medications   Medication Sig Dispense Refill    citalopram (CELEXA) 40 MG tablet Take 1 tablet by mouth daily Must keep appt on 9-21-20 30 tablet 0    clopidogrel (PLAVIX) 75 MG tablet TAKE 1 TABLET BY MOUTH  DAILY 30 tablet 11    atorvastatin (LIPITOR) 40 MG tablet Take 1 tablet by mouth daily 90 tablet 2    glipiZIDE (GLUCOTROL XL) 10 MG extended release tablet Take 1 tablet by mouth daily 90 tablet 0    lisinopril (PRINIVIL;ZESTRIL) 40 MG tablet Take 1 tablet by mouth daily 90 tablet 1    cilostazol (PLETAL) 100 MG tablet TAKE 1 TABLET BY MOUTH TWO  TIMES DAILY 180 tablet 3    gabapentin (NEURONTIN) 100 MG capsule Take 100 mg by mouth 2 times daily.  metFORMIN (GLUCOPHAGE) 500 MG tablet Take 500 mg by mouth 2 times daily (with meals)       omeprazole (PRILOSEC) 20 MG delayed release capsule Take 20 mg by mouth Daily       aspirin (ASPIRIN CHILDRENS) 81 MG chewable tablet Take 1 tablet by mouth daily 30 tablet 3     No current facility-administered medications for this visit. Allergies:  Patient has no known allergies.   Social History     Socioeconomic History    Marital status:      Spouse name: Not on file    Number of children: Not on file    Years of education: Not on file    Highest education level: Not on file   Occupational History    Not on file   Social Needs    Financial resource strain: Not on file    Food insecurity     Worry: Not on file     Inability: Not on file    Transportation needs     Medical: Not on file     Non-medical: Not on file   Tobacco Use    Smoking status: Former Smoker     Packs/day: 1.50     Years: 28.00     Pack years: 42.00     Types: Cigarettes     Start date: 80     Quit date: 10/15/2016     Years since quittin.4    Smokeless tobacco: Former User     Types: Snuff     Quit date: 2004   Substance and Sexual Activity    Alcohol use: Not Currently     Alcohol/week: 12.0 standard drinks     Types: 12 Cans of beer per week     Frequency: 2-4 times a month     Drinks per session: 1 or 2     Comment: HAS NOT DRANK SINCE 19    Drug use: Never    Sexual activity: Not on file   Lifestyle    Physical activity     Days per week: Not on file     Minutes per session: Not on file    Stress: Not on file   Relationships    Social connections     Talks on phone: Not on file     Gets together: Not on file     Attends Episcopal service: Not on file     Active member of club or organization: Not on file     Attends meetings of clubs or organizations: Not on file     Relationship status: Not on file    Intimate partner violence     Fear of current or ex partner: Not on file     Emotionally abused: Not on file     Physically abused: Not on file     Forced sexual activity: Not on file   Other Topics Concern    Not on file   Social History Narrative    Occ coke; 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   CONSTITUTIONAL:   Awake, alert, cooperative  PSYCHIATRIC :  Oriented to time, place and person     Appropriate insight to disease process  EYES: Lids and lashes normal  ENT:  External ears and nose without lesions   Hearing deficits absent  NECK: Supple, symmetrical, trachea midline   Carotid bruit absent  LUNGS:  No increased work of breathing                 Clear to auscultation  CARDIOVASCULAR:  regular rate and rhythm   ABDOMEN:  soft, non-distended, non-tender  SKIN:   Normal skin color   Texture and turgor normal, no induration  EXTREMITIES:   R LE Edema absent, no open wounds  L LE Edema absent, no open wounds  R femoral 2+ L femoral 2   R posterior tibial Weakly biphasic L posterior tibial biphasic   R dorsalis pedis monophasic L dorsalis pedis monophasic     RADIOLOGY:  3/1/21  R MADI 0.46, TBI 0.11  L MADI  0.77, TBI 0.52      A/P PVD with hx of lifestyle limiting claudication  S/P R common femoral endarterectomy, profundoplasty  S/P L femoral endarterectomy, fem pop bypass  · Pt is improved but still sx - R LE lifestyle limiting claudication  · Due to issues during procedure atherectomy preformed but access lost so plasty of sfa pop segment was not preformed  · Continue medical management with asa, plavix, pletal and statin  · Emphasized importance of continued Tobacco cessation  · Last smoked in 2016  · they understood that with tobacco use they are at increased risk of worsening of their pvd and increased risk of limb loss  · Discussed with patient pathophysiology of pvd, claudication, tissues loss, rest pain, and potential for limb loss  · Discussed with patient symptoms of new onset claudication, tissue loss, rest pain, changes in lower extremity coolness, pain and they understood to go to ER immediately if any symptoms developed  · Emphasized walking program  · Emphasized improved control of dm - last hgb  1c 12.7  · May need R LE reintervention - plasty  · Fu in 4-6 weeks  · Understands that long term significant concerns due to profound degree of his arterial disease    Negrito Scott

## 2021-03-23 NOTE — PATIENT INSTRUCTIONS
Patient Education        Learning About Peripheral Arterial Disease (PAD)  What is peripheral arterial disease? Peripheral arterial disease (PAD) is narrowing or blockage of arteries that causes poor blood flow to your arms and legs. PAD is most common in the legs. The most common cause of PAD is the buildup of plaque on the inside of arteries. Over time, plaque builds up in the walls of the arteries, including those that supply blood to your legs. If you have PAD, you're likely to have plaque in other arteries in your body. This raises your risk of a heart attack and stroke. Medicines and lifestyle changes may lower your risk of heart attack and stroke. They may also help if you have symptoms. In some cases, surgery or other treatment is needed. Peripheral arterial disease is also called peripheral vascular disease. What are the symptoms? Many people who have PAD don't have symptoms. If you have symptoms, they may include a tight, aching, or squeezing pain in your calf, thigh, or buttock. This pain is called intermittent claudication. It usually happens after you have walked a certain distance. The pain goes away if you stop walking. As PAD gets worse, you may have pain in your foot or toe when you aren't walking. Other symptoms may include weak or tired legs. You might have trouble walking or balancing. If PAD gets worse, you may have other symptoms caused by poor blood flow to your legs and feet. These symptoms aren't common. They may include cold or numb feet or toes, sores that are slow to heal, or leg or foot pain when you're at rest.  How can you prevent PAD? · Quit smoking. Quitting smoking is one of the best things you can do to help prevent PAD. If you need help quitting, talk to your doctor about stop-smoking programs and medicines. These can increase your chances of quitting for good. · Stay at a healthy weight.   · Manage other health problems, including diabetes, high blood pressure, and high cholesterol. · Be physically active. Try to do moderate activity at least 2½ hours a week. Or try to do vigorous activity at least 1¼ hours a week. You may want to walk or try other activities, such as running, swimming, cycling, or playing tennis or team sports. · Eat a variety of heart-healthy foods. ? Eat fruits, vegetables, whole grains, beans, and other high-fiber foods. ? Eat lean proteins, such as seafood, lean meats, beans, nuts, and soy products. ? Eat healthy fats, such as canola and olive oil. ? Choose foods that are low in saturated fat and avoid trans fat. ? Limit sodium and alcohol. ? Limit drinks and foods with added sugar. How is PAD treated? Treatment for PAD focuses on relieving symptoms and lowering your risk of heart attack and stroke. Making healthy lifestyle changes can help you lower this risk. · If you smoke, quit. Quitting is the best thing you can do when you have PAD. Medicines and counseling can help you quit for good. · Get regular exercise (if your doctor says it's safe). Try walking, swimming, or biking for at least 30 minutes on most, if not all, days of the week. If you have leg symptoms when you exercise, your doctor might recommend a specialized exercise program that may relieve symptoms. The goal is to be able to walk farther without pain. · Eat heart-healthy foods, such as vegetables, fruits, nuts, beans, fish, and whole grains. Limit foods that have a lot of salt, fat, and sugar. · Stay at a healthy weight. Lose weight if you need to. You may need medicines to help lower your risk of heart attack and stroke. These include medicine to prevent blood clots, improve cholesterol, or lower blood pressure. You also may take a medicine that can help ease pain while you are walking. People who have severe PAD may have bypass surgery or other procedures (such as angioplasty) to restore proper blood flow to the legs.   Follow-up care is a key part of your treatment and safety. Be sure to make and go to all appointments, and call your doctor if you are having problems. It's also a good idea to know your test results and keep a list of the medicines you take. Where can you learn more? Go to https://josafat.46elks. org and sign in to your [employer] account. Enter O401 in the [facility] box to learn more about \"Learning About Peripheral Arterial Disease (PAD). \"     If you do not have an account, please click on the \"Sign Up Now\" link. Current as of: August 31, 2020               Content Version: 12.8  © 4926-5819 [employer], [employer]. Care instructions adapted under license by [facility] ([facility]). If you have questions about a medical condition or this instruction, always ask your healthcare professional. [address] any warranty or liability for your use of this information.

## 2021-04-16 ENCOUNTER — TELEPHONE (OUTPATIENT)
Dept: VASCULAR SURGERY | Age: 51
End: 2021-04-16

## 2021-04-19 ENCOUNTER — OFFICE VISIT (OUTPATIENT)
Dept: VASCULAR SURGERY | Age: 51
End: 2021-04-19
Payer: COMMERCIAL

## 2021-04-19 DIAGNOSIS — Z12.5 SCREENING FOR PROSTATE CANCER: ICD-10-CM

## 2021-04-19 DIAGNOSIS — R53.83 FATIGUE, UNSPECIFIED TYPE: ICD-10-CM

## 2021-04-19 DIAGNOSIS — E11.51 TYPE 2 DIABETES MELLITUS WITH DIABETIC PERIPHERAL ANGIOPATHY WITHOUT GANGRENE, WITHOUT LONG-TERM CURRENT USE OF INSULIN (HCC): Chronic | ICD-10-CM

## 2021-04-19 DIAGNOSIS — I73.9 PVD (PERIPHERAL VASCULAR DISEASE) WITH CLAUDICATION (HCC): Primary | ICD-10-CM

## 2021-04-19 DIAGNOSIS — E78.00 PURE HYPERCHOLESTEROLEMIA: ICD-10-CM

## 2021-04-19 LAB
BASOPHILS ABSOLUTE: 0.07 E9/L (ref 0–0.2)
BASOPHILS RELATIVE PERCENT: 0.6 % (ref 0–2)
EOSINOPHILS ABSOLUTE: 0.39 E9/L (ref 0.05–0.5)
EOSINOPHILS RELATIVE PERCENT: 3.6 % (ref 0–6)
HCT VFR BLD CALC: 45.4 % (ref 37–54)
HEMOGLOBIN: 15.1 G/DL (ref 12.5–16.5)
IMMATURE GRANULOCYTES #: 0.03 E9/L
IMMATURE GRANULOCYTES %: 0.3 % (ref 0–5)
LYMPHOCYTES ABSOLUTE: 2.91 E9/L (ref 1.5–4)
LYMPHOCYTES RELATIVE PERCENT: 26.7 % (ref 20–42)
MCH RBC QN AUTO: 31.5 PG (ref 26–35)
MCHC RBC AUTO-ENTMCNC: 33.3 % (ref 32–34.5)
MCV RBC AUTO: 94.8 FL (ref 80–99.9)
MONOCYTES ABSOLUTE: 0.71 E9/L (ref 0.1–0.95)
MONOCYTES RELATIVE PERCENT: 6.5 % (ref 2–12)
NEUTROPHILS ABSOLUTE: 6.79 E9/L (ref 1.8–7.3)
NEUTROPHILS RELATIVE PERCENT: 62.3 % (ref 43–80)
PDW BLD-RTO: 12.4 FL (ref 11.5–15)
PLATELET # BLD: 160 E9/L (ref 130–450)
PMV BLD AUTO: 10.6 FL (ref 7–12)
RBC # BLD: 4.79 E12/L (ref 3.8–5.8)
WBC # BLD: 10.9 E9/L (ref 4.5–11.5)

## 2021-04-19 PROCEDURE — 99214 OFFICE O/P EST MOD 30 MIN: CPT | Performed by: SURGERY

## 2021-04-19 NOTE — PROGRESS NOTES
disease) ([facility] [address] 1/15/2018    S/P femoral-popliteal bypass surgery 9/9/2019    Type 2 diabetes mellitus with diabetic peripheral angiopathy without gangrene, without long-term current use of insulin ([facility] [address] 1/15/2018     Past Surgical History:        Procedure Laterality Date    FEMORAL BYPASS Left 8/16/2019    FEMORAL POPLITEAL BYPASS WITH FEMORAL ENDARTERECTOMY [facility]    RIGHT FEMORAL ENDARTERECTOMY performed by Rosemarie Cummins MD at [address]  10/04/2016    Dr Kitchen Shadow - athrectomy/pci right fem/pop    OTHER SURGICAL HISTORY  06/19/2018    Dr Kitchen Shadow - PCI w/ athrectomy RLE Common Illiac to Popliteal    VASCULAR SURGERY  07/2019    ANGIOGRAM    WISDOM TOOTH EXTRACTION       Current Medications:   Current Outpatient Medications   Medication Sig Dispense Refill    citalopram (CELEXA) 40 MG tablet Take 1 tablet by mouth daily Must keep appt on 9-21-20 30 tablet 0    clopidogrel (PLAVIX) 75 MG tablet TAKE 1 TABLET BY MOUTH  DAILY 30 tablet 11    atorvastatin (LIPITOR) 40 MG tablet Take 1 tablet by mouth daily 90 tablet 2    glipiZIDE (GLUCOTROL XL) 10 MG extended release tablet Take 1 tablet by mouth daily 90 tablet 0    lisinopril (PRINIVIL;ZESTRIL) 40 MG tablet Take 1 tablet by mouth daily 90 tablet 1    cilostazol (PLETAL) 100 MG tablet TAKE 1 TABLET BY MOUTH TWO  TIMES DAILY 180 tablet 3    gabapentin (NEURONTIN) 100 MG capsule Take 100 mg by mouth 2 times daily.  metFORMIN (GLUCOPHAGE) 500 MG tablet Take 500 mg by mouth 2 times daily (with meals)       omeprazole (PRILOSEC) 20 MG delayed release capsule Take 20 mg by mouth Daily       aspirin (ASPIRIN CHILDRENS) 81 MG chewable tablet Take 1 tablet by mouth daily 30 tablet 3     No current facility-administered medications for this visit. Allergies:  Patient has no known allergies.   Social History Socioeconomic History    Marital status:      Spouse name: Not on file    Number of children: Not on file    Years of education: Not on file    Highest education level: Not on file   Occupational History    Not on file   Social Needs    Financial resource strain: Not on file    Food insecurity     Worry: Not on file     Inability: Not on file    Transportation needs     Medical: Not on file     Non-medical: Not on file   Tobacco Use    Smoking status: Former Smoker     Packs/day: 1.50     Years: 28.00     Pack years: 42.00     Types: Cigarettes     Start date: 80     Quit date: 10/15/2016     Years since quittin.5    Smokeless tobacco: Former User     Types: Snuff     Quit date: 2004   Substance and Sexual Activity    Alcohol use: Not Currently     Alcohol/week: 12.0 standard drinks     Types: 12 Cans of beer per week     Frequency: 2-4 times a month     Drinks per session: 1 or 2     Comment: HAS NOT DRANK SINCE 19    Drug use: Never    Sexual activity: Not on file   Lifestyle    Physical activity     Days per week: Not on file     Minutes per session: Not on file    Stress: Not on file   Relationships    Social connections     Talks on phone: Not on file     Gets together: Not on file     Attends Adventism service: Not on file     Active member of club or organization: Not on file     Attends meetings of clubs or organizations: Not on file     Relationship status: Not on file    Intimate partner violence     Fear of current or ex partner: Not on file     Emotionally abused: Not on file     Physically abused: Not on file     Forced sexual activity: Not on file   Other Topics Concern    Not on file   Social History Narrative    Occ [employer]; 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     Family History   Problem Relation Age of Onset    Asthma Mother     Other Father         vascular problems    Cancer Father         prostate, lung    Diabetes Father      Labs  Lab Results   Component Value Date WBC 9.4 03/10/2021    HGB 12.5 03/10/2021    HCT 37.1 03/10/2021     03/10/2021    PROTIME 11.9 03/09/2021    INR 1.1 03/09/2021    APTT 24.8 08/16/2019    K 4.1 03/10/2021    BUN 12 03/10/2021    CREATININE 0.7 03/10/2021     PHYSICAL EXAM:    There were no vitals taken for this visit.   CONSTITUTIONAL:   Awake, alert, cooperative  PSYCHIATRIC :  Oriented to time, place and person     Appropriate insight to disease process  EYES: Lids and lashes normal  ENT:  External ears and nose without lesions   Hearing deficits absent  NECK: Supple, symmetrical, trachea midline   Carotid bruit absent  LUNGS:  No increased work of breathing                 Clear to auscultation  CARDIOVASCULAR:  regular rate and rhythm   ABDOMEN:  soft, non-distended, non-tender  SKIN:   Normal skin color   Texture and turgor normal, no induration  EXTREMITIES:   R LE Edema absent, no open wounds  L LE Edema absent, no open wounds  R femoral 2+ L femoral 2   R posterior tibial Weakly biphasic L posterior tibial biphasic   R dorsalis pedis monophasic L dorsalis pedis Weakly biphasic     RADIOLOGY:  3/1/21  R MADI 0.46, TBI 0.11  L MADI  0.77, TBI 0.52      A/P PVD with hx of lifestyle limiting claudication  S/P R common femoral endarterectomy, profundoplasty  S/P L femoral endarterectomy, fem pop bypass  · He continues to have R LE lifestyle limiting claudication but it is stable  · Patient does not want to proceed with further intervention at this time  · Due to issues during procedure atherectomy preformed but access lost so plasty of sfa pop segment was not preformed  · Continue medical management with asa, plavix, pletal and statin  · Emphasized importance of continued Tobacco cessation  · Last smoked in 2016  · they understood that with tobacco use they are at increased risk of worsening of their pvd and increased risk of limb loss  · Discussed with patient pathophysiology of pvd, claudication, tissues loss, rest pain, and potential for limb loss  · Discussed with patient symptoms of new onset claudication, tissue loss, rest pain, changes in lower extremity coolness, pain and they understood to go to ER immediately if any symptoms developed  · Emphasized walking program  · Emphasized improved control of dm - last hgb  1c 12.7  · Understands that long term significant concerns due to profound degree of his arterial disease  · Fu in 3 months with abis and pvrs    Negrito Scott

## 2021-04-20 LAB
ALBUMIN SERPL-MCNC: 4.6 G/DL (ref 3.5–5.2)
ALP BLD-CCNC: 96 U/L (ref 40–129)
ALT SERPL-CCNC: 12 U/L (ref 0–40)
ANION GAP SERPL CALCULATED.3IONS-SCNC: 18 MMOL/L (ref 7–16)
AST SERPL-CCNC: 13 U/L (ref 0–39)
BILIRUB SERPL-MCNC: <0.2 MG/DL (ref 0–1.2)
BUN BLDV-MCNC: 32 MG/DL (ref 6–20)
CALCIUM SERPL-MCNC: 10.4 MG/DL (ref 8.6–10.2)
CHLORIDE BLD-SCNC: 100 MMOL/L (ref 98–107)
CHOLESTEROL, TOTAL: 166 MG/DL (ref 0–199)
CO2: 17 MMOL/L (ref 22–29)
CREAT SERPL-MCNC: 0.9 MG/DL (ref 0.7–1.2)
GFR AFRICAN AMERICAN: >60
GFR NON-AFRICAN AMERICAN: >60 ML/MIN/1.73
GLUCOSE FASTING: 260 MG/DL (ref 74–99)
HBA1C MFR BLD: 10.9 % (ref 4–5.6)
HDLC SERPL-MCNC: 31 MG/DL
LDL CHOLESTEROL CALCULATED: ABNORMAL MG/DL (ref 0–99)
POTASSIUM SERPL-SCNC: 5 MMOL/L (ref 3.5–5)
PROSTATE SPECIFIC ANTIGEN: 0.58 NG/ML (ref 0–4)
SODIUM BLD-SCNC: 135 MMOL/L (ref 132–146)
TOTAL PROTEIN: 7.8 G/DL (ref 6.4–8.3)
TRIGL SERPL-MCNC: 401 MG/DL (ref 0–149)
TSH SERPL DL<=0.05 MIU/L-ACNC: 2.65 UIU/ML (ref 0.27–4.2)
VLDLC SERPL CALC-MCNC: ABNORMAL MG/DL

## 2021-04-20 NOTE — PATIENT INSTRUCTIONS
high cholesterol. · Be physically active. Try to do moderate activity at least 2½ hours a week. Or try to do vigorous activity at least 1¼ hours a week. You may want to walk or try other activities, such as running, swimming, cycling, or playing tennis or team sports. · Eat a variety of heart-healthy foods. ? Eat fruits, vegetables, whole grains, beans, and other high-fiber foods. ? Eat lean proteins, such as seafood, lean meats, beans, nuts, and soy products. ? Eat healthy fats, such as canola and olive oil. ? Choose foods that are low in saturated fat and avoid trans fat. ? Limit sodium and alcohol. ? Limit drinks and foods with added sugar. How is PAD treated? Treatment for PAD focuses on relieving symptoms and lowering your risk of heart attack and stroke. Making healthy lifestyle changes can help you lower this risk. · If you smoke, quit. Quitting is the best thing you can do when you have PAD. Medicines and counseling can help you quit for good. · Get regular exercise (if your doctor says it's safe). Try walking, swimming, or biking for at least 30 minutes on most, if not all, days of the week. If you have leg symptoms when you exercise, your doctor might recommend a specialized exercise program that may relieve symptoms. The goal is to be able to walk farther without pain. · Eat heart-healthy foods, such as vegetables, fruits, nuts, beans, fish, and whole grains. Limit foods that have a lot of salt, fat, and sugar. · Stay at a healthy weight. Lose weight if you need to. You may need medicines to help lower your risk of heart attack and stroke. These include medicine to prevent blood clots, improve cholesterol, or lower blood pressure. You also may take a medicine that can help ease pain while you are walking. People who have severe PAD may have bypass surgery or other procedures (such as angioplasty) to restore proper blood flow to the legs.   Follow-up care is a key part of your treatment and

## 2021-05-10 PROBLEM — F17.219 CIGARETTE NICOTINE DEPENDENCE WITH NICOTINE-INDUCED DISORDER: Status: RESOLVED | Noted: 2020-09-21 | Resolved: 2021-05-10

## 2021-07-14 ENCOUNTER — TELEPHONE (OUTPATIENT)
Dept: VASCULAR SURGERY | Age: 51
End: 2021-07-14

## 2021-07-14 NOTE — TELEPHONE ENCOUNTER
Patient has (L) leg pain and swelling, onset 7-14-21, he had an MADI in [place] for disability on 7-7-21,  (attempting to obtain results). Please advise, thank you.     If he can come in to see [facility] 7/15 Thursday    Otherwise I can seen see him Monday    olivia

## 2021-07-15 ENCOUNTER — OFFICE VISIT (OUTPATIENT)
Dept: VASCULAR SURGERY | Age: 51
End: 2021-07-15
Payer: COMMERCIAL

## 2021-07-15 ENCOUNTER — NURSE ONLY (OUTPATIENT)
Dept: PRIMARY CARE CLINIC | Age: 51
End: 2021-07-15

## 2021-07-15 VITALS — HEIGHT: 70 IN | WEIGHT: 195 LBS | BODY MASS INDEX: 27.92 KG/M2

## 2021-07-15 DIAGNOSIS — I73.9 PVD (PERIPHERAL VASCULAR DISEASE) WITH CLAUDICATION (HCC): Primary | ICD-10-CM

## 2021-07-15 DIAGNOSIS — I70.222 ATHEROSCLEROSIS OF NATIVE ARTERY OF LEFT LOWER EXTREMITY WITH REST PAIN (HCC): ICD-10-CM

## 2021-07-15 DIAGNOSIS — Z95.828 S/P FEMORAL-POPLITEAL BYPASS SURGERY: ICD-10-CM

## 2021-07-15 PROBLEM — I70.229 ATHEROSCLEROSIS OF NATIVE ARTERIES OF EXTREMITY WITH REST PAIN (HCC): Status: ACTIVE | Noted: 2021-07-15

## 2021-07-15 PROCEDURE — 99215 OFFICE O/P EST HI 40 MIN: CPT | Performed by: SURGERY

## 2021-07-15 NOTE — PROGRESS NOTES
Chief Complaint:   Chief Complaint   Patient presents with    Follow-up     left leg pain since Tuesday         HPI: This patient, who has very long and extensive history of peripheral vascular disease, status post multiple angiograms, angioplasties antrectomies etc. done by Dr. Kevin Brewster starting in 2016, along with history of right common femoral artery and right with profundoplasty and repair with bovine patch, on January 31, 2020, history of left distal external iliac and femoral endarterectomy and profundoplasty with left femoral to above-the-knee popliteal bypass with 8 mm ringed propatent [place]-David graft done on in August 2019, developed sudden onset of pain in the left leg, and the night, of the 13th, call the office yesterday, and patient was seen in the office today tells me that his symptoms have gotten slightly better since Monday night but still has trouble sleeping, gets pain in the foot at nighttime, such as to keep the foot in a dependent position, can walk 20 to 30 feet only, has pins-and-needles sensation involving her toes    Patient denies any chest pain or shortness of breath    Patient has no symptoms involving the right leg    Prior to this happening, patient can walk up to 1 block at a time and some done 2 blocks    Patient does not smoke at the present time      Patient denies any focal lateralizing neurological symptoms like loss of speech, vision or loss of function of extremity        No Known Allergies    Current Outpatient Medications   Medication Sig Dispense Refill    clopidogrel (PLAVIX) 75 MG tablet TAKE 1 TABLET BY MOUTH  DAILY 30 tablet 11    cilostazol (PLETAL) 100 MG tablet TAKE 1 TABLET BY MOUTH TWO  TIMES DAILY 180 tablet 3    gabapentin (NEURONTIN) 100 MG capsule Take 100 mg by mouth 2 times daily.        metFORMIN (GLUCOPHAGE) 500 MG tablet Take 500 mg by mouth 2 times daily (with meals)       omeprazole (PRILOSEC) 20 MG delayed release capsule Take 20 mg by mouth Daily       aspirin (ASPIRIN CHILDRENS) 81 MG chewable tablet Take 1 tablet by mouth daily 30 tablet 3    citalopram (CELEXA) 40 MG tablet Take 1 tablet by mouth daily Must keep appt on 9-21-20 30 tablet 0    atorvastatin (LIPITOR) 40 MG tablet Take 1 tablet by mouth daily 90 tablet 2    glipiZIDE (GLUCOTROL XL) 10 MG extended release tablet Take 1 tablet by mouth daily 90 tablet 0    lisinopril (PRINIVIL;ZESTRIL) 40 MG tablet Take 1 tablet by mouth daily 90 tablet 1     No current facility-administered medications for this visit.        Past Medical History:   Diagnosis Date    Anxiety     Chronic obstructive pulmonary disease ([facility] [address] 7/30/2019    Diabetes (HCC)     GERD (gastroesophageal reflux disease)     Gout     Hyperlipidemia     Hypertension     Leg pain     Leg pain     Postoperative anemia due to acute blood loss 8/18/2019    [facility]    S/P femoral-popliteal bypass surgery 9/9/2019    Type 2 diabetes mellitus with diabetic peripheral angiopathy without gangrene, without long-term current use of insulin [facility]       Past Surgical History:   Procedure Laterality Date    FEMORAL BYPASS Left 8/16/2019    FEMORAL POPLITEAL BYPASS WITH FEMORAL ENDARTERECTOMY LEFT LEG performed by Burton Stuart MD at [facility] Right 1/31/2020    RIGHT FEMORAL ENDARTERECTOMY performed by Burton [facility] MD at [facility] [address] HISTORY  10/04/2016    Dr Sarah Lezama - athrectomy/pci right fem/pop    OTHER SURGICAL HISTORY  06/19/2018    Dr Sarah Lezama - PCI w/ athrectomy RLE Common Illiac to Popliteal    VASCULAR SURGERY  07/2019    ANGIOGRAM    WISDOM TOOTH EXTRACTION         Family History   Problem Relation Age of Onset    Asthma Mother     Other Father         vascular problems    Cancer Father         prostate, lung    Diabetes Father        Social History     Socioeconomic History    Marital status:      Spouse name: Not on file    Number of children: Not on file    Years of education: Not on file    Highest education level: Not on file   Occupational History    Not on file   Tobacco Use    Smoking status: Former Smoker     Packs/day: 1.50     Years: 28.00     Pack years: 42.00     Types: Cigarettes     Start date: 80     Quit date: 10/15/2016     Years since quittin.7    Smokeless tobacco: Former User     Types: Snuff     Quit date: 2004   Vaping Use    Vaping Use: Never used   Substance and Sexual Activity    Alcohol use: Not Currently     Alcohol/week: 12.0 standard drinks     Types: 12 Cans of beer per week     Comment: HAS NOT DRANK SINCE 19    Drug use: Never    Sexual activity: Not on file   Other Topics Concern    Not on file   Social History Narrative    Occ [employer]; 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     Social Determinants of Health     Financial Resource Strain:     Difficulty of Paying Living Expenses:    Food Insecurity:     Worried About Running Out of Food in the Last Year:     [address] in the Last Year:    Transportation Needs:     Lack of Transportation (Medical):  Lack of Transportation (Non-Medical):    Physical Activity:     Days of Exercise per Week:     Minutes of Exercise per Session:    Stress:     Feeling of Stress :    Social Connections:     Frequency of Communication with Friends and Family:     Frequency of Social Gatherings with Friends and Family:     Attends Taoism Services:     Active Member of Clubs or Organizations:     Attends Club or Organization Meetings:     Marital Status:    Intimate Partner Violence:     Fear of Current or Ex-Partner:     Emotionally Abused:     Physically Abused:     Sexually Abused:        Review of Systems:  Skin:  No abnormal pigmentation or rash  Eyes:  No blurring, diplopia or vision loss  Ears/Nose/Throat:  No hearing loss or vertigo  Respiratory:  No cough, pleuritic chest pain, dyspnea, or wheezing. History of tobacco use in the past, chronic obstructive lung disease  Cardiovascular: No angina, palpitations . Coronary artery disease, hypertension, hyperlipidemia  Gastrointestinal:  No nausea or vomiting; no abdominal pain or rectal bleeding. GERD  Musculoskeletal:  No arthritis or weakness. Neurologic:  No paralysis, paresis, paresthesia, seizures or headaches  Hematologic/Lymphatic/Immunologic:  No anemia, abnormal bleeding/bruising, fever, chills or night sweats. Endocrine:  No heat or cold intolerance. No polyphagia, polydipsia or polyuria. Diabetes mellitus      Physical Exam:  General appearance:  Alert, awake, oriented x 3. No distress. Skin:  Warm and dry  Head:  Normocephalic. No masses, lesions or tenderness  Eyes:  Conjunctivae appear normal; PERRL  Ears:  External ears normal  Nose/Sinuses:  Septum midline, mucosa normal; no drainage  Oropharynx:  Clear, no exudate noted  Neck:  No jugular venous distention, lymphadenopathy or thyromegaly. No evidence of carotid bruit  Lungs:  Clear to ausculation bilaterally. No rhonchi, crackles, wheezes  Heart:  Regular rate and rhythm. No rub or murmur  Abdomen:  Soft, non-tender. No masses, organomegaly. Musculoskeletal : No joint effusions, tenderness swelling    Neuro: Speech is intact. Moving all extremities. No focal motor or sensory deficits      Extremities:      Right foot is warm to touch, color normal, monophasic Doppler signal, at the ankle and at the level of the popliteal artery    Left foot, slightly purplish, intact range of motion of the ankle and the toes, decreased sensation to light touch distally, barely audible Doppler signal, at the ankle level    Pulses Right  Left    Brachial 3 3    Radial    3=normal   Femoral 2-3 2-3  2=diminished   Popliteal    1=barely palpable   Dorsalis pedis    0=absent   Posterior tibial 0 0  4=aneurysmal             Other pertinent information:1.  The past medical records were reviewed, including the last cardiology notes. 2.  I have the extensive review of all the vascular interventional procedures done by Dr. Nicci Brooks since 2016 including angiograms, angioplasties, colectomies, endarterectomies, bypass surgeries etc. and reviewed the last ankle-brachial index    Assessment:    1. Ischemic pain at rest associated with the left femoral-popliteal tibial arterial occlusive disease, with clinical suspicion of possible occlusion of the left femoral-popliteal bypass graft    2. 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   Status post multiple vascular percutaneous vascular intervention with angiogram angioplasty atherectomy etc. along with distal external iliac and common femoral endarterectomy with profundoplasty, with on the left side left femoral-popliteal bypass graft with a propatent 8 mm [place]-David graft          Plan:       I had a long and detailed discussion the patient and his wife, options, risks benefits and alternatives were explained to them, informed them that I did a complete review of all procedures done by Dr. Nicci Brooks, based upon the clinical history, and my physical examination, consistent with occlusion of left femoral-popliteal bypass graft, because of symptoms of rest pain, recommend that patient go to the emergency room to be admitted, consider angiogram tomorrow, with potential lysis if the angiogram reveals evidence of occluded graft on the left side    Patient however, is extremely reluctant, to go to the emergency room, in fact did refuse to go to the emergency room stating that he will try to put up with it till Monday when he would like to see Dr. Nicci Brooks discussed with him regarding options for management of his vascular status    Risks benefits explained    Patient finally agreed, that if the symptoms gets worse, he will come to the emergency room    Informed him, in the interim, will try to make arrangements to save a spot in the cardiac Cath Lab for angiogram with possible intervention etc.    All his questions were answered            Indicated follow-up: Return if symptoms worsen or fail to improve.

## 2021-07-16 ENCOUNTER — TELEPHONE (OUTPATIENT)
Dept: VASCULAR SURGERY | Age: 51
End: 2021-07-16

## 2021-07-16 DIAGNOSIS — I73.9 PVD (PERIPHERAL VASCULAR DISEASE) WITH CLAUDICATION (HCC): ICD-10-CM

## 2021-07-16 LAB — SARS-COV-2, PCR: NOT DETECTED

## 2021-07-19 ENCOUNTER — OFFICE VISIT (OUTPATIENT)
Dept: VASCULAR SURGERY | Age: 51
End: 2021-07-19
Payer: COMMERCIAL

## 2021-07-19 ENCOUNTER — TELEPHONE (OUTPATIENT)
Dept: CARDIAC CATH/INVASIVE PROCEDURES | Age: 51
End: 2021-07-19

## 2021-07-19 VITALS — HEIGHT: 70 IN | WEIGHT: 195 LBS | BODY MASS INDEX: 27.92 KG/M2

## 2021-07-19 DIAGNOSIS — Z95.828 S/P FEMORAL-POPLITEAL BYPASS SURGERY: ICD-10-CM

## 2021-07-19 DIAGNOSIS — I73.9 PVD (PERIPHERAL VASCULAR DISEASE) WITH CLAUDICATION (HCC): Primary | ICD-10-CM

## 2021-07-19 DIAGNOSIS — T82.898A FEMORAL-POPLITEAL BYPASS GRAFT OCCLUSION, LEFT, INITIAL ENCOUNTER (HCC): ICD-10-CM

## 2021-07-19 DIAGNOSIS — I70.222 ATHEROSCLEROSIS OF NATIVE ARTERY OF LEFT LOWER EXTREMITY WITH REST PAIN (HCC): ICD-10-CM

## 2021-07-19 PROCEDURE — 99214 OFFICE O/P EST MOD 30 MIN: CPT | Performed by: SURGERY

## 2021-07-19 NOTE — TELEPHONE ENCOUNTER
Reminded patient of scheduled procedure on 7/20/21. Instructions given and COVID questionnaire completed.

## 2021-07-20 ENCOUNTER — HOSPITAL ENCOUNTER (INPATIENT)
Dept: CARDIAC CATH/INVASIVE PROCEDURES | Age: 51
LOS: 10 days | Discharge: NURSING, REHAB OR LONG-TERM CARE | DRG: 240 | End: 2021-07-30
Attending: SURGERY | Admitting: SURGERY
Payer: COMMERCIAL

## 2021-07-20 DIAGNOSIS — I70.209 ARTERIAL OCCLUSION, LOWER EXTREMITY (HCC): ICD-10-CM

## 2021-07-20 DIAGNOSIS — T82.898A FEMORAL-POPLITEAL BYPASS GRAFT OCCLUSION, LEFT, INITIAL ENCOUNTER (HCC): ICD-10-CM

## 2021-07-20 PROBLEM — I70.222 ATHEROSCLEROSIS OF NATIVE ARTERY OF LEFT LOWER EXTREMITY WITH REST PAIN (HCC): Status: ACTIVE | Noted: 2020-01-31

## 2021-07-20 LAB
ABO/RH: NORMAL
ANION GAP SERPL CALCULATED.3IONS-SCNC: 11 MMOL/L (ref 7–16)
ANION GAP SERPL CALCULATED.3IONS-SCNC: 11 MMOL/L (ref 7–16)
ANTIBODY SCREEN: NORMAL
APTT: 38.3 SEC (ref 24.5–35.1)
APTT: >240 SEC (ref 24.5–35.1)
BUN BLDV-MCNC: 11 MG/DL (ref 6–20)
BUN BLDV-MCNC: 12 MG/DL (ref 6–20)
CALCIUM SERPL-MCNC: 8.1 MG/DL (ref 8.6–10.2)
CALCIUM SERPL-MCNC: 9 MG/DL (ref 8.6–10.2)
CHLORIDE BLD-SCNC: 106 MMOL/L (ref 98–107)
CHLORIDE BLD-SCNC: 106 MMOL/L (ref 98–107)
CO2: 21 MMOL/L (ref 22–29)
CO2: 24 MMOL/L (ref 22–29)
CREAT SERPL-MCNC: 0.7 MG/DL (ref 0.7–1.2)
CREAT SERPL-MCNC: 0.8 MG/DL (ref 0.7–1.2)
FIBRINOGEN: 457 MG/DL (ref 225–540)
FIBRINOGEN: 565 MG/DL (ref 225–540)
GFR AFRICAN AMERICAN: >60
GFR AFRICAN AMERICAN: >60
GFR NON-AFRICAN AMERICAN: >60 ML/MIN/1.73
GFR NON-AFRICAN AMERICAN: >60 ML/MIN/1.73
GLUCOSE BLD-MCNC: 124 MG/DL (ref 74–99)
GLUCOSE BLD-MCNC: 208 MG/DL (ref 74–99)
HCT VFR BLD CALC: 33.8 % (ref 37–54)
HCT VFR BLD CALC: 34.8 % (ref 37–54)
HCT VFR BLD CALC: 39.3 % (ref 37–54)
HEMOGLOBIN: 11.5 G/DL (ref 12.5–16.5)
HEMOGLOBIN: 11.8 G/DL (ref 12.5–16.5)
HEMOGLOBIN: 13.3 G/DL (ref 12.5–16.5)
INR BLD: 1
MCH RBC QN AUTO: 30.2 PG (ref 26–35)
MCH RBC QN AUTO: 30.3 PG (ref 26–35)
MCH RBC QN AUTO: 30.3 PG (ref 26–35)
MCHC RBC AUTO-ENTMCNC: 33.8 % (ref 32–34.5)
MCHC RBC AUTO-ENTMCNC: 33.9 % (ref 32–34.5)
MCHC RBC AUTO-ENTMCNC: 34 % (ref 32–34.5)
MCV RBC AUTO: 88.9 FL (ref 80–99.9)
MCV RBC AUTO: 89 FL (ref 80–99.9)
MCV RBC AUTO: 89.5 FL (ref 80–99.9)
PDW BLD-RTO: 12.6 FL (ref 11.5–15)
PDW BLD-RTO: 12.7 FL (ref 11.5–15)
PDW BLD-RTO: 12.7 FL (ref 11.5–15)
PLATELET # BLD: 210 E9/L (ref 130–450)
PLATELET # BLD: 233 E9/L (ref 130–450)
PLATELET # BLD: 242 E9/L (ref 130–450)
PMV BLD AUTO: 9.2 FL (ref 7–12)
PMV BLD AUTO: 9.7 FL (ref 7–12)
PMV BLD AUTO: 9.8 FL (ref 7–12)
POTASSIUM REFLEX MAGNESIUM: 4.2 MMOL/L (ref 3.5–5)
POTASSIUM SERPL-SCNC: 4.2 MMOL/L (ref 3.5–5)
PROTHROMBIN TIME: 11 SEC (ref 9.3–12.4)
RBC # BLD: 3.8 E12/L (ref 3.8–5.8)
RBC # BLD: 3.91 E12/L (ref 3.8–5.8)
RBC # BLD: 4.39 E12/L (ref 3.8–5.8)
SODIUM BLD-SCNC: 138 MMOL/L (ref 132–146)
SODIUM BLD-SCNC: 141 MMOL/L (ref 132–146)
WBC # BLD: 12.4 E9/L (ref 4.5–11.5)
WBC # BLD: 15 E9/L (ref 4.5–11.5)
WBC # BLD: 9.6 E9/L (ref 4.5–11.5)

## 2021-07-20 PROCEDURE — 86900 BLOOD TYPING SEROLOGIC ABO: CPT

## 2021-07-20 PROCEDURE — 6360000002 HC RX W HCPCS: Performed by: SURGERY

## 2021-07-20 PROCEDURE — C1751 CATH, INF, PER/CENT/MIDLINE: HCPCS

## 2021-07-20 PROCEDURE — 86901 BLOOD TYPING SEROLOGIC RH(D): CPT

## 2021-07-20 PROCEDURE — C1769 GUIDE WIRE: HCPCS

## 2021-07-20 PROCEDURE — 85610 PROTHROMBIN TIME: CPT

## 2021-07-20 PROCEDURE — 6360000002 HC RX W HCPCS: Performed by: NURSE PRACTITIONER

## 2021-07-20 PROCEDURE — 36415 COLL VENOUS BLD VENIPUNCTURE: CPT

## 2021-07-20 PROCEDURE — 2580000003 HC RX 258: Performed by: PHYSICIAN ASSISTANT

## 2021-07-20 PROCEDURE — C1725 CATH, TRANSLUMIN NON-LASER: HCPCS

## 2021-07-20 PROCEDURE — 2580000003 HC RX 258: Performed by: SURGERY

## 2021-07-20 PROCEDURE — 2500000003 HC RX 250 WO HCPCS: Performed by: SURGERY

## 2021-07-20 PROCEDURE — 80048 BASIC METABOLIC PNL TOTAL CA: CPT

## 2021-07-20 PROCEDURE — 6370000000 HC RX 637 (ALT 250 FOR IP): Performed by: SURGERY

## 2021-07-20 PROCEDURE — 85730 THROMBOPLASTIN TIME PARTIAL: CPT

## 2021-07-20 PROCEDURE — 2709999900 HC NON-CHARGEABLE SUPPLY

## 2021-07-20 PROCEDURE — 6360000002 HC RX W HCPCS

## 2021-07-20 PROCEDURE — 86923 COMPATIBILITY TEST ELECTRIC: CPT

## 2021-07-20 PROCEDURE — 85384 FIBRINOGEN ACTIVITY: CPT

## 2021-07-20 PROCEDURE — C1894 INTRO/SHEATH, NON-LASER: HCPCS

## 2021-07-20 PROCEDURE — 047N3ZZ DILATION OF LEFT POPLITEAL ARTERY, PERCUTANEOUS APPROACH: ICD-10-PCS | Performed by: SURGERY

## 2021-07-20 PROCEDURE — 86850 RBC ANTIBODY SCREEN: CPT

## 2021-07-20 PROCEDURE — 37224 HC FEM POP TERRITORY PLASTY: CPT | Performed by: SURGERY

## 2021-07-20 PROCEDURE — 37211 THROMBOLYTIC ART THERAPY: CPT | Performed by: SURGERY

## 2021-07-20 PROCEDURE — C1887 CATHETER, GUIDING: HCPCS

## 2021-07-20 PROCEDURE — 85027 COMPLETE CBC AUTOMATED: CPT

## 2021-07-20 PROCEDURE — 75710 ARTERY X-RAYS ARM/LEG: CPT | Performed by: SURGERY

## 2021-07-20 PROCEDURE — 2000000000 HC ICU R&B

## 2021-07-20 PROCEDURE — 37224 PR REVSC OPN/PRG FEM/POP W/ANGIOPLASTY UNI: CPT | Performed by: SURGERY

## 2021-07-20 PROCEDURE — 2500000003 HC RX 250 WO HCPCS

## 2021-07-20 PROCEDURE — 047L3ZZ DILATION OF LEFT FEMORAL ARTERY, PERCUTANEOUS APPROACH: ICD-10-PCS | Performed by: SURGERY

## 2021-07-20 PROCEDURE — P9016 RBC LEUKOCYTES REDUCED: HCPCS

## 2021-07-20 RX ORDER — SODIUM CHLORIDE 9 MG/ML
25 INJECTION, SOLUTION INTRAVENOUS PRN
Status: DISCONTINUED | OUTPATIENT
Start: 2021-07-20 | End: 2021-07-25

## 2021-07-20 RX ORDER — ONDANSETRON 4 MG/1
4 TABLET, ORALLY DISINTEGRATING ORAL EVERY 8 HOURS PRN
Status: DISCONTINUED | OUTPATIENT
Start: 2021-07-20 | End: 2021-07-30 | Stop reason: HOSPADM

## 2021-07-20 RX ORDER — HEPARIN SODIUM 10000 [USP'U]/100ML
500 INJECTION, SOLUTION INTRAVENOUS CONTINUOUS
Status: DISCONTINUED | OUTPATIENT
Start: 2021-07-20 | End: 2021-07-21

## 2021-07-20 RX ORDER — SODIUM CHLORIDE 0.9 % (FLUSH) 0.9 %
10 SYRINGE (ML) INJECTION PRN
Status: DISCONTINUED | OUTPATIENT
Start: 2021-07-20 | End: 2021-07-20

## 2021-07-20 RX ORDER — CITALOPRAM 20 MG/1
40 TABLET ORAL DAILY
Status: DISCONTINUED | OUTPATIENT
Start: 2021-07-21 | End: 2021-07-30 | Stop reason: HOSPADM

## 2021-07-20 RX ORDER — ONDANSETRON 2 MG/ML
4 INJECTION INTRAMUSCULAR; INTRAVENOUS EVERY 6 HOURS PRN
Status: DISCONTINUED | OUTPATIENT
Start: 2021-07-20 | End: 2021-07-30 | Stop reason: HOSPADM

## 2021-07-20 RX ORDER — SODIUM CHLORIDE 0.9 % (FLUSH) 0.9 %
5-40 SYRINGE (ML) INJECTION EVERY 12 HOURS SCHEDULED
Status: DISCONTINUED | OUTPATIENT
Start: 2021-07-20 | End: 2021-07-25

## 2021-07-20 RX ORDER — LISINOPRIL 20 MG/1
40 TABLET ORAL DAILY
Status: DISCONTINUED | OUTPATIENT
Start: 2021-07-21 | End: 2021-07-30 | Stop reason: HOSPADM

## 2021-07-20 RX ORDER — PANTOPRAZOLE SODIUM 40 MG/1
40 TABLET, DELAYED RELEASE ORAL
Status: DISCONTINUED | OUTPATIENT
Start: 2021-07-21 | End: 2021-07-30 | Stop reason: HOSPADM

## 2021-07-20 RX ORDER — SODIUM CHLORIDE 0.9 % (FLUSH) 0.9 %
10 SYRINGE (ML) INJECTION EVERY 12 HOURS SCHEDULED
Status: DISCONTINUED | OUTPATIENT
Start: 2021-07-20 | End: 2021-07-20

## 2021-07-20 RX ORDER — SODIUM CHLORIDE 9 MG/ML
25 INJECTION, SOLUTION INTRAVENOUS PRN
Status: DISCONTINUED | OUTPATIENT
Start: 2021-07-20 | End: 2021-07-20

## 2021-07-20 RX ORDER — GABAPENTIN 100 MG/1
100 CAPSULE ORAL 2 TIMES DAILY
Status: DISCONTINUED | OUTPATIENT
Start: 2021-07-20 | End: 2021-07-26

## 2021-07-20 RX ORDER — SODIUM CHLORIDE 9 MG/ML
INJECTION, SOLUTION INTRAVENOUS CONTINUOUS
Status: DISCONTINUED | OUTPATIENT
Start: 2021-07-20 | End: 2021-07-21

## 2021-07-20 RX ORDER — ATORVASTATIN CALCIUM 40 MG/1
40 TABLET, FILM COATED ORAL NIGHTLY
Status: DISCONTINUED | OUTPATIENT
Start: 2021-07-21 | End: 2021-07-30 | Stop reason: HOSPADM

## 2021-07-20 RX ORDER — SODIUM CHLORIDE 9 MG/ML
INJECTION, SOLUTION INTRAVENOUS CONTINUOUS
Status: DISCONTINUED | OUTPATIENT
Start: 2021-07-20 | End: 2021-07-20

## 2021-07-20 RX ORDER — SODIUM CHLORIDE 9 MG/ML
INJECTION, SOLUTION INTRAVENOUS CONTINUOUS PRN
Status: ACTIVE | OUTPATIENT
Start: 2021-07-20 | End: 2021-07-21

## 2021-07-20 RX ORDER — SODIUM CHLORIDE 0.9 % (FLUSH) 0.9 %
5-40 SYRINGE (ML) INJECTION EVERY 12 HOURS SCHEDULED
Status: DISCONTINUED | OUTPATIENT
Start: 2021-07-20 | End: 2021-07-30 | Stop reason: HOSPADM

## 2021-07-20 RX ORDER — FENTANYL CITRATE 50 UG/ML
50 INJECTION, SOLUTION INTRAMUSCULAR; INTRAVENOUS ONCE
Status: COMPLETED | OUTPATIENT
Start: 2021-07-20 | End: 2021-07-20

## 2021-07-20 RX ORDER — SODIUM CHLORIDE 0.9 % (FLUSH) 0.9 %
5-40 SYRINGE (ML) INJECTION PRN
Status: DISCONTINUED | OUTPATIENT
Start: 2021-07-20 | End: 2021-07-25

## 2021-07-20 RX ORDER — SODIUM CHLORIDE 0.9 % (FLUSH) 0.9 %
5-40 SYRINGE (ML) INJECTION PRN
Status: DISCONTINUED | OUTPATIENT
Start: 2021-07-20 | End: 2021-07-30 | Stop reason: HOSPADM

## 2021-07-20 RX ADMIN — SODIUM CHLORIDE: 9 INJECTION, SOLUTION INTRAVENOUS at 08:31

## 2021-07-20 RX ADMIN — HYDROMORPHONE HYDROCHLORIDE 0.5 MG: 1 INJECTION, SOLUTION INTRAMUSCULAR; INTRAVENOUS; SUBCUTANEOUS at 16:09

## 2021-07-20 RX ADMIN — Medication 10 ML: at 20:09

## 2021-07-20 RX ADMIN — GABAPENTIN 100 MG: 100 CAPSULE ORAL at 20:08

## 2021-07-20 RX ADMIN — FENTANYL CITRATE 50 MCG: 50 INJECTION, SOLUTION INTRAMUSCULAR; INTRAVENOUS at 15:23

## 2021-07-20 RX ADMIN — HYDROMORPHONE HYDROCHLORIDE 0.5 MG: 1 INJECTION, SOLUTION INTRAMUSCULAR; INTRAVENOUS; SUBCUTANEOUS at 12:52

## 2021-07-20 RX ADMIN — HYDROMORPHONE HYDROCHLORIDE 1 MG: 1 INJECTION, SOLUTION INTRAMUSCULAR; INTRAVENOUS; SUBCUTANEOUS at 20:08

## 2021-07-20 RX ADMIN — HYDROMORPHONE HYDROCHLORIDE 1 MG: 1 INJECTION, SOLUTION INTRAMUSCULAR; INTRAVENOUS; SUBCUTANEOUS at 17:29

## 2021-07-20 RX ADMIN — ALTEPLASE 1 MG/HR: 2.2 INJECTION, POWDER, LYOPHILIZED, FOR SOLUTION INTRAVENOUS at 12:01

## 2021-07-20 RX ADMIN — ALTEPLASE 1 MG/HR: 2.2 INJECTION, POWDER, LYOPHILIZED, FOR SOLUTION INTRAVENOUS at 18:35

## 2021-07-20 RX ADMIN — CEFAZOLIN 2000 MG: 10 INJECTION, POWDER, FOR SOLUTION INTRAVENOUS at 18:20

## 2021-07-20 RX ADMIN — HEPARIN SODIUM AND DEXTROSE 5 ML/HR: 10000; 5 INJECTION INTRAVENOUS at 12:04

## 2021-07-20 RX ADMIN — HYDROMORPHONE HYDROCHLORIDE 1 MG: 1 INJECTION, SOLUTION INTRAMUSCULAR; INTRAVENOUS; SUBCUTANEOUS at 22:16

## 2021-07-20 ASSESSMENT — PAIN DESCRIPTION - PROGRESSION: CLINICAL_PROGRESSION: NOT CHANGED

## 2021-07-20 ASSESSMENT — PAIN SCALES - GENERAL
PAINLEVEL_OUTOF10: 9
PAINLEVEL_OUTOF10: 6
PAINLEVEL_OUTOF10: 8
PAINLEVEL_OUTOF10: 0
PAINLEVEL_OUTOF10: 4
PAINLEVEL_OUTOF10: 5
PAINLEVEL_OUTOF10: 7
PAINLEVEL_OUTOF10: 0
PAINLEVEL_OUTOF10: 6
PAINLEVEL_OUTOF10: 6
PAINLEVEL_OUTOF10: 10
PAINLEVEL_OUTOF10: 7
PAINLEVEL_OUTOF10: 3
PAINLEVEL_OUTOF10: 9
PAINLEVEL_OUTOF10: 7
PAINLEVEL_OUTOF10: 3
PAINLEVEL_OUTOF10: 9
PAINLEVEL_OUTOF10: 8

## 2021-07-20 ASSESSMENT — PAIN DESCRIPTION - ONSET: ONSET: ON-GOING

## 2021-07-20 ASSESSMENT — PAIN - FUNCTIONAL ASSESSMENT: PAIN_FUNCTIONAL_ASSESSMENT: PREVENTS OR INTERFERES WITH MANY ACTIVE NOT PASSIVE ACTIVITIES

## 2021-07-20 ASSESSMENT — PAIN DESCRIPTION - PAIN TYPE
TYPE: SURGICAL PAIN;ACUTE PAIN
TYPE: SURGICAL PAIN;ACUTE PAIN

## 2021-07-20 ASSESSMENT — PAIN DESCRIPTION - ORIENTATION: ORIENTATION: LEFT

## 2021-07-20 ASSESSMENT — PAIN DESCRIPTION - FREQUENCY: FREQUENCY: CONTINUOUS

## 2021-07-20 ASSESSMENT — PAIN DESCRIPTION - DESCRIPTORS: DESCRIPTORS: ACHING;NUMBNESS

## 2021-07-20 ASSESSMENT — PAIN DESCRIPTION - LOCATION: LOCATION: LEG

## 2021-07-20 NOTE — PROGRESS NOTES
Patient admitted to 12. Patient connected to bedside monitor. All lines & drained checked in place. Patient GARCÍA & following commands. C/o 12/10 pain at LLE.

## 2021-07-20 NOTE — PROGRESS NOTES
Vascular Surgery Outpatient Followup    PCP : Alyse Vega,     Previous lower extremity procedures  10/4/16 R EIA plasty  R CFA, SFA, Popliteal atherectomy/plasty  R SFA, Popliteal plasty with DCB   6/19/18 R CFA, SFA, popliteal therectomy  R CFA plasty 6x150  R SFA, popliteal plasty 6x150 DCB   7/3/18 L CFA plasty with 7x60 DCB, 8x40 evercross  L profunda plasty 5x60 evercross   7/23/19 Left angiogram, Left Common femoral and profunda plasty with 6x80 evercross, 9x80 evercross    8/16/19 Left distal external iliac and femoral endarterectomy  Left Deep femoral endarterectomy with profundoplasty   Left fem ak pop bypass with 8 mm ringed propatent PTFE graft  Angiogram with plasty of bk pop with 5x150 [facility]   1/31/20 Right distal external iliac and femoral endarterectomy with repair with bovine pericardial patch   Deep femoral endarterectomy with profundoplasty    6/16/20 R profunda plasty 5x60  R sfa, pop atherectomy, 6x250 DCB   3/16/21 R sfa pop atherectomy       HISTORY OF PRESENT ILLNESS:    The patient is a 48 y.o. male who is here in regards to follow up of their PVD. Acute onset of L LE rest pain. He was seen by Dr. Ele Hunt who on exam believes he had developed occlusion of his bypass. Plan is for tommorow to do lysis of his bypass in cath lab. Since intervention 3/16/21 he is is stable at 150 yards in his right lower extremity which remains lifestyle limiting but stable. Previously since 12/2020 he had right calf claudication at 50 yards. Prior to that he had no significant claudication. He has no rest pain or ulceration. He remains on asa, plavix, pletal, statin. Last hgba1c is 12.7    He is currently not working. He is on disability.       Past Medical History:        Diagnosis Date    Anxiety     Atherosclerosis of native arteries of extremity with rest pain ([address] 7/15/2021    Chronic obstructive pulmonary disease ([address] 7/30/2019    Diabetes ([address]     Femoral-popliteal bypass graft occlusion, left, initial encounter [facility] 7/19/2021    GERD (gastroesophageal reflux disease)     Gout     Hyperlipidemia     Hypertension     Leg pain     Leg pain     Postoperative anemia due to acute blood loss 8/18/2019    PVD (peripheral vascular disease) ([facility] 1/15/2018    S/P femoral-popliteal bypass surgery 9/9/2019    Type 2 diabetes mellitus with diabetic peripheral angiopathy without gangrene, without long-term current use of insulin ([facility] 1/15/2018     Past Surgical History:        Procedure Laterality Date    FEMORAL BYPASS Left 8/16/2019    FEMORAL POPLITEAL BYPASS WITH FEMORAL ENDARTERECTOMY LEFT LEG performed by Migdalia Jessica MD at [place] [address] Right 1/31/2020    RIGHT FEMORAL ENDARTERECTOMY performed by Migdalia Jessica MD at [place] [address]  10/04/2016    Dr Eder Daly - athrectomy/pci right fem/pop    OTHER SURGICAL HISTORY  06/19/2018    Dr Eder Daly - PCI w/ athrectomy RLE Common Illiac to Popliteal    VASCULAR SURGERY  07/2019    ANGIOGRAM    WISDOM TOOTH EXTRACTION       Current Medications:   Current Outpatient Medications   Medication Sig Dispense Refill    clopidogrel (PLAVIX) 75 MG tablet TAKE 1 TABLET BY MOUTH  DAILY 30 tablet 11    cilostazol (PLETAL) 100 MG tablet TAKE 1 TABLET BY MOUTH TWO  TIMES DAILY 180 tablet 3    gabapentin (NEURONTIN) 100 MG capsule Take 100 mg by mouth 2 times daily.        metFORMIN (GLUCOPHAGE) 500 MG tablet Take 500 mg by mouth 2 times daily (with meals)       omeprazole (PRILOSEC) 20 MG delayed release capsule Take 20 mg by mouth Daily       aspirin (ASPIRIN CHILDRENS) 81 MG chewable tablet Take 1 tablet by mouth daily 30 tablet 3    citalopram (CELEXA) 40 MG tablet Take 1 tablet by mouth daily Must keep appt on 9-21-20 30 tablet 0    atorvastatin (LIPITOR) 40 MG tablet Take 1 tablet by mouth daily 90 tablet 2    glipiZIDE (GLUCOTROL XL) 10 MG extended release tablet Take 1 tablet by mouth daily 90 tablet 0    lisinopril (PRINIVIL;ZESTRIL) 40 MG tablet Take 1 tablet by mouth daily 90 tablet 1     No current facility-administered medications for this visit. Facility-Administered Medications Ordered in Other Visits   Medication Dose Route Frequency Provider Last Rate Last Admin    0.9 % sodium chloride infusion   Intravenous Continuous Gab Capellan PA-C 75 mL/hr at 21 0831 New Bag at 21 0831    sodium chloride flush 0.9 % injection 10 mL  10 mL Intravenous 2 times per day Jacquelyn Ascencio PA-C        sodium chloride flush 0.9 % injection 10 mL  10 mL Intravenous PRN Jacquelyn Ascencio PA-C        0.9 % sodium chloride infusion  25 mL Intravenous PRN Jacquelyn Ascencio PA-C        ceFAZolin (ANCEF) 2000 mg in sterile water 20 mL IV syringe  2,000 mg Intravenous On Call to [address] [place] [address] DELMIS Kline        alteplase (CATHFLO) 10 mg in sodium chloride 0.9 % 100 mL infusion  1 mg/hr Intracatheter Continuous Zeenat Baptiste MD        heparin 25,000 units in dextrose 5% 250 mL (premix) infusion  500 Units/hr Intracatheter Continuous Zeenat Baptiste MD        0.9 % sodium chloride infusion   Intracatheter Continuous Zeenat Baptiste MD         Allergies:  Patient has no known allergies.   Social History     Socioeconomic History    Marital status:      Spouse name: Not on file    Number of children: Not on file    Years of education: Not on file    Highest education level: Not on file   Occupational History    Not on file   Tobacco Use    Smoking status: Former Smoker     Packs/day: 1.50     Years: 28.00     Pack years: 42.00     Types: Cigarettes     Start date: 80     Quit date: 10/15/2016     Years since quittin.7    Smokeless tobacco: Former User     Types: Snuff     Quit date: 2004   Vaping Use    Vaping Use: Never used   Substance and Sexual Activity    Alcohol use: Not Currently     Alcohol/week: 12.0 standard drinks     Types: 12 Cans of beer per week     Comment: HAS NOT DRANK SINCE 12/13/19    Drug use: Never    Sexual activity: Not on file   Other Topics Concern    Not on file   Social History Narrative    Occ [employer]; 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     Social Determinants of Health     Financial Resource Strain:     Difficulty of Paying Living Expenses:    Food Insecurity:     Worried About Running Out of Food in the Last Year:     [address] in the Last Year:    Transportation Needs:     Lack of Transportation (Medical):      Lack of Transportation (Non-Medical):    Physical Activity:     Days of Exercise per Week:     Minutes of Exercise per Session:    Stress:     Feeling of Stress :    Social Connections:     Frequency of Communication with Friends and Family:     Frequency of Social Gatherings with Friends and Family:     Attends [employer] Services:     Active Member of Clubs or Organizations:     Attends Club or Organization Meetings:     Marital Status:    Intimate Partner Violence:     Fear of Current or Ex-Partner:     Emotionally Abused:     Physically Abused:     Sexually Abused:      Family History   Problem Relation Age of Onset    Asthma Mother     Other Father         vascular problems    Cancer Father         prostate, lung    Diabetes Father      Labs  Lab Results   Component Value Date    WBC 9.6 07/20/2021    HGB 13.3 07/20/2021    HCT 39.3 07/20/2021     07/20/2021    PROTIME 11.0 07/20/2021    INR 1.0 07/20/2021    APTT 24.8 08/16/2019    K 4.2 07/20/2021    BUN 12 07/20/2021    CREATININE 0.8 07/20/2021     PHYSICAL EXAM:    Ht 5' 10\" (1.778 m)   Wt 195 lb (88.5 kg)   BMI 27.98 kg/m²   CONSTITUTIONAL:   Awake, alert, cooperative  PSYCHIATRIC :  Oriented to time, place and person     Appropriate insight to disease process  EYES: Lids and lashes normal  ENT:  External ears and nose without lesions   Hearing deficits absent  NECK: Supple, symmetrical, trachea midline   Carotid bruit absent  LUNGS:  No increased work of breathing                 Clear to auscultation  CARDIOVASCULAR:  regular rate and rhythm   ABDOMEN:  soft, non-distended, non-tender  SKIN:   Normal skin color   Texture and turgor normal, no induration  EXTREMITIES:   R LE Edema absent, no open wounds  L LE Edema prsent, no open wounds   Rubor of the foot  R femoral 2+ L femoral 1+   R posterior tibial Weakly biphasic L posterior tibial No signal   R dorsalis pedis monophasic L dorsalis pedis No signal     RADIOLOGY:  3/1/21  R MADI 0.46, TBI 0.11  L MADI  0.77, TBI 0.52      A/P PVD with hx of lifestyle limiting claudication  S/P R common femoral endarterectomy, profundoplasty  S/P L femoral endarterectomy, fem pop bypass  · He continues to have R LE lifestyle limiting claudication but it is stable  · Due to issues during procedure atherectomy preformed but access lost so plasty of sfa pop segment was not preformed - at previous visit he did not want to proceed with R LE intevention  · He will need L LE intervention - we discussed angiogram, tpa - associated bleeding issues, not limited to limb loss, death, embolization  I reviewed the procedure with the patient and family as available. I discussed the procedure, risks, benefits, complications, and alternatives of the procedure. They understand and consent.   All questions were answered  · Continue medical management with asa, plavix, pletal and statin  · Emphasized importance of continued Tobacco cessation  · Last smoked in 2016  · they understood that with tobacco use they are at increased risk of worsening of their pvd and increased risk of limb loss  · Discussed with patient pathophysiology of pvd, claudication, tissues loss, rest pain, and potential for limb loss  · Discussed with patient symptoms of new onset claudication, tissue loss, rest pain, changes in lower extremity coolness, pain and they understood to go to ER immediately if any symptoms developed  · Emphasized walking program  · Emphasized improved control of dm - last hgb  1c 12.7  · Understands that long term significant concerns due to profound degree of his arterial disease    Karina Sanon MD

## 2021-07-20 NOTE — PLAN OF CARE
Problem: Bleeding:  Goal: Will show no signs and symptoms of excessive bleeding  Description: Will show no signs and symptoms of excessive bleeding  Outcome: Met This Shift     Problem: Skin Integrity:  Goal: Will show no infection signs and symptoms  Description: Will show no infection signs and symptoms  Outcome: Met This Shift     Problem: Falls - Risk of:  Goal: Will remain free from falls  Description: Will remain free from falls  Outcome: Met This Shift     Problem: Pain:  Goal: Pain level will decrease  Description: Pain level will decrease  Outcome: Met This Shift

## 2021-07-20 NOTE — PROGRESS NOTES
CVICU Admission Note    Name: Orville Burnette  MRN: [id number]    CC: Postoperative Critical Care Management     Indication for Surgery/Procedure: LLE rest pain, thrombosed bypass    Important/Relevant PMH/PSH: HTN, DMII, HLD, PVD with multiple interventions listed below   10/4/16 R EIA plasty  R CFA, SFA, Popliteal atherectomy/plasty  R SFA, Popliteal plasty with [facility]   6/19/18 R CFA, SFA, popliteal therectomy  R CFA plasty 6x150  R SFA, popliteal plasty 6x150 DCB   7/3/18 L CFA plasty with 7x60 DCB, 8x40 evercross  L profunda plasty 5x60 evercross   7/23/19 Left angiogram, Left Common femoral and profunda plasty with 6x80 evercross, 9x80 evercross    8/16/19 Left distal external iliac and femoral endarterectomy  Left Deep femoral endarterectomy with profundoplasty   Left fem ak pop bypass with 8 mm ringed propatent PTFE graft  Angiogram with plasty of bk pop with 5x150 [facility]   1/31/20 Right distal external iliac and femoral endarterectomy with repair with bovine pericardial patch   Deep femoral endarterectomy with profundoplasty    6/16/20 R profunda plasty 5x60  R sfa, pop atherectomy, 6x250 DCB   3/16/21 R sfa pop atherectomy         Procedure/Surgeries: 7/20/2021 catheter directed lysis therapy       Physical Exam:    /64   Pulse 90   Temp 98.1 °F (36.7 °C) (Oral)   Resp 12   Ht 5' 10\" (1.778 m)   Wt 195 lb (88.5 kg)   SpO2 94%   BMI 27.98 kg/m²     Recent Labs     07/20/21  0809   WBC 9.6   RBC 4.39   HGB 13.3   HCT 39.3   MCV 89.5   MCH 30.3   MCHC 33.8   RDW 12.7      MPV 9.8     Recent Labs     07/20/21  0809      K 4.2      CO2 24   BUN 12   CREATININE 0.8   GLUCOSE 124*   CALCIUM 9.0       General Appearance: Arrived to ICU in stable condition, TPA/Heparin running   Eyes: PERRL  Pulmonary: No wheezes, no accessory muscle use noted on room air   Cardiovascular: RRR, no heaves or thrills palpated   Telemetry: SR  Abdomen: Soft, nontender  Extremities: LLE cool, +motor function, decreased sensation, +femoral, absent DP/PT, absent popliteal  RLE with +DP/PT signals   Neurologic/Psych: Alert and oriented, GARCÍA equally, no deficits noted   Skin: Warm and dry   Incision: right femoral catheter insertion site soft, no bleeding      Assessment/Plan: Day of Surgery     1.  Thrombosed left fem-pop bypass S/p catheter directed lysis   - Frequent neurovascular checks, monitor insertion site for signs of bleeding/hematoma    - tPA/heparin infusing   - Monitor H/H, APTT, fibrinogen q 6 hours while tPA infusing  - NPO for now; bedrest  - Ancef while catheters in place  - PRN Dilaudid for pain   - Plan to return to CVL for reimaging tomorrow    Electronically signed by DRAKE Hopper - CNP on 7/20/2021 at 1:12 PM

## 2021-07-20 NOTE — H&P
Vascular Surgery History & Physical Exam      Chief Complaint: Peripheral vascular disease, rest pain of L foot    HISTORY OF PRESENT ILLNESS:                The patient is a 48 y.o. male who presents to the hospital for elective arteriogram with possible intervention. The patient has a history of peripheral vascular disease s/p multiple interventions of b/l LE including left distal external iliac and femoral endarterectomy and profundoplasty with left femoral to above-the-knee popliteal bypass with 8 mm ringed propatent Frankford-David graft done in August 2019. He presented to the office on 7/15 due to sudden onset pain to the L leg, mostly foot. Was recommended by Dr Oxana German to go to ED but patient was reluctant. States the pain has gradually improved but is still a 6-7/10 to the L foot. Denies deficit in motor. Has diminished sensation but unsure if this is new. IMPRESSION:    Active Hospital Problems    Diagnosis     Femoral-popliteal bypass graft occlusion, left, initial encounter ([facility] [S33.397N]     Atherosclerosis of native arteries of extremity with rest pain ([facility] [I70.229]        PLAN:  Aortogram,  Left lower extremity arteriogram, possible intervention, possible lysis. I reviewed the procedure with the patient. I discussed the risks, benefits, and alternatives of the procedure. The patient understands and consents. All questions were answered.     Patient Active Problem List   Diagnosis Code    Type 2 diabetes mellitus with diabetic peripheral angiopathy without gangrene, without long-term current use of insulin ([address] E11.51    Tobacco abuse counseling Z71.6    PVD (peripheral vascular disease) with claudication ([address] I73.9    Pure hypercholesterolemia E78.00    Diabetic mononeuropathy associated with type 2 diabetes mellitus ([address] E11.41    Chronic obstructive pulmonary disease ([address] J44.9    S/P femoral-popliteal bypass surgery Z95.828    Swelling of joint of pelvic region or thigh, left M25.452    Atherosclerosis of native artery of right lower extremity ([facility] I70.201    PVD (peripheral vascular disease) ([facility]) I73.9    Atherosclerosis of native arteries of extremity with rest pain ([facility]) I70.229    Femoral-popliteal bypass graft occlusion, left, initial encounter ([facility] T82.898A       Past Medical History:   Diagnosis Date    Anxiety     Atherosclerosis of native arteries of extremity with rest pain ([facility] 7/15/2021    Chronic obstructive pulmonary disease ([address] 7/30/2019    Diabetes ([address]     Femoral-popliteal bypass graft occlusion, left, initial encounter ([facility] 7/19/2021    GERD (gastroesophageal reflux disease)     Gout     Hyperlipidemia     Hypertension     Leg pain     Leg pain     Postoperative anemia due to acute blood loss 8/18/2019    PVD (peripheral vascular disease) ([address] 1/15/2018    S/P femoral-popliteal bypass surgery 9/9/2019    Type 2 diabetes mellitus with diabetic peripheral angiopathy without gangrene, without long-term current use of insulin ([facility] 1/15/2018        Past Surgical History:   Procedure Laterality Date    FEMORAL BYPASS Left 8/16/2019    FEMORAL POPLITEAL BYPASS WITH FEMORAL ENDARTERECTOMY LEFT LEG performed by Tami Gottron, MD at [place] [address] Right 1/31/2020    RIGHT FEMORAL ENDARTERECTOMY performed by Tami Gottron, MD at [address] [place] [address]  10/04/2016    Dr Khris Corey - athrectomy/pci right fem/pop    OTHER SURGICAL HISTORY  06/19/2018    Dr Khris Corey - PCI w/ athrectomy RLE Common Illiac to Popliteal    VASCULAR SURGERY  07/2019    ANGIOGRAM    WISDOM TOOTH EXTRACTION         Current Medications:     Current Outpatient Medications:     citalopram (CELEXA) 40 MG tablet, Take 1 tablet by mouth daily Must keep appt on 9-21-20, Disp: 30 tablet, Rfl: 0    clopidogrel (PLAVIX) 75 MG tablet, TAKE 1 TABLET BY MOUTH  DAILY, Disp: 30 tablet, Rfl: 11    atorvastatin (LIPITOR) 40 MG tablet, Take 1 tablet by mouth daily, Disp: 90 tablet, Rfl: 2    glipiZIDE (GLUCOTROL XL) 10 MG extended release tablet, Take 1 tablet by mouth daily, Disp: 90 tablet, Rfl: 0    lisinopril (PRINIVIL;ZESTRIL) 40 MG tablet, Take 1 tablet by mouth daily, Disp: 90 tablet, Rfl: 1    cilostazol (PLETAL) 100 MG tablet, TAKE 1 TABLET BY MOUTH TWO  TIMES DAILY, Disp: 180 tablet, Rfl: 3    gabapentin (NEURONTIN) 100 MG capsule, Take 100 mg by mouth 2 times daily. , Disp: , Rfl:     omeprazole (PRILOSEC) 20 MG delayed release capsule, Take 20 mg by mouth Daily , Disp: , Rfl:     aspirin (ASPIRIN CHILDRENS) 81 MG chewable tablet, Take 1 tablet by mouth daily, Disp: 30 tablet, Rfl: 3    metFORMIN (GLUCOPHAGE) 500 MG tablet, Take 500 mg by mouth 2 times daily (with meals) , Disp: , Rfl:     Current Facility-Administered Medications:     0.9 % sodium chloride infusion, , Intravenous, Continuous, Jacquelyn Ascencio PA-C, Last Rate: 75 mL/hr at 21, New Bag at 21    sodium chloride flush 0.9 % injection 10 mL, 10 mL, Intravenous, 2 times per day, Jacquelyn Ascencio PA-C    sodium chloride flush 0.9 % injection 10 mL, 10 mL, Intravenous, PRN, Jacquelyn Ascencio PA-C    0.9 % sodium chloride infusion, 25 mL, Intravenous, PRN, Jacquelyn Ascencio PA-C    ceFAZolin (ANCEF) 2000 mg in sterile water 20 mL IV syringe, 2,000 mg, Intravenous, On Call to OR, Jacquelyn Ascencio PA-C    Allergies:  Patient has no known allergies.     Social History     Socioeconomic History    Marital status:      Spouse name: Not on file    Number of children: Not on file    Years of education: Not on file    Highest education level: Not on file   Occupational History    Not on file   Tobacco Use    Smoking status: Former Smoker     Packs/day: 1.50     Years: 28.00     Pack years: 42.00     Types: Cigarettes     Start date: 80     Quit date: 10/15/2016     Years since quittin.7    Smokeless tobacco: Former User Types: Snuff     Quit date: 1/1/2004   Vaping Use    Vaping Use: Never used   Substance and Sexual Activity    Alcohol use: Not Currently     Alcohol/week: 12.0 standard drinks     Types: 12 Cans of beer per week     Comment: HAS NOT DRANK SINCE 12/13/19    Drug use: Never    Sexual activity: Not on file   Other Topics Concern    Not on file   Social History Narrative    Occ [employer]; 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     Social Determinants of Health     Financial Resource Strain:     Difficulty of Paying Living Expenses:    Food Insecurity:     Worried About Running Out of Food in the Last Year:     [address] in the Last Year:    Transportation Needs:     Lack of Transportation (Medical):  Lack of Transportation (Non-Medical):    Physical Activity:     Days of Exercise per Week:     Minutes of Exercise per Session:    Stress:     Feeling of Stress :    Social Connections:     Frequency of Communication with Friends and Family:     Frequency of Social Gatherings with Friends and Family:     Attends [employer] Services:     Active Member of Clubs or Organizations:     Attends Club or Organization Meetings:     Marital Status:    Intimate Partner Violence:     Fear of Current or Ex-Partner:     Emotionally Abused:     Physically Abused:     Sexually Abused:         Family History   Problem Relation Age of Onset    Asthma Mother     Other Father         vascular problems    Cancer Father         prostate, lung    Diabetes Father        REVIEW OF SYSTEMS:  The chart was reviewed.     PHYSICAL EXAM:    Vitals:    07/20/21 0832   BP: 128/85   Pulse: 86   Temp: 97.2 °F (36.2 °C)   SpO2: 98%     CONSTITUTIONAL:  awake, alert, cooperative, no apparent distress, and appears stated age  NECK:  supple, symmetrical, trachea midline  LUNGS:  no increased work of breathing, good air exchange  CARDIOVASCULAR:  regular rate and rhythm  ABDOMEN:  soft, non-distended  RLE: 3+ femoral, biphasic PT, monophasic DP, normal motor and sensation  LLE: 2+ femoral, monophasic pop, no signal PT or DP, foot cool to touch, 5/5 motor, diminished sensation to dorsum of foot, no sensation to plantar aspect    LABS:    Lab Results   Component Value Date    WBC 10.9 04/19/2021    HGB 15.1 04/19/2021    HCT 45.4 04/19/2021     04/19/2021    PROTIME 11.9 03/09/2021    INR 1.1 03/09/2021    APTT 24.8 08/16/2019    K 5.0 04/19/2021    BUN 32 (H) 04/19/2021    CREATININE 0.9 04/19/2021     Marie Quinones MD

## 2021-07-20 NOTE — OP NOTE
Cardiovascular Lab Procedure Report    Loida Ferrera  1970    Date : 7/20/2021  Surgeon: Star Brink M.D. Pre-procedure Diagnosis: L LE rest pain, thrombosed bypass  Post-procedure Diagnosis: Same  Procedure:      Right  common femoral artery access   with US guidance    TF Left lower extremity angiogram   68086 Angiogram with catheter in Left, common iliac  common femoral, fem pop bypass, popliteal artery   46108 Left SFA, Popliteal plasty with 5x100 nanocross   25843 Left common femoral sfa infusion catheter placement 15 cm length   Anesthesia: Local with IV sedation  Assistants: Cath Lab Staff  Estimated Blood Loss: less than 50   Complications: none  Findings:   Left Left s/p Intervention   Common Iliac Art patent patent   External Iliac Art patent patent   Internal iliac patent patent   Common Femoral Art patent thrombus   Superficial Femoral Art occluded occluded   Profunda Femoral Art Stenosis but patent Thrombus at takeoff, stenosis but patent   Fem pop bypass thrombosed thrombosed   AK Popliteal Art occluded Diffusely diseased and thrombus but flow present   BK Popliteal Art occluded Diseased, but flow present   Anterior Tibial Art occluded occluded   Tibioperoneal Trunk occluded Some flow   Peroneal Art occluded occluded   Posterior Tibial Art occluded Proximal flow     Procedure Details : There was not previous CTA  or catheter based diagnostic imaging preformed prior to today's procedure. Timeout preformed identifying pt and procedure. Groins prepped and draped in sterile fashion. Patient given sedation as needed throughout the case. Right common femoral artery was noted to be patent and was accessed under ultrasound guidance after infiltrating with local.  Micropuncture placed, exchanged out for 5 fr sheath. Significant difficulty with R groin access due to scar. 4, 5, 6, 7 fr dilators used over anplatz wire.     Advantage glide wire and soft vu catheter advanced into distal aorta which was used to access Left  iliac system and catheter placed at common femoral and angiogram of theLeft  lower extremity preformed. Fem pop bypass thrombosed. There was not outflow present    Patient was given 1000 units of heparin and than a 7 fr destination sheath advanced to common femoral artery on the Left over an anplatz wire. Imaging with the catheter in the sfa to further evaluate the sfa, popliteal lesion. 0.35 Baldwin blazer catheter and advantage glide wire used to traverse stenosis. The catheter was used to cross into the fem ak pop bypass, through and into the above knee popliteal artery. There was significant extravasation present. Catheter was pulled back and unable to get back into true lumen initially. The left profunda was also not able to be accessed to ballon and address some stenosis due to more thrombus that was present in the left common femoral.      0.14 advantage wire and trailbalzer catheter were able to be tracked past venous anastamosis stenosis into what was true lumen. There was distal flow present in to the calf. Plasty of the ak pop and distal fem ak pop bypass. There was no further extravasation and flow was present. There was significant residual thrombus and atherosclerotic disease of popliteal artery at level of knee. I spoke with patient and than wife after procedure was done regarding options please see below. 50 cm ekos catheter placed but wire would not track. 15 cm lysis catheter placed in the common femoral proximal bypass. Sheath sutured into place. Drips hooked up.       Plan  I had a extensive discussion with patient and than wife  - high risk for limb loss  - currently high risk for needing aka  - if we can salvage a bka that is likely the best case scenario  - if we leave lysis catheter in    - it could improve common femoral profunda - increase change bka would heal   - it could improve bypass flow and allow for

## 2021-07-20 NOTE — PATIENT INSTRUCTIONS
Patient Education        Learning About Peripheral Arterial Disease (PAD)  What is peripheral arterial disease? Peripheral arterial disease (PAD) is narrowing or blockage of arteries that causes poor blood flow to your arms and legs. PAD is most common in the legs. The most common cause of PAD is the buildup of plaque on the inside of arteries. Over time, plaque builds up in the walls of the arteries, including those that supply blood to your legs. If you have PAD, you're likely to have plaque in other arteries in your body. This raises your risk of a heart attack and stroke. Medicines and lifestyle changes may lower your risk of heart attack and stroke. They may also help if you have symptoms. In some cases, surgery or other treatment is needed. Peripheral arterial disease is also called peripheral vascular disease. What are the symptoms? Many people who have PAD don't have symptoms. If you have symptoms, they may include a tight, aching, or squeezing pain in your calf, thigh, or buttock. This pain is called intermittent claudication. It usually happens after you have walked a certain distance. The pain goes away if you stop walking. As PAD gets worse, you may have pain in your foot or toe when you aren't walking. Other symptoms may include weak or tired legs. You might have trouble walking or balancing. If PAD gets worse, you may have other symptoms caused by poor blood flow to your legs and feet. These symptoms aren't common. They may include cold or numb feet or toes, sores that are slow to heal, or leg or foot pain when you're at rest.  How can you prevent PAD? · Quit smoking. Quitting smoking is one of the best things you can do to help prevent PAD. If you need help quitting, talk to your doctor about stop-smoking programs and medicines. These can increase your chances of quitting for good. · Stay at a healthy weight.   · Manage other health problems, including diabetes, high blood pressure, and safety. Be sure to make and go to all appointments, and call your doctor if you are having problems. It's also a good idea to know your test results and keep a list of the medicines you take. Where can you learn more? Go to [email or web address].[email or web address] org and sign in to your [email or web address] account. Enter C926 in the [facility] box to learn more about \"Learning About Peripheral Arterial Disease (PAD). \"     If you do not have an account, please click on the \"Sign Up Now\" link. Current as of: August 31, 2020               Content Version: 12.9  © 6682-6863 [employer], Incorporated. Care instructions adapted under license by [facility] ([facility]). If you have questions about a medical condition or this instruction, always ask your healthcare professional. [address] any warranty or liability for your use of this information.

## 2021-07-21 ENCOUNTER — ANESTHESIA EVENT (OUTPATIENT)
Dept: OPERATING ROOM | Age: 51
DRG: 240 | End: 2021-07-21
Payer: COMMERCIAL

## 2021-07-21 LAB
ANION GAP SERPL CALCULATED.3IONS-SCNC: 11 MMOL/L (ref 7–16)
ANION GAP SERPL CALCULATED.3IONS-SCNC: 14 MMOL/L (ref 7–16)
ANION GAP SERPL CALCULATED.3IONS-SCNC: 14 MMOL/L (ref 7–16)
ANION GAP SERPL CALCULATED.3IONS-SCNC: 15 MMOL/L (ref 7–16)
APTT: 29.2 SEC (ref 24.5–35.1)
APTT: 42 SEC (ref 24.5–35.1)
BUN BLDV-MCNC: 15 MG/DL (ref 6–20)
BUN BLDV-MCNC: 22 MG/DL (ref 6–20)
BUN BLDV-MCNC: 28 MG/DL (ref 6–20)
BUN BLDV-MCNC: 33 MG/DL (ref 6–20)
CALCIUM SERPL-MCNC: 7.8 MG/DL (ref 8.6–10.2)
CALCIUM SERPL-MCNC: 8 MG/DL (ref 8.6–10.2)
CALCIUM SERPL-MCNC: 8.2 MG/DL (ref 8.6–10.2)
CALCIUM SERPL-MCNC: 8.2 MG/DL (ref 8.6–10.2)
CHLORIDE BLD-SCNC: 100 MMOL/L (ref 98–107)
CHLORIDE BLD-SCNC: 102 MMOL/L (ref 98–107)
CHLORIDE BLD-SCNC: 98 MMOL/L (ref 98–107)
CHLORIDE BLD-SCNC: 98 MMOL/L (ref 98–107)
CO2: 21 MMOL/L (ref 22–29)
CO2: 22 MMOL/L (ref 22–29)
CO2: 22 MMOL/L (ref 22–29)
CO2: 23 MMOL/L (ref 22–29)
CREAT SERPL-MCNC: 0.8 MG/DL (ref 0.7–1.2)
CREAT SERPL-MCNC: 1.3 MG/DL (ref 0.7–1.2)
CREAT SERPL-MCNC: 1.7 MG/DL (ref 0.7–1.2)
CREAT SERPL-MCNC: 2 MG/DL (ref 0.7–1.2)
FIBRINOGEN: 315 MG/DL (ref 225–540)
GFR AFRICAN AMERICAN: 43
GFR AFRICAN AMERICAN: 52
GFR AFRICAN AMERICAN: >60
GFR AFRICAN AMERICAN: >60
GFR NON-AFRICAN AMERICAN: 35 ML/MIN/1.73
GFR NON-AFRICAN AMERICAN: 43 ML/MIN/1.73
GFR NON-AFRICAN AMERICAN: 58 ML/MIN/1.73
GFR NON-AFRICAN AMERICAN: >60 ML/MIN/1.73
GLUCOSE BLD-MCNC: 165 MG/DL (ref 74–99)
GLUCOSE BLD-MCNC: 241 MG/DL (ref 74–99)
GLUCOSE BLD-MCNC: 265 MG/DL (ref 74–99)
GLUCOSE BLD-MCNC: 315 MG/DL (ref 74–99)
HCT VFR BLD CALC: 25.6 % (ref 37–54)
HCT VFR BLD CALC: 28.7 % (ref 37–54)
HCT VFR BLD CALC: 30.2 % (ref 37–54)
HCT VFR BLD CALC: 33.7 % (ref 37–54)
HEMOGLOBIN: 10.2 G/DL (ref 12.5–16.5)
HEMOGLOBIN: 11.4 G/DL (ref 12.5–16.5)
HEMOGLOBIN: 8.8 G/DL (ref 12.5–16.5)
HEMOGLOBIN: 9.8 G/DL (ref 12.5–16.5)
MCH RBC QN AUTO: 30.1 PG (ref 26–35)
MCH RBC QN AUTO: 30.2 PG (ref 26–35)
MCH RBC QN AUTO: 30.5 PG (ref 26–35)
MCH RBC QN AUTO: 30.9 PG (ref 26–35)
MCHC RBC AUTO-ENTMCNC: 33.8 % (ref 32–34.5)
MCHC RBC AUTO-ENTMCNC: 33.8 % (ref 32–34.5)
MCHC RBC AUTO-ENTMCNC: 34.1 % (ref 32–34.5)
MCHC RBC AUTO-ENTMCNC: 34.4 % (ref 32–34.5)
MCV RBC AUTO: 88 FL (ref 80–99.9)
MCV RBC AUTO: 89.1 FL (ref 80–99.9)
MCV RBC AUTO: 89.4 FL (ref 80–99.9)
MCV RBC AUTO: 91.3 FL (ref 80–99.9)
METER GLUCOSE: 153 MG/DL (ref 74–99)
METER GLUCOSE: 181 MG/DL (ref 74–99)
METER GLUCOSE: 285 MG/DL (ref 74–99)
PDW BLD-RTO: 12.8 FL (ref 11.5–15)
PDW BLD-RTO: 12.9 FL (ref 11.5–15)
PDW BLD-RTO: 12.9 FL (ref 11.5–15)
PDW BLD-RTO: 13 FL (ref 11.5–15)
PLATELET # BLD: 207 E9/L (ref 130–450)
PLATELET # BLD: 230 E9/L (ref 130–450)
PLATELET # BLD: 244 E9/L (ref 130–450)
PLATELET # BLD: 248 E9/L (ref 130–450)
PMV BLD AUTO: 9.5 FL (ref 7–12)
PMV BLD AUTO: 9.7 FL (ref 7–12)
PMV BLD AUTO: 9.8 FL (ref 7–12)
PMV BLD AUTO: 9.8 FL (ref 7–12)
POTASSIUM SERPL-SCNC: 4.4 MMOL/L (ref 3.5–5)
POTASSIUM SERPL-SCNC: 4.5 MMOL/L (ref 3.5–5)
POTASSIUM SERPL-SCNC: 5 MMOL/L (ref 3.5–5)
POTASSIUM SERPL-SCNC: 5.8 MMOL/L (ref 3.5–5)
RBC # BLD: 2.91 E12/L (ref 3.8–5.8)
RBC # BLD: 3.21 E12/L (ref 3.8–5.8)
RBC # BLD: 3.39 E12/L (ref 3.8–5.8)
RBC # BLD: 3.69 E12/L (ref 3.8–5.8)
SODIUM BLD-SCNC: 132 MMOL/L (ref 132–146)
SODIUM BLD-SCNC: 134 MMOL/L (ref 132–146)
SODIUM BLD-SCNC: 136 MMOL/L (ref 132–146)
SODIUM BLD-SCNC: 138 MMOL/L (ref 132–146)
WBC # BLD: 14.4 E9/L (ref 4.5–11.5)
WBC # BLD: 14.6 E9/L (ref 4.5–11.5)
WBC # BLD: 16.3 E9/L (ref 4.5–11.5)
WBC # BLD: 16.6 E9/L (ref 4.5–11.5)

## 2021-07-21 PROCEDURE — 2580000003 HC RX 258: Performed by: NURSE PRACTITIONER

## 2021-07-21 PROCEDURE — 37214 CESSJ THERAPY CATH REMOVAL: CPT | Performed by: SURGERY

## 2021-07-21 PROCEDURE — 80048 BASIC METABOLIC PNL TOTAL CA: CPT

## 2021-07-21 PROCEDURE — 6370000000 HC RX 637 (ALT 250 FOR IP): Performed by: SURGERY

## 2021-07-21 PROCEDURE — 85730 THROMBOPLASTIN TIME PARTIAL: CPT

## 2021-07-21 PROCEDURE — 6360000002 HC RX W HCPCS

## 2021-07-21 PROCEDURE — 2500000003 HC RX 250 WO HCPCS: Performed by: SURGERY

## 2021-07-21 PROCEDURE — 2500000003 HC RX 250 WO HCPCS

## 2021-07-21 PROCEDURE — C1769 GUIDE WIRE: HCPCS

## 2021-07-21 PROCEDURE — 2709999900 HC NON-CHARGEABLE SUPPLY

## 2021-07-21 PROCEDURE — B41G1ZZ FLUOROSCOPY OF LEFT LOWER EXTREMITY ARTERIES USING LOW OSMOLAR CONTRAST: ICD-10-PCS | Performed by: SURGERY

## 2021-07-21 PROCEDURE — 85027 COMPLETE CBC AUTOMATED: CPT

## 2021-07-21 PROCEDURE — 2000000000 HC ICU R&B

## 2021-07-21 PROCEDURE — 6360000002 HC RX W HCPCS: Performed by: STUDENT IN AN ORGANIZED HEALTH CARE EDUCATION/TRAINING PROGRAM

## 2021-07-21 PROCEDURE — 6360000002 HC RX W HCPCS: Performed by: SURGERY

## 2021-07-21 PROCEDURE — 2580000003 HC RX 258: Performed by: SURGERY

## 2021-07-21 PROCEDURE — 85384 FIBRINOGEN ACTIVITY: CPT

## 2021-07-21 PROCEDURE — 6360000002 HC RX W HCPCS: Performed by: NURSE PRACTITIONER

## 2021-07-21 PROCEDURE — C1760 CLOSURE DEV, VASC: HCPCS

## 2021-07-21 PROCEDURE — 82962 GLUCOSE BLOOD TEST: CPT

## 2021-07-21 PROCEDURE — 6370000000 HC RX 637 (ALT 250 FOR IP): Performed by: STUDENT IN AN ORGANIZED HEALTH CARE EDUCATION/TRAINING PROGRAM

## 2021-07-21 PROCEDURE — 36415 COLL VENOUS BLD VENIPUNCTURE: CPT

## 2021-07-21 PROCEDURE — 2700000000 HC OXYGEN THERAPY PER DAY

## 2021-07-21 PROCEDURE — 6370000000 HC RX 637 (ALT 250 FOR IP): Performed by: NURSE PRACTITIONER

## 2021-07-21 RX ORDER — DEXTROSE MONOHYDRATE 50 MG/ML
100 INJECTION, SOLUTION INTRAVENOUS PRN
Status: DISCONTINUED | OUTPATIENT
Start: 2021-07-21 | End: 2021-07-30 | Stop reason: HOSPADM

## 2021-07-21 RX ORDER — HEPARIN SODIUM 1000 [USP'U]/ML
80 INJECTION, SOLUTION INTRAVENOUS; SUBCUTANEOUS ONCE
Status: DISCONTINUED | OUTPATIENT
Start: 2021-07-21 | End: 2021-07-22

## 2021-07-21 RX ORDER — NALOXONE HYDROCHLORIDE 0.4 MG/ML
0.4 INJECTION, SOLUTION INTRAMUSCULAR; INTRAVENOUS; SUBCUTANEOUS PRN
Status: DISCONTINUED | OUTPATIENT
Start: 2021-07-21 | End: 2021-07-26

## 2021-07-21 RX ORDER — DEXTROSE MONOHYDRATE 25 G/50ML
12.5 INJECTION, SOLUTION INTRAVENOUS PRN
Status: DISCONTINUED | OUTPATIENT
Start: 2021-07-21 | End: 2021-07-30 | Stop reason: HOSPADM

## 2021-07-21 RX ORDER — OXYCODONE HYDROCHLORIDE 5 MG/1
5 TABLET ORAL EVERY 4 HOURS PRN
Status: DISCONTINUED | OUTPATIENT
Start: 2021-07-21 | End: 2021-07-26

## 2021-07-21 RX ORDER — HEPARIN SODIUM 1000 [USP'U]/ML
40 INJECTION, SOLUTION INTRAVENOUS; SUBCUTANEOUS PRN
Status: DISCONTINUED | OUTPATIENT
Start: 2021-07-21 | End: 2021-07-21

## 2021-07-21 RX ORDER — SODIUM CHLORIDE 9 MG/ML
INJECTION, SOLUTION INTRAVENOUS CONTINUOUS
Status: DISCONTINUED | OUTPATIENT
Start: 2021-07-21 | End: 2021-07-23

## 2021-07-21 RX ORDER — NICOTINE POLACRILEX 4 MG
15 LOZENGE BUCCAL PRN
Status: DISCONTINUED | OUTPATIENT
Start: 2021-07-21 | End: 2021-07-30 | Stop reason: HOSPADM

## 2021-07-21 RX ORDER — HEPARIN SODIUM 10000 [USP'U]/100ML
5-30 INJECTION, SOLUTION INTRAVENOUS CONTINUOUS
Status: DISCONTINUED | OUTPATIENT
Start: 2021-07-21 | End: 2021-07-21

## 2021-07-21 RX ORDER — 0.9 % SODIUM CHLORIDE 0.9 %
500 INTRAVENOUS SOLUTION INTRAVENOUS ONCE
Status: COMPLETED | OUTPATIENT
Start: 2021-07-21 | End: 2021-07-21

## 2021-07-21 RX ORDER — FENTANYL CITRATE 50 UG/ML
100 INJECTION, SOLUTION INTRAMUSCULAR; INTRAVENOUS ONCE
Status: COMPLETED | OUTPATIENT
Start: 2021-07-21 | End: 2021-07-21

## 2021-07-21 RX ORDER — HEPARIN SODIUM 1000 [USP'U]/ML
80 INJECTION, SOLUTION INTRAVENOUS; SUBCUTANEOUS PRN
Status: DISCONTINUED | OUTPATIENT
Start: 2021-07-21 | End: 2021-07-21

## 2021-07-21 RX ADMIN — FENTANYL CITRATE 100 MCG: 50 INJECTION, SOLUTION INTRAMUSCULAR; INTRAVENOUS at 03:24

## 2021-07-21 RX ADMIN — CEFAZOLIN 2000 MG: 10 INJECTION, POWDER, FOR SOLUTION INTRAVENOUS at 02:30

## 2021-07-21 RX ADMIN — PANTOPRAZOLE SODIUM 40 MG: 40 TABLET, DELAYED RELEASE ORAL at 06:16

## 2021-07-21 RX ADMIN — HYDROMORPHONE HYDROCHLORIDE 1 MG: 1 INJECTION, SOLUTION INTRAMUSCULAR; INTRAVENOUS; SUBCUTANEOUS at 04:22

## 2021-07-21 RX ADMIN — HEPARIN SODIUM 18 UNITS/KG/HR: 10000 INJECTION, SOLUTION INTRAVENOUS at 08:40

## 2021-07-21 RX ADMIN — INSULIN LISPRO 1 UNITS: 100 INJECTION, SOLUTION INTRAVENOUS; SUBCUTANEOUS at 18:04

## 2021-07-21 RX ADMIN — OXYCODONE 5 MG: 5 TABLET ORAL at 03:24

## 2021-07-21 RX ADMIN — GABAPENTIN 100 MG: 100 CAPSULE ORAL at 20:35

## 2021-07-21 RX ADMIN — Medication 10 ML: at 08:44

## 2021-07-21 RX ADMIN — SODIUM CHLORIDE 500 ML: 9 INJECTION, SOLUTION INTRAVENOUS at 19:48

## 2021-07-21 RX ADMIN — OXYCODONE 5 MG: 5 TABLET ORAL at 08:50

## 2021-07-21 RX ADMIN — HYDROMORPHONE HYDROCHLORIDE 1 MG: 1 INJECTION, SOLUTION INTRAMUSCULAR; INTRAVENOUS; SUBCUTANEOUS at 12:42

## 2021-07-21 RX ADMIN — Medication 10 ML: at 20:35

## 2021-07-21 RX ADMIN — HYDROMORPHONE HYDROCHLORIDE 1 MG: 1 INJECTION, SOLUTION INTRAMUSCULAR; INTRAVENOUS; SUBCUTANEOUS at 02:19

## 2021-07-21 RX ADMIN — ATORVASTATIN CALCIUM 40 MG: 40 TABLET, FILM COATED ORAL at 20:35

## 2021-07-21 RX ADMIN — HYDROMORPHONE HYDROCHLORIDE 1 MG: 1 INJECTION, SOLUTION INTRAMUSCULAR; INTRAVENOUS; SUBCUTANEOUS at 00:19

## 2021-07-21 RX ADMIN — INSULIN LISPRO 3 UNITS: 100 INJECTION, SOLUTION INTRAVENOUS; SUBCUTANEOUS at 12:34

## 2021-07-21 RX ADMIN — CEFAZOLIN 2000 MG: 10 INJECTION, POWDER, FOR SOLUTION INTRAVENOUS at 11:06

## 2021-07-21 RX ADMIN — GABAPENTIN 100 MG: 100 CAPSULE ORAL at 08:50

## 2021-07-21 RX ADMIN — INSULIN LISPRO 1 UNITS: 100 INJECTION, SOLUTION INTRAVENOUS; SUBCUTANEOUS at 21:27

## 2021-07-21 RX ADMIN — HYDROMORPHONE HYDROCHLORIDE 1 MG: 1 INJECTION, SOLUTION INTRAMUSCULAR; INTRAVENOUS; SUBCUTANEOUS at 06:16

## 2021-07-21 RX ADMIN — SODIUM CHLORIDE: 9 INJECTION, SOLUTION INTRAVENOUS at 14:37

## 2021-07-21 RX ADMIN — Medication 0.2 MG: at 15:37

## 2021-07-21 RX ADMIN — CITALOPRAM 40 MG: 20 TABLET, FILM COATED ORAL at 08:47

## 2021-07-21 ASSESSMENT — PAIN DESCRIPTION - FREQUENCY
FREQUENCY: CONTINUOUS

## 2021-07-21 ASSESSMENT — PAIN DESCRIPTION - LOCATION
LOCATION: LEG

## 2021-07-21 ASSESSMENT — PAIN DESCRIPTION - PROGRESSION
CLINICAL_PROGRESSION: GRADUALLY IMPROVING
CLINICAL_PROGRESSION: NOT CHANGED
CLINICAL_PROGRESSION: GRADUALLY IMPROVING
CLINICAL_PROGRESSION: NOT CHANGED
CLINICAL_PROGRESSION: GRADUALLY IMPROVING
CLINICAL_PROGRESSION: GRADUALLY IMPROVING
CLINICAL_PROGRESSION: NOT CHANGED
CLINICAL_PROGRESSION: NOT CHANGED
CLINICAL_PROGRESSION: GRADUALLY IMPROVING
CLINICAL_PROGRESSION: GRADUALLY IMPROVING

## 2021-07-21 ASSESSMENT — PAIN DESCRIPTION - ORIENTATION
ORIENTATION: LEFT

## 2021-07-21 ASSESSMENT — PAIN DESCRIPTION - PAIN TYPE
TYPE: ACUTE PAIN
TYPE: ACUTE PAIN;SURGICAL PAIN
TYPE: ACUTE PAIN

## 2021-07-21 ASSESSMENT — PAIN DESCRIPTION - DESCRIPTORS
DESCRIPTORS: ACHING;DISCOMFORT
DESCRIPTORS: ACHING;NUMBNESS
DESCRIPTORS: ACHING;NUMBNESS;SHARP
DESCRIPTORS: SHARP;ACHING;NUMBNESS
DESCRIPTORS: ACHING
DESCRIPTORS: SHARP;ACHING
DESCRIPTORS: ACHING

## 2021-07-21 ASSESSMENT — PAIN SCALES - GENERAL
PAINLEVEL_OUTOF10: 5
PAINLEVEL_OUTOF10: 0
PAINLEVEL_OUTOF10: 6
PAINLEVEL_OUTOF10: 10
PAINLEVEL_OUTOF10: 4
PAINLEVEL_OUTOF10: 8
PAINLEVEL_OUTOF10: 7
PAINLEVEL_OUTOF10: 7
PAINLEVEL_OUTOF10: 6
PAINLEVEL_OUTOF10: 7
PAINLEVEL_OUTOF10: 10
PAINLEVEL_OUTOF10: 7
PAINLEVEL_OUTOF10: 6
PAINLEVEL_OUTOF10: 10
PAINLEVEL_OUTOF10: 5
PAINLEVEL_OUTOF10: 8

## 2021-07-21 ASSESSMENT — PAIN - FUNCTIONAL ASSESSMENT
PAIN_FUNCTIONAL_ASSESSMENT: PREVENTS OR INTERFERES WITH ALL ACTIVE AND SOME PASSIVE ACTIVITIES
PAIN_FUNCTIONAL_ASSESSMENT: PREVENTS OR INTERFERES WITH MANY ACTIVE NOT PASSIVE ACTIVITIES
PAIN_FUNCTIONAL_ASSESSMENT: PREVENTS OR INTERFERES SOME ACTIVE ACTIVITIES AND ADLS
PAIN_FUNCTIONAL_ASSESSMENT: PREVENTS OR INTERFERES WITH MANY ACTIVE NOT PASSIVE ACTIVITIES
PAIN_FUNCTIONAL_ASSESSMENT: PREVENTS OR INTERFERES WITH MANY ACTIVE NOT PASSIVE ACTIVITIES

## 2021-07-21 ASSESSMENT — PAIN DESCRIPTION - ONSET
ONSET: ON-GOING

## 2021-07-21 ASSESSMENT — COPD QUESTIONNAIRES: CAT_SEVERITY: MILD

## 2021-07-21 NOTE — PROGRESS NOTES
Pt complaining of increased L groin area and L popliteal area pain, more than previously. Dr. Khris browning and Dr. Christiano Leavitt at bedside assessing. Femoral swelling is observed around the graft. Femoral doppler pulse present, no popliteal, pedal or tibial pulses present. Venous flow is heard through doppler, capillary refill is slow but present. Dr. Christiano Leavitt pulled the catheter at bedside and TPA is discontinued. The sheath with Heparin @5ml/hr still in place and infusing. Fentanyl and Oxy added for pain management.

## 2021-07-21 NOTE — CARE COORDINATION
Transition of care: Lysis catheter removed overnight. Returned to cvl today for angiogram. Attempted to meet with pt and he was resting quietly. I called and spoke with pt's wife, [place], [place]. Pt lives with his wife and 24 yr old daughter in a ranch style home. Independent with ADLs and drives. Pt is a  but does not use [facility] services. DME- shower chair. No needs voiced for home at present. PCP is Dr. Alyssa Curtis and pharmacy is [facility] and [facility] on [address]  Sw/cm will follow

## 2021-07-21 NOTE — PROGRESS NOTES
CV[facility] Progress Note    Name: Ankush Souza  MRN: [id number]    CC: Postoperative Critical Care Management     Indication for Surgery/Procedure: LLE rest pain, thrombosed bypass     Important/Relevant PMH/PSH: HTN, DMII, HLD, PVD with multiple interventions listed below   10/4/16 R EIA plasty  R CFA, SFA, Popliteal atherectomy/plasty  R SFA, Popliteal plasty with [facility]   6/19/18 R CFA, SFA, popliteal therectomy  R CFA plasty 6x150  R SFA, popliteal plasty 6x150 DCB   7/3/18 L CFA plasty with 7x60 DCB, 8x40 evercross  L profunda plasty 5x60 evercross   7/23/19 Left angiogram, Left Common femoral and profunda plasty with 6x80 evercross, 9x80 evercross    8/16/19 Left distal external iliac and femoral endarterectomy  Left Deep femoral endarterectomy with profundoplasty   Left fem ak pop bypass with 8 mm ringed propatent PTFE graft  Angiogram with plasty of bk pop with 5x150 [facility]   1/31/20 Right distal external iliac and femoral endarterectomy with repair with bovine pericardial patch   Deep femoral endarterectomy with profundoplasty    6/16/20 R profunda plasty 5x60  R sfa, pop atherectomy, 6x250 DCB   3/16/21 R sfa pop atherectomy            Procedure/Surgeries: 7/20/2021 catheter directed lysis therapy           Intake/Output Summary (Last 24 hours) at 7/21/2021 0807  Last data filed at 7/21/2021 0616  Gross per 24 hour   Intake 50.9 ml   Output 640 ml   Net -589.1 ml       Recent Labs     07/20/21  1910 07/21/21  0100 07/21/21  0654   WBC 15.0* 16.6* 16.3*   HGB 11.5* 11.4* 10.2*   HCT 33.8* 33.7* 30.2*    230 248      Lab Results   Component Value Date     07/21/2021    K 5.8 07/21/2021    K 4.2 07/20/2021     07/21/2021    CO2 21 07/21/2021    BUN 22 07/21/2021    CREATININE 1.3 07/21/2021    GLUCOSE 315 07/21/2021    CALCIUM 8.0 07/21/2021         Physical Exam:    /69   Pulse 113   Temp 97 °F (36.1 °C) (Temporal)   Resp 14   Ht 5' 10\" (1.778 m)   Wt 195 lb (88.5 kg)   SpO2 100%   BMI 27.98 kg/m²           General: Awake, alert. States pain in left groin improved   Eyes: PERRL, anicteric   Pulmonary: No wheezes, no accessory muscle use noted   Cardiovascular:  RRR, no heaves or thrills on palpation  Tele: ST  Abdomen: Soft, nontender, +BS   Extremities: RLE with +DP/PT signals                      LLE +femoral, no popliteal or distal pulses noted, no motor fxn left foot  Neurologic/Psych: A&Ox3, GARCÍA to command   Skin: Warm and dry  Incisions: right groin sheath in place, no swelling or bleeding       Assessment/Plan: POD #1    1. Thrombosed left fem-pop bypass S/p catheter directed lysis 7/20, stopped overnight 7/21 d/t increased left groin/thigh swelling and pain.  -Left groin pain better this morning, no motor fxn in left foot, absent popliteal and pedal pulses.  Plan high dose heparin nomogram,  CVL this morning for re-imaging.   -sheath to right femoral remains in place  -NPO  - further plans per VS     Electronically signed by DRAKE Petersen - CNP on 7/21/2021 at 8:07 AM

## 2021-07-21 NOTE — PROGRESS NOTES
Contacted regarding worsening pain in LLE as well as left groin swelling. Patient states he has decreased motor and sensation in left foot as well as worsening pain and swelling over the last couple of hours. PE:  RLE: Groin w/ sheath and lysis catheter in place, no bleeding or hematoma noted. Sensation intact, 5/5 dorsi/plantar flexion. LLE: Femoral pulse 1+, swelling and firmness noted in L groin and upper thigh, moderately tender to palpation. Palpable graft, biphasic on doppler at scar in mid medial thigh. Foot warm to touch w/ ~4-5 sec cap refill. No dopplerable DP/PT or popliteal (similar to pre-procedure). 0/5 dorsi/plantar flexion. Unable to wiggle toes. Loss of sensation starting at ankle. Did not assess hip or knee flexion/extension due to lysis catheter and sheath in place.     - Discussed physical exam findings with Dr. Kevin Brewster. tPA was held and lysis catheter was removed at bedside. Patient tolerated well.  Sheath kept in place with heparin infusing.   - 1 time dose of fentanyl ordered as patient complaining of inadequate pain control with dilaudid and added bobby prn for longer pain control coverage.  - will continue to monitor L groin swelling and neurovascular exam    Electronically signed by Nusrat Tabares MD on 7/21/2021 at 3:29 AM

## 2021-07-21 NOTE — PROGRESS NOTES
Vascular Surgery Progress Note    Pt is being seen in f/u today regarding L LE rest pain, thrombosed bypass s/p 7/21 angio, L comon femoral SFA infusion catheter placement    Subjective  Pt s/e. Overnight events noted. S/p removal of catheter and stop TPA but sheath and heparin infusion remain. Still with swelling of L thigh and motor difficulty of L foot. Symptoms not worse since catheter removal.      Current Medications:    heparin (PORCINE) Infusion 5 mL/hr (07/21/21 0300)    sodium chloride      sodium chloride      sodium chloride      sodium chloride Stopped (07/20/21 1327)      oxyCODONE, sodium chloride flush, sodium chloride, ondansetron **OR** ondansetron, sodium chloride, sodium chloride flush, sodium chloride, HYDROmorphone    sodium chloride flush  5-40 mL Intravenous 2 times per day    ceFAZolin (ANCEF) IVPB  2,000 mg Intravenous Q8H    atorvastatin  40 mg Oral Nightly    citalopram  40 mg Oral Daily    gabapentin  100 mg Oral BID    lisinopril  40 mg Oral Daily    pantoprazole  40 mg Oral QAM AC    sodium chloride flush  5-40 mL Intravenous 2 times per day        PHYSICAL EXAM:    /69   Pulse 113   Temp 97 °F (36.1 °C) (Temporal)   Resp 14   Ht 5' 10\" (1.778 m)   Wt 195 lb (88.5 kg)   SpO2 100%   BMI 27.98 kg/m²     Intake/Output Summary (Last 24 hours) at 7/21/2021 0714  Last data filed at 7/21/2021 0616  Gross per 24 hour   Intake 50.9 ml   Output 640 ml   Net -589.1 ml          Gen: awake, alert and oriented x3, no apparent distress  CVS: tachycardic  Resp: No increased work of breathing, on room airr  Abd: Soft, non-tender, non-distended  R LE: 2+ femoral, weakly bibphasic DP/PT, 5/5 motor  L LE: 2+ femoral, absent DP/PT, 0/5 dorsiflexion/plantarflexion. Sensation intact. Swelling of L thigh compartments soft.     LABS:    Lab Results   Component Value Date    WBC 16.3 (H) 07/21/2021    HGB 10.2 (L) 07/21/2021    HCT 30.2 (L) 07/21/2021     07/21/2021

## 2021-07-21 NOTE — FLOWSHEET NOTE
Patient back from cath lab, VSS, patient easy to arouse. Right groin dry, clean, intact, soft, no hematoma.

## 2021-07-21 NOTE — ANESTHESIA PRE PROCEDURE
- CNP        insulin lispro (HUMALOG) injection vial 0-6 Units  0-6 Units Subcutaneous TID WC Hobert Star, APRN - CNP   1 Units at 07/21/21 1804    insulin lispro (HUMALOG) injection vial 0-3 Units  0-3 Units Subcutaneous Nightly Hobert Star, APRN - CNP   1 Units at 07/21/21 2127    glucose (GLUTOSE) 40 % oral gel 15 g  15 g Oral PRN Hobert Star, APRN - CNP        dextrose 50 % IV solution  12.5 g Intravenous PRN Hobert Star, APRN - CNP        glucagon (rDNA) injection 1 mg  1 mg Intramuscular PRN Hobert Star, APRN - CNP        dextrose 5 % solution  100 mL/hr Intravenous PRN Hobert Star, APRN - CNP        naloxone [facility]) injection 0.4 mg  0.4 mg Intravenous PRN Hobert Star, APRN - CNP        HYDROmorphone (DILAUDID) 0.2 mg/mL PCA   Intravenous Continuous Hobert Star, APRN - CNP   0.2 mg at 07/21/21 1537    0.9 % sodium chloride infusion   Intravenous Continuous Hobert Star, APRN -  mL/hr at 07/21/21 1936 Rate Change at 07/21/21 1936    ceFAZolin (ANCEF) 3,000 mg in dextrose 5 % 100 mL IVPB  3,000 mg Intravenous See Admin Instructions Shelbie Cardenas MD        sodium chloride flush 0.9 % injection 5-40 mL  5-40 mL Intravenous 2 times per day Shelbie Cardenas MD   10 mL at 07/21/21 2035    sodium chloride flush 0.9 % injection 5-40 mL  5-40 mL Intravenous PRN Shelbie Cardenas MD        0.9 % sodium chloride infusion  25 mL Intravenous PRN Shelbie Cardenas MD        ondansetron (ZOFRAN-ODT) disintegrating tablet 4 mg  4 mg Oral Q8H PRN Shelbie Cardenas MD        Or    ondansetron [facility]) injection 4 mg  4 mg Intravenous Q6H PRN Shelbie Cardenas MD        atorvastatin (LIPITOR) tablet 40 mg  40 mg Oral Nightly Shelbie Cardenas MD   40 mg at 07/21/21 2035    citalopram (CELEXA) tablet 40 mg  40 mg Oral Daily Shelbie Cardenas MD   40 mg at 07/22/21 0830    gabapentin (NEURONTIN) capsule 100 mg  100 mg Oral BID Kyler Gtz MD   100 mg at 07/22/21 0830    [Held by provider] lisinopril (PRINIVIL;ZESTRIL) tablet 40 mg  40 mg Oral Daily Kyler Gtz MD        pantoprazole (PROTONIX) tablet 40 mg  40 mg Oral QAM AC Kyler Gtz MD   40 mg at 07/22/21 7536    sodium chloride flush 0.9 % injection 5-40 mL  5-40 mL Intravenous 2 times per day Kyler Gtz MD   10 mL at 07/21/21 2035    sodium chloride flush 0.9 % injection 5-40 mL  5-40 mL Intravenous PRN Kyler Gtz MD        0.9 % sodium chloride infusion  25 mL Intravenous PRN Kyler Gtz MD           Allergies:  No Known Allergies    Problem List:    Patient Active Problem List   Diagnosis Code    Type 2 diabetes mellitus with diabetic peripheral angiopathy without gangrene, without long-term current use of insulin ([facility]) E11.51    Tobacco abuse counseling Z71.6    PVD (peripheral vascular disease) with claudication ([address] I73.9    Pure hypercholesterolemia E78.00    Diabetic mononeuropathy associated with type 2 diabetes mellitus ([address] E11.41    Chronic obstructive pulmonary disease ([address] J44.9    S/P femoral-popliteal bypass surgery Z95.828    Swelling of joint of pelvic region or thigh, left M25.452    Atherosclerosis of native artery of left lower extremity with rest pain ([address] I70.222    PVD (peripheral vascular disease) ([address] I73.9    Atherosclerosis of native arteries of extremity with rest pain ([address] I70.229    Femoral-popliteal bypass graft occlusion, left, initial encounter ([address] T82.898A    Arterial occlusion, lower extremity ([address] I70.209       Past Medical History:        Diagnosis Date    Anxiety     Atherosclerosis of native arteries of extremity with rest pain ([address] 7/15/2021    Chronic obstructive pulmonary disease ([address] 7/30/2019    Diabetes ([address]     Femoral-popliteal bypass graft occlusion, left, initial encounter ([address] 7/19/2021    GERD (gastroesophageal reflux disease)     Gout     Hyperlipidemia     Hypertension     Leg pain     Leg pain     Postoperative anemia due to acute blood loss 2019    PVD (peripheral vascular disease) ([facility] 1/15/2018    S/P femoral-popliteal bypass surgery 2019    Type 2 diabetes mellitus with diabetic peripheral angiopathy without gangrene, without long-term current use of insulin ([facility] 1/15/2018       Past Surgical History:        Procedure Laterality Date    FEMORAL BYPASS Left 2019    FEMORAL POPLITEAL BYPASS WITH FEMORAL ENDARTERECTOMY LEFT LEG performed by Pam Morales MD at [place] [address] Right 2020    RIGHT FEMORAL ENDARTERECTOMY performed by Pam Morales MD at [place] [address]  10/04/2016    Dr Jordan Hutchins - athrectomy/pci right fem/pop    OTHER SURGICAL HISTORY  2018    Dr Jordan Hutchins - PCI w/ athrectomy RLE Common Illiac to Popliteal    VASCULAR SURGERY  2019    ANGIOGRAM    WISDOM TOOTH EXTRACTION         Social History:    Social History     Tobacco Use    Smoking status: Former Smoker     Packs/day: 1.50     Years: 28.00     Pack years: 42.00     Types: Cigarettes     Start date:      Quit date: 10/15/2016     Years since quittin.7    Smokeless tobacco: Former User     Types: Snuff     Quit date: 2004   Substance Use Topics    Alcohol use: Not Currently     Alcohol/week: 12.0 standard drinks     Types: 12 Cans of beer per week     Comment: HAS NOT DRANK SINCE 19                                Counseling given: Not Answered      Vital Signs (Current):   Vitals:    21 1121 21 1200 21 1300 21 1400   BP: (!) 101/55 119/79 116/80 122/81   Pulse: 100 100 100 102   Resp: 12 18 13 12   Temp:  98.8 °F (37.1 °C)     TempSrc:  Oral     SpO2:  98% 98% 98%   Weight:       Height:                                                  BP Readings from Last 3 Encounters:   21 122/81   05/10/21 120/82   03/10/21 (!) 146/67       NPO Status:  > 8hrs                                                                               BMI:   Wt Readings from Last 3 Encounters:   07/20/21 195 lb (88.5 kg)   07/19/21 195 lb (88.5 kg)   07/15/21 195 lb (88.5 kg)     Body mass index is 27.98 kg/m². CBC:   Lab Results   Component Value Date    WBC 12.0 07/22/2021    RBC 2.41 07/22/2021    HGB 7.4 07/22/2021    HCT 21.0 07/22/2021    MCV 87.1 07/22/2021    RDW 12.9 07/22/2021     07/22/2021       CMP:   Lab Results   Component Value Date     07/22/2021    K 4.3 07/22/2021    K 4.2 07/20/2021     07/22/2021    CO2 23 07/22/2021    BUN 32 07/22/2021    CREATININE 1.1 07/22/2021    GFRAA >60 07/22/2021    LABGLOM >60 07/22/2021    GLUCOSE 139 07/22/2021    PROT 7.8 04/19/2021    CALCIUM 7.3 07/22/2021    BILITOT <0.2 04/19/2021    ALKPHOS 96 04/19/2021    AST 13 04/19/2021    ALT 12 04/19/2021       POC Tests: No results for input(s): POCGLU, POCNA, POCK, POCCL, POCBUN, POCHEMO, POCHCT in the last 72 hours.     Coags:   Lab Results   Component Value Date    PROTIME 11.0 07/20/2021    INR 1.0 07/20/2021    APTT 29.2 07/21/2021       HCG (If Applicable): No results found for: PREGTESTUR, PREGSERUM, HCG, HCGQUANT     ABGs:   Lab Results   Component Value Date    PO2ART 165.5 01/31/2020    QCX8RWI 44.5 01/31/2020    KKW2FRV 21.9 01/31/2020        Type & Screen (If Applicable):  No results found for: LABABO, LABRH    Drug/Infectious Status (If Applicable):  No results found for: HIV, HEPCAB    COVID-19 Screening (If Applicable):   Lab Results   Component Value Date    COVID19 Not Detected 07/15/2021           Anesthesia Evaluation  Patient summary reviewed and Nursing notes reviewed no history of anesthetic complications:   Airway: Mallampati: III  TM distance: >3 FB   Neck ROM: full  Mouth opening: > = 3 FB Dental:          Pulmonary: breath sounds clear to auscultation  (+) COPD: mild,  sleep apnea: on noncompliant,                             Cardiovascular:  Exercise tolerance: poor (<4 METS),   (+) hypertension:, hyperlipidemia      ECG reviewed  Rhythm: regular  Rate: normal    Stress test reviewed       Beta Blocker:  Not on Beta Blocker         Neuro/Psych:   (+) neuromuscular disease (neuropathy to bilat feet numbness and tingling):,             GI/Hepatic/Renal:   (+) GERD (taking prilosec): well controlled, renal disease: CRI,           Endo/Other:    (+) DiabetesType II DM, poorly controlled, using insulin, : arthritis:., .                 Abdominal:   (+) obese,     Abdomen: soft. Vascular:   + PVD, aortic or cerebral, . Other Findings:           Anesthesia Plan      general     ASA 4     (LAC and RAC 20g PIV)  Induction: intravenous. MIPS: Postoperative opioids intended, Prophylactic antiemetics administered and Postoperative trial extubation. Anesthetic plan and risks discussed with patient. Use of blood products discussed with patient whom consented to blood products. Plan discussed with CRNA and attending. Juan Daniel Michaud, DO   7/22/2021      EKG 11/12/19  Sinus Tach    8/1/19 Stress Test Lexican  Gated SPECT left ventricular ejection fraction was calculated to   be 71%, with normal myocardial thickening and wall motion. Impression:     1. Electrocardiographically normal regadenoson infusion with a   clinicallynonischemic response. 2. Myocardial perfusion imaging was normal.     3. Overall left ventricular systolic function was normal without   regional wall motion abnormalities.    4.   Intermediate risk pre-operative pharmacologic stress test     8/8/19 CXR  Impression   NO ACUTE CARDIOPULMONARY PROCESS      VL LOWER EXT 3/1/21  IMPRESSION:   Right lower extremity is significantly worse as compared to previous with evidence of severe iliofemoral and fem pop arterial occlusive disease   MADI is 0.46 ( 0.76 ) and TBI is 0.11 (0.37)        Left lower extremity is significantly worse as compared to previous with evidence of moderate iliofemoral arterial occlusive disease   MADI is 0.77 ( 0.96 ) and TBI is 0.52 ( 0.66 )

## 2021-07-22 ENCOUNTER — ANESTHESIA (OUTPATIENT)
Dept: OPERATING ROOM | Age: 51
DRG: 240 | End: 2021-07-22
Payer: COMMERCIAL

## 2021-07-22 VITALS — OXYGEN SATURATION: 100 % | SYSTOLIC BLOOD PRESSURE: 98 MMHG | DIASTOLIC BLOOD PRESSURE: 42 MMHG | TEMPERATURE: 99.3 F

## 2021-07-22 LAB
ANION GAP SERPL CALCULATED.3IONS-SCNC: 11 MMOL/L (ref 7–16)
BUN BLDV-MCNC: 32 MG/DL (ref 6–20)
CALCIUM SERPL-MCNC: 7.3 MG/DL (ref 8.6–10.2)
CHLORIDE BLD-SCNC: 102 MMOL/L (ref 98–107)
CO2: 23 MMOL/L (ref 22–29)
CREAT SERPL-MCNC: 1.1 MG/DL (ref 0.7–1.2)
GFR AFRICAN AMERICAN: >60
GFR NON-AFRICAN AMERICAN: >60 ML/MIN/1.73
GLUCOSE BLD-MCNC: 139 MG/DL (ref 74–99)
HCT VFR BLD CALC: 21 % (ref 37–54)
HEMOGLOBIN: 7.4 G/DL (ref 12.5–16.5)
MCH RBC QN AUTO: 30.7 PG (ref 26–35)
MCHC RBC AUTO-ENTMCNC: 35.2 % (ref 32–34.5)
MCV RBC AUTO: 87.1 FL (ref 80–99.9)
METER GLUCOSE: 148 MG/DL (ref 74–99)
METER GLUCOSE: 214 MG/DL (ref 74–99)
METER GLUCOSE: 228 MG/DL (ref 74–99)
PDW BLD-RTO: 12.9 FL (ref 11.5–15)
PLATELET # BLD: 172 E9/L (ref 130–450)
PMV BLD AUTO: 9.6 FL (ref 7–12)
POTASSIUM SERPL-SCNC: 4.3 MMOL/L (ref 3.5–5)
RBC # BLD: 2.41 E12/L (ref 3.8–5.8)
SODIUM BLD-SCNC: 136 MMOL/L (ref 132–146)
WBC # BLD: 12 E9/L (ref 4.5–11.5)

## 2021-07-22 PROCEDURE — 2709999900 HC NON-CHARGEABLE SUPPLY: Performed by: SURGERY

## 2021-07-22 PROCEDURE — 7100000000 HC PACU RECOVERY - FIRST 15 MIN

## 2021-07-22 PROCEDURE — 2000000000 HC ICU R&B

## 2021-07-22 PROCEDURE — 6360000002 HC RX W HCPCS: Performed by: SURGERY

## 2021-07-22 PROCEDURE — 6370000000 HC RX 637 (ALT 250 FOR IP): Performed by: SURGERY

## 2021-07-22 PROCEDURE — 36415 COLL VENOUS BLD VENIPUNCTURE: CPT

## 2021-07-22 PROCEDURE — 6360000002 HC RX W HCPCS

## 2021-07-22 PROCEDURE — 2500000003 HC RX 250 WO HCPCS

## 2021-07-22 PROCEDURE — 0Y6D0Z3 DETACHMENT AT LEFT UPPER LEG, LOW, OPEN APPROACH: ICD-10-PCS | Performed by: SURGERY

## 2021-07-22 PROCEDURE — 2580000003 HC RX 258

## 2021-07-22 PROCEDURE — 36430 TRANSFUSION BLD/BLD COMPNT: CPT

## 2021-07-22 PROCEDURE — 88311 DECALCIFY TISSUE: CPT

## 2021-07-22 PROCEDURE — 2580000003 HC RX 258: Performed by: SURGERY

## 2021-07-22 PROCEDURE — 85027 COMPLETE CBC AUTOMATED: CPT

## 2021-07-22 PROCEDURE — 7100000001 HC PACU RECOVERY - ADDTL 15 MIN

## 2021-07-22 PROCEDURE — 3700000000 HC ANESTHESIA ATTENDED CARE: Performed by: SURGERY

## 2021-07-22 PROCEDURE — 80048 BASIC METABOLIC PNL TOTAL CA: CPT

## 2021-07-22 PROCEDURE — 6370000000 HC RX 637 (ALT 250 FOR IP): Performed by: NURSE PRACTITIONER

## 2021-07-22 PROCEDURE — 88307 TISSUE EXAM BY PATHOLOGIST: CPT

## 2021-07-22 PROCEDURE — 2700000000 HC OXYGEN THERAPY PER DAY

## 2021-07-22 PROCEDURE — 3600000003 HC SURGERY LEVEL 3 BASE: Performed by: SURGERY

## 2021-07-22 PROCEDURE — 3700000001 HC ADD 15 MINUTES (ANESTHESIA): Performed by: SURGERY

## 2021-07-22 PROCEDURE — 3600000013 HC SURGERY LEVEL 3 ADDTL 15MIN: Performed by: SURGERY

## 2021-07-22 PROCEDURE — 88305 TISSUE EXAM BY PATHOLOGIST: CPT

## 2021-07-22 PROCEDURE — 99233 SBSQ HOSP IP/OBS HIGH 50: CPT | Performed by: SURGERY

## 2021-07-22 PROCEDURE — 27590 AMPUTATE LEG AT THIGH: CPT | Performed by: SURGERY

## 2021-07-22 PROCEDURE — P9016 RBC LEUKOCYTES REDUCED: HCPCS

## 2021-07-22 PROCEDURE — 82962 GLUCOSE BLOOD TEST: CPT

## 2021-07-22 RX ORDER — HEPARIN SODIUM 10000 [USP'U]/ML
5000 INJECTION, SOLUTION INTRAVENOUS; SUBCUTANEOUS EVERY 8 HOURS SCHEDULED
Status: DISCONTINUED | OUTPATIENT
Start: 2021-07-23 | End: 2021-07-30 | Stop reason: HOSPADM

## 2021-07-22 RX ORDER — PHENYLEPHRINE HCL IN 0.9% NACL 1 MG/10 ML
SYRINGE (ML) INTRAVENOUS PRN
Status: DISCONTINUED | OUTPATIENT
Start: 2021-07-22 | End: 2021-07-22 | Stop reason: SDUPTHER

## 2021-07-22 RX ORDER — SODIUM CHLORIDE 9 MG/ML
INJECTION, SOLUTION INTRAVENOUS CONTINUOUS PRN
Status: DISCONTINUED | OUTPATIENT
Start: 2021-07-22 | End: 2021-07-22 | Stop reason: SDUPTHER

## 2021-07-22 RX ORDER — MIDAZOLAM HYDROCHLORIDE 1 MG/ML
INJECTION INTRAMUSCULAR; INTRAVENOUS PRN
Status: DISCONTINUED | OUTPATIENT
Start: 2021-07-22 | End: 2021-07-22 | Stop reason: SDUPTHER

## 2021-07-22 RX ORDER — LIDOCAINE HYDROCHLORIDE 20 MG/ML
INJECTION, SOLUTION INTRAVENOUS PRN
Status: DISCONTINUED | OUTPATIENT
Start: 2021-07-22 | End: 2021-07-22 | Stop reason: SDUPTHER

## 2021-07-22 RX ORDER — SODIUM CHLORIDE 9 MG/ML
INJECTION, SOLUTION INTRAVENOUS PRN
Status: DISCONTINUED | OUTPATIENT
Start: 2021-07-22 | End: 2021-07-25

## 2021-07-22 RX ORDER — GLYCOPYRROLATE 1 MG/5 ML
SYRINGE (ML) INTRAVENOUS PRN
Status: DISCONTINUED | OUTPATIENT
Start: 2021-07-22 | End: 2021-07-22 | Stop reason: SDUPTHER

## 2021-07-22 RX ORDER — NEOSTIGMINE METHYLSULFATE 1 MG/ML
INJECTION, SOLUTION INTRAVENOUS PRN
Status: DISCONTINUED | OUTPATIENT
Start: 2021-07-22 | End: 2021-07-22 | Stop reason: SDUPTHER

## 2021-07-22 RX ORDER — HYDROMORPHONE HCL 110MG/55ML
PATIENT CONTROLLED ANALGESIA SYRINGE INTRAVENOUS PRN
Status: DISCONTINUED | OUTPATIENT
Start: 2021-07-22 | End: 2021-07-22 | Stop reason: SDUPTHER

## 2021-07-22 RX ORDER — ROCURONIUM BROMIDE 10 MG/ML
INJECTION, SOLUTION INTRAVENOUS PRN
Status: DISCONTINUED | OUTPATIENT
Start: 2021-07-22 | End: 2021-07-22 | Stop reason: SDUPTHER

## 2021-07-22 RX ORDER — PROPOFOL 10 MG/ML
INJECTION, EMULSION INTRAVENOUS PRN
Status: DISCONTINUED | OUTPATIENT
Start: 2021-07-22 | End: 2021-07-22 | Stop reason: SDUPTHER

## 2021-07-22 RX ORDER — DEXAMETHASONE SODIUM PHOSPHATE 10 MG/ML
INJECTION INTRAMUSCULAR; INTRAVENOUS PRN
Status: DISCONTINUED | OUTPATIENT
Start: 2021-07-22 | End: 2021-07-22 | Stop reason: SDUPTHER

## 2021-07-22 RX ORDER — ONDANSETRON 2 MG/ML
INJECTION INTRAMUSCULAR; INTRAVENOUS PRN
Status: DISCONTINUED | OUTPATIENT
Start: 2021-07-22 | End: 2021-07-22 | Stop reason: SDUPTHER

## 2021-07-22 RX ORDER — FENTANYL CITRATE 50 UG/ML
INJECTION, SOLUTION INTRAMUSCULAR; INTRAVENOUS PRN
Status: DISCONTINUED | OUTPATIENT
Start: 2021-07-22 | End: 2021-07-22 | Stop reason: SDUPTHER

## 2021-07-22 RX ADMIN — FENTANYL CITRATE 100 MCG: 50 INJECTION, SOLUTION INTRAMUSCULAR; INTRAVENOUS at 16:22

## 2021-07-22 RX ADMIN — CITALOPRAM 40 MG: 20 TABLET, FILM COATED ORAL at 08:30

## 2021-07-22 RX ADMIN — Medication 10 ML: at 21:02

## 2021-07-22 RX ADMIN — LIDOCAINE HYDROCHLORIDE 60 MG: 20 INJECTION, SOLUTION INTRAVENOUS at 16:22

## 2021-07-22 RX ADMIN — Medication 100 MCG: at 16:37

## 2021-07-22 RX ADMIN — Medication: at 19:44

## 2021-07-22 RX ADMIN — PROPOFOL 100 MG: 10 INJECTION, EMULSION INTRAVENOUS at 16:22

## 2021-07-22 RX ADMIN — SODIUM CHLORIDE: 9 INJECTION, SOLUTION INTRAVENOUS at 17:57

## 2021-07-22 RX ADMIN — Medication 0.6 MG: at 17:50

## 2021-07-22 RX ADMIN — GABAPENTIN 100 MG: 100 CAPSULE ORAL at 08:30

## 2021-07-22 RX ADMIN — INSULIN LISPRO 3 UNITS: 100 INJECTION, SOLUTION INTRAVENOUS; SUBCUTANEOUS at 19:11

## 2021-07-22 RX ADMIN — Medication 100 MCG: at 17:55

## 2021-07-22 RX ADMIN — GABAPENTIN 100 MG: 100 CAPSULE ORAL at 21:02

## 2021-07-22 RX ADMIN — ROCURONIUM BROMIDE 20 MG: 10 INJECTION, SOLUTION INTRAVENOUS at 16:59

## 2021-07-22 RX ADMIN — Medication 100 MCG: at 17:10

## 2021-07-22 RX ADMIN — FENTANYL CITRATE 50 MCG: 50 INJECTION, SOLUTION INTRAMUSCULAR; INTRAVENOUS at 17:46

## 2021-07-22 RX ADMIN — SODIUM CHLORIDE: 9 INJECTION, SOLUTION INTRAVENOUS at 16:00

## 2021-07-22 RX ADMIN — Medication 3 MG: at 17:50

## 2021-07-22 RX ADMIN — ROCURONIUM BROMIDE 40 MG: 10 INJECTION, SOLUTION INTRAVENOUS at 16:22

## 2021-07-22 RX ADMIN — Medication 100 MCG: at 17:59

## 2021-07-22 RX ADMIN — ROCURONIUM BROMIDE 10 MG: 10 INJECTION, SOLUTION INTRAVENOUS at 17:46

## 2021-07-22 RX ADMIN — CEFAZOLIN 3000 MG: 1 INJECTION, POWDER, FOR SOLUTION INTRAMUSCULAR; INTRAVENOUS at 16:15

## 2021-07-22 RX ADMIN — DEXAMETHASONE SODIUM PHOSPHATE 10 MG: 10 INJECTION INTRAMUSCULAR; INTRAVENOUS at 16:34

## 2021-07-22 RX ADMIN — MIDAZOLAM 2 MG: 1 INJECTION INTRAMUSCULAR; INTRAVENOUS at 16:20

## 2021-07-22 RX ADMIN — PANTOPRAZOLE SODIUM 40 MG: 40 TABLET, DELAYED RELEASE ORAL at 07:09

## 2021-07-22 RX ADMIN — ATORVASTATIN CALCIUM 40 MG: 40 TABLET, FILM COATED ORAL at 21:02

## 2021-07-22 RX ADMIN — OXYCODONE 5 MG: 5 TABLET ORAL at 22:28

## 2021-07-22 RX ADMIN — Medication 10 ML: at 21:03

## 2021-07-22 RX ADMIN — HYDROMORPHONE HYDROCHLORIDE 0.5 MG: 2 INJECTION, SOLUTION INTRAMUSCULAR; INTRAVENOUS; SUBCUTANEOUS at 18:10

## 2021-07-22 RX ADMIN — INSULIN LISPRO 1 UNITS: 100 INJECTION, SOLUTION INTRAVENOUS; SUBCUTANEOUS at 20:57

## 2021-07-22 RX ADMIN — ONDANSETRON HYDROCHLORIDE 4 MG: 2 INJECTION, SOLUTION INTRAMUSCULAR; INTRAVENOUS at 18:00

## 2021-07-22 RX ADMIN — Medication 200 MCG: at 16:41

## 2021-07-22 RX ADMIN — FENTANYL CITRATE 100 MCG: 50 INJECTION, SOLUTION INTRAMUSCULAR; INTRAVENOUS at 16:28

## 2021-07-22 ASSESSMENT — PAIN SCALES - GENERAL
PAINLEVEL_OUTOF10: 8
PAINLEVEL_OUTOF10: 8
PAINLEVEL_OUTOF10: 2
PAINLEVEL_OUTOF10: 2
PAINLEVEL_OUTOF10: 9
PAINLEVEL_OUTOF10: 0
PAINLEVEL_OUTOF10: 1
PAINLEVEL_OUTOF10: 0
PAINLEVEL_OUTOF10: 8
PAINLEVEL_OUTOF10: 6
PAINLEVEL_OUTOF10: 3
PAINLEVEL_OUTOF10: 8
PAINLEVEL_OUTOF10: 2
PAINLEVEL_OUTOF10: 10
PAINLEVEL_OUTOF10: 10
PAINLEVEL_OUTOF10: 8

## 2021-07-22 ASSESSMENT — PULMONARY FUNCTION TESTS
PIF_VALUE: 4
PIF_VALUE: 3
PIF_VALUE: 25
PIF_VALUE: 22
PIF_VALUE: 20
PIF_VALUE: 23
PIF_VALUE: 25
PIF_VALUE: 23
PIF_VALUE: 1
PIF_VALUE: 22
PIF_VALUE: 21
PIF_VALUE: 24
PIF_VALUE: 1
PIF_VALUE: 23
PIF_VALUE: 22
PIF_VALUE: 21
PIF_VALUE: 21
PIF_VALUE: 20
PIF_VALUE: 21
PIF_VALUE: 3
PIF_VALUE: 24
PIF_VALUE: 25
PIF_VALUE: 22
PIF_VALUE: 25
PIF_VALUE: 20
PIF_VALUE: 25
PIF_VALUE: 1
PIF_VALUE: 23
PIF_VALUE: 24
PIF_VALUE: 1
PIF_VALUE: 26
PIF_VALUE: 21
PIF_VALUE: 20
PIF_VALUE: 21
PIF_VALUE: 23
PIF_VALUE: 23
PIF_VALUE: 0
PIF_VALUE: 18
PIF_VALUE: 20
PIF_VALUE: 25
PIF_VALUE: 24
PIF_VALUE: 21
PIF_VALUE: 22
PIF_VALUE: 25
PIF_VALUE: 21
PIF_VALUE: 5
PIF_VALUE: 23
PIF_VALUE: 21
PIF_VALUE: 24
PIF_VALUE: 21
PIF_VALUE: 21
PIF_VALUE: 1
PIF_VALUE: 24
PIF_VALUE: 22
PIF_VALUE: 25
PIF_VALUE: 3
PIF_VALUE: 24
PIF_VALUE: 1
PIF_VALUE: 21
PIF_VALUE: 15
PIF_VALUE: 22
PIF_VALUE: 25
PIF_VALUE: 21
PIF_VALUE: 25
PIF_VALUE: 20
PIF_VALUE: 21
PIF_VALUE: 21
PIF_VALUE: 19
PIF_VALUE: 23
PIF_VALUE: 1
PIF_VALUE: 3
PIF_VALUE: 12
PIF_VALUE: 1
PIF_VALUE: 25
PIF_VALUE: 21
PIF_VALUE: 25
PIF_VALUE: 22
PIF_VALUE: 1
PIF_VALUE: 2
PIF_VALUE: 21
PIF_VALUE: 23
PIF_VALUE: 21
PIF_VALUE: 24
PIF_VALUE: 2
PIF_VALUE: 20
PIF_VALUE: 3
PIF_VALUE: 2
PIF_VALUE: 24
PIF_VALUE: 3
PIF_VALUE: 20
PIF_VALUE: 18
PIF_VALUE: 1
PIF_VALUE: 22
PIF_VALUE: 21
PIF_VALUE: 23
PIF_VALUE: 21
PIF_VALUE: 30
PIF_VALUE: 24
PIF_VALUE: 20
PIF_VALUE: 25
PIF_VALUE: 22
PIF_VALUE: 1
PIF_VALUE: 18
PIF_VALUE: 12
PIF_VALUE: 2
PIF_VALUE: 22
PIF_VALUE: 21
PIF_VALUE: 12
PIF_VALUE: 3
PIF_VALUE: 15
PIF_VALUE: 3
PIF_VALUE: 23
PIF_VALUE: 25
PIF_VALUE: 23
PIF_VALUE: 22
PIF_VALUE: 12
PIF_VALUE: 25
PIF_VALUE: 22
PIF_VALUE: 25
PIF_VALUE: 0
PIF_VALUE: 22
PIF_VALUE: 21
PIF_VALUE: 22
PIF_VALUE: 22
PIF_VALUE: 21
PIF_VALUE: 19
PIF_VALUE: 25
PIF_VALUE: 3
PIF_VALUE: 20
PIF_VALUE: 1
PIF_VALUE: 2
PIF_VALUE: 1
PIF_VALUE: 25
PIF_VALUE: 19
PIF_VALUE: 22
PIF_VALUE: 22
PIF_VALUE: 3
PIF_VALUE: 21

## 2021-07-22 ASSESSMENT — PAIN DESCRIPTION - DESCRIPTORS
DESCRIPTORS: CONSTANT;THROBBING
DESCRIPTORS: CRAMPING

## 2021-07-22 ASSESSMENT — PAIN DESCRIPTION - LOCATION
LOCATION: LEG

## 2021-07-22 ASSESSMENT — PAIN DESCRIPTION - ORIENTATION
ORIENTATION: LEFT

## 2021-07-22 ASSESSMENT — PAIN DESCRIPTION - PAIN TYPE
TYPE: ACUTE PAIN

## 2021-07-22 ASSESSMENT — PAIN DESCRIPTION - FREQUENCY
FREQUENCY: CONTINUOUS
FREQUENCY: INTERMITTENT

## 2021-07-22 ASSESSMENT — PAIN - FUNCTIONAL ASSESSMENT: PAIN_FUNCTIONAL_ASSESSMENT: PREVENTS OR INTERFERES SOME ACTIVE ACTIVITIES AND ADLS

## 2021-07-22 ASSESSMENT — PAIN DESCRIPTION - ONSET: ONSET: ON-GOING

## 2021-07-22 ASSESSMENT — PAIN DESCRIPTION - PROGRESSION
CLINICAL_PROGRESSION: NOT CHANGED
CLINICAL_PROGRESSION: GRADUALLY WORSENING
CLINICAL_PROGRESSION: GRADUALLY IMPROVING
CLINICAL_PROGRESSION: NOT CHANGED

## 2021-07-22 NOTE — OP NOTE
Cardiovascular Lab Procedure Report    Chuck Tam  1970    Date : 7/22/2021  Surgeon: August Henriquez M.D. Pre-procedure Diagnosis: thrombosed fem pop bypass left  Post-procedure Diagnosis: Same  Procedure: 8 fr angioseal used for closure   Fu angiogram after lysis    Anesthesia: Local with IV sedation  Assistants: Cath Lab Staff  Estimated Blood Loss: Minimal  Complications: none  Findings:    Left   Common Iliac Art patent   External Iliac Art patent   Internal Iliac Art patent   Common Femoral Art patent   Superficial Femoral Art occluded   Profunda Femoral Art occluded   AK Popliteal Art occluded   BK Popliteal Art occluded   Anterior Tibial Art occluded   Tibioperoneal Trunk occluded   Peroneal Art occluded   Posterior Tibial Art occluded     Procedure Details : There was previous CTA , or catheter  based diagnostic imaging preformed prior to today's procedure. Timeout preformed identifying pt and procedure. Groins prepped and draped in sterile fashion. Patient given sedation as needed throughout the case. Previously in place R femoral sheath was prepped and draped in sterile fashion. Angiogram done through the sheath. Noting occlusion as above. Sheath pulled back further imaging done. Sheath than pulled back to Right femoral.  Imaging done and than angioseal placed and deployed after removal of sheath. Good hemostasis noted.       Plan  Extensive discussion with patient and wife re options    They would like to go forward with JANINE WALTERS - they understands he is at risk for wound healing problems    August Henriquez MD

## 2021-07-22 NOTE — PLAN OF CARE
Problem: Pain:  Goal: Pain level will decrease  Description: Pain level will decrease  7/22/2021 0235 by Amaris Nur RN  Outcome: Met This Shift  7/21/2021 1726 by Gregory Araujo RN  Outcome: Met This Shift     Problem: Pain:  Goal: Control of acute pain  Description: Control of acute pain  7/22/2021 0235 by Amaris Nur RN  Outcome: Ongoing     Problem: Pain:  Goal: Control of chronic pain  Description: Control of chronic pain  7/22/2021 0235 by Amaris Nur RN  Outcome: Ongoing     Problem: Falls - Risk of:  Goal: Absence of physical injury  Description: Absence of physical injury  7/22/2021 0235 by Amaris Nur RN  Outcome: Ongoing     Problem: Falls - Risk of:  Goal: Will remain free from falls  Description: Will remain free from falls  7/22/2021 0235 by Amaris Nur RN  Outcome: Ongoing     Problem: Skin Integrity:  Goal: Absence of new skin breakdown  Description: Absence of new skin breakdown  Outcome: Ongoing     Problem: Bleeding:  Goal: Will show no signs and symptoms of excessive bleeding  Description: Will show no signs and symptoms of excessive bleeding  Outcome: Ongoing

## 2021-07-22 NOTE — PROGRESS NOTES
Vascular Surgery Progress Note    Pt is being seen in f/u today regarding L LE rest pain, thrombosed bypass s/p 7/21 angio, L common femoral SFA infusion catheter placement    Subjective  Pt s/e. Still with no motor of LLE below knee, pain controlled. C/o intermittent cramping of R calf that has developed over the last 2 days. Stable. Currently 2/10 described to be a cramping. Current Medications:    dextrose      HYDROmorphone      sodium chloride 100 mL/hr at 07/21/21 1936    sodium chloride      sodium chloride        oxyCODONE, glucose, dextrose, glucagon (rDNA), dextrose, naloxone, sodium chloride flush, sodium chloride, ondansetron **OR** ondansetron, sodium chloride flush, sodium chloride    heparin (porcine)  80 Units/kg Intravenous Once    insulin lispro  0-6 Units Subcutaneous TID WC    insulin lispro  0-3 Units Subcutaneous Nightly    ceFAZolin  3,000 mg Intravenous See Admin Instructions    sodium chloride flush  5-40 mL Intravenous 2 times per day    atorvastatin  40 mg Oral Nightly    citalopram  40 mg Oral Daily    gabapentin  100 mg Oral BID    [Held by provider] lisinopril  40 mg Oral Daily    pantoprazole  40 mg Oral QAM AC    sodium chloride flush  5-40 mL Intravenous 2 times per day        PHYSICAL EXAM:    /81   Pulse 102   Temp 98.8 °F (37.1 °C) (Oral)   Resp 12   Ht 5' 10\" (1.778 m)   Wt 195 lb (88.5 kg)   SpO2 98%   BMI 27.98 kg/m²     Intake/Output Summary (Last 24 hours) at 7/22/2021 1437  Last data filed at 7/22/2021 1400  Gross per 24 hour   Intake 3159.17 ml   Output 1675 ml   Net 1484.17 ml          Gen: awake, alert and oriented x3, no apparent distress  CVS: tachycardic  Resp: No increased work of breathing, on room airr  Abd: Soft, non-tender, non-distended  R LE: 2+ femoral, strong biphasic popliteal, weakly bibphasic PT, no signal to DP, 5/5 motor. Normal sensation  L LE: 2+ femoral, absent DP/PT, 0/5 dorsiflexion/plantarflexion.  Sensation intact proximal to calf, no sensation mid shin down into foot. Cool to touch from mid knee and distally. Hematoma of L thigh compartments soft. LABS:    Lab Results   Component Value Date    WBC 12.0 (H) 07/22/2021    HGB 7.4 (L) 07/22/2021    HCT 21.0 (L) 07/22/2021     07/22/2021    PROTIME 11.0 07/20/2021    INR 1.0 07/20/2021    APTT 29.2 07/21/2021    K 4.3 07/22/2021    BUN 32 (H) 07/22/2021    CREATININE 1.1 07/22/2021       A/P  48 y.o. male with LLE occlusive disease and thrombosed bypass graft. S/p 7/20 angiogram, plasty of L SFA/popliteal arteries, insertion of lysis catheter    - compartments soft  - PCA for pain  - NPO  - for L AKA today  - discussed the procedure with the patient. I offered to call his wife to answer any further questions she has but he states she will be I before the procedure to go over everything  - will monitor R leg pain and diminished DP signal closely. Motor/sensation intact to RLE, frequent nv checks  - acute blood loss anemia, Hgb trending down. 2 U prepared for OR today.      Electronically signed by Marquise Trujillo PA-C on 7/22/2021 at 2:37 PM

## 2021-07-22 NOTE — PLAN OF CARE
Problem: Pain:  Goal: Pain level will decrease  Description: Pain level will decrease  7/22/2021 0926 by Boubacar Jade RN  Outcome: Met This Shift     Problem: Pain:  Goal: Control of acute pain  Description: Control of acute pain  7/22/2021 0926 by Boubacar Jade RN  Outcome: Met This Shift     Problem: Falls - Risk of:  Goal: Will remain free from falls  Description: Will remain free from falls  7/22/2021 0926 by Boubacar Jade RN  Outcome: Met This Shift     Problem: Falls - Risk of:  Goal: Absence of physical injury  Description: Absence of physical injury  7/22/2021 5017 by Boubacar Jade RN  Outcome: Met This Shift

## 2021-07-22 NOTE — PROGRESS NOTES
Order for type and screen discontinued d/t patient having active blood band. Current type and screen valid until the end of day on 7/23.

## 2021-07-22 NOTE — BRIEF OP NOTE
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    Post-Op Diagnosis: Same       Procedure(s):  ABOVE KNEE AMPUTATION-LEFT LEG    Surgeon(s):  Kraig Hanks MD    Assistant:  Surgical Assistant: Perlita Julien  Resident: Kyra Berg DO    Anesthesia: * No anesthesia type entered *    Estimated Blood Loss (mL): 790     Complications: None    Specimens:   ID Type Source Tests Collected by Time Destination   A : LEFT ABOVE THE KNEE AMPUTATION  **SENT TO [facility]** Tissue Tissue SURGICAL PATHOLOGY Kraig Hanks MD 7/22/2021 1749        Implants:  * No implants in log *      Drains:   Urethral Catheter Non-latex 16 fr (Active)       Findings: none    Electronically signed by Kyra Berg DO on 7/22/2021 at 6:43 PM

## 2021-07-22 NOTE — PROGRESS NOTES
Patient's left above the knee amputation was placed in labeled biohazard bag and sent to [facility] with PCT David Telles at [phone].

## 2021-07-22 NOTE — PROGRESS NOTES
Vascular Surgery Progress Note    Pt is being seen in f/u today regarding L LE rest pain, thrombosed bypass s/p 7/21 angio, L comon femoral SFA infusion catheter placement    Subjective  Pt s/e. Still with no motor of LLE below knee, pain controlled. C/o intermittent cramping of R calf. S/p removal of sheath from R femoral artery. Current Medications:    dextrose      HYDROmorphone      sodium chloride 100 mL/hr at 07/21/21 1936    sodium chloride      sodium chloride        oxyCODONE, glucose, dextrose, glucagon (rDNA), dextrose, naloxone, sodium chloride flush, sodium chloride, ondansetron **OR** ondansetron, sodium chloride flush, sodium chloride    heparin (porcine)  80 Units/kg Intravenous Once    insulin lispro  0-6 Units Subcutaneous TID WC    insulin lispro  0-3 Units Subcutaneous Nightly    ceFAZolin  3,000 mg Intravenous See Admin Instructions    sodium chloride flush  5-40 mL Intravenous 2 times per day    atorvastatin  40 mg Oral Nightly    citalopram  40 mg Oral Daily    gabapentin  100 mg Oral BID    [Held by provider] lisinopril  40 mg Oral Daily    pantoprazole  40 mg Oral QAM AC    sodium chloride flush  5-40 mL Intravenous 2 times per day        PHYSICAL EXAM:    /78   Pulse 94   Temp 98.8 °F (37.1 °C) (Oral)   Resp 15   Ht 5' 10\" (1.778 m)   Wt 195 lb (88.5 kg)   SpO2 97%   BMI 27.98 kg/m²     Intake/Output Summary (Last 24 hours) at 7/22/2021 4369  Last data filed at 7/22/2021 0600  Gross per 24 hour   Intake 2359.17 ml   Output 875 ml   Net 1484.17 ml          Gen: awake, alert and oriented x3, no apparent distress  CVS: tachycardic  Resp: No increased work of breathing, on room airr  Abd: Soft, non-tender, non-distended  R LE: 2+ femoral, strong biphasic popliteal, weakly bibphasic DP/PT, 5/5 motor  L LE: 2+ femoral, absent DP/PT, 0/5 dorsiflexion/plantarflexion. Sensation intact proximal to calf, no sensation in foot.   Swelling of L thigh compartments soft.    LABS:    Lab Results   Component Value Date    WBC 12.0 (H) 07/22/2021    HGB 7.4 (L) 07/22/2021    HCT 21.0 (L) 07/22/2021     07/22/2021    PROTIME 11.0 07/20/2021    INR 1.0 07/20/2021    APTT 29.2 07/21/2021    K 4.3 07/22/2021    BUN 32 (H) 07/22/2021    CREATININE 1.1 07/22/2021       A/P  48 y.o. male with LLE occlusive disease and thrombosed bypass graft. S/p 7/20 Angiogram, plasty of L SFA/popliteal arteries, insertion of lysis catheter    - sheath removed yesterday  - hemodynamically stable  - continue to monitor compartments  - PCA for pain.  - NPO  - for L AKA today. - acute blood loss anemia, Hgb trending down. 2 U prepared for OR today. Will be d/w Dr. Anne Webber DO  Electronically signed by Jarod Sawyer DO on 7/22/2021 at 6:52 AM    Pt seen and examined  Plan for L AKA today    R LE 2+ femoral pulse           Biphasic popliteal signal           DP no signal, PT weakly biphasic           Motor and sensation intact    Complaints of R Calf pain    Exam is slightly worse on right but currently prioroity is to addres L LE     I reviewed the procedure with the patient and family as available. I discussed the procedure, risks, benefits, complications, and alternatives of the procedure. They understand and consent.   All questions were answered      Leidy Diaz MD

## 2021-07-22 NOTE — PROGRESS NOTES
97%   BMI 27.98 kg/m²       General: Awake, alert. C/o some right calf pain today  Eyes: PERRL, anicteric   Pulmonary: No wheezes, no accessory muscle use noted on room air   Cardiovascular:  RRR, no heaves or thrills on palpation  Tele: SR  Abdomen: Soft, nontender, + BS   Extremities: RLE compartments soft, +PT biphasic signals, no DP signals appreciated, + motor function, + sensation, right groin dressing intact    LLE with +femoral pulse, leg cold, remains with no motor function   Neurologic/Psych: A&Ox3, GARCÍA to command   Skin: Warm and dry  Incisions: right groin dressing intact, groin soft       Assessment/Plan: POD #2  1. Thrombosed left fem-pop bypass S/p catheter directed lysis 7/20, stopped overnight 7/21 d/t increased left groin/thigh swelling and pain.   -NPO for AKA this afternoon  -PCA for pain control   -Scr improved with IVF   -H/H noted--2prbc on hold for OR,     Dispo: [facility]ICU    Electronically signed by DRAKE Vo - CNP on 7/22/2021 at 9:10 AM

## 2021-07-23 LAB
ANION GAP SERPL CALCULATED.3IONS-SCNC: 8 MMOL/L (ref 7–16)
BUN BLDV-MCNC: 12 MG/DL (ref 6–20)
CALCIUM SERPL-MCNC: 6.9 MG/DL (ref 8.6–10.2)
CHLORIDE BLD-SCNC: 103 MMOL/L (ref 98–107)
CO2: 23 MMOL/L (ref 22–29)
CREAT SERPL-MCNC: 0.6 MG/DL (ref 0.7–1.2)
GFR AFRICAN AMERICAN: >60
GFR NON-AFRICAN AMERICAN: >60 ML/MIN/1.73
GLUCOSE BLD-MCNC: 194 MG/DL (ref 74–99)
HCT VFR BLD CALC: 22.2 % (ref 37–54)
HEMOGLOBIN: 7.6 G/DL (ref 12.5–16.5)
MCH RBC QN AUTO: 30.3 PG (ref 26–35)
MCHC RBC AUTO-ENTMCNC: 34.2 % (ref 32–34.5)
MCV RBC AUTO: 88.4 FL (ref 80–99.9)
METER GLUCOSE: 127 MG/DL (ref 74–99)
METER GLUCOSE: 176 MG/DL (ref 74–99)
METER GLUCOSE: 213 MG/DL (ref 74–99)
METER GLUCOSE: 264 MG/DL (ref 74–99)
PDW BLD-RTO: 13.2 FL (ref 11.5–15)
PLATELET # BLD: 158 E9/L (ref 130–450)
PMV BLD AUTO: 9.4 FL (ref 7–12)
POTASSIUM SERPL-SCNC: 4.5 MMOL/L (ref 3.5–5)
RBC # BLD: 2.51 E12/L (ref 3.8–5.8)
SODIUM BLD-SCNC: 134 MMOL/L (ref 132–146)
WBC # BLD: 10.9 E9/L (ref 4.5–11.5)

## 2021-07-23 PROCEDURE — 97530 THERAPEUTIC ACTIVITIES: CPT

## 2021-07-23 PROCEDURE — 2060000000 HC ICU INTERMEDIATE R&B

## 2021-07-23 PROCEDURE — 2580000003 HC RX 258: Performed by: SURGERY

## 2021-07-23 PROCEDURE — 6370000000 HC RX 637 (ALT 250 FOR IP): Performed by: SURGERY

## 2021-07-23 PROCEDURE — 82962 GLUCOSE BLOOD TEST: CPT

## 2021-07-23 PROCEDURE — 97166 OT EVAL MOD COMPLEX 45 MIN: CPT

## 2021-07-23 PROCEDURE — 6360000002 HC RX W HCPCS: Performed by: SURGERY

## 2021-07-23 PROCEDURE — 80048 BASIC METABOLIC PNL TOTAL CA: CPT

## 2021-07-23 PROCEDURE — 36415 COLL VENOUS BLD VENIPUNCTURE: CPT

## 2021-07-23 PROCEDURE — 85027 COMPLETE CBC AUTOMATED: CPT

## 2021-07-23 PROCEDURE — 97162 PT EVAL MOD COMPLEX 30 MIN: CPT

## 2021-07-23 PROCEDURE — 2700000000 HC OXYGEN THERAPY PER DAY

## 2021-07-23 RX ADMIN — GABAPENTIN 100 MG: 100 CAPSULE ORAL at 20:29

## 2021-07-23 RX ADMIN — Medication: at 00:34

## 2021-07-23 RX ADMIN — PANTOPRAZOLE SODIUM 40 MG: 40 TABLET, DELAYED RELEASE ORAL at 06:19

## 2021-07-23 RX ADMIN — INSULIN LISPRO 2 UNITS: 100 INJECTION, SOLUTION INTRAVENOUS; SUBCUTANEOUS at 06:19

## 2021-07-23 RX ADMIN — Medication 10 ML: at 08:25

## 2021-07-23 RX ADMIN — CITALOPRAM 40 MG: 20 TABLET, FILM COATED ORAL at 08:24

## 2021-07-23 RX ADMIN — HEPARIN SODIUM 5000 UNITS: 10000 INJECTION INTRAVENOUS; SUBCUTANEOUS at 14:06

## 2021-07-23 RX ADMIN — HEPARIN SODIUM 5000 UNITS: 10000 INJECTION INTRAVENOUS; SUBCUTANEOUS at 06:19

## 2021-07-23 RX ADMIN — HEPARIN SODIUM 5000 UNITS: 10000 INJECTION INTRAVENOUS; SUBCUTANEOUS at 20:27

## 2021-07-23 RX ADMIN — Medication 10 ML: at 20:29

## 2021-07-23 RX ADMIN — GABAPENTIN 100 MG: 100 CAPSULE ORAL at 08:24

## 2021-07-23 RX ADMIN — Medication 0.2 MG: at 20:24

## 2021-07-23 RX ADMIN — INSULIN LISPRO 1 UNITS: 100 INJECTION, SOLUTION INTRAVENOUS; SUBCUTANEOUS at 14:06

## 2021-07-23 RX ADMIN — INSULIN LISPRO 3 UNITS: 100 INJECTION, SOLUTION INTRAVENOUS; SUBCUTANEOUS at 17:47

## 2021-07-23 ASSESSMENT — PAIN DESCRIPTION - PROGRESSION
CLINICAL_PROGRESSION: NOT CHANGED
CLINICAL_PROGRESSION: GRADUALLY WORSENING
CLINICAL_PROGRESSION: NOT CHANGED

## 2021-07-23 ASSESSMENT — PAIN DESCRIPTION - LOCATION
LOCATION: LEG

## 2021-07-23 ASSESSMENT — PAIN DESCRIPTION - PAIN TYPE
TYPE: ACUTE PAIN
TYPE: ACUTE PAIN
TYPE: SURGICAL PAIN
TYPE: ACUTE PAIN

## 2021-07-23 ASSESSMENT — PAIN SCALES - GENERAL
PAINLEVEL_OUTOF10: 0
PAINLEVEL_OUTOF10: 6
PAINLEVEL_OUTOF10: 7
PAINLEVEL_OUTOF10: 0
PAINLEVEL_OUTOF10: 4
PAINLEVEL_OUTOF10: 6
PAINLEVEL_OUTOF10: 8
PAINLEVEL_OUTOF10: 8
PAINLEVEL_OUTOF10: 7
PAINLEVEL_OUTOF10: 6

## 2021-07-23 ASSESSMENT — PAIN DESCRIPTION - FREQUENCY
FREQUENCY: CONTINUOUS

## 2021-07-23 ASSESSMENT — PAIN DESCRIPTION - ORIENTATION
ORIENTATION: LEFT

## 2021-07-23 ASSESSMENT — PAIN - FUNCTIONAL ASSESSMENT
PAIN_FUNCTIONAL_ASSESSMENT: PREVENTS OR INTERFERES SOME ACTIVE ACTIVITIES AND ADLS
PAIN_FUNCTIONAL_ASSESSMENT: PREVENTS OR INTERFERES SOME ACTIVE ACTIVITIES AND ADLS

## 2021-07-23 ASSESSMENT — PAIN DESCRIPTION - DESCRIPTORS
DESCRIPTORS: TENDER;SORE
DESCRIPTORS: CONSTANT

## 2021-07-23 ASSESSMENT — PAIN DESCRIPTION - ONSET
ONSET: ON-GOING
ONSET: ON-GOING

## 2021-07-23 NOTE — PROGRESS NOTES
Vascular Surgery Progress Note    Pt is being seen in f/u today regarding L LE rest pain, thrombosed bypass s/p 7/21 angio, L common femoral SFA infusion catheter placement    Subjective  Pt s/e. States he is getting some phantom pains to the LLE, but tolerable. Well controlled with PCA. Currently has no pain to the R calf. It will occur with palpable to the lateral calf and when it does occur it is still minimal today. Current Medications:    sodium chloride      dextrose      HYDROmorphone      sodium chloride      sodium chloride        sodium chloride, oxyCODONE, glucose, dextrose, glucagon (rDNA), dextrose, naloxone, sodium chloride flush, sodium chloride, ondansetron **OR** ondansetron, sodium chloride flush, sodium chloride    heparin (porcine)  5,000 Units Subcutaneous 3 times per day    insulin lispro  0-6 Units Subcutaneous TID WC    insulin lispro  0-3 Units Subcutaneous Nightly    sodium chloride flush  5-40 mL Intravenous 2 times per day    atorvastatin  40 mg Oral Nightly    citalopram  40 mg Oral Daily    gabapentin  100 mg Oral BID    [Held by provider] lisinopril  40 mg Oral Daily    pantoprazole  40 mg Oral QAM AC    sodium chloride flush  5-40 mL Intravenous 2 times per day        PHYSICAL EXAM:    /74   Pulse 112   Temp 98 °F (36.7 °C) (Temporal)   Resp 16   Ht 5' 10\" (1.778 m)   Wt 195 lb (88.5 kg)   SpO2 92%   BMI 27.98 kg/m²     Intake/Output Summary (Last 24 hours) at 7/23/2021 0724  Last data filed at 7/23/2021 0600  Gross per 24 hour   Intake 4756 ml   Output 2475 ml   Net 2281 ml          Gen: awake, alert and oriented x3, no apparent distress  CVS: tachycardic  Resp: No increased work of breathing, on room airr  Abd: Soft, non-tender, non-distended  R LE: 2+ femoral,  biphasic popliteal, weakly biphasic PT, no signal to DP, 5/5 motor. Normal sensation  L LE: 2+ femoral, edema to L AKA residual limb, soft, appropriate ttp.  No strike through on BENNETT    LABS:    Lab Results   Component Value Date    WBC 10.9 07/23/2021    HGB 7.6 (L) 07/23/2021    HCT 22.2 (L) 07/23/2021     07/23/2021    PROTIME 11.0 07/20/2021    INR 1.0 07/20/2021    APTT 29.2 07/21/2021    K 4.5 07/23/2021    BUN 12 07/23/2021    CREATININE 0.6 (L) 07/23/2021       A/P  48 y.o. male with LLE occlusive disease and thrombosed bypass graft. S/p 7/20 angiogram, plasty of L SFA/popliteal arteries, insertion of lysis catheter, S/P L AKA 7/22    - PCA for pain  - continue diet as tolerated  - monitor R leg pain and diminished DP signal closely.  Motor/sensation intact to RLE, frequent nv checks  - Hgb stable post op  - keep alvarez  -BENNETT intact likely til pod 3  - transfer to monitored floor     Electronically signed by Manjit Null PA-C on 7/23/2021 at 7:24 AM

## 2021-07-23 NOTE — PROGRESS NOTES
Physical Therapy  Physical Therapy Initial Assessment     Name: Adrianna Shirley  : 1970  MRN: [id number]      Date of Service: 2021    Evaluating PT:  Keli Mcgrath, PT, DPT [id number]    Room #:  5740/4133-I  Diagnosis:  Peripheral vascular disease, unspecified [I73.9]  Arterial occlusion, lower extremity ([facility] [address] [I70.209]  PMHx/PSHx:  COPD, DM, HLD, HTN, PVD  Procedure/Surgery:   L AKA  Precautions:  Falls, O2, PCA  Equipment Needs:  TBD    SUBJECTIVE:    Pt lives with wife and adult daughter in a 1 story home with 1 stairs to enter and no rail. Pt ambulated without device and was independent PTA. OBJECTIVE:   Initial Evaluation  Date: 21 Treatment Short Term/ Long Term   Goals   AM-PAC 6 Clicks      Was pt agreeable to Eval/treatment?  Yes     Does pt have pain? -8/10 LLE     Bed Mobility  Rolling: NT  Supine to sit: MaxA x 2  Sit to supine: MaxA x 2  Scooting: MaxA  Codey   Transfers Sit to stand: MaxA x 2  Stand to sit: MaxA x 2  Stand pivot: NT  Codey with AAD   Ambulation   NT  >50 feet with Codey with AAD   Stair negotiation: ascended and descended NT  >2 steps with 2 rail ModA   ROM BUE:  Defer to OT note  RLE:  WFL     Strength BUE:  Defer to OT note  RLE:  4-/5  Increase by 1/3 MMT grade   Balance Sitting EOB:  Codey  Dynamic Standing:  NT  Sitting EOB:  Independent  Dynamic Standing:  Codey with AAD     Pt is A & O x 4  CAM-ICU: NT  RASS: 0  Sensation:  No reported paresthesias  Edema:  None    Vitals:  Heart Rate at rest 97 bpm Heart Rate post session 107 bpm   SpO2 at rest 94% SpO2 post session 96%   Blood Pressure at rest 115/68 mmHg Blood Pressure post session 123/72 mmHg       Functional Status Score-Intensive Care Unit (FSS-ICU)   Rolling -/   Supine to sit transfer 1   Unsupported sitting  4/   Sit to stand transfers 1/   Ambulation /   Total         Therapeutic Exercises:  Instructed pt on desensitization techniques and proper positioning of LLE    Patient education  Pt educated on safety    Patient response to education:   Pt verbalized understanding Pt demonstrated skill Pt requires further education in this area   x x x     ASSESSMENT:    Conditions Requiring Skilled Therapeutic Intervention:    [x]Decreased strength     []Decreased ROM  [x]Decreased functional mobility  [x]Decreased balance   [x]Decreased endurance   [x]Decreased posture  [x]Decreased sensation  []Decreased coordination   []Decreased vision  []Decreased safety awareness   [x]Increased pain       Comments:  NP reported pt was stable for session. Pt was in bed upon arrival, agreeable to initial evaluation. Extensive education provided regarding L AKA, desensitization techniques and proper positioning of residual limb. Increased assistance provided for all mobility. Pt reported dizziness with upright activity. Pt stood with mild unsteadiness but was unable to advance RLE despite encouragement. Fatigue and fear limited activity. Pt was left in bed with all needs met and call light in reach. All lines remained intact. Pt would benefit from an intensive rehabilitation program at discharge. Notified NP Prerna Scripture. Treatment:  Patient practiced and was instructed in the following treatment:     Bed mobility training - pt given verbal and tactile cues to facilitate proper sequencing and safety during supine>sit as well as provided with physical assistance.  Sitting EOB for >10 minutes for upright tolerance, postural awareness and BLE ROM   Transfer training - pt was given verbal and tactile cues to facilitate proper hand placement, technique and safety during sit to stand, stand to sit and stand pivot transfers as well as provided with physical assistance.  Skilled positioning - Pt placed in the chair position with pillows utilized to facilitate upright posture, joint and skin integrity, and interaction with environment. Pt's/ family goals   1.  To walk    Prognosis is good for reaching above PT goals. Patient and or family understand(s) diagnosis, prognosis, and plan of care. yes    PHYSICAL THERAPY PLAN OF CARE:    PT POC is established based on physician order and patient diagnosis     Referring provider/PT Order:  Lorena Hunter DO /07/22/21 1930 PT eval and treat  Diagnosis:  Peripheral vascular disease, unspecified [I73.9]  Arterial occlusion, lower extremity ([facility] [address] [I70.209]  Specific instructions for next treatment:  Progress activity    Current Treatment Recommendations:     [x] Strengthening to improve independence with functional mobility   [] ROM to improve independence with functional mobility   [x] Balance Training to improve static/dynamic balance and to reduce fall risk  [x] Endurance Training to improve activity tolerance during functional mobility   [x] Transfer Training to improve safety and independence with all functional transfers   [x] Gait Training to improve gait mechanics, endurance and asses need for appropriate assistive device  [x] Stair Training in preparation for safe discharge home and/or into the community   [x] Positioning to prevent skin breakdown and contractures  [x] Safety and Education Training   [x] Patient/Caregiver Education   [] HEP  [] Other     PT long term treatment goals are located in above grid    Frequency of treatments: 2-5x/week x 1-2 weeks. Time in  0905  Time out  0935    Total Treatment Time  20 minutes     Evaluation Time includes thorough review of current medical information, gathering information on past medical history/social history and prior level of function, completion of standardized testing/informal observation of tasks, assessment of data and education on plan of care and goals.     CPT codes:  [] Low Complexity PT evaluation 61332  [x] Moderate Complexity PT evaluation 41213  [] High Complexity PT evaluation 21854  [] PT Re-evaluation 43696  [] Gait training 19276 - minutes  [] Manual therapy 63035 - minutes  [x] Therapeutic activities 74228 20 minutes  [] Therapeutic exercises 10690 - minutes  [] Neuromuscular reeducation 70554 - minutes     Keely Harmon, PT, DPT  [id number]

## 2021-07-23 NOTE — PROGRESS NOTES
Vascular Surgery Progress Note    Pt is being seen in f/u today regarding L LE rest pain, thrombosed bypass s/p 7/21 angio, L comon femoral SFA infusion catheter placement, s/p 7/22 L AKA    Subjective  Pt s/e. POD1 s/p L AKA. Pain controlled with PCA. Denies new overnight issues. Denies pain in right lower extremity. No nausea, vomiting. Denies CP, SOB. On 3L O2 while sleeping with PCA      Current Medications:    sodium chloride      dextrose      HYDROmorphone      sodium chloride 100 mL/hr at 07/23/21 0600    sodium chloride      sodium chloride        sodium chloride, oxyCODONE, glucose, dextrose, glucagon (rDNA), dextrose, naloxone, sodium chloride flush, sodium chloride, ondansetron **OR** ondansetron, sodium chloride flush, sodium chloride    heparin (porcine)  5,000 Units Subcutaneous 3 times per day    insulin lispro  0-6 Units Subcutaneous TID WC    insulin lispro  0-3 Units Subcutaneous Nightly    sodium chloride flush  5-40 mL Intravenous 2 times per day    atorvastatin  40 mg Oral Nightly    citalopram  40 mg Oral Daily    gabapentin  100 mg Oral BID    [Held by provider] lisinopril  40 mg Oral Daily    pantoprazole  40 mg Oral QAM AC    sodium chloride flush  5-40 mL Intravenous 2 times per day        PHYSICAL EXAM:    /74   Pulse 112   Temp 98 °F (36.7 °C) (Temporal)   Resp 16   Ht 5' 10\" (1.778 m)   Wt 195 lb (88.5 kg)   SpO2 92%   BMI 27.98 kg/m²     Intake/Output Summary (Last 24 hours) at 7/23/2021 6325  Last data filed at 7/23/2021 0600  Gross per 24 hour   Intake 4756 ml   Output 2475 ml   Net 2281 ml          Gen: awake, alert and oriented x3, no apparent distress  CVS: tachycardic   Resp: No increased work of breathing, on  3L O2  Abd: Soft, non-tender, non-distended  R LE: 2+ femoral, strong biphasic popliteal, weakly bibphasic DP/PT, 5/5 motor  L LE: 2+ femoral.  S/p AKA. BENNETT dressing in place without strikethrough. Thigh compartment soft.    LABS: Lab Results   Component Value Date    WBC 12.0 (H) 07/22/2021    HGB 7.4 (L) 07/22/2021    HCT 21.0 (L) 07/22/2021     07/22/2021    PROTIME 11.0 07/20/2021    INR 1.0 07/20/2021    APTT 29.2 07/21/2021    K 4.3 07/22/2021    BUN 32 (H) 07/22/2021    CREATININE 1.1 07/22/2021       A/P  48 y.o. male with LLE occlusive disease and thrombosed bypass graft. S/p 7/20 Angiogram, plasty of L SFA/popliteal arteries, insertion of lysis catheter, s/p 7/22 L AKA    - supportive care postoperatively  - acute blood loss anemia, monitor H/H. Transfuse prn for Hgb <7 or symptomatic  - continue to monitor RLE neurovascular exam  - PCA for pain.   - diet as tolerated  - keep alvarez  - PT/OT evals  - transfer out of ICU    Will be d/w Dr. Souleymane Mullen DO  Electronically signed by Denis Hu DO on 7/23/2021 at 6:24 AM

## 2021-07-23 NOTE — ANESTHESIA POSTPROCEDURE EVALUATION
Department of Anesthesiology  Postprocedure Note    Patient: Loida Ferrera  MRN: [id number]  YOB: 1970  Date of evaluation: 7/22/2021  Time:  9:16 PM     Procedure Summary     Date: 07/22/21 Room / Location: [address] [place] [address] / [facility]    Anesthesia Start: 1600 Anesthesia Stop: 1843    Procedure: ABOVE KNEE AMPUTATION-LEFT LEG (Left Leg Upper) Diagnosis: (/)    Surgeons: Star Brink MD Responsible Provider: Jame Boggs DO    Anesthesia Type: general ASA Status: 4          Anesthesia Type: general    Meek Phase I: Meek Score: 8    Meek Phase II: Meek Score: 9    Last vitals: Reviewed and per EMR flowsheets.        Anesthesia Post Evaluation    Patient location during evaluation: ICU  Patient participation: complete - patient participated  Level of consciousness: awake and alert  Airway patency: patent  Nausea & Vomiting: no nausea and no vomiting  Complications: no  Cardiovascular status: blood pressure returned to baseline  Respiratory status: acceptable  Hydration status: euvolemic

## 2021-07-23 NOTE — CONSULTS
[address]                  [address] [facility],  [address]                                  CONSULTATION    PATIENT NAME: Kashif Ga                       :        1970  MED REC NO:   [id number]                            ROOM:       1541  ACCOUNT NO:   [de-identified]                           ADMIT DATE: 2021  PROVIDER:     Dorothea Cassidy MD    CONSULT DATE:  2021    REHAB CONSULT    CHIEF COMPLAINT:  Left above-knee amputation. HISTORY OF PRESENT ILLNESS:  The patient has a longstanding history of  peripheral vascular disease. He has hypertension, diabetes, and a prior  smoking history. He developed ischemia at the left leg and was admitted  to [facility] on 2021. He has being been followed very  regularly by Vascular. He had a thrombectomy done on 2021, but  failed to improve after that and then on 2021 it looks like he  went for left above-knee amputation and was then admitted to ICU on  2021. He seems to be stable so far. He has been evaluated by  Therapy. He was max assist to transfer and mod to max for his ADLs. I  was asked to see him for developing a further rehab. PAST MEDICAL HISTORY:  1. Type 2 diabetes mellitus. 2.  Hypertension. 3.  Peripheral vascular disease. 4.  Prior nicotine addiction. He tells me that he quit smoking in 2016. ALLERGIES:  None known. CURRENT MEDICATIONS:  Lipitor, Celexa, gabapentin, heparin, sliding  scale insulin, Prinivil, and Protonix. HABITS:  Prior smoking. SOCIAL HISTORY:  He lives with his wife in a one-story home. REVIEW OF SYSTEMS:  HEENT:  Negative for prior HEENT problems. PULMONARY:  Positive for COPD. CARDIAC:  Negative. GI AND :   Negative. ORTHOPEDIC:  Negative. NEUROLOGIC:  Negative.     PHYSICAL EXAMINATION:  GENERAL:  A well-nourished, well-developed white male in no acute  distress at rest, but he has pain with movement of the left limb. HEENT:  Head:  Normocephalic, atraumatic. Eyes:  Pupils are equal,  round, and reactive to light. Pharynx:  Normal.  LUNGS:  Clear to P and A. HEART:  Regular rate and rhythm. S1, S2 normal.  No murmurs or gallops. ABDOMEN:  Bowel sounds normal.  Soft, nontender. No masses. EXTREMITIES:  Left above-knee amputation. MENTAL STATUS:  Alert and oriented. SENSATION:  Intact. NEUROMUSCULAR:  4/5 strength in both uppers and the right lower, limited  due to pain. PROBLEM LIST:  1. Left above-knee amputation. 2.  Severe peripheral vascular disease. 3.  Hypertension. 4.  Type 2 diabetes mellitus. RECOMMENDATIONS:  I think the patient will be a good candidate for short  stay in acute rehab. He will need to learn to get functional at a  wheelchair level and I think there is a good possibility that he is  going to be able to do some very limited gait with a wheeled walker and  hopping at least for a short distance to help him in tight spaces. He  just had the surgery yesterday, so it is a little too soon to pursue  that now plus he has had some orthostatic hypotension when they did try  to get him up. We will have to get pre-cert, but I think by Monday he  will be in good shape for rehab and will be able to go ahead with  pre-cert.         Irma Astudillo MD    D: 07/23/2021 13:49:25       T: 07/23/2021 13:58:50     [email or web address]  Job#: [id number]     Doc#: [id number]    CC:

## 2021-07-23 NOTE — PROGRESS NOTES
CVICU Progress Note    Name: Jennifer Benito  MRN: [id number]    CC: Postoperative Critical Care Management      Indication for Surgery/Procedure: LLE rest pain, thrombosed bypass     Important/Relevant PMH/PSH: HTN, DMII, HLD, PVD with multiple interventions listed below   10/4/16 R EIA plasty  R CFA, SFA, Popliteal atherectomy/plasty  R SFA, Popliteal plasty with DCB   6/19/18 R CFA, SFA, popliteal therectomy  R CFA plasty 6x150  R SFA, popliteal plasty 6x150 DCB   7/3/18 L CFA plasty with 7x60 DCB, 8x40 evercross  L profunda plasty 5x60 evercross   7/23/19 Left angiogram, Left Common femoral and profunda plasty with 6x80 evercross, 9x80 evercross    8/16/19 Left distal external iliac and femoral endarterectomy  Left Deep femoral endarterectomy with profundoplasty   Left fem ak pop bypass with 8 mm ringed propatent PTFE graft  Angiogram with plasty of bk pop with 5x150 [facility]   1/31/20 Right distal external iliac and femoral endarterectomy with repair with bovine pericardial patch   Deep femoral endarterectomy with profundoplasty    6/16/20 R profunda plasty 5x60  R sfa, pop atherectomy, 6x250 DCB   3/16/21 R sfa pop atherectomy            Procedure/Surgeries:   7/20/2021 catheter directed lysis therapy, stopped 7/21/2021 7/22/2021 Left AKA           Intake/Output Summary (Last 24 hours) at 7/23/2021 0758  Last data filed at 7/23/2021 0600  Gross per 24 hour   Intake 4756 ml   Output 2475 ml   Net 2281 ml       Recent Labs     07/21/21  1826 07/22/21  0450 07/23/21  0540   WBC 14.4* 12.0* 10.9   HGB 8.8* 7.4* 7.6*   HCT 25.6* 21.0* 22.2*    172 158      Lab Results   Component Value Date     07/23/2021    K 4.5 07/23/2021    K 4.2 07/20/2021     07/23/2021    CO2 23 07/23/2021    BUN 12 07/23/2021    CREATININE 0.6 07/23/2021    GLUCOSE 194 07/23/2021    CALCIUM 6.9 07/23/2021         Physical Exam:    /75   Pulse 99   Temp 98 °F (36.7 °C) (Temporal)   Resp 19   Ht 5' 10\" (1.778 m)   Wt 195 lb (88.5 kg)   SpO2 99%   BMI 27.98 kg/m²         General: Awake, alert. States pain controlled   Eyes: PERRL, anicteric   Pulmonary:  No wheezes, no accessory muscle use noted   Cardiovascular:  RRR, no heaves or thrills on palpation  Tele: SR  Abdomen: Soft, nontender, + BS   Extremities:   Left AKA, +femoral    RLE with +PT signals, absent DP, +popilteal signals, +motor function/sensation   Neurologic/Psych: A&Ox3, GARCÍA to command   Skin: Warm and dry  Incisions: left aka danielle intact, no strike-through. Assessment/Plan: POD #3  1. Thrombosed left fem-pop bypass S/p catheter directed lysis 7/20, stopped overnight 7/21 d/t increased left groin/thigh swelling and pain.   7/22/2021 s/p Left AKA  -continue neurovascular exams   -No acute events overnight, pain controlled on PCA  -labs reviewed  -diet   -Keep alvarez  -Transfer to monitor floor       Dispo: transfer out of ICU     Electronically signed by Korene Blizzard, APRN - CNP on 7/23/2021 at 7:58 AM

## 2021-07-23 NOTE — PROGRESS NOTES
[address]  Date:2021                                             Patient Name: Orville Burnette  MRN: [id number]  : 1970  Room: -A  Evaluating OT: Rochelle Strauss OTD, OTR/L; #[id number]    Referring Provider: Date:2021                                             Patient Name: Orville Burnette  MRN: [id number]  : 1970  Room: -A    Evaluating OT: Rochelle Strauss OTD, OTR/L; #[id number]    Referring Provider: Torrey Sloan DO  Specific Provider Orders/Date: OT Evaluation and Treatment, 2021    Diagnosis: Peripheral vascular disease, unspecified [I73.9]  Arterial occlusion, lower extremity ([facility] [address] [I70.209]  Surgery:   - s/p L LE angiogram, thrombosed left femoral-popliteal bypass and catheter directed lysis  (21)- patient with no volitional motor function present per chart    - s/p L AKA (2021)    Pertinent Medical History: anxiety, 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, COPD, DM,  GERD, HLD, HTNA, PVD, s/p L femoral-popliteal bypass surgery with  femoral endarterectomy (2019), s/p R femoral endarterectomy (2020)     Precautions:  Fall Risk, L AKA, PCA      Assessment of current deficits   [x] Functional mobility   [x]ADLs  [x] Strength               []Cognition   [x] Functional transfers   [x] IADLs         [x] Safety Awareness   [x]Endurance   [] Fine Coordination              [x] Balance      [] Vision/perception   []Sensation    []Gross Motor Coordination  [] ROM  [] Delirium                   [] Motor Control     OT PLAN OF CARE   OT POC based on physician orders, patient diagnosis and results of clinical assessment    Frequency/Duration: 2-5 days/wk for 2 weeks PRN   Specific OT Treatment Interventions to include:   * Instruction/training on adapted ADL techniques and AE recommendations to increase functional independence within precautions       * Training on energy conservation strategies, correct breathing pattern and techniques to improve independence/tolerance for self-care routine  * Functional transfer/mobility training/DME recommendations for increased independence, safety, and fall prevention  * Patient/Family education to increase follow through with safety techniques and functional independence  * Recommendation of environmental modifications for increased safety with functional transfers/mobility and ADLs  * Cognitive retraining/development of therapeutic activities to improve problem solving, judgement, memory, and attention for increased safety/participation in ADL/IADL tasks  * Therapeutic exercise to improve motor endurance, ROM, and functional strength for ADLs/functional transfers  * Therapeutic activities to facilitate/challenge dynamic balance, stand tolerance for increased safety and independence with ADLs  * Therapeutic activities to facilitate gross/fine motor skills for increased independence with ADLs  * Positioning to improve skin integrity, interaction with environment and functional independence  * Delirium prevention/treatment  * Manual techniques for edema management    Recommended Adaptive Equipment: TBD pending discharge plan     Home Living: Adolfot lives with his wife and adult daughter in a 1 story house with 1 step to enter without railing. Bedroom/bathroom are located on the 1st floor. Laundry is located on the first floor. Bathroom setup: Walk-in shower (no threshold) with shower chair and grab bars, standard commode   Equipment owned: shower chair    Prior Level of Function: I with ADLs. I with IADLs. Patient completed functional mobility using no device. 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    Pain Level:  Moderate/severe L LE residual limb, re-positioning completed for pain management with PCA available PRN for patient use  Cognition: A&O: 4/4; Follows 2-3 step directions   Memory:  Good   Sequencing:  Fair+   Problem solving:  Fair+  Judgement/safety:  Fair+    Communication skills: WFL           Vision: WFL               Glasses:yes                                                   Hearing: WFL     RASS: 0  CAM-ICU: (NT) Delirium     Functional Assessment:  AM-PAC Daily Activity Raw Score: 14/24   Initial Eval Status  Date: 7/23/21 Treatment Status  Date: STGs = [facility]  Time frame: 10-14 days   Feeding Modified [place]       Grooming Minimal Assist  Simulated, seated   Modified [place]   Seated   UB Dressing Moderate Assist   Modified [place]    LB Dressing Maximal Assist   Simulated to don/doff R sock with min A, anticipate increased assistance with additional LB dressing tasks  Minimal Assist using AE as needed and modified techniques   Bathing Maximal Assist  Minimal Assist    Toileting Dependent   Nuñez catheter  Minimal Assist    Bed Mobility  Supine to sit: Maximal Assist   Sit to supine: Maximal Assist   Supine to sit: Minimal Assist   Sit to supine: Minimal Assist    Functional Transfers Sit<>stand:  Max A x2  x2 attempt to achieve full standing   Sit<>stand: Minimal Assist   Stand pivot: Minimal Assist  Bed<>bsc/chair using fww   Functional Mobility NT  Unable to safely compelte   Minimal Assist  Short functional distance using fww, bed<>bsc/chair    Balance Sitting:     Static: SBA    Dynamic: CGA  Standing: Min A x2 with B UE support using fww     Activity Tolerance Fair-  Fair+   Visual/  Perceptual Glasses: yes                   AROM (PROM) Strength Additional Info:  STGs = [facility]  Time frame: 10-14 days   RUE  WFL Proximally: 4-/5  Distally: 4/5 Good FMC/dexterity noted during ADL tasks Patient will improve R UE strength to 4+/5 overall to improve performance with functional transfers     LUE WFL Proximally: 4-/5  Distally: 4/5 Good FMC/dexterity noted during ADL tasks Patient will improve L UE strength to 4+/5 overall to improve performance with functional transfers       Hearing: [facility]   Sensation:  L residual limb hypersensitive  Tone: WFL   Edema: none observed B UE       Vitals:   HR at rest:102 bpm HR at end of session: 105 bpm   Spo2 at rest:96% Spo2 at end of session:96%   BP at rest:115/68 mmHg BP at end of session: 123/72 mmHg        Comments: Nursing approved OT session. Upon arrival, patient semi-supine in bed, agreeable and motivated to participate. Patient demonstrated fair- tolerance with good understanding of education/techniques. At end of session, patient semi-supine in bed. Call light within reach, all lines and tubes intact. Patient instructed on use of call light for assistance and fall prevention. Line management and environmental modifications made prior to and end of session to ensure patient safety and to increase efficiency of session. Skilled monitoring of HR, O2 saturation, blood pressure and patient's response to activity performed throughout session. Overall, pt presents with decreased activity tolerance, impaired balance, weakness, pain, and recent L AKA limiting completion of ADLs and safe return home. Patient would benefit from continued skilled OT to increase safety and functional performance with ADLs/ functional mobility to maximize functional outcomes in order for patient to return to [facility]       Assistance of two required for safety secondary to patient's medical complexity, line management and monitoring of vitals. Treatment: OT treatment provided this date includes:    Instruction/training on safe functional mobility/transfer techniques: Therapist assessed and modified patient environment to maximize safety. Max A supine>sit with HOB elevated and max A sit>supine. Verbal/tactile cues for sequencing and technique.  Max A x2 sit<>stand with patient requiring x2 attempt to achieve full standing with verbal/tactile cues for hand placement, R LE placement, and technique.  Balance: Patient completed static/dynamic sitting balance with SBA/CGA at EOB to improve activity tolerance in preparation for ADLs. Min A x2 using fww to maintain static standing with verbal cues for technique and positioning and technique. Additional cues as needed for pursed lip breathing for pain management. Rehab Potential: Good  for established goals     Patient / Family Goal: To return home      Patient and/or family were instructed on functional diagnosis, prognosis/goals and OT plan of care. Demonstrated good understanding. ·  Eval Complexity: Evaluation Complexity: Mod Complexity  ? History: Expanded review of medical records and additional review of physical, cognitive, or psychosocial history related to current functional performance  ? Exam: 5+ performance deficits  ? Assistance/Modification: mod/max assistance or modifications required to perform tasks. May have comorbidities that affect occupational performance. Time In: 09:15  Time Out: 09:51  Total Treatment Time: 15 minutes    Min Units   OT Eval Low 52131       OT Eval Medium 97166  X 1   OT Eval High 42318       OT Re-Eval [id number]       Therapeutic Ex 85608       Therapeutic Activities 10928  15  1   ADL/Self Care 80244       Orthotic Management 58669       Neuro Re-Ed 51372       Non-Billable Time              Evaluation Time includes thorough review of current medical information, gathering information on past medical history/social history and prior level of function, completion of standardized testing/informal observation of tasks, assessment of data and education on plan of care and goals.             DILCIA Lopez, OTR/L; #[id number]

## 2021-07-23 NOTE — OP NOTE
Lorene Goreorman  1970    DATE OF PROCEDURE: 7/22/2021     SURGEON: Johanny Escalona M.D.     ASSISTANT: Deuce Drummond     PREOPERATIVE DIAGNOSIS: L LE ischemia, pain      Thrombosed L LE bypass    POSTOPERATIVE DIAGNOSIS: Same    OPERATION: left above knee amputation     ANESTHESIA: General     ESTIMATED BLOOD LOSS: 649 ml     COMPLICATIONS: None     OPERATIVE PROCEDURE: Patient was placed supine on the operative table. Under adequate general anesthesia, the Left lower extremity was prepped circumferentially and the Left thigh region, groin region, and upper abdomenwere prepped and draped in usual sterile fashion. Patient was already on broad-spectrum preoperative antibiotics. Approximately 12 cm proximal to the most distal portion of the femoral condyle incision pattern was made in a fishmouth type pattern. Incision was made with scalpel blade. Electrocautery was used to dissect through the dermis and subcutaneous tissues. The muscles of the quadriceps tendons and anterior compartments were taken down. Small blood vessels were controlled with electrocautery silk ties. The muscles themselves looked healthy. There was good healthy bleeding from the skin edges. The popliteal artery and veins were individually clamped and suture ligated with 2-0 silk sutures  and individually tied with 2-0 silk suture. A pneumatic bone saw was then used to cut the femur approximately 5 cm proximal to the most proximal portion of our muscular skin flaps. The wound was copiously irrigated. The posterior muscular skin flap was sutured with 2-0 Vicryl in a figure-of-eight fashion to cover any bony prominences. Next,2-0 Vicryl in a figure-of-eight fashion was used to bring the fascia together over the anterior and posterior skin flaps. Next, 3-0 Vicryl in a buried interrupted fashion was used to approximate the deep dermal layers of the skin flaps.  A 3-0 nylon suture in a loose vertical mattress type fashion was used to help approximate the skin and the skin was then approximated with staples. Skin was clean and dried again. All care skin prep and than danielle vac applied to incision. Seal obtained. Needles, instruments and sponge counts correct x 2.       Brittani Proctor MD

## 2021-07-23 NOTE — PROGRESS NOTES
Comprehensive Nutrition Assessment    Type and Reason for Visit:  Initial (ICU LOS)    Nutrition Recommendations/Plan: Recommend Carb Controlled diet  Add Ensure HP & Brett BID    Nutrition Assessment:  Pt admit w/ LLE occlusive disease & thrombosed bypass s/p angio, s/p L AKA. Good PO intakes noted at this time. H/o DM. Recommend Carb Controlled diet. Will add ONS to aid in wound healing and will monitor. Malnutrition Assessment:  Malnutrition Status:  No malnutrition    Context:  Acute Illness     Findings of the 6 clinical characteristics of malnutrition:  Energy Intake:  No significant decrease in energy intake  Weight Loss:  Unable to assess     Body Fat Loss:  No significant body fat loss     Muscle Mass Loss:  No significant muscle mass loss    Fluid Accumulation:  No significant fluid accumulation     Strength:  Not Performed    Estimated Daily Nutrient Needs:  Energy (kcal):  7704-6245 (20-25 kcals/kg); Weight Used for Energy Requirements:  Current     Protein (g):  100-120; Weight Used for Protein Requirements:  Ideal (adjusted IBW for AKA (1.5-1.8 gm/kg IBW))        Fluid (ml/day):  per critical care; Method Used for Fluid Requirements:         Nutrition Related Findings:  A&Ox4, L AKA, active BS, soft abd, trace edema, +I/Os, hyperglycemia      Wounds:  Surgical Incision       Current Nutrition Therapies:    ADULT DIET; Regular; Low Fat/Low Chol/High Fiber/2 gm Na    Anthropometric Measures:  · Height: 5' 10\" (177.8 cm)  · Current Body Weight: 195 lb (88.5 kg) (7/20)     · Usual Body Weight: 199 lb (90.3 kg) (9/2020 actual per EMR)     · Ideal Body Weight: 166 lbs; % Ideal Body Weight 117.5 %   · BMI: 28  · Adjusted Body Weight: 214.7;  Amputation   · Adjusted BMI: 30.8    · BMI Categories: Obese Class 1 (BMI 30.0-34.9) (adjusted BMI for AKA)       Nutrition Diagnosis:   · Increased nutrient needs related to increase demand for energy/nutrients as evidenced by wounds    Nutrition Interventions:

## 2021-07-24 LAB
ANION GAP SERPL CALCULATED.3IONS-SCNC: 7 MMOL/L (ref 7–16)
BLOOD BANK DISPENSE STATUS: NORMAL
BLOOD BANK PRODUCT CODE: NORMAL
BPU ID: NORMAL
BUN BLDV-MCNC: 13 MG/DL (ref 6–20)
CALCIUM SERPL-MCNC: 7.8 MG/DL (ref 8.6–10.2)
CHLORIDE BLD-SCNC: 100 MMOL/L (ref 98–107)
CO2: 26 MMOL/L (ref 22–29)
CREAT SERPL-MCNC: 0.5 MG/DL (ref 0.7–1.2)
DESCRIPTION BLOOD BANK: NORMAL
GFR AFRICAN AMERICAN: >60
GFR NON-AFRICAN AMERICAN: >60 ML/MIN/1.73
GLUCOSE BLD-MCNC: 162 MG/DL (ref 74–99)
HCT VFR BLD CALC: 21.1 % (ref 37–54)
HEMOGLOBIN: 7.2 G/DL (ref 12.5–16.5)
MCH RBC QN AUTO: 30.1 PG (ref 26–35)
MCHC RBC AUTO-ENTMCNC: 34.1 % (ref 32–34.5)
MCV RBC AUTO: 88.3 FL (ref 80–99.9)
METER GLUCOSE: 147 MG/DL (ref 74–99)
METER GLUCOSE: 163 MG/DL (ref 74–99)
METER GLUCOSE: 179 MG/DL (ref 74–99)
METER GLUCOSE: 187 MG/DL (ref 74–99)
PDW BLD-RTO: 13.2 FL (ref 11.5–15)
PLATELET # BLD: 173 E9/L (ref 130–450)
PMV BLD AUTO: 9.6 FL (ref 7–12)
POTASSIUM SERPL-SCNC: 4.2 MMOL/L (ref 3.5–5)
RBC # BLD: 2.39 E12/L (ref 3.8–5.8)
SODIUM BLD-SCNC: 133 MMOL/L (ref 132–146)
WBC # BLD: 9.8 E9/L (ref 4.5–11.5)

## 2021-07-24 PROCEDURE — 2700000000 HC OXYGEN THERAPY PER DAY

## 2021-07-24 PROCEDURE — 2580000003 HC RX 258: Performed by: SURGERY

## 2021-07-24 PROCEDURE — 6360000002 HC RX W HCPCS: Performed by: SURGERY

## 2021-07-24 PROCEDURE — 36415 COLL VENOUS BLD VENIPUNCTURE: CPT

## 2021-07-24 PROCEDURE — 6370000000 HC RX 637 (ALT 250 FOR IP): Performed by: SURGERY

## 2021-07-24 PROCEDURE — 82962 GLUCOSE BLOOD TEST: CPT

## 2021-07-24 PROCEDURE — 80048 BASIC METABOLIC PNL TOTAL CA: CPT

## 2021-07-24 PROCEDURE — 2060000000 HC ICU INTERMEDIATE R&B

## 2021-07-24 PROCEDURE — 85027 COMPLETE CBC AUTOMATED: CPT

## 2021-07-24 RX ADMIN — INSULIN LISPRO 1 UNITS: 100 INJECTION, SOLUTION INTRAVENOUS; SUBCUTANEOUS at 12:27

## 2021-07-24 RX ADMIN — HEPARIN SODIUM 5000 UNITS: 10000 INJECTION INTRAVENOUS; SUBCUTANEOUS at 06:52

## 2021-07-24 RX ADMIN — CITALOPRAM 40 MG: 20 TABLET, FILM COATED ORAL at 08:16

## 2021-07-24 RX ADMIN — Medication 10 ML: at 22:01

## 2021-07-24 RX ADMIN — HEPARIN SODIUM 5000 UNITS: 10000 INJECTION INTRAVENOUS; SUBCUTANEOUS at 13:17

## 2021-07-24 RX ADMIN — ATORVASTATIN CALCIUM 40 MG: 40 TABLET, FILM COATED ORAL at 21:51

## 2021-07-24 RX ADMIN — Medication: at 13:14

## 2021-07-24 RX ADMIN — INSULIN LISPRO 1 UNITS: 100 INJECTION, SOLUTION INTRAVENOUS; SUBCUTANEOUS at 17:16

## 2021-07-24 RX ADMIN — Medication 10 ML: at 08:16

## 2021-07-24 RX ADMIN — PANTOPRAZOLE SODIUM 40 MG: 40 TABLET, DELAYED RELEASE ORAL at 06:52

## 2021-07-24 RX ADMIN — GABAPENTIN 100 MG: 100 CAPSULE ORAL at 21:51

## 2021-07-24 RX ADMIN — HEPARIN SODIUM 5000 UNITS: 10000 INJECTION INTRAVENOUS; SUBCUTANEOUS at 21:52

## 2021-07-24 RX ADMIN — INSULIN LISPRO 1 UNITS: 100 INJECTION, SOLUTION INTRAVENOUS; SUBCUTANEOUS at 06:58

## 2021-07-24 RX ADMIN — GABAPENTIN 100 MG: 100 CAPSULE ORAL at 08:16

## 2021-07-24 RX ADMIN — INSULIN LISPRO 1 UNITS: 100 INJECTION, SOLUTION INTRAVENOUS; SUBCUTANEOUS at 21:52

## 2021-07-24 ASSESSMENT — PAIN DESCRIPTION - PAIN TYPE
TYPE: ACUTE PAIN;SURGICAL PAIN
TYPE: SURGICAL PAIN;PHANTOM PAIN

## 2021-07-24 ASSESSMENT — PAIN DESCRIPTION - LOCATION: LOCATION: LEG

## 2021-07-24 ASSESSMENT — PAIN SCALES - GENERAL
PAINLEVEL_OUTOF10: 7
PAINLEVEL_OUTOF10: 6

## 2021-07-24 NOTE — PROGRESS NOTES
Vascular Surgery Progress Note    Subjective  POD2 s/p L AKA. Pain controlled with PCA and PRN oxy. No issues overnight. No nausea, vomiting, no pain. On 2L NC      Current Medications:    sodium chloride      dextrose      HYDROmorphone      sodium chloride      sodium chloride        sodium chloride, oxyCODONE, glucose, dextrose, glucagon (rDNA), dextrose, naloxone, sodium chloride flush, sodium chloride, ondansetron **OR** ondansetron, sodium chloride flush, sodium chloride    heparin (porcine)  5,000 Units Subcutaneous 3 times per day    insulin lispro  0-6 Units Subcutaneous TID WC    insulin lispro  0-3 Units Subcutaneous Nightly    sodium chloride flush  5-40 mL Intravenous 2 times per day    atorvastatin  40 mg Oral Nightly    citalopram  40 mg Oral Daily    gabapentin  100 mg Oral BID    [Held by provider] lisinopril  40 mg Oral Daily    pantoprazole  40 mg Oral QAM AC    sodium chloride flush  5-40 mL Intravenous 2 times per day        PHYSICAL EXAM:    /87   Pulse 94   Temp 98.4 °F (36.9 °C) (Oral)   Resp 16   Ht 5' 10\" (1.778 m)   Wt 195 lb (88.5 kg)   SpO2 94%   BMI 27.98 kg/m²     Intake/Output Summary (Last 24 hours) at 7/24/2021 0756  Last data filed at 7/24/2021 0157  Gross per 24 hour   Intake 3615 ml   Output 3125 ml   Net 490 ml          Gen: awake, alert and oriented x3, no apparent distress  CVS: tachycardic   Resp: No increased work of breathing, on  3L O2  Abd: Soft, non-tender, non-distended  R LE: 2+ femoral, strong biphasic popliteal, weakly bibphasic DP/PT, 5/5 motor  L LE: 2+ femoral.  S/p AKA. BENNETT dressing in place without strikethrough. Thigh compartment soft.    LABS:    Lab Results   Component Value Date    WBC 10.9 07/23/2021    HGB 7.6 (L) 07/23/2021    HCT 22.2 (L) 07/23/2021     07/23/2021    PROTIME 11.0 07/20/2021    INR 1.0 07/20/2021    APTT 29.2 07/21/2021    K 4.5 07/23/2021    BUN 12 07/23/2021    CREATININE 0.6 (L) 07/23/2021 A/P  48 y.o. male with LLE occlusive disease and thrombosed bypass graft. S/p Angiogram, plasty of L SFA/popliteal arteries, insertion of lysis catheter 7/20, s/p 7/22 L AKA  - acute blood loss anemia, monitor H/H.    - continue to monitor RLE neurovascular exam  - PCA, PRN oxy, scheduled tylonel., gabapentin for pain. - diet low fat/low chol/high fibre/low 2g  - Zofran PRN for nausea   - PT/OT evals prosthetic consult placed    Will be d/w Dr. Marsha Keith MD  Electronically signed by Juany Dowling MD on 7/24/2021 at 7:56 AM    Agree.

## 2021-07-24 NOTE — PLAN OF CARE
Problem: Pain:  Goal: Pain level will decrease  Description: Pain level will decrease  Outcome: Ongoing  Goal: Control of acute pain  Description: Control of acute pain  Outcome: Ongoing  Goal: Control of chronic pain  Description: Control of chronic pain  Outcome: Ongoing     Problem: Falls - Risk of:  Goal: Will remain free from falls  Description: Will remain free from falls  Outcome: Ongoing  Goal: Absence of physical injury  Description: Absence of physical injury  Outcome: Ongoing     Problem: Skin Integrity:  Goal: Will show no infection signs and symptoms  Description: Will show no infection signs and symptoms  Outcome: Ongoing  Goal: Absence of new skin breakdown  Description: Absence of new skin breakdown  Outcome: Ongoing     Problem: Bleeding:  Goal: Will show no signs and symptoms of excessive bleeding  Description: Will show no signs and symptoms of excessive bleeding  Outcome: Ongoing     Problem: Musculor/Skeletal Functional Status  Goal: Highest potential functional level  Outcome: Ongoing  Goal: Absence of falls  Outcome: Ongoing     Problem: Increased nutrient needs (NI-5.1)  Goal: Food and/or Nutrient Delivery  Description: Individualized approach for food/nutrient provision.   7/23/2021 8435 by Marbin Shin MS, RD, LD  Outcome: Met This Shift

## 2021-07-24 NOTE — PLAN OF CARE
Problem: Falls - Risk of:  Goal: Will remain free from falls  Description: Will remain free from falls  7/24/2021 1442 by Sharyn Peters RN  Outcome: Met This Shift  7/24/2021 0208 by Deedee Marroquin RN  Outcome: Ongoing  Goal: Absence of physical injury  Description: Absence of physical injury  7/24/2021 1442 by Sharyn Peters RN  Outcome: Met This Shift  7/24/2021 0208 by Deedee Marroquin RN  Outcome: Ongoing

## 2021-07-25 ENCOUNTER — APPOINTMENT (OUTPATIENT)
Dept: GENERAL RADIOLOGY | Age: 51
DRG: 240 | End: 2021-07-25
Attending: SURGERY
Payer: COMMERCIAL

## 2021-07-25 LAB
ANION GAP SERPL CALCULATED.3IONS-SCNC: 10 MMOL/L (ref 7–16)
BUN BLDV-MCNC: 10 MG/DL (ref 6–20)
CALCIUM SERPL-MCNC: 8 MG/DL (ref 8.6–10.2)
CHLORIDE BLD-SCNC: 98 MMOL/L (ref 98–107)
CO2: 25 MMOL/L (ref 22–29)
CREAT SERPL-MCNC: 0.5 MG/DL (ref 0.7–1.2)
GFR AFRICAN AMERICAN: >60
GFR NON-AFRICAN AMERICAN: >60 ML/MIN/1.73
GLUCOSE BLD-MCNC: 142 MG/DL (ref 74–99)
HCT VFR BLD CALC: 20.8 % (ref 37–54)
HEMOGLOBIN: 7.2 G/DL (ref 12.5–16.5)
MCH RBC QN AUTO: 30.5 PG (ref 26–35)
MCHC RBC AUTO-ENTMCNC: 34.6 % (ref 32–34.5)
MCV RBC AUTO: 88.1 FL (ref 80–99.9)
METER GLUCOSE: 167 MG/DL (ref 74–99)
METER GLUCOSE: 171 MG/DL (ref 74–99)
METER GLUCOSE: 226 MG/DL (ref 74–99)
METER GLUCOSE: 258 MG/DL (ref 74–99)
PDW BLD-RTO: 13.5 FL (ref 11.5–15)
PLATELET # BLD: 184 E9/L (ref 130–450)
PMV BLD AUTO: 9.9 FL (ref 7–12)
POTASSIUM SERPL-SCNC: 4.1 MMOL/L (ref 3.5–5)
RBC # BLD: 2.36 E12/L (ref 3.8–5.8)
SODIUM BLD-SCNC: 133 MMOL/L (ref 132–146)
WBC # BLD: 10.9 E9/L (ref 4.5–11.5)

## 2021-07-25 PROCEDURE — 2580000003 HC RX 258: Performed by: NURSE PRACTITIONER

## 2021-07-25 PROCEDURE — 2700000000 HC OXYGEN THERAPY PER DAY

## 2021-07-25 PROCEDURE — 6370000000 HC RX 637 (ALT 250 FOR IP): Performed by: NURSE PRACTITIONER

## 2021-07-25 PROCEDURE — 73610 X-RAY EXAM OF ANKLE: CPT

## 2021-07-25 PROCEDURE — 6360000002 HC RX W HCPCS: Performed by: NURSE PRACTITIONER

## 2021-07-25 PROCEDURE — 80048 BASIC METABOLIC PNL TOTAL CA: CPT

## 2021-07-25 PROCEDURE — 85027 COMPLETE CBC AUTOMATED: CPT

## 2021-07-25 PROCEDURE — 82962 GLUCOSE BLOOD TEST: CPT

## 2021-07-25 PROCEDURE — 2060000000 HC ICU INTERMEDIATE R&B

## 2021-07-25 PROCEDURE — 36415 COLL VENOUS BLD VENIPUNCTURE: CPT

## 2021-07-25 RX ADMIN — INSULIN LISPRO 1 UNITS: 100 INJECTION, SOLUTION INTRAVENOUS; SUBCUTANEOUS at 11:57

## 2021-07-25 RX ADMIN — GABAPENTIN 100 MG: 100 CAPSULE ORAL at 20:19

## 2021-07-25 RX ADMIN — Medication: at 18:36

## 2021-07-25 RX ADMIN — HEPARIN SODIUM 5000 UNITS: 10000 INJECTION INTRAVENOUS; SUBCUTANEOUS at 14:04

## 2021-07-25 RX ADMIN — INSULIN LISPRO 1 UNITS: 100 INJECTION, SOLUTION INTRAVENOUS; SUBCUTANEOUS at 08:00

## 2021-07-25 RX ADMIN — ATORVASTATIN CALCIUM 40 MG: 40 TABLET, FILM COATED ORAL at 20:19

## 2021-07-25 RX ADMIN — Medication 10 ML: at 20:20

## 2021-07-25 RX ADMIN — CITALOPRAM 40 MG: 20 TABLET, FILM COATED ORAL at 07:58

## 2021-07-25 RX ADMIN — Medication 10 ML: at 07:59

## 2021-07-25 RX ADMIN — OXYCODONE 5 MG: 5 TABLET ORAL at 06:37

## 2021-07-25 RX ADMIN — GABAPENTIN 100 MG: 100 CAPSULE ORAL at 07:58

## 2021-07-25 RX ADMIN — HEPARIN SODIUM 5000 UNITS: 10000 INJECTION INTRAVENOUS; SUBCUTANEOUS at 06:26

## 2021-07-25 RX ADMIN — HEPARIN SODIUM 5000 UNITS: 10000 INJECTION INTRAVENOUS; SUBCUTANEOUS at 20:19

## 2021-07-25 RX ADMIN — INSULIN LISPRO 2 UNITS: 100 INJECTION, SOLUTION INTRAVENOUS; SUBCUTANEOUS at 20:24

## 2021-07-25 RX ADMIN — PANTOPRAZOLE SODIUM 40 MG: 40 TABLET, DELAYED RELEASE ORAL at 06:27

## 2021-07-25 RX ADMIN — Medication: at 05:37

## 2021-07-25 RX ADMIN — INSULIN LISPRO 2 UNITS: 100 INJECTION, SOLUTION INTRAVENOUS; SUBCUTANEOUS at 16:47

## 2021-07-25 ASSESSMENT — PAIN SCALES - GENERAL
PAINLEVEL_OUTOF10: 5
PAINLEVEL_OUTOF10: 3
PAINLEVEL_OUTOF10: 6
PAINLEVEL_OUTOF10: 5

## 2021-07-25 NOTE — PROGRESS NOTES
Vascular Surgery Progress Note    CC: f/u L AKA    HISTORY:  The patient is awake, alert, and oriented. C/o right ankle pain. X-rays ordered. IMPRESSION:  POD # 3 s/p L AKA. PLAN:  Pain control. PT/OT/rehab.   Remove alvarez    Patient Active Problem List   Diagnosis Code    Type 2 diabetes mellitus with diabetic peripheral angiopathy without gangrene, without long-term current use of insulin ([address] E11.51    Tobacco abuse counseling Z71.6    PVD (peripheral vascular disease) with claudication ([address] I73.9    Pure hypercholesterolemia E78.00    Diabetic mononeuropathy associated with type 2 diabetes mellitus ([address] E11.41    Chronic obstructive pulmonary disease ([address] J44.9    S/P femoral-popliteal bypass surgery Z95.828    Swelling of joint of pelvic region or thigh, left M25.452    Atherosclerosis of native artery of left lower extremity with rest pain ([address] I70.222    PVD (peripheral vascular disease) ([address] I73.9    Atherosclerosis of native arteries of extremity with rest pain ([address] I70.229    Femoral-popliteal bypass graft occlusion, left, initial encounter ([address] T82.898A    Arterial occlusion, lower extremity (HCC) I70.209       Current Medications:    dextrose      HYDROmorphone        oxyCODONE, glucose, dextrose, glucagon (rDNA), dextrose, naloxone, sodium chloride flush, ondansetron **OR** ondansetron    heparin (porcine)  5,000 Units Subcutaneous 3 times per day    insulin lispro  0-6 Units Subcutaneous TID     insulin lispro  0-3 Units Subcutaneous Nightly    sodium chloride flush  5-40 mL Intravenous 2 times per day    atorvastatin  40 mg Oral Nightly    citalopram  40 mg Oral Daily    gabapentin  100 mg Oral BID    [Held by provider] lisinopril  40 mg Oral Daily    pantoprazole  40 mg Oral QAM AC          PHYSICAL EXAM:    Vitals:    07/25/21 0400   BP: 118/78   Pulse: 88   Resp: 16   Temp: 98.1 °F (36.7 °C)   SpO2:      CONSTITUTIONAL:  awake, alert, cooperative, no apparent distress  LUNGS:  no increased work of breathing, good air exchange  CARDIOVASCULAR:  regular rate and rhythm  L AKA dressing intact    LABS:    Lab Results   Component Value Date    WBC 10.9 07/25/2021    HGB 7.2 (L) 07/25/2021    HCT 20.8 (L) 07/25/2021     07/25/2021    PROTIME 11.0 07/20/2021    INR 1.0 07/20/2021    APTT 29.2 07/21/2021    K 4.1 07/25/2021    BUN 10 07/25/2021    CREATININE 0.5 (L) 07/25/2021       RADIOLOGY:

## 2021-07-26 LAB
ANION GAP SERPL CALCULATED.3IONS-SCNC: 10 MMOL/L (ref 7–16)
BUN BLDV-MCNC: 13 MG/DL (ref 6–20)
CALCIUM SERPL-MCNC: 8.8 MG/DL (ref 8.6–10.2)
CHLORIDE BLD-SCNC: 98 MMOL/L (ref 98–107)
CO2: 26 MMOL/L (ref 22–29)
CREAT SERPL-MCNC: 0.6 MG/DL (ref 0.7–1.2)
GFR AFRICAN AMERICAN: >60
GFR NON-AFRICAN AMERICAN: >60 ML/MIN/1.73
GLUCOSE BLD-MCNC: 229 MG/DL (ref 74–99)
HCT VFR BLD CALC: 23.3 % (ref 37–54)
HEMOGLOBIN: 7.7 G/DL (ref 12.5–16.5)
MCH RBC QN AUTO: 30 PG (ref 26–35)
MCHC RBC AUTO-ENTMCNC: 33 % (ref 32–34.5)
MCV RBC AUTO: 90.7 FL (ref 80–99.9)
METER GLUCOSE: 181 MG/DL (ref 74–99)
METER GLUCOSE: 196 MG/DL (ref 74–99)
METER GLUCOSE: 242 MG/DL (ref 74–99)
METER GLUCOSE: 251 MG/DL (ref 74–99)
PDW BLD-RTO: 13.4 FL (ref 11.5–15)
PLATELET # BLD: 259 E9/L (ref 130–450)
PMV BLD AUTO: 8.9 FL (ref 7–12)
POTASSIUM SERPL-SCNC: 4.3 MMOL/L (ref 3.5–5)
RBC # BLD: 2.57 E12/L (ref 3.8–5.8)
SODIUM BLD-SCNC: 134 MMOL/L (ref 132–146)
WBC # BLD: 11 E9/L (ref 4.5–11.5)

## 2021-07-26 PROCEDURE — 36415 COLL VENOUS BLD VENIPUNCTURE: CPT

## 2021-07-26 PROCEDURE — 97530 THERAPEUTIC ACTIVITIES: CPT

## 2021-07-26 PROCEDURE — 80048 BASIC METABOLIC PNL TOTAL CA: CPT

## 2021-07-26 PROCEDURE — 2700000000 HC OXYGEN THERAPY PER DAY

## 2021-07-26 PROCEDURE — 2580000003 HC RX 258: Performed by: NURSE PRACTITIONER

## 2021-07-26 PROCEDURE — 85027 COMPLETE CBC AUTOMATED: CPT

## 2021-07-26 PROCEDURE — 6360000002 HC RX W HCPCS: Performed by: SURGERY

## 2021-07-26 PROCEDURE — 6360000002 HC RX W HCPCS: Performed by: NURSE PRACTITIONER

## 2021-07-26 PROCEDURE — 6370000000 HC RX 637 (ALT 250 FOR IP): Performed by: NURSE PRACTITIONER

## 2021-07-26 PROCEDURE — 6370000000 HC RX 637 (ALT 250 FOR IP): Performed by: SURGERY

## 2021-07-26 PROCEDURE — 2060000000 HC ICU INTERMEDIATE R&B

## 2021-07-26 PROCEDURE — 82962 GLUCOSE BLOOD TEST: CPT

## 2021-07-26 RX ORDER — ACETAMINOPHEN 325 MG/1
650 TABLET ORAL EVERY 6 HOURS SCHEDULED
Status: DISCONTINUED | OUTPATIENT
Start: 2021-07-26 | End: 2021-07-30 | Stop reason: HOSPADM

## 2021-07-26 RX ORDER — OXYCODONE HYDROCHLORIDE 10 MG/1
10 TABLET ORAL EVERY 4 HOURS PRN
Status: DISCONTINUED | OUTPATIENT
Start: 2021-07-26 | End: 2021-07-30 | Stop reason: HOSPADM

## 2021-07-26 RX ORDER — GABAPENTIN 100 MG/1
200 CAPSULE ORAL 3 TIMES DAILY
Status: DISCONTINUED | OUTPATIENT
Start: 2021-07-26 | End: 2021-07-30 | Stop reason: HOSPADM

## 2021-07-26 RX ORDER — OXYCODONE HYDROCHLORIDE 5 MG/1
5 TABLET ORAL EVERY 4 HOURS PRN
Status: DISCONTINUED | OUTPATIENT
Start: 2021-07-26 | End: 2021-07-30 | Stop reason: HOSPADM

## 2021-07-26 RX ADMIN — HEPARIN SODIUM 5000 UNITS: 10000 INJECTION INTRAVENOUS; SUBCUTANEOUS at 05:38

## 2021-07-26 RX ADMIN — ACETAMINOPHEN 650 MG: 325 TABLET ORAL at 12:16

## 2021-07-26 RX ADMIN — GABAPENTIN 200 MG: 100 CAPSULE ORAL at 15:14

## 2021-07-26 RX ADMIN — Medication: at 05:46

## 2021-07-26 RX ADMIN — HYDROMORPHONE HYDROCHLORIDE 1 MG: 1 INJECTION, SOLUTION INTRAMUSCULAR; INTRAVENOUS; SUBCUTANEOUS at 20:46

## 2021-07-26 RX ADMIN — HEPARIN SODIUM 5000 UNITS: 10000 INJECTION INTRAVENOUS; SUBCUTANEOUS at 20:36

## 2021-07-26 RX ADMIN — OXYCODONE HYDROCHLORIDE 10 MG: 10 TABLET ORAL at 16:56

## 2021-07-26 RX ADMIN — GABAPENTIN 200 MG: 100 CAPSULE ORAL at 20:37

## 2021-07-26 RX ADMIN — INSULIN LISPRO 3 UNITS: 100 INJECTION, SOLUTION INTRAVENOUS; SUBCUTANEOUS at 16:49

## 2021-07-26 RX ADMIN — ACETAMINOPHEN 650 MG: 325 TABLET ORAL at 08:56

## 2021-07-26 RX ADMIN — ACETAMINOPHEN 650 MG: 325 TABLET ORAL at 23:59

## 2021-07-26 RX ADMIN — GABAPENTIN 200 MG: 100 CAPSULE ORAL at 08:56

## 2021-07-26 RX ADMIN — HEPARIN SODIUM 5000 UNITS: 10000 INJECTION INTRAVENOUS; SUBCUTANEOUS at 15:14

## 2021-07-26 RX ADMIN — INSULIN LISPRO 1 UNITS: 100 INJECTION, SOLUTION INTRAVENOUS; SUBCUTANEOUS at 12:11

## 2021-07-26 RX ADMIN — INSULIN LISPRO 2 UNITS: 100 INJECTION, SOLUTION INTRAVENOUS; SUBCUTANEOUS at 09:35

## 2021-07-26 RX ADMIN — Medication 10 ML: at 22:00

## 2021-07-26 RX ADMIN — CITALOPRAM 40 MG: 20 TABLET, FILM COATED ORAL at 08:56

## 2021-07-26 RX ADMIN — PANTOPRAZOLE SODIUM 40 MG: 40 TABLET, DELAYED RELEASE ORAL at 05:39

## 2021-07-26 RX ADMIN — OXYCODONE HYDROCHLORIDE 10 MG: 10 TABLET ORAL at 08:56

## 2021-07-26 RX ADMIN — ATORVASTATIN CALCIUM 40 MG: 40 TABLET, FILM COATED ORAL at 20:36

## 2021-07-26 RX ADMIN — INSULIN LISPRO 1 UNITS: 100 INJECTION, SOLUTION INTRAVENOUS; SUBCUTANEOUS at 20:57

## 2021-07-26 RX ADMIN — ACETAMINOPHEN 650 MG: 325 TABLET ORAL at 16:54

## 2021-07-26 ASSESSMENT — PAIN SCALES - GENERAL
PAINLEVEL_OUTOF10: 6
PAINLEVEL_OUTOF10: 5
PAINLEVEL_OUTOF10: 10
PAINLEVEL_OUTOF10: 6
PAINLEVEL_OUTOF10: 10

## 2021-07-26 ASSESSMENT — PAIN DESCRIPTION - DESCRIPTORS: DESCRIPTORS: THROBBING;SHARP

## 2021-07-26 ASSESSMENT — PAIN DESCRIPTION - PAIN TYPE: TYPE: ACUTE PAIN

## 2021-07-26 ASSESSMENT — PAIN DESCRIPTION - FREQUENCY: FREQUENCY: INTERMITTENT

## 2021-07-26 ASSESSMENT — PAIN - FUNCTIONAL ASSESSMENT: PAIN_FUNCTIONAL_ASSESSMENT: ACTIVITIES ARE NOT PREVENTED

## 2021-07-26 ASSESSMENT — PAIN DESCRIPTION - PROGRESSION
CLINICAL_PROGRESSION: GRADUALLY IMPROVING
CLINICAL_PROGRESSION: GRADUALLY IMPROVING

## 2021-07-26 ASSESSMENT — PAIN DESCRIPTION - ONSET: ONSET: ON-GOING

## 2021-07-26 ASSESSMENT — PAIN DESCRIPTION - LOCATION: LOCATION: ANKLE;KNEE

## 2021-07-26 NOTE — PROGRESS NOTES
Vascular Surgery Progress Note    Pt is being seen in f/u today regarding L AKA  Subjective  Pt s/e. Pain controlled off PCA. Right ankle feels slightly better today. Ortho recommends walking boot. Current Medications:    dextrose        oxyCODONE **OR** oxyCODONE, HYDROmorphone **OR** HYDROmorphone, glucose, dextrose, glucagon (rDNA), dextrose, sodium chloride flush, ondansetron **OR** ondansetron    gabapentin  200 mg Oral TID    acetaminophen  650 mg Oral 4 times per day    heparin (porcine)  5,000 Units Subcutaneous 3 times per day    insulin lispro  0-6 Units Subcutaneous TID WC    insulin lispro  0-3 Units Subcutaneous Nightly    sodium chloride flush  5-40 mL Intravenous 2 times per day    atorvastatin  40 mg Oral Nightly    citalopram  40 mg Oral Daily    [Held by provider] lisinopril  40 mg Oral Daily    pantoprazole  40 mg Oral QAM AC      PHYSICAL EXAM:    /69   Pulse 105   Temp 98.1 °F (36.7 °C) (Oral)   Resp 18   Ht 5' 10\" (1.778 m)   Wt 195 lb (88.5 kg)   SpO2 95%   BMI 27.98 kg/m²     Intake/Output Summary (Last 24 hours) at 7/26/2021 1145  Last data filed at 7/26/2021 0859  Gross per 24 hour   Intake 1068 ml   Output 2725 ml   Net -1657 ml        Gen awake, alert, in no apparent distress  CVS tachycardic  Resp easy, nonlabored  Abd soft, ndnt  R LE No edema   No wounds   Biphasic PT   Monophasic DP   5/5 strength  L LE    S/p AKA. Dressing intact with no strike through.     Thigh soft   +swelling  LABS:    Lab Results   Component Value Date    WBC 11.0 07/26/2021    HGB 7.7 (L) 07/26/2021    HCT 23.3 (L) 07/26/2021     07/26/2021    PROTIME 11.0 07/20/2021    INR 1.0 07/20/2021    APTT 29.2 07/21/2021    K 4.3 07/26/2021    BUN 13 07/26/2021    CREATININE 0.6 (L) 07/26/2021     A/P LLE thrombosed bypass graft S/p Angiogram, plasty of L SFA/popliteal arteries, insertion of lysis catheter 7/20, s/p 7/22 L AKA  · Continue neurovascular checks  · Continue pain control scheduled tylenol, gabapentin and PRN oxy, dilaudid    · Pt/OT  · Ortho recommends boot for R ankle    Nga Alvarado, APRN - CNP

## 2021-07-26 NOTE — PROGRESS NOTES
Occupational Therapy  OT BEDSIDE TREATMENT NOTE   [address] [address]  [address] [place] [address]      Date:2021  Patient Name: Eugene Marx  MRN: [id number]  : 1970  Room: [address] [place] [address]     Evaluating OT: Mihai Sim, NICOLASAD, OTR/L; #[id number]     Referring Provider: Tali Johnson DO  Specific Provider Orders/Date: OT Evaluation and Treatment, 2021     Diagnosis: Peripheral vascular disease, unspecified [I73.9]  Arterial occlusion, lower extremity ([facility] [address] [I70.209]  Surgery:   - s/p L LE angiogram, thrombosed left femoral-popliteal bypass and catheter directed lysis  (21)- patient with no volitional motor function present per chart     - s/p L AKA (2021)     Pertinent Medical History: anxiety, 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, COPD, DM,  GERD, HLD, HTNA, PVD, s/p L femoral-popliteal bypass surgery with  femoral endarterectomy (2019), s/p R femoral endarterectomy (2020)     Precautions:  Fall Risk, L AKA, WBAT RLE with post op shoe        Assessment of current deficits   [x]? Functional mobility             [x]?ADLs           [x]? Strength                  []?Cognition   [x]? Functional transfers           [x]? IADLs         [x]? Safety Awareness   [x]? Endurance   []? Fine Coordination              [x]? Balance      []? Vision/perception   []? Sensation     []? Gross Motor Coordination  []? ROM           []?  Delirium                   []? Motor Control      OT PLAN OF CARE   OT POC based on physician orders, patient diagnosis and results of clinical assessment     Frequency/Duration: 2-5 days/wk for 2 weeks PRN   Specific OT Treatment Interventions to include:   * Instruction/training on adapted ADL techniques and AE recommendations to increase functional independence within precautions       * Training on energy conservation strategies, correct breathing pattern and techniques to improve independence/tolerance for self-care routine  * Functional transfer/mobility training/DME recommendations for increased independence, safety, and fall prevention  * Patient/Family education to increase follow through with safety techniques and functional independence  * Recommendation of environmental modifications for increased safety with functional transfers/mobility and ADLs  * Cognitive retraining/development of therapeutic activities to improve problem solving, judgement, memory, and attention for increased safety/participation in ADL/IADL tasks  * Therapeutic exercise to improve motor endurance, ROM, and functional strength for ADLs/functional transfers  * Therapeutic activities to facilitate/challenge dynamic balance, stand tolerance for increased safety and independence with ADLs  * Therapeutic activities to facilitate gross/fine motor skills for increased independence with ADLs  * Positioning to improve skin integrity, interaction with environment and functional independence  * Delirium prevention/treatment  * Manual techniques for edema management     Recommended Adaptive Equipment: TBD pending discharge plan      Home Living: Lane lives with his wife and adult daughter in a 1 story house with 1 step to enter without railing. Bedroom/bathroom are located on the 1st floor. Laundry is located on the first floor. Bathroom setup: Walk-in shower (no threshold) with shower chair and grab bars, standard commode   Equipment owned: shower chair     Prior Level of Function: I with ADLs. I with IADLs. Patient completed functional mobility using no device.   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     Pain Level: mild right ankle pain  Cognition: A&O: 4/4; Follows 2-3 step directions              Memory:  Good              Sequencing:  Fair+              Problem solving:  Fair+  Judgement/safety:  Fair+     Communication skills: WFL                   Vision: [facility] Glasses:yes                                                             Hearing: WFL         RASS: 0  CAM-ICU: (NT) Delirium                Functional Assessment:  AM-PAC Daily Activity Raw Score: 16/24    Initial Eval Status  Date: 7/23/21 Treatment Status  Date: 7/26/21 STGs = LTGs  Time frame: 10-14 days   Feeding Modified [place]   Ind     Grooming Minimal Assist  Simulated, seated   SBA  Seated EOB Modified [place]   Seated   UB Dressing Moderate Assist   SBA  To don/doff gown seated EOB Modified [place]    LB Dressing Maximal Assist   Simulated to don/doff R sock with min A, anticipate increased assistance with additional LB dressing tasks Min A  To don right sock seated upright in bed  Minimal Assist using AE as needed and modified techniques   Bathing Maximal Assist Mod A  Seated EOB  Minimal Assist    Toileting Dependent   Nuñez catheter Max A- hygiene  Minimal Assist    Bed Mobility  Supine to sit: Maximal Assist   Sit to supine: Maximal Assist   SBA- supine<->sit  Educated pt on technique to increase independence. Supine to sit: Minimal Assist   Sit to supine: Minimal Assist    Functional Transfers Sit<>stand: Max A x2  x2 attempt to achieve full standing  N/T  Due to no shoe present in room  Sit<>stand: Minimal Assist   Stand pivot: Minimal Assist  Bed<>bsc/chair using fww   Functional Mobility NT  Unable to safely compelte  N/T  Minimal Assist  Short functional distance using fww, bed<>bsc/chair    Balance Sitting:     Static: SBA    Dynamic: CGA  Standing: Min A x2 with B UE support using fww Sitting:     Static: SBA    Dynamic: CGA  Standing: N/T      Activity Tolerance Fair-  Fair+ Fair+   Visual/  Perceptual Glasses: yes                        Comments: Upon arrival pt supine in bed. Pt educated on techniques to increase independence and safety during ADL's and bed mobility.  Pt educated on and completed BUE AROM exercises all planes to complete throughout day to increase strength and endurance for increased independence with ADL's. At end of session pt left seated upright in bed, call light within reach. · Pt has made fair+ progress towards set goals.      · Continue with current plan of care    Treatment Time In:9:30            Treatment Time Out: 9:45             Treatment Charges: Mins Units   Ther Ex  03139     Manual Therapy 08154     Thera Activities 18818 15 1   ADL/Home Mgt [id number]     Neuro Re-ed 28280     Group Therapy      Orthotic manage/training  15263     Non-Billable Time     Total Timed Treatment 15 1     [address]

## 2021-07-26 NOTE — PROGRESS NOTES
Vascular Surgery Progress Note    Pt is being seen in f/u today regarding POD4 s/p L AKA    Subjective  Pt s/e. Pain overall controlled. More sore with activity. No overnight issues. Seen by Ortho over weekend for R ankle pain and found to have possible avulsion fx. Rec walking boot. Current Medications:    dextrose      HYDROmorphone        oxyCODONE, glucose, dextrose, glucagon (rDNA), dextrose, naloxone, sodium chloride flush, ondansetron **OR** ondansetron    heparin (porcine)  5,000 Units Subcutaneous 3 times per day    insulin lispro  0-6 Units Subcutaneous TID WC    insulin lispro  0-3 Units Subcutaneous Nightly    sodium chloride flush  5-40 mL Intravenous 2 times per day    atorvastatin  40 mg Oral Nightly    citalopram  40 mg Oral Daily    gabapentin  100 mg Oral BID    [Held by provider] lisinopril  40 mg Oral Daily    pantoprazole  40 mg Oral QAM AC        PHYSICAL EXAM:    /77   Pulse 115   Temp 100.5 °F (38.1 °C) (Oral)   Resp 18   Ht 5' 10\" (1.778 m)   Wt 195 lb (88.5 kg)   SpO2 96%   BMI 27.98 kg/m²     Intake/Output Summary (Last 24 hours) at 7/26/2021 2235  Last data filed at 7/25/2021 2103  Gross per 24 hour   Intake 268 ml   Output 2150 ml   Net -1882 ml          Gen: awake, alert and oriented x3, no apparent distress  CVS: regular rate and rhythm  Resp: No increased work of breathing  Abd: Soft, non-tender, non-distended  R LE: 2+ fem, strong biphasic popliteal, biphasic PT, weak mono DP, 5/5 motor. TTP R lateral ankle. L LE: s/p AKA.  BENNETT dressing with minimal strikethrough, 2+ fem pulse,, ecchymosis of lateral thigh, compartments soft    LABS:    Lab Results   Component Value Date    WBC 10.9 07/25/2021    HGB 7.2 (L) 07/25/2021    HCT 20.8 (L) 07/25/2021     07/25/2021    PROTIME 11.0 07/20/2021    INR 1.0 07/20/2021    APTT 29.2 07/21/2021    K 4.1 07/25/2021    BUN 10 07/25/2021    CREATININE 0.5 (L) 07/25/2021       A/P  48 y.o. male with LLE occlusive disease and thrombosed bypass graft. S/p Angiogram, plasty of L SFA/popliteal arteries, insertion of lysis catheter 7/20, s/p 7/22 L AKA    - prn pain control, stop PCA today.   Cont gabapentin, prn oxy, prn dilaudid for breakthrough  - diet as tolerated  - PT/OT evals  - prosthetic consult placed  - Ortho following for R ankle pain - rec boot  - acute blood loss anemia - H/H stable    Will be d/w Dr. Kaylen Bowman DO

## 2021-07-26 NOTE — PROGRESS NOTES
Physical Therapy  Treatment Note    Name: Deshawn Frias  : 1970  MRN: [id number]      Date of Service: 2021    Evaluating PT:  Krishan Rao PT, DPT [id number]    Room #:  4501/4501-B  Diagnosis:  Peripheral vascular disease, unspecified [I73.9]  Arterial occlusion, lower extremity ([facility] [address] [I70.209]  PMHx/PSHx:  COPD, DM, HLD, HTN, PVD  Procedure/Surgery:   L AKA  Precautions:  Falls,  WBAT with walking boot RLE  Equipment Needs:  TBD    SUBJECTIVE:    Pt lives with wife and adult daughter in a 1 story home with 1 stairs to enter and no rail. Pt ambulated without device and was independent PTA. OBJECTIVE:   Initial Evaluation  Date: 21 Treatment  Date: 2021 Short Term/ Long Term   Goals   AM-PAC 6 Clicks  39/14    Was pt agreeable to Eval/treatment? Yes yes    Does pt have pain? -8/10 LLE 5/10 R ankle  LLE pain well controlled    Bed Mobility  Rolling: NT  Supine to sit: MaxA x 2  Sit to supine: MaxA x 2  Scooting: MaxA SBA all aspects Codey   Transfers Sit to stand: MaxA x 2  Stand to sit: MaxA x 2  Stand pivot: NT NT. Walking boot not present at this time. Codey with AAD   Ambulation   NT NT >50 feet with Codey with AAD   Stair negotiation: ascended and descended NT NT >2 steps with 2 rail ModA   ROM BUE:  Defer to OT note  RLE:  WFL     Strength BUE:  Defer to OT note  RLE:  4-/5  Increase by 1/3 MMT grade   Balance Sitting EOB:  Codey  Dynamic Standing:  NT Sitting EOB: SBA  Dynamic standing: NT Sitting EOB:  Independent  Dynamic Standing:  Codey with AAD     Pt is A & O x 4  Sensation:  Pt denies numbness and tingling to extremities  Edema:  unremarkable    Therapeutic Exercises:  hip flexion, LAQ, ankle df/pf for RLE x 10 reps, LLE hip flexion x 10 reps    Patient education  Pt educated on role of PT intervention. Pt educated on safety in room with utilization of call light for assistance with mobility.   Pt educated on importance of maximizing OOB time by transferring to bedside chair for meals and ambulating to bathroom/transferring to bedside commode with assistance from nursing and therapy staff to increase functional activity tolerance and overall functional independence. Pt educated on importance of independent performance of therapeutic exercises designated above for improved strength, activity tolerance, and ROM. Patient response to education:   Pt verbalized understanding Pt demonstrated skill Pt requires further education in this area   yes yes yes     ASSESSMENT:    Comments:  RN cleared pt for activity prior to session. Pt received supine in bed and agreeable to PT intervention at this time. Pt performed all functional mobility as noted above. Pt demonstrates significantly improved bed mobility this date. Unable to test transfers d/t absence of walking boot as ordered by ortho. Pt returned to supine at end of session and left with all needs met and call light in reach. Pt requires continued skilled PT intervention for the purposes of maximizing functional mobility and independence by addressing deficits described above. Treatment:  Patient practiced and was instructed in the following treatment:     Therapeutic Activities Completed:  o Functional mobility as noted above:   - Bed mobility: SBA all aspects. Min VC for safety and efficient use of BUE on bed rail to complete. Pt sat at EOB x 10 minutes at SBA level. Demonstrated ability to complete therapeutic exercises independently without LOB. o Skilled repositioning in supine with HOB elevated for comfort. Lower half of bed left flat.  o Pt education as noted above. PLAN:    Patient is making good progress towards established goals. Will continue with current POC.       Time in  0935  Time out  0948    Total Treatment Time  13 minutes     CPT codes:  [] Gait training 65357 0 minutes  [] Manual therapy 18443 0 minutes  [x] Therapeutic activities 05646 13 minutes  [] Therapeutic exercises 45628 0 minutes  [] Neuromuscular reeducation 20434 0 minutes    Christiano Pereira, PT, DPT  [id number]

## 2021-07-26 NOTE — CONSULTS
Department of Orthopedic Surgery  Resident Consult Note  Reason for Consult: Right ankle pain    HISTORY OF PRESENT ILLNESS:       Patient is a 48 y.o. male with right ankle pain. Patient underwent left BKA procedure with vascular surgery 3 days ago. He denies recent trauma to the right ankle but notes that he notices right ankle pain after waking from surgery. Patient has put weight through his right lower extremity 1 time since surgery but does not remember if it was painful. Denies numbness/tingling/paresthesias. Denies any other orthopedic complaints at this time.       Past Medical History:        Diagnosis Date    Anxiety     Atherosclerosis of native arteries of extremity with rest pain ([address] 7/15/2021    Chronic obstructive pulmonary disease ([address] 7/30/2019    Diabetes ([address]     Femoral-popliteal bypass graft occlusion, left, initial encounter ([address] 7/19/2021    GERD (gastroesophageal reflux disease)     Gout     Hyperlipidemia     Hypertension     Leg pain     Leg pain     Postoperative anemia due to acute blood loss 8/18/2019    PVD (peripheral vascular disease) ([address] 1/15/2018    S/P femoral-popliteal bypass surgery 9/9/2019    Type 2 diabetes mellitus with diabetic peripheral angiopathy without gangrene, without long-term current use of insulin ([address] 1/15/2018     Past Surgical History:        Procedure Laterality Date    FEMORAL BYPASS Left 8/16/2019    FEMORAL POPLITEAL BYPASS WITH FEMORAL ENDARTERECTOMY LEFT LEG performed by Gold Johnson MD at [facility] Right 1/31/2020    RIGHT FEMORAL ENDARTERECTOMY performed by Gold Johnson MD at [address] Left 7/22/2021    ABOVE KNEE AMPUTATION-LEFT LEG performed by Gold Johnson MD at [facility] HISTORY  10/04/2016    Dr Aleksander Sinha - athrectomy/pci right fem/pop    OTHER SURGICAL HISTORY  06/19/2018    Dr Aleksander Sinha - PCI w/ athrectomy RLE Common Illiac to Popliteal    VASCULAR SURGERY  07/2019    ANGIOGRAM    WISDOM TOOTH EXTRACTION       Current Medications:   Current Facility-Administered Medications: heparin (porcine) injection 5,000 Units, 5,000 Units, Subcutaneous, 3 times per day  oxyCODONE (ROXICODONE) immediate release tablet 5 mg, 5 mg, Oral, Q4H PRN  insulin lispro (HUMALOG) injection vial 0-6 Units, 0-6 Units, Subcutaneous, TID WC  insulin lispro (HUMALOG) injection vial 0-3 Units, 0-3 Units, Subcutaneous, Nightly  glucose (GLUTOSE) 40 % oral gel 15 g, 15 g, Oral, PRN  dextrose 50 % IV solution, 12.5 g, Intravenous, PRN  glucagon (rDNA) injection 1 mg, 1 mg, Intramuscular, PRN  dextrose 5 % solution, 100 mL/hr, Intravenous, PRN  naloxone (NARCAN) injection 0.4 mg, 0.4 mg, Intravenous, PRN  HYDROmorphone (DILAUDID) 0.2 mg/mL PCA, , Intravenous, Continuous  sodium chloride flush 0.9 % injection 5-40 mL, 5-40 mL, Intravenous, 2 times per day  sodium chloride flush 0.9 % injection 5-40 mL, 5-40 mL, Intravenous, PRN  ondansetron (ZOFRAN-ODT) disintegrating tablet 4 mg, 4 mg, Oral, Q8H PRN **OR** ondansetron (ZOFRAN) injection 4 mg, 4 mg, Intravenous, Q6H PRN  atorvastatin (LIPITOR) tablet 40 mg, 40 mg, Oral, Nightly  citalopram (CELEXA) tablet 40 mg, 40 mg, Oral, Daily  gabapentin (NEURONTIN) capsule 100 mg, 100 mg, Oral, BID  [Held by provider] lisinopril (PRINIVIL;ZESTRIL) tablet 40 mg, 40 mg, Oral, Daily  pantoprazole (PROTONIX) tablet 40 mg, 40 mg, Oral, QAM AC  Allergies:  Patient has no known allergies. Social History:   TOBACCO:   reports that he quit smoking about 4 years ago. His smoking use included cigarettes. He started smoking about 33 years ago. He has a 42.00 pack-year smoking history. He quit smokeless tobacco use about 17 years ago. His smokeless tobacco use included snuff. ETOH:   reports previous alcohol use of about 12.0 standard drinks of alcohol per week. DRUGS:   reports no history of drug use.   ACTIVITIES OF DAILY LIVING: OCCUPATION:    Family History:       Problem Relation Age of Onset   [address] [facility] Asthma Mother     Other Father         vascular problems    Cancer Father         prostate, lung    Diabetes Father        REVIEW OF SYSTEMS:  CONSTITUTIONAL:  negative for fevers, chills  EYES:  negative for acute changes  HEENT:  negative for acute changes  RESPIRATORY:  negative for dyspnea  CARDIOVASCULAR:  negative for chest pain, palpitations  GASTROINTESTINAL:  negative for nausea, vomiting  GENITOURINARY:  negative for frequency  HEMATOLOGIC/LYMPHATIC:  negative for bleeding and petechiae  MUSCULOSKELETAL:  positive for right ankle pain  NEUROLOGICAL:  negative for head trauma or LOC  BEHAVIOR/PSYCH:  negative for increased agitation and anxiety    PHYSICAL EXAM:    VITALS:  /77   Pulse 115   Temp 100.5 °F (38.1 °C) (Oral)   Resp 18   Ht 5' 10\" (1.778 m)   Wt 195 lb (88.5 kg)   SpO2 96%   BMI 27.98 kg/m²   CONSTITUTIONAL:  awake, alert, cooperative, no apparent distress, and appears stated age    MUSCULOSKELETAL:  Right lower Extremity:  Mild ecchymosis and edema about the lateral malleolus  Positive TTP about the lateral malleolus  Negative TTP about the navicular region  Compartments soft and compressible  Calf soft and non tender  +PF/DF/EHL  +2/4 DP & PT pulses, Brisk Cap refill, Toes warm and perfused  Distal sensation grossly intact to Peroneals, Sural, Saphenous, and tibial nrs    Secondary Exam:   · Bilateral UE: No obvious signs of trauma. -TTP to fingers, hand, wrist, forearm, elbow, humerus, shoulder or clavicle. -- Patient able to flex/extend fingers, wrist, elbow and shoulder with active and passive ROM without pain, +2/4 Radial pulse, cap refill <3sec, +AIN/PIN/Radial/Ulnar/Median N, distal sensation grossly intact to C4-T1 dermatomes, compartments soft and compressible. · Left LE: S/P BKA, dressing clean/dry/intact.     · Pelvis: -TTP, -Log roll, -Heel strike     DATA:    CBC:   Lab Results   Component Value Date    WBC 10.9 07/25/2021    RBC 2.36 07/25/2021    HGB 7.2 07/25/2021    HCT 20.8 07/25/2021    MCV 88.1 07/25/2021    MCH 30.5 07/25/2021    MCHC 34.6 07/25/2021    RDW 13.5 07/25/2021     07/25/2021    MPV 9.9 07/25/2021     PT/INR:    Lab Results   Component Value Date    PROTIME 11.0 07/20/2021    INR 1.0 07/20/2021       Radiology Review:  X-ray right foot: Avulsion over the dorsum of the navicular bone with questionable chronicity. No other fractures or dislocations appreciated    IMPRESSION:  · Mild right ankle sprain    PLAN:  · No acute orthopedic surgical intervention indicated at this time. Patient's injury can be managed nonoperatively  · Weightbearing as tolerated right lower extremity while wearing walking boot  · Walking boot to right lower extremity  · Rest, ice, elevate, compress right lower extremity  · Multimodal pain control  · Follow-up outpatient in 2 weeks.   Patient may follow-up with Dr. Fabiola Milian or with PCP  · Plan to be discussed with attending    All questions were sought and answered during encounter    Electronically signed by Jerry Awan DO on 7/25/2021 at 9:58 PM

## 2021-07-27 LAB
ANION GAP SERPL CALCULATED.3IONS-SCNC: 12 MMOL/L (ref 7–16)
BUN BLDV-MCNC: 14 MG/DL (ref 6–20)
CALCIUM SERPL-MCNC: 8.9 MG/DL (ref 8.6–10.2)
CHLORIDE BLD-SCNC: 96 MMOL/L (ref 98–107)
CO2: 26 MMOL/L (ref 22–29)
CREAT SERPL-MCNC: 0.6 MG/DL (ref 0.7–1.2)
GFR AFRICAN AMERICAN: >60
GFR NON-AFRICAN AMERICAN: >60 ML/MIN/1.73
GLUCOSE BLD-MCNC: 260 MG/DL (ref 74–99)
HCT VFR BLD CALC: 21.6 % (ref 37–54)
HEMOGLOBIN: 7.3 G/DL (ref 12.5–16.5)
MCH RBC QN AUTO: 30.4 PG (ref 26–35)
MCHC RBC AUTO-ENTMCNC: 33.8 % (ref 32–34.5)
MCV RBC AUTO: 90 FL (ref 80–99.9)
METER GLUCOSE: 225 MG/DL (ref 74–99)
METER GLUCOSE: 243 MG/DL (ref 74–99)
METER GLUCOSE: 248 MG/DL (ref 74–99)
METER GLUCOSE: 310 MG/DL (ref 74–99)
PDW BLD-RTO: 13.3 FL (ref 11.5–15)
PLATELET # BLD: 280 E9/L (ref 130–450)
PMV BLD AUTO: 9.6 FL (ref 7–12)
POTASSIUM SERPL-SCNC: 4 MMOL/L (ref 3.5–5)
RBC # BLD: 2.4 E12/L (ref 3.8–5.8)
SODIUM BLD-SCNC: 134 MMOL/L (ref 132–146)
WBC # BLD: 10.9 E9/L (ref 4.5–11.5)

## 2021-07-27 PROCEDURE — 80048 BASIC METABOLIC PNL TOTAL CA: CPT

## 2021-07-27 PROCEDURE — 2580000003 HC RX 258: Performed by: SURGERY

## 2021-07-27 PROCEDURE — 1200000000 HC SEMI PRIVATE

## 2021-07-27 PROCEDURE — 82962 GLUCOSE BLOOD TEST: CPT

## 2021-07-27 PROCEDURE — 97535 SELF CARE MNGMENT TRAINING: CPT

## 2021-07-27 PROCEDURE — 6370000000 HC RX 637 (ALT 250 FOR IP): Performed by: NURSE PRACTITIONER

## 2021-07-27 PROCEDURE — 97530 THERAPEUTIC ACTIVITIES: CPT

## 2021-07-27 PROCEDURE — 6360000002 HC RX W HCPCS: Performed by: SURGERY

## 2021-07-27 PROCEDURE — 6370000000 HC RX 637 (ALT 250 FOR IP): Performed by: SURGERY

## 2021-07-27 PROCEDURE — 2580000003 HC RX 258: Performed by: NURSE PRACTITIONER

## 2021-07-27 PROCEDURE — 36415 COLL VENOUS BLD VENIPUNCTURE: CPT

## 2021-07-27 PROCEDURE — 85027 COMPLETE CBC AUTOMATED: CPT

## 2021-07-27 PROCEDURE — 6360000002 HC RX W HCPCS: Performed by: NURSE PRACTITIONER

## 2021-07-27 RX ORDER — NICOTINE POLACRILEX 4 MG
15 LOZENGE BUCCAL PRN
Status: DISCONTINUED | OUTPATIENT
Start: 2021-07-27 | End: 2021-07-27

## 2021-07-27 RX ORDER — DEXTROSE MONOHYDRATE 25 G/50ML
12.5 INJECTION, SOLUTION INTRAVENOUS PRN
Status: DISCONTINUED | OUTPATIENT
Start: 2021-07-27 | End: 2021-07-27

## 2021-07-27 RX ORDER — DEXTROSE MONOHYDRATE 50 MG/ML
100 INJECTION, SOLUTION INTRAVENOUS PRN
Status: DISCONTINUED | OUTPATIENT
Start: 2021-07-27 | End: 2021-07-27

## 2021-07-27 RX ADMIN — OXYCODONE HYDROCHLORIDE 10 MG: 10 TABLET ORAL at 23:48

## 2021-07-27 RX ADMIN — ACETAMINOPHEN 650 MG: 325 TABLET ORAL at 18:15

## 2021-07-27 RX ADMIN — HEPARIN SODIUM 5000 UNITS: 10000 INJECTION INTRAVENOUS; SUBCUTANEOUS at 13:40

## 2021-07-27 RX ADMIN — Medication 10 ML: at 21:44

## 2021-07-27 RX ADMIN — ACETAMINOPHEN 650 MG: 325 TABLET ORAL at 23:48

## 2021-07-27 RX ADMIN — INSULIN LISPRO 2 UNITS: 100 INJECTION, SOLUTION INTRAVENOUS; SUBCUTANEOUS at 12:07

## 2021-07-27 RX ADMIN — ACETAMINOPHEN 650 MG: 325 TABLET ORAL at 05:52

## 2021-07-27 RX ADMIN — PANTOPRAZOLE SODIUM 40 MG: 40 TABLET, DELAYED RELEASE ORAL at 05:52

## 2021-07-27 RX ADMIN — GABAPENTIN 200 MG: 100 CAPSULE ORAL at 20:12

## 2021-07-27 RX ADMIN — INSULIN LISPRO 4 UNITS: 100 INJECTION, SOLUTION INTRAVENOUS; SUBCUTANEOUS at 18:36

## 2021-07-27 RX ADMIN — OXYCODONE HYDROCHLORIDE 10 MG: 10 TABLET ORAL at 08:59

## 2021-07-27 RX ADMIN — INSULIN LISPRO 2 UNITS: 100 INJECTION, SOLUTION INTRAVENOUS; SUBCUTANEOUS at 09:48

## 2021-07-27 RX ADMIN — CITALOPRAM 40 MG: 20 TABLET, FILM COATED ORAL at 08:59

## 2021-07-27 RX ADMIN — ACETAMINOPHEN 650 MG: 325 TABLET ORAL at 12:06

## 2021-07-27 RX ADMIN — GABAPENTIN 200 MG: 100 CAPSULE ORAL at 13:39

## 2021-07-27 RX ADMIN — HYDROMORPHONE HYDROCHLORIDE 1 MG: 1 INJECTION, SOLUTION INTRAMUSCULAR; INTRAVENOUS; SUBCUTANEOUS at 21:43

## 2021-07-27 RX ADMIN — HEPARIN SODIUM 5000 UNITS: 10000 INJECTION INTRAVENOUS; SUBCUTANEOUS at 05:52

## 2021-07-27 RX ADMIN — INSULIN LISPRO 4 UNITS: 100 INJECTION, SOLUTION INTRAVENOUS; SUBCUTANEOUS at 20:11

## 2021-07-27 RX ADMIN — Medication 10 ML: at 08:59

## 2021-07-27 RX ADMIN — GABAPENTIN 200 MG: 100 CAPSULE ORAL at 08:59

## 2021-07-27 RX ADMIN — ATORVASTATIN CALCIUM 40 MG: 40 TABLET, FILM COATED ORAL at 21:43

## 2021-07-27 RX ADMIN — Medication 10 ML: at 20:12

## 2021-07-27 RX ADMIN — OXYCODONE HYDROCHLORIDE 10 MG: 10 TABLET ORAL at 13:40

## 2021-07-27 RX ADMIN — HEPARIN SODIUM 5000 UNITS: 10000 INJECTION INTRAVENOUS; SUBCUTANEOUS at 20:15

## 2021-07-27 RX ADMIN — OXYCODONE HYDROCHLORIDE 10 MG: 10 TABLET ORAL at 18:15

## 2021-07-27 ASSESSMENT — PAIN SCALES - GENERAL
PAINLEVEL_OUTOF10: 7
PAINLEVEL_OUTOF10: 6
PAINLEVEL_OUTOF10: 9
PAINLEVEL_OUTOF10: 0
PAINLEVEL_OUTOF10: 3
PAINLEVEL_OUTOF10: 7
PAINLEVEL_OUTOF10: 8
PAINLEVEL_OUTOF10: 7
PAINLEVEL_OUTOF10: 8
PAINLEVEL_OUTOF10: 7
PAINLEVEL_OUTOF10: 7
PAINLEVEL_OUTOF10: 9
PAINLEVEL_OUTOF10: 6

## 2021-07-27 ASSESSMENT — PAIN DESCRIPTION - ORIENTATION
ORIENTATION: LEFT

## 2021-07-27 ASSESSMENT — PAIN DESCRIPTION - LOCATION
LOCATION: LEG
LOCATION: KNEE;INCISION
LOCATION: OTHER (COMMENT)
LOCATION: LEG
LOCATION: OTHER (COMMENT)

## 2021-07-27 ASSESSMENT — PAIN DESCRIPTION - DESCRIPTORS
DESCRIPTORS: ACHING;DISCOMFORT
DESCRIPTORS: ACHING;THROBBING;DISCOMFORT
DESCRIPTORS: BURNING;SHARP;SORE
DESCRIPTORS: BURNING;SHARP;SORE

## 2021-07-27 ASSESSMENT — PAIN DESCRIPTION - ONSET
ONSET: PROGRESSIVE
ONSET: GRADUAL
ONSET: ON-GOING
ONSET: ON-GOING
ONSET: PROGRESSIVE

## 2021-07-27 ASSESSMENT — PAIN DESCRIPTION - FREQUENCY
FREQUENCY: INTERMITTENT
FREQUENCY: CONTINUOUS
FREQUENCY: CONTINUOUS
FREQUENCY: INTERMITTENT
FREQUENCY: INTERMITTENT

## 2021-07-27 ASSESSMENT — PAIN DESCRIPTION - PROGRESSION
CLINICAL_PROGRESSION: GRADUALLY WORSENING
CLINICAL_PROGRESSION: NOT CHANGED
CLINICAL_PROGRESSION: NOT CHANGED

## 2021-07-27 ASSESSMENT — PAIN DESCRIPTION - PAIN TYPE
TYPE: ACUTE PAIN;SURGICAL PAIN
TYPE: ACUTE PAIN;SURGICAL PAIN
TYPE: SURGICAL PAIN
TYPE: ACUTE PAIN;SURGICAL PAIN
TYPE: SURGICAL PAIN

## 2021-07-27 ASSESSMENT — PAIN - FUNCTIONAL ASSESSMENT
PAIN_FUNCTIONAL_ASSESSMENT: ACTIVITIES ARE NOT PREVENTED
PAIN_FUNCTIONAL_ASSESSMENT: ACTIVITIES ARE NOT PREVENTED

## 2021-07-27 NOTE — PROGRESS NOTES
Occupational Therapy  OT BEDSIDE TREATMENT NOTE   [address] [facility] [place] [address] [facility] [place]  [address] [place] [address]      Date:2021  Patient Name: Anam Delarosa  MRN: [id number]  : 1970  Room: [address] [place] [address]     Evaluating OT: Noemi Youngblood, NICOLASAD, OTR/L; #[id number]     Referring Provider: Reinier Alcantara DO  Specific Provider Orders/Date: OT Evaluation and Treatment, 2021     Diagnosis: Peripheral vascular disease, unspecified [I73.9]  Arterial occlusion, lower extremity ([facility] [address] [I70.209]  Surgery:   - s/p L LE angiogram, thrombosed left femoral-popliteal bypass and catheter directed lysis  (21)- patient with no volitional motor function present per chart     - s/p L AKA (2021)     Pertinent Medical History: anxiety, 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, COPD, DM,  GERD, HLD, HTNA, PVD, s/p L femoral-popliteal bypass surgery with  femoral endarterectomy (2019), s/p R femoral endarterectomy (2020)     Precautions:  Fall Risk, L AKA, WBAT RLE with post op shoe     Assessment of current deficits   [x]? Functional mobility             [x]?ADLs           [x]? Strength                  []?Cognition   [x]? Functional transfers           [x]? IADLs         [x]? Safety Awareness   [x]? Endurance   []? Fine Coordination              [x]? Balance      []? Vision/perception   []? Sensation     []? Gross Motor Coordination  []? ROM           []?  Delirium                   []? Motor Control      OT PLAN OF CARE   OT POC based on physician orders, patient diagnosis and results of clinical assessment     Frequency/Duration: 2-5 days/wk for 2 weeks PRN   Specific OT Treatment Interventions to include:   * Instruction/training on adapted ADL techniques and AE recommendations to increase functional independence within precautions       * Training on energy conservation strategies, correct breathing pattern and techniques to improve independence/tolerance for self-care routine  * Functional transfer/mobility training/DME recommendations for increased independence, safety, and fall prevention  * Patient/Family education to increase follow through with safety techniques and functional independence  * Recommendation of environmental modifications for increased safety with functional transfers/mobility and ADLs  * Cognitive retraining/development of therapeutic activities to improve problem solving, judgement, memory, and attention for increased safety/participation in ADL/IADL tasks  * Therapeutic exercise to improve motor endurance, ROM, and functional strength for ADLs/functional transfers  * Therapeutic activities to facilitate/challenge dynamic balance, stand tolerance for increased safety and independence with ADLs  * Therapeutic activities to facilitate gross/fine motor skills for increased independence with ADLs  * Positioning to improve skin integrity, interaction with environment and functional independence  * Delirium prevention/treatment  * Manual techniques for edema management     Recommended Adaptive Equipment: TBD pending discharge plan      Home Living: Lane lives with his wife and adult daughter in a 1 story house with 1 step to enter without railing. Bedroom/bathroom are located on the 1st floor. Laundry is located on the first floor. Bathroom setup: Walk-in shower (no threshold) with shower chair and grab bars, standard commode   Equipment owned: shower chair     Prior Level of Function: I with ADLs. I with IADLs. Patient completed functional mobility using no device. 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 Pt having phantom pain.    Cognition: A&O: 4/4; Follows 2-3 step directions              Memory:  Good              Sequencing:  Fair+              Problem solving:  Fair+  Judgement/safety:  Fair+     Communication skills: [facility] SBA    Dynamic: CGA  Standing: Min A x2 with B UE support using fww Sitting:     Static: Sup    Dynamic: SBA    Standing: Mod A of 2     Activity Tolerance Fair-  Fair+ Fair+   Visual/  Perceptual Glasses: yes     Yes                 Comments: Upon arrival pt supine in bed. Pt educated on techniques to increase independence and safety during ADL's and bed mobility while on the EOB and in the chair. Pt educated on and completed BUE AROM exercises to increase strength and tolerance for functional tasks. At end of session pt left seated upright in the chair, call light within reach and nurse notified to order w/w and BSC for room. Also educated nurse on assistance level for pt to return to bed. · Pt has made fair+ progress towards set goals.    · Continue with current plan of care    Treatment Time In: 0845            Treatment Time Out: 7262  Treatment Charges: Mins Units   Ther Ex  96179     Manual Therapy 19638     Thera Activities 45768 12 1   ADL/Home Mgt [id number] 13 1   Neuro Re-ed 41331     Group Therapy      Orthotic manage/training  05322     Non-Billable Time     Total Timed Treatment 25 2     Teodoro Mbary [address], [address]

## 2021-07-27 NOTE — PROGRESS NOTES
Papers faxed to arlene and spoke to [facility] over phone and he will contact floor tomorrow about further details regarding consult.

## 2021-07-27 NOTE — PROGRESS NOTES
Vascular Surgery Progress Note    Pt is being seen in f/u today regarding POD5 s/p L AKA    Subjective  Pt s/e. Pain overall controlled. More sore with activity. Off PCA, pain controlled with oral meds    Seen by Ortho over weekend for R ankle pain and found to have possible avulsion fx. Rec walking boot, boot now at bedside    Current Medications:    dextrose        oxyCODONE **OR** oxyCODONE, HYDROmorphone **OR** HYDROmorphone, glucose, dextrose, glucagon (rDNA), dextrose, sodium chloride flush, ondansetron **OR** ondansetron    gabapentin  200 mg Oral TID    acetaminophen  650 mg Oral 4 times per day    heparin (porcine)  5,000 Units Subcutaneous 3 times per day    insulin lispro  0-6 Units Subcutaneous TID WC    insulin lispro  0-3 Units Subcutaneous Nightly    sodium chloride flush  5-40 mL Intravenous 2 times per day    atorvastatin  40 mg Oral Nightly    citalopram  40 mg Oral Daily    [Held by provider] lisinopril  40 mg Oral Daily    pantoprazole  40 mg Oral QAM AC        PHYSICAL EXAM:    /69   Pulse 92   Temp 97.6 °F (36.4 °C) (Oral)   Resp 16   Ht 5' 10\" (1.778 m)   Wt 195 lb (88.5 kg)   SpO2 92%   BMI 27.98 kg/m²     Intake/Output Summary (Last 24 hours) at 7/27/2021 0640  Last data filed at 7/27/2021 0559  Gross per 24 hour   Intake 240 ml   Output 2000 ml   Net -1760 ml          Gen: awake, alert and oriented x3, no apparent distress  CVS: regular rate and rhythm  Resp: No increased work of breathing  Abd: Soft, non-tender, non-distended  R LE: 2+ fem, strong biphasic popliteal, biphasic PT, weak mono DP, 5/5 motor. TTP R lateral ankle. L LE: s/p AKA. Incision is c/d/i with staples in place. Minimal serosanguinous drainage on dressing.  2+ fem pulse,, ecchymosis of lateral thigh, compartments soft    LABS:    Lab Results   Component Value Date    WBC 10.9 07/27/2021    HGB 7.3 (L) 07/27/2021    HCT 21.6 (L) 07/27/2021     07/27/2021    PROTIME 11.0 07/20/2021 INR 1.0 07/20/2021    APTT 29.2 07/21/2021    K 4.0 07/27/2021    BUN 14 07/27/2021    CREATININE 0.6 (L) 07/27/2021       A/P  48 y.o. male with LLE occlusive disease and thrombosed bypass graft.   S/p Angiogram, plasty of L SFA/popliteal arteries, insertion of lysis catheter 7/20, s/p 7/22 L AKA    - prn pain control, Cont gabapentin, prn oxy, prn dilaudid for breakthrough  - local wound care  - diet as tolerated  - PT/OT evals  - prosthetic consult placed  - Ortho following for R ankle pain - rec boot  - acute blood loss anemia - H/H stable  - discharge planning    Will be d/w Dr. Francisca Wolf    Pt seen and examined  Adjustments made in dm regimen  Moving around better, pain better controlled  Ok for Acute rehab when bed available    MD Reinier Talamantes DO   Electronically signed by Reinier Alcantara DO on 7/27/2021 at 6:42 AM

## 2021-07-27 NOTE — PROGRESS NOTES
Physical Therapy  Treatment Note    Name: Mayito Hatch  : 1970  MRN: [id number]      Date of Service: 2021    Evaluating PT:  Vikram Rhodes PT, DPT [id number]    Room #:  4501/4501-B  Diagnosis:  Peripheral vascular disease, unspecified [I73.9]  Arterial occlusion, lower extremity ([facility] [address] [I70.209]  PMHx/PSHx:  COPD, DM, HLD, HTN, PVD  Procedure/Surgery:   L AKA  Precautions:  Falls,  WBAT with walking boot RLE  Equipment Needs:  TBD    SUBJECTIVE:    Pt lives with wife and adult daughter in a 1 story home with 1 stairs to enter and no rail. Pt ambulated without device and was independent PTA. OBJECTIVE:   Initial Evaluation  Date: 21 Treatment  Date: 2021 Short Term/ Long Term   Goals   AM-PAC 6 Clicks     Was pt agreeable to Eval/treatment? Yes yes    Does pt have pain? 7-8/10 LLE 7/10 L LE     Bed Mobility  Rolling: NT  Supine to sit: MaxA x 2  Sit to supine: MaxA x 2  Scooting: MaxA Supine to sit SBA  Scooting to edge of bed SBA Codey   Transfers Sit to stand: MaxA x 2  Stand to sit: MaxA x 2  Stand pivot: NT Sit to stand mod A of 2  Stand to sit mod A of 2  Stand pivot with ww with mod A of 2. Codey with AAD   Ambulation   NT 5 feet forward/backward with ww with mod A  >50 feet with Codey with AAD   Stair negotiation: ascended and descended NT NT >2 steps with 2 rail ModA   ROM BUE:  Defer to OT note  RLE:  WFL     Strength BUE:  Defer to OT note  RLE:  4-/5  Increase by 1/3 MMT grade   Balance Sitting EOB:  Codey  Dynamic Standing:  NT Sitting EOB: SBA  Dynamic standing: NT Sitting EOB:  Independent  Dynamic Standing:  Codey with AAD       Therapeutic Exercises:    R LE   LAQs ( x15 AROM )   Marching ( x15 AROM     Patient education  Pt educated on importance of preventing L hip tightness to prepare for prothesis fitting when appropriate.      Patient response to education:   Pt verbalized understanding Pt demonstrated skill Pt requires further education in this area   x x x ASSESSMENT:    Comments: Nursing cleared pt for physical therapy. Pt in bed upon arrival and agreed to participate in therapy. Pt reported L LE pain and nursing notified/arrived to medicate pt. Pt completed functional mobility as noted above. Walking boot present and donned boot prior to mobility. Pt stood from bed to walker as noted above and reported some lightheadedness and required assistance for static standing balance. Pt transferred from  bed to chair and completed exercises as noted above. Pt ambulated a few feet with ww and required an assist of 1. Nursing notified about ordering a walker and bed side commode for pt to use in room and assist with transfers. Pt returned to chair with call light in reach. Pt requires continued skilled PT intervention for the purposes of maximizing functional mobility and independence by addressing deficits described above. Treatment:  Patient practiced and was instructed in the following treatment:     Therapeutic Activities Completed:  o Functional mobility as noted above:   - Bed mobility: verbal instruction for technique with bed mobility.  - Transfer training - verbal instruction for hand placement and technique to improve safety and balance. Assistance required to complete task. - Gait training - verbal instruction for walker approximation and technique/sequenicng with gait to improve safety and balance. Assistance required to complete task. Concepcion Sexton PLAN:    Patient is making good progress towards established goals. Will continue with current POC.       Time in  0845  Time out 0910    Total Treatment Time 25 minutes     CPT codes:  [] Gait training 54527 0 minutes  [] Manual therapy 59608 0 minutes  [x] Therapeutic activities 16650 25 minutes  [] Therapeutic exercises 81663 0 minutes  [] Neuromuscular reeducation 92961 0 minutes    Jolly Cortez [id number]

## 2021-07-28 LAB
ANION GAP SERPL CALCULATED.3IONS-SCNC: 6 MMOL/L (ref 7–16)
BUN BLDV-MCNC: 13 MG/DL (ref 6–20)
CALCIUM SERPL-MCNC: 9 MG/DL (ref 8.6–10.2)
CHLORIDE BLD-SCNC: 98 MMOL/L (ref 98–107)
CO2: 32 MMOL/L (ref 22–29)
CREAT SERPL-MCNC: 0.7 MG/DL (ref 0.7–1.2)
GFR AFRICAN AMERICAN: >60
GFR NON-AFRICAN AMERICAN: >60 ML/MIN/1.73
GLUCOSE BLD-MCNC: 155 MG/DL (ref 74–99)
HBA1C MFR BLD: 7.5 % (ref 4–5.6)
HCT VFR BLD CALC: 23.2 % (ref 37–54)
HEMOGLOBIN: 7.6 G/DL (ref 12.5–16.5)
MCH RBC QN AUTO: 29.6 PG (ref 26–35)
MCHC RBC AUTO-ENTMCNC: 32.8 % (ref 32–34.5)
MCV RBC AUTO: 90.3 FL (ref 80–99.9)
METER GLUCOSE: 193 MG/DL (ref 74–99)
METER GLUCOSE: 218 MG/DL (ref 74–99)
METER GLUCOSE: 270 MG/DL (ref 74–99)
METER GLUCOSE: 315 MG/DL (ref 74–99)
PDW BLD-RTO: 13.7 FL (ref 11.5–15)
PLATELET # BLD: 283 E9/L (ref 130–450)
PMV BLD AUTO: 10 FL (ref 7–12)
POTASSIUM SERPL-SCNC: 4.3 MMOL/L (ref 3.5–5)
RBC # BLD: 2.57 E12/L (ref 3.8–5.8)
SODIUM BLD-SCNC: 136 MMOL/L (ref 132–146)
WBC # BLD: 9.5 E9/L (ref 4.5–11.5)

## 2021-07-28 PROCEDURE — 36415 COLL VENOUS BLD VENIPUNCTURE: CPT

## 2021-07-28 PROCEDURE — 80048 BASIC METABOLIC PNL TOTAL CA: CPT

## 2021-07-28 PROCEDURE — 97530 THERAPEUTIC ACTIVITIES: CPT

## 2021-07-28 PROCEDURE — 85027 COMPLETE CBC AUTOMATED: CPT

## 2021-07-28 PROCEDURE — 2580000003 HC RX 258: Performed by: SURGERY

## 2021-07-28 PROCEDURE — 6360000002 HC RX W HCPCS: Performed by: SURGERY

## 2021-07-28 PROCEDURE — 6370000000 HC RX 637 (ALT 250 FOR IP): Performed by: SURGERY

## 2021-07-28 PROCEDURE — 83036 HEMOGLOBIN GLYCOSYLATED A1C: CPT

## 2021-07-28 PROCEDURE — 1200000000 HC SEMI PRIVATE

## 2021-07-28 PROCEDURE — 82962 GLUCOSE BLOOD TEST: CPT

## 2021-07-28 RX ADMIN — HEPARIN SODIUM 5000 UNITS: 10000 INJECTION INTRAVENOUS; SUBCUTANEOUS at 13:47

## 2021-07-28 RX ADMIN — HYDROMORPHONE HYDROCHLORIDE 1 MG: 1 INJECTION, SOLUTION INTRAMUSCULAR; INTRAVENOUS; SUBCUTANEOUS at 08:21

## 2021-07-28 RX ADMIN — CITALOPRAM 40 MG: 20 TABLET, FILM COATED ORAL at 08:20

## 2021-07-28 RX ADMIN — PANTOPRAZOLE SODIUM 40 MG: 40 TABLET, DELAYED RELEASE ORAL at 04:51

## 2021-07-28 RX ADMIN — OXYCODONE HYDROCHLORIDE 10 MG: 10 TABLET ORAL at 04:51

## 2021-07-28 RX ADMIN — ACETAMINOPHEN 650 MG: 325 TABLET ORAL at 20:49

## 2021-07-28 RX ADMIN — Medication 10 ML: at 20:50

## 2021-07-28 RX ADMIN — INSULIN LISPRO 4 UNITS: 100 INJECTION, SOLUTION INTRAVENOUS; SUBCUTANEOUS at 17:50

## 2021-07-28 RX ADMIN — ACETAMINOPHEN 650 MG: 325 TABLET ORAL at 11:53

## 2021-07-28 RX ADMIN — Medication 10 ML: at 23:04

## 2021-07-28 RX ADMIN — GABAPENTIN 200 MG: 100 CAPSULE ORAL at 13:47

## 2021-07-28 RX ADMIN — OXYCODONE HYDROCHLORIDE 10 MG: 10 TABLET ORAL at 11:53

## 2021-07-28 RX ADMIN — ATORVASTATIN CALCIUM 40 MG: 40 TABLET, FILM COATED ORAL at 20:50

## 2021-07-28 RX ADMIN — ACETAMINOPHEN 650 MG: 325 TABLET ORAL at 17:49

## 2021-07-28 RX ADMIN — INSULIN LISPRO 4 UNITS: 100 INJECTION, SOLUTION INTRAVENOUS; SUBCUTANEOUS at 20:55

## 2021-07-28 RX ADMIN — GABAPENTIN 200 MG: 100 CAPSULE ORAL at 08:20

## 2021-07-28 RX ADMIN — METFORMIN HYDROCHLORIDE 500 MG: 500 TABLET ORAL at 08:20

## 2021-07-28 RX ADMIN — INSULIN LISPRO 2 UNITS: 100 INJECTION, SOLUTION INTRAVENOUS; SUBCUTANEOUS at 08:28

## 2021-07-28 RX ADMIN — HEPARIN SODIUM 5000 UNITS: 10000 INJECTION INTRAVENOUS; SUBCUTANEOUS at 04:52

## 2021-07-28 RX ADMIN — GABAPENTIN 200 MG: 100 CAPSULE ORAL at 20:49

## 2021-07-28 RX ADMIN — OXYCODONE HYDROCHLORIDE 10 MG: 10 TABLET ORAL at 20:49

## 2021-07-28 RX ADMIN — INSULIN LISPRO 6 UNITS: 100 INJECTION, SOLUTION INTRAVENOUS; SUBCUTANEOUS at 11:56

## 2021-07-28 RX ADMIN — ACETAMINOPHEN 650 MG: 325 TABLET ORAL at 04:51

## 2021-07-28 RX ADMIN — HYDROMORPHONE HYDROCHLORIDE 1 MG: 1 INJECTION, SOLUTION INTRAMUSCULAR; INTRAVENOUS; SUBCUTANEOUS at 23:04

## 2021-07-28 RX ADMIN — METFORMIN HYDROCHLORIDE 500 MG: 500 TABLET ORAL at 17:49

## 2021-07-28 RX ADMIN — HEPARIN SODIUM 5000 UNITS: 10000 INJECTION INTRAVENOUS; SUBCUTANEOUS at 20:56

## 2021-07-28 RX ADMIN — OXYCODONE HYDROCHLORIDE 10 MG: 10 TABLET ORAL at 17:49

## 2021-07-28 ASSESSMENT — PAIN DESCRIPTION - PAIN TYPE
TYPE: SURGICAL PAIN
TYPE: ACUTE PAIN
TYPE: SURGICAL PAIN
TYPE: SURGICAL PAIN;ACUTE PAIN
TYPE: SURGICAL PAIN

## 2021-07-28 ASSESSMENT — PAIN DESCRIPTION - LOCATION
LOCATION: LEG
LOCATION: OTHER (COMMENT)
LOCATION: LEG
LOCATION: LEG

## 2021-07-28 ASSESSMENT — PAIN SCALES - GENERAL
PAINLEVEL_OUTOF10: 8
PAINLEVEL_OUTOF10: 7
PAINLEVEL_OUTOF10: 8
PAINLEVEL_OUTOF10: 0
PAINLEVEL_OUTOF10: 0
PAINLEVEL_OUTOF10: 8
PAINLEVEL_OUTOF10: 0
PAINLEVEL_OUTOF10: 3
PAINLEVEL_OUTOF10: 7
PAINLEVEL_OUTOF10: 7

## 2021-07-28 ASSESSMENT — PAIN DESCRIPTION - ONSET
ONSET: ON-GOING
ONSET: GRADUAL
ONSET: ON-GOING
ONSET: ON-GOING

## 2021-07-28 ASSESSMENT — PAIN DESCRIPTION - ORIENTATION
ORIENTATION: LEFT
ORIENTATION: LEFT
ORIENTATION: LEFT;UPPER
ORIENTATION: LEFT;UPPER
ORIENTATION: LEFT

## 2021-07-28 ASSESSMENT — PAIN DESCRIPTION - DESCRIPTORS
DESCRIPTORS: ACHING;DISCOMFORT;SORE
DESCRIPTORS: ACHING;DISCOMFORT;CONSTANT
DESCRIPTORS: ACHING;DISCOMFORT;CONSTANT
DESCRIPTORS: BURNING;SHARP;SORE

## 2021-07-28 ASSESSMENT — PAIN DESCRIPTION - FREQUENCY
FREQUENCY: CONTINUOUS
FREQUENCY: CONTINUOUS
FREQUENCY: INTERMITTENT
FREQUENCY: CONTINUOUS

## 2021-07-28 NOTE — PROGRESS NOTES
Vascular Surgery Progress Note    Pt is being seen in f/u today regarding L AKA  Subjective  Pt s/e. No new complaints. Pain controlled with po meds, states oxycodone works best. R ankle feeling better. Using ortho boot with ambulation. Worked with PT/OT yesterday. Current Medications:    dextrose        oxyCODONE **OR** oxyCODONE, HYDROmorphone **OR** HYDROmorphone, glucose, dextrose, glucagon (rDNA), dextrose, sodium chloride flush, ondansetron **OR** ondansetron    insulin lispro  0-12 Units Subcutaneous TID WC    insulin lispro  0-6 Units Subcutaneous Nightly    metFORMIN  500 mg Oral BID WC    gabapentin  200 mg Oral TID    acetaminophen  650 mg Oral 4 times per day    heparin (porcine)  5,000 Units Subcutaneous 3 times per day    sodium chloride flush  5-40 mL Intravenous 2 times per day    atorvastatin  40 mg Oral Nightly    citalopram  40 mg Oral Daily    [Held by provider] lisinopril  40 mg Oral Daily    pantoprazole  40 mg Oral QAM AC      PHYSICAL EXAM:    BP (!) 107/55   Pulse 89   Temp 97.5 °F (36.4 °C) (Temporal)   Resp 16   Ht 5' 10\" (1.778 m)   Wt 195 lb (88.5 kg)   SpO2 96%   BMI 27.98 kg/m²     Intake/Output Summary (Last 24 hours) at 7/28/2021 1033  Last data filed at 7/28/2021 0504  Gross per 24 hour   Intake 720 ml   Output 1900 ml   Net -1180 ml        Gen awake, alert, in no apparent distress  CVS RRR  Resp easy, nonlabored  Abd soft, ndnt  R LE No edema   No wounds   Biphasic PT   Monophasic DP   5/5 strength  L LE    S/p AKA. Dressing intact with no strike through.     Thigh soft   + swelling  LABS:    Lab Results   Component Value Date    WBC 9.5 07/28/2021    HGB 7.6 (L) 07/28/2021    HCT 23.2 (L) 07/28/2021     07/28/2021    PROTIME 11.0 07/20/2021    INR 1.0 07/20/2021    APTT 29.2 07/21/2021    K 4.3 07/28/2021    BUN 13 07/28/2021    CREATININE 0.7 07/28/2021     A/P LLE thrombosed bypass graft S/p Angiogram, plasty of L SFA/popliteal arteries, insertion of lysis catheter 7/20, s/p 7/22 L AKA  · Continue neurovascular checks  · Continue pain control scheduled tylenol, gabapentin and PRN oxy, dilaudid    · Pt/OT- ARU precert started       DRAKE Mason - JACOB

## 2021-07-28 NOTE — PRE-CERTIFICATION NOTE
Precertification for acute inpatient rehab initiated with [facility] via [employer].

## 2021-07-28 NOTE — PROGRESS NOTES
Spoke with patient at bedside. Patient agrees to transition to ARU once approval obtained. Patient stated he as assistance at home upon discharge for safe transition of care.

## 2021-07-28 NOTE — PROGRESS NOTES
Physical Therapy  Treatment Note    Name: Orville Burnette  : 1970  MRN: [id number]      Date of Service: 2021    Evaluating PT:  Mar Black, PT, DPT [id number]    Room #:  9869/2398-Q  Diagnosis:  Peripheral vascular disease, unspecified [I73.9]  Arterial occlusion, lower extremity ([address] [I70.209]  PMHx/PSHx:  COPD, DM, HLD, HTN, PVD  Procedure/Surgery:   L AKA  Precautions:  Falls,  WBAT with walking boot RLE  Equipment Needs:  TBD    SUBJECTIVE:    Pt lives with wife and adult daughter in a 1 story home with 1 stairs to enter and no rail. Pt ambulated without device and was independent PTA. OBJECTIVE:   Initial Evaluation  Date: 21 Treatment  Date: 2021 Short Term/ Long Term   Goals   AM-PAC 6 Clicks     Was pt agreeable to Eval/treatment? Yes yes    Does pt have pain? -8/10 LLE NT     Bed Mobility  Rolling: NT  Supine to sit: MaxA x 2  Sit to supine: MaxA x 2  Scooting: MaxA Supine to sit SBA  Scooting to edge of bed SBA    Rolling supervision  Codey   Transfers Sit to stand: MaxA x 2  Stand to sit: MaxA x 2  Stand pivot: NT Sit to stand   mod Stand to sit mod A   Stand pivot with ww mod  Codey with AAD   Ambulation   NT 8 feet ww min/mod  >50 feet with Codey with AAD   Stair negotiation: ascended and descended NT NT >2 steps with 2 rail ModA   ROM BUE:  Defer to OT note  RLE:  WFL     Strength BUE:  Defer to OT note  RLE:  4-/5  Increase by 1/3 MMT grade   Balance Sitting EOB:  Codey  Dynamic Standing:  NT Sitting EOB: SBA  Dynamic standing: NT Sitting EOB:  Independent  Dynamic Standing:  Codey with AAD       Therapeutic Exercises:    R LE  slr supine  B LE bridging  Isometric hip ext L LE  Lying supine to stretch out hip flexors B  Side lying L hip extension and abd  Single leg right le bridging       Patient education  Pt educated on bed exercises to do intermittent and things to do with promoting flexability of L and R hip flexors.     Educated pt on call light and not [id number]

## 2021-07-28 NOTE — PROGRESS NOTES
Vascular Surgery Progress Note    Pt is being seen in f/u today regarding POD6 s/p L AKA    Subjective  Pt s/e. Pain controlled with oral meds. More sore with activity. Has walking boot for R ankle. Case management arranging placement for Acute Rehab    Current Medications:    dextrose        oxyCODONE **OR** oxyCODONE, HYDROmorphone **OR** HYDROmorphone, glucose, dextrose, glucagon (rDNA), dextrose, sodium chloride flush, ondansetron **OR** ondansetron    insulin lispro  0-12 Units Subcutaneous TID WC    insulin lispro  0-6 Units Subcutaneous Nightly    metFORMIN  500 mg Oral BID WC    gabapentin  200 mg Oral TID    acetaminophen  650 mg Oral 4 times per day    heparin (porcine)  5,000 Units Subcutaneous 3 times per day    sodium chloride flush  5-40 mL Intravenous 2 times per day    atorvastatin  40 mg Oral Nightly    citalopram  40 mg Oral Daily    [Held by provider] lisinopril  40 mg Oral Daily    pantoprazole  40 mg Oral QAM AC        PHYSICAL EXAM:    /68   Pulse 87   Temp 98.1 °F (36.7 °C) (Temporal)   Resp 17   Ht 5' 10\" (1.778 m)   Wt 195 lb (88.5 kg)   SpO2 97%   BMI 27.98 kg/m²     Intake/Output Summary (Last 24 hours) at 7/28/2021 8226  Last data filed at 7/28/2021 0504  Gross per 24 hour   Intake 720 ml   Output 2100 ml   Net -1380 ml          Gen: awake, alert and oriented x3, no apparent distress  CVS: regular rate and rhythm  Resp: No increased work of breathing  Abd: Soft, non-tender, non-distended  R LE: 2+ fem, strong biphasic popliteal, biphasic PT, weak mono DP, 5/5 motor. TTP R lateral ankle. L LE: s/p AKA. Incision is c/d/i with staples in place, well approximated. Dressing changed.   2+ fem pulse,, ecchymosis of lateral thigh, compartments soft    LABS:    Lab Results   Component Value Date    WBC 9.5 07/28/2021    HGB 7.6 (L) 07/28/2021    HCT 23.2 (L) 07/28/2021     07/28/2021    PROTIME 11.0 07/20/2021    INR 1.0 07/20/2021    APTT 29.2 07/21/2021

## 2021-07-29 LAB
ANION GAP SERPL CALCULATED.3IONS-SCNC: 10 MMOL/L (ref 7–16)
BUN BLDV-MCNC: 16 MG/DL (ref 6–20)
CALCIUM SERPL-MCNC: 9.2 MG/DL (ref 8.6–10.2)
CHLORIDE BLD-SCNC: 96 MMOL/L (ref 98–107)
CO2: 28 MMOL/L (ref 22–29)
CREAT SERPL-MCNC: 0.6 MG/DL (ref 0.7–1.2)
GFR AFRICAN AMERICAN: >60
GFR NON-AFRICAN AMERICAN: >60 ML/MIN/1.73
GLUCOSE BLD-MCNC: 174 MG/DL (ref 74–99)
HCT VFR BLD CALC: 23.1 % (ref 37–54)
HEMOGLOBIN: 7.5 G/DL (ref 12.5–16.5)
MCH RBC QN AUTO: 29.6 PG (ref 26–35)
MCHC RBC AUTO-ENTMCNC: 32.5 % (ref 32–34.5)
MCV RBC AUTO: 91.3 FL (ref 80–99.9)
METER GLUCOSE: 136 MG/DL (ref 74–99)
METER GLUCOSE: 209 MG/DL (ref 74–99)
METER GLUCOSE: 213 MG/DL (ref 74–99)
METER GLUCOSE: 230 MG/DL (ref 74–99)
METER GLUCOSE: 305 MG/DL (ref 74–99)
PDW BLD-RTO: 13.6 FL (ref 11.5–15)
PLATELET # BLD: 266 E9/L (ref 130–450)
PMV BLD AUTO: 10.3 FL (ref 7–12)
POTASSIUM SERPL-SCNC: 4.6 MMOL/L (ref 3.5–5)
RBC # BLD: 2.53 E12/L (ref 3.8–5.8)
SODIUM BLD-SCNC: 134 MMOL/L (ref 132–146)
WBC # BLD: 10.8 E9/L (ref 4.5–11.5)

## 2021-07-29 PROCEDURE — 36415 COLL VENOUS BLD VENIPUNCTURE: CPT

## 2021-07-29 PROCEDURE — 6370000000 HC RX 637 (ALT 250 FOR IP): Performed by: SURGERY

## 2021-07-29 PROCEDURE — 6360000002 HC RX W HCPCS: Performed by: SURGERY

## 2021-07-29 PROCEDURE — 1200000000 HC SEMI PRIVATE

## 2021-07-29 PROCEDURE — 85027 COMPLETE CBC AUTOMATED: CPT

## 2021-07-29 PROCEDURE — 82962 GLUCOSE BLOOD TEST: CPT

## 2021-07-29 PROCEDURE — 97530 THERAPEUTIC ACTIVITIES: CPT

## 2021-07-29 PROCEDURE — 2580000003 HC RX 258: Performed by: SURGERY

## 2021-07-29 PROCEDURE — 80048 BASIC METABOLIC PNL TOTAL CA: CPT

## 2021-07-29 RX ORDER — INSULIN GLARGINE 100 [IU]/ML
10 INJECTION, SOLUTION SUBCUTANEOUS NIGHTLY
Status: DISCONTINUED | OUTPATIENT
Start: 2021-07-29 | End: 2021-07-30 | Stop reason: HOSPADM

## 2021-07-29 RX ORDER — GLIPIZIDE 5 MG/1
10 TABLET ORAL
Refills: 0 | Status: DISCONTINUED | OUTPATIENT
Start: 2021-07-30 | End: 2021-07-30 | Stop reason: HOSPADM

## 2021-07-29 RX ADMIN — OXYCODONE 5 MG: 5 TABLET ORAL at 23:31

## 2021-07-29 RX ADMIN — ACETAMINOPHEN 650 MG: 325 TABLET ORAL at 18:30

## 2021-07-29 RX ADMIN — Medication 10 ML: at 08:05

## 2021-07-29 RX ADMIN — GABAPENTIN 200 MG: 100 CAPSULE ORAL at 08:06

## 2021-07-29 RX ADMIN — CITALOPRAM 40 MG: 20 TABLET, FILM COATED ORAL at 08:05

## 2021-07-29 RX ADMIN — INSULIN LISPRO 12 UNITS: 100 INJECTION, SOLUTION INTRAVENOUS; SUBCUTANEOUS at 12:51

## 2021-07-29 RX ADMIN — INSULIN LISPRO 3 UNITS: 100 INJECTION, SOLUTION INTRAVENOUS; SUBCUTANEOUS at 22:08

## 2021-07-29 RX ADMIN — GABAPENTIN 200 MG: 100 CAPSULE ORAL at 14:19

## 2021-07-29 RX ADMIN — INSULIN LISPRO 6 UNITS: 100 INJECTION, SOLUTION INTRAVENOUS; SUBCUTANEOUS at 08:06

## 2021-07-29 RX ADMIN — Medication 10 ML: at 22:05

## 2021-07-29 RX ADMIN — METFORMIN HYDROCHLORIDE 500 MG: 500 TABLET ORAL at 18:29

## 2021-07-29 RX ADMIN — ACETAMINOPHEN 650 MG: 325 TABLET ORAL at 23:31

## 2021-07-29 RX ADMIN — OXYCODONE HYDROCHLORIDE 10 MG: 10 TABLET ORAL at 06:18

## 2021-07-29 RX ADMIN — METFORMIN HYDROCHLORIDE 500 MG: 500 TABLET ORAL at 08:05

## 2021-07-29 RX ADMIN — HEPARIN SODIUM 5000 UNITS: 10000 INJECTION INTRAVENOUS; SUBCUTANEOUS at 22:04

## 2021-07-29 RX ADMIN — ATORVASTATIN CALCIUM 40 MG: 40 TABLET, FILM COATED ORAL at 22:05

## 2021-07-29 RX ADMIN — PANTOPRAZOLE SODIUM 40 MG: 40 TABLET, DELAYED RELEASE ORAL at 06:18

## 2021-07-29 RX ADMIN — ACETAMINOPHEN 650 MG: 325 TABLET ORAL at 11:34

## 2021-07-29 RX ADMIN — GABAPENTIN 200 MG: 100 CAPSULE ORAL at 22:05

## 2021-07-29 RX ADMIN — HEPARIN SODIUM 5000 UNITS: 10000 INJECTION INTRAVENOUS; SUBCUTANEOUS at 14:19

## 2021-07-29 RX ADMIN — OXYCODONE 5 MG: 5 TABLET ORAL at 18:35

## 2021-07-29 RX ADMIN — HEPARIN SODIUM 5000 UNITS: 10000 INJECTION INTRAVENOUS; SUBCUTANEOUS at 06:19

## 2021-07-29 RX ADMIN — ACETAMINOPHEN 650 MG: 325 TABLET ORAL at 06:18

## 2021-07-29 RX ADMIN — INSULIN GLARGINE 10 UNITS: 100 INJECTION, SOLUTION SUBCUTANEOUS at 23:28

## 2021-07-29 RX ADMIN — OXYCODONE 5 MG: 5 TABLET ORAL at 14:18

## 2021-07-29 ASSESSMENT — PAIN DESCRIPTION - DESCRIPTORS
DESCRIPTORS: ACHING;DISCOMFORT
DESCRIPTORS: ACHING;DISCOMFORT;SORE
DESCRIPTORS: ACHING;CONSTANT;DISCOMFORT
DESCRIPTORS: ACHING;DISCOMFORT;SORE
DESCRIPTORS: CONSTANT;DISCOMFORT

## 2021-07-29 ASSESSMENT — PAIN SCALES - GENERAL
PAINLEVEL_OUTOF10: 0
PAINLEVEL_OUTOF10: 0
PAINLEVEL_OUTOF10: 5
PAINLEVEL_OUTOF10: 8
PAINLEVEL_OUTOF10: 5
PAINLEVEL_OUTOF10: 6
PAINLEVEL_OUTOF10: 5

## 2021-07-29 ASSESSMENT — PAIN DESCRIPTION - LOCATION
LOCATION: OTHER (COMMENT)
LOCATION: LEG

## 2021-07-29 ASSESSMENT — PAIN DESCRIPTION - ORIENTATION
ORIENTATION: LEFT
ORIENTATION: LEFT;UPPER
ORIENTATION: LEFT

## 2021-07-29 ASSESSMENT — PAIN DESCRIPTION - FREQUENCY
FREQUENCY: INTERMITTENT
FREQUENCY: CONTINUOUS
FREQUENCY: INTERMITTENT

## 2021-07-29 ASSESSMENT — PAIN DESCRIPTION - PAIN TYPE
TYPE: ACUTE PAIN
TYPE: ACUTE PAIN
TYPE: SURGICAL PAIN
TYPE: ACUTE PAIN

## 2021-07-29 ASSESSMENT — PAIN DESCRIPTION - ONSET
ONSET: ON-GOING

## 2021-07-29 NOTE — PROGRESS NOTES
Vascular Surgery Progress Note    Pt is being seen in f/u today regarding POD7 s/p L AKA    Subjective  Pt s/e. Pain controlled with oral meds. More sore with activity. Has walking boot for R ankle. States that R ankle pain is getting better. Case management arranging placement for Acute Rehab    Current Medications:    dextrose        oxyCODONE **OR** oxyCODONE, HYDROmorphone **OR** HYDROmorphone, glucose, dextrose, glucagon (rDNA), dextrose, sodium chloride flush, ondansetron **OR** ondansetron    insulin lispro  0-12 Units Subcutaneous TID WC    insulin lispro  0-6 Units Subcutaneous Nightly    metFORMIN  500 mg Oral BID WC    gabapentin  200 mg Oral TID    acetaminophen  650 mg Oral 4 times per day    heparin (porcine)  5,000 Units Subcutaneous 3 times per day    sodium chloride flush  5-40 mL Intravenous 2 times per day    atorvastatin  40 mg Oral Nightly    citalopram  40 mg Oral Daily    [Held by provider] lisinopril  40 mg Oral Daily    pantoprazole  40 mg Oral QAM AC        PHYSICAL EXAM:    /76   Pulse 81   Temp 97.7 °F (36.5 °C) (Temporal)   Resp 16   Ht 5' 10\" (1.778 m)   Wt 195 lb (88.5 kg)   SpO2 96%   BMI 27.98 kg/m²     Intake/Output Summary (Last 24 hours) at 7/29/2021 0618  Last data filed at 7/29/2021 0535  Gross per 24 hour   Intake 640 ml   Output 1850 ml   Net -1210 ml          Gen: awake, alert and oriented x3, no apparent distress  CVS: regular rate and rhythm  Resp: No increased work of breathing  Abd: Soft, non-tender, non-distended  R LE: 2+ fem, 5/5 motor. Less TTP R lateral ankle  L LE: s/p AKA. Incision is c/d/i with staples in place, well approximated. Dressing changed.   2+ fem pulse,, ecchymosis of lateral thigh, compartments soft    LABS:    Lab Results   Component Value Date    WBC 9.5 07/28/2021    HGB 7.6 (L) 07/28/2021    HCT 23.2 (L) 07/28/2021     07/28/2021    PROTIME 11.0 07/20/2021    INR 1.0 07/20/2021    APTT 29.2 07/21/2021    K 4.3 07/28/2021    BUN 13 07/28/2021    CREATININE 0.7 07/28/2021       A/P  48 y.o. male with LLE occlusive disease and thrombosed bypass graft.   S/p Angiogram, plasty of L SFA/popliteal arteries, insertion of lysis catheter 7/20, s/p 7/22 L AKA    - prn pain control, Cont gabapentin, prn oxy, decrease interval of prn dilaudid   - local wound care  - diet as tolerated  - PT/OT evals  - prosthetic consult placed  - Ortho following for R ankle pain - rec boot  - acute blood loss anemia - H/H stable  - discharge planning - ok for Acute rehab when bed available    Will be d/w Dr. Fabi Olson DO  Electronically signed by Shobha Bustos DO on 7/28/21 at 6:44 AM EDT    Pt seen and examined  Will add lantus as bs not well controlled on current regimen  awaiitng rehab placement    Carlos Mccann MD

## 2021-07-29 NOTE — PROGRESS NOTES
Vascular Surgery Progress Note    Pt is being seen in f/u today regarding L AKA    Subjective  Pt s/e. No new complaints. Pain controlled with po meds. R ankle feeling better. Using ortho boot with ambulation. Awaiting precert for ARU. Current Medications:    dextrose        HYDROmorphone **OR** [DISCONTINUED] HYDROmorphone, oxyCODONE **OR** oxyCODONE, glucose, dextrose, glucagon (rDNA), dextrose, sodium chloride flush, ondansetron **OR** ondansetron    insulin lispro  0-18 Units Subcutaneous TID WC    insulin lispro  0-9 Units Subcutaneous Nightly    metFORMIN  500 mg Oral BID WC    gabapentin  200 mg Oral TID    acetaminophen  650 mg Oral 4 times per day    heparin (porcine)  5,000 Units Subcutaneous 3 times per day    sodium chloride flush  5-40 mL Intravenous 2 times per day    atorvastatin  40 mg Oral Nightly    citalopram  40 mg Oral Daily    [Held by provider] lisinopril  40 mg Oral Daily    pantoprazole  40 mg Oral QAM AC      PHYSICAL EXAM:    /60   Pulse 74   Temp 97.5 °F (36.4 °C) (Temporal)   Resp 16   Ht 5' 10\" (1.778 m)   Wt 195 lb (88.5 kg)   SpO2 95%   BMI 27.98 kg/m²     Intake/Output Summary (Last 24 hours) at 7/29/2021 1124  Last data filed at 7/29/2021 0800  Gross per 24 hour   Intake 1420 ml   Output 2100 ml   Net -680 ml        Gen awake, alert, in no apparent distress  CVS RRR  Resp easy, nonlabored  Abd soft, ndnt  R LE No edema   No wounds   Biphasic PT   Monophasic DP   5/5 strength  L LE    S/p AKA. Dressing intact with no strike through.     Thigh soft   + swelling  LABS:    Lab Results   Component Value Date    WBC 10.8 07/29/2021    HGB 7.5 (L) 07/29/2021    HCT 23.1 (L) 07/29/2021     07/29/2021    PROTIME 11.0 07/20/2021    INR 1.0 07/20/2021    APTT 29.2 07/21/2021    K 4.6 07/29/2021    BUN 16 07/29/2021    CREATININE 0.6 (L) 07/29/2021     A/P LLE thrombosed bypass graft S/p Angiogram, plasty of L SFA/popliteal arteries, insertion of lysis catheter 7/20, s/p 7/22 L AKA  · Continue neurovascular checks  · Continue pain control scheduled tylenol, gabapentin and PRN oxy, dilaudid    · Pt/OT- ARU precert started     · Ok to discharge from vascular standpoint     Alison Williamson, APRN - CNP

## 2021-07-29 NOTE — PROGRESS NOTES
Physical Therapy  Treatment Note    Name: Ele Duarte  : 1970  MRN: [id number]      Date of Service: 2021    Evaluating PT:  Latasha Lui, PT, DPT [id number]    Room #:  6762/5251-T  Diagnosis:  Peripheral vascular disease, unspecified [I73.9]  Arterial occlusion, lower extremity ([address] [I70.209]  PMHx/PSHx:  COPD, DM, HLD, HTN, PVD  Procedure/Surgery:   L AKA  Precautions:  Falls,  WBAT with walking boot RLE  Equipment Needs:  TBD    SUBJECTIVE:    Pt lives with wife and adult daughter in a 1 story home with 1 stairs to enter and no rail. Pt ambulated without device and was independent PTA. OBJECTIVE:   Initial Evaluation  Date: 21 Treatment  Date: 2021 Short Term/ Long Term   Goals   AM-PAC 6 Clicks     Was pt agreeable to Eval/treatment? Yes yes    Does pt have pain? -8/10 LLE NT     Bed Mobility  Rolling: NT  Supine to sit: MaxA x 2  Sit to supine: MaxA x 2  Scooting: MaxA Supine to sit independent  Scooting to edge of bed Supervision     Rolling supervision  Codey   Transfers Sit to stand: MaxA x 2  Stand to sit: MaxA x 2  Stand pivot: NT Sit to stand   min Stand to sit min   Stand pivot with ww min  Codey with AAD   Ambulation   NT 12  feet ww cga >50 feet with Codey with AAD   Stair negotiation: ascended and descended NT NT >2 steps with 2 rail ModA   ROM BUE:  Defer to OT note  RLE:  WFL     Strength BUE:  Defer to OT note  RLE:  4-/5  Increase by 1/3 MMT grade   Balance Sitting EOB:  Codey  Dynamic Standing:  NT Sitting EOB: SBA  Dynamic standing: NT Sitting EOB:  Independent  Dynamic Standing:  Codey with AAD       Therapeutic Exercises:    sidelying L hip abduction, L hip ext  Single leg bridging and using B UE to assist   Prone on stomach approx 3 min     I    Patient education  Pt educated on bed exercises to do intermittent and things to do with promoting flexability of L and R hip flexors.     Pt educated on brakes on wheel chair and how to put on leg rests on wheel chair. Patient response to education:   Pt verbalized understanding Pt demonstrated skill Pt requires further education in this area   x x x     ASSESSMENT:    Comments: Nursing cleared pt for physical therapy. Pt in bed upon arrival and agreed to participate in therapy. Pt required  Light assist donning walking boot. Pt performed transfer with cues for hand placement. Pt mildly unsteady but decrease difficulty. Pt ambulated 12 feet with ww with cga. Pt reports earlier he used ww and hopped approx 13 feet to toilet with assist.  Pt more steady with hopping today. Pt reports leg fatigued with hopping but due to his vascular problems in right leg he will not be able to go far. Pt would benefit from wheel chair and pt reports he has a wheel chair at home. Pt reports no steps at home and will have assist at home. Pt requires continued skilled PT intervention for the purposes of maximizing functional mobility and independence by addressing deficits described above. Treatment:  Patient practiced and was instructed in the following treatment:     Therapeutic Activities Completed:  o Functional mobility as noted above:   - Bed mobility:   Pt did well   - Transfer training - verbal instruction for hand placement and technique to improve safety and balance. Assistance required to complete task. - Gait training - verbal instruction for walker approximation and technique/sequenicng with gait to improve safety and balance. Light assistance required to complete task. .  - Pt education   - Wheel chair propulsion     PLAN:    Patient is making good progress towards established goals. Will continue with current POC.       Time in  350   Time out 430     Total Treatment Time  40  minutes     CPT codes:  [] Gait training 49048 0 minutes  [] Manual therapy 77651 0 minutes  [x] Therapeutic activities 63534 40  minutes  [] Therapeutic exercises 30802 0 minutes  [] Neuromuscular reeducation [id number] [address] [place], [place]  [address]

## 2021-07-29 NOTE — PROGRESS NOTES
Acute Rehab Pre-Admission Screen      Referral date: 7/23/21  Onset/Hospital Admit Date: 7/20/2021 12:31 PM    Current Location: [address] [place] [address]    Name: Shana López  YOB: 1970  Age: 48 y.o. Admitting Diagnosis: left aka   Address: [place] [address]  Home Phone: [phone] (home)  Anushka Olivier #:     Sex: male  Race:   Marital Status:    Ethnic/Cultural/Sabianist Considerations: Druze    Advanced Directives: [x] Full Code  [] [facility] [] Medications only       [] Living Will  [] DPOA      []Organ donor      [] No mechanical breathing or ventilation     [] no tube feeding, nutrition or hydration      [x] Patient does not have advanced directives or living will        COVERAGE INFORMATION   Primary Insurer: [employer]   Payor Contact: online portal  Phone: [phone]  Authorization #: [id number]    Verified coverage: [] Patient  [] Family/caregiver    [x] financial department [] insurance carrier    COVID SCREEN DATE: 7/30/21 Result: negative      MEDICAL UPDATE:  History of present admission: the patient is a 48 y.o male who was scheduled for elective arteriogram with possible intervention. The patient has a history of PVD s/p multiple inteventions. He presented in physician office on 7 /15 with sudden pain to Left leg and recommeded to go to ER. TPA initiated. Left  Fem-pop bypass graft on 7/20/21.    7/21- patient complained of pain left groin and femoral dopper done with no poplitel, pedial or tibial pulses. 7/22/21- Left aka completed  7/23/21- Nuñez in, Transfer to monitored floor. 7/24/21-PT/OT evals for prostethic placed, PCA maintained with prn pain meds. 7/25/21-mild right ankle sprain  7/26/21-Ortho recommends boot for right ankle. Unable to fit for prostethetic due to edema. 7/27/21- left leg stump still edematous. Pain controlled    7/28/21-PT good progress. /OT fair progress. Precert initiated.   7/29/21- Discharged from vascular service. 7/27/21-ok to transfer to ARU when bed available and medically stable. Prostethic fitting on hold still d/t edema. Ortho boot on right leg.  7/29/21- using ortho boot with ambulation. R ankle pain improving. DC IV pain control      PHYSICIAN / REFERRAL INFORMATION  Referring Physician: Dileep Bolden DO  Attending Physician: Julienne Sol, *  Primary Care Physician: Sohail Morgan DO  Consultants/Opinions (see full consult notes on chart): orthopedic surgery- left AKA  General surgery- AKA  Vascular- AKA    SOCIAL INFORMATION  Primary  Contact: Adrianna Wagner Relationship: spouse  Primary Phone: [phone]    Secondary  Contact: Aries Rodriguez  Relationship: parent  Primary Phone: [phone]     Continuous Community Services: none  Caregiver available: [x] Yes [] No Hours per day available: tbd  Patient previously employed:  [x] Yes [] Part Time [] Full Time [] No [] Retired  Occupation/Profession: worked at [employer]  Prior living arrangements: [x] Home  [] Assisted living  [] SNF [] Other  Lived with:  [] Alone  [x] Spouse  [] Family  [x] Other  Lived with: spouse and adult daughter  Contact phone: [phone]   Home:  [address] home  1 entry steps  Rails: 0  Steps:  0 to 2nd floor  Rails:     Bedroom: [x] 1st floor  [] 2nd floor    Bathroom:  [x] 1st floor  [] 2nd floor    Prior Functional Level:   Independent for: I with IADLS and ADLs, drove   Assistance for:   Dependent for:   Dominant hand: [x] Right  [] Left    Previous Home Equipment:  [] Cane [] Grab bars [] Orthotic / prosthetic   [x] Shower chair [] Tub bench  [] 3-in-1 Commode [] Long handle sponge   [] Oxygen [] Sock aide  [] Wheelchair  [] motorized wc/scooter  [] Wheelchair cushion   [] Crutches [] Long handle shoehorn  [] Reachers [] Toilet seat elevator [] Rollator  [] Walker(wheeled)   [] Walker(standard) [] Mechanical lift    [] None of the above     Has patient had 2 or more falls in the past year or any fall with injury in the past year? [] yes   [] no   [x]unknown    Has patient had major surgery during past 100 days prior to admission?    [x] yes   [] no Type/ Date: Left aka 7/21/21    Surgical History:  Past Surgical History:   Procedure Laterality Date    FEMORAL BYPASS Left 8/16/2019    FEMORAL POPLITEAL BYPASS WITH FEMORAL ENDARTERECTOMY LEFT LEG performed by Abby Fraire MD at [address] Right 1/31/2020    RIGHT FEMORAL ENDARTERECTOMY performed by Abby Fraire MD at [address] Left 7/22/2021    ABOVE KNEE AMPUTATION-LEFT LEG [facility]  10/04/2016    Dr Renée Self - athrectomy/pci right fem/pop    OTHER SURGICAL HISTORY  06/19/2018    Dr Renée Self - PCI w/ athrectomy RLE Common Illiac to Popliteal    VASCULAR SURGERY  07/2019    ANGIOGRAM    WISDOM TOOTH EXTRACTION         Past Medical:  Past Medical History:   Diagnosis Date    Anxiety     Atherosclerosis of native arteries of extremity with rest pain ([address] 7/15/2021    Chronic obstructive pulmonary disease ([address] 7/30/2019    Diabetes ([address]     Femoral-popliteal bypass graft occlusion, left, initial encounter ([address] 7/19/2021    GERD (gastroesophageal reflux disease)     Gout     Hyperlipidemia     Hypertension     Leg pain     Leg pain     Postoperative anemia due to acute blood loss 8/18/2019    PVD (peripheral vascular disease) ([address] 1/15/2018    S/P femoral-popliteal bypass surgery 9/9/2019    Type 2 diabetes mellitus with diabetic peripheral angiopathy without gangrene, without long-term current use of insulin ([address] 1/15/2018       Current Co-morbidities:  [] Alzheimer's   [] Dysphasia    [] Parkinsonism  [x] Amputation   [x] GERD   [] Peripheral artery disease   [] Anemia      [] Encephalopathy  [x] Peripheral vascular disease  [x] Anxiety   [] Gangrene   [] Pneumonia  [] Aphasia   [] Gout   [] Polyneuropathy  [] Asthma   [] Heart Failure (diastolic) [] Post-polio syndrome  [] Atrial fibrillation  [] Heart Failure (left-sided) [] Pseudomonas enteritis   [] Blind   [] Heart Failure (right-sided) [] Pulmonary embolism  [] Cellulitis     [] Heart Failure (systolic) [] Renal dialysis  [] Clostridium difficile  [] Hemiparesis  [] Renal failure  [] Congestive heart failure [x] Hypertension  [] Rheumatoid arthritis  [x] COPD   [] Hypotension  [] Seizure disorder   [] Coronary Artery Disease [] Hypothyroidism  [] Septicemia   [] Deaf   [x] Hyperlipidemia  [] Sleep apnea  [] Depression   [] Morbid obesity  [] Spinal cord injury  [x] Diabetes   [] MRSA   [] Stroke  [] Diabetic nephropathy [] Myocardial infarction [] Tracheostomy  [] Diabetic neuropathy [] Osteoarthritis  [] Traumatic brain injury   [] Diabetic retinopathy [] Osteoporosis  [] Urinary tract infection  [] DVT   [] Pancytopenia  [] Vocal cord paralysis  []  Spinal stenosis  []  kidney disease  [] VRE  [x] Post op Left aka  [x] right ankle sprain  []        Medical/Functional Conditions requiring inpatient rehabilitation: Patient has functional deficits in ADLS, mobility, impaired balance, and needs ongoing medical management   Requires multidisciplinary treatment including PM&R physician daily care, 24 hour rehabilitation nursing, physical therapy, occupational therapy, rehabilitation psychology, recreation therapy and rehabilitation social work. Risk for Medical/Clinical Complications: Falls, injury, pain, skin breakdown, abnormal vitals, abnormal labs, DVT, PE, pneumonia, decreased mobility, neuro changes    CLINICAL DATA:     Height : 5'10\"      Weight:  195 lbs   BMI: 27.98       Date: 7/29/21 Date: 7/30/21 Date:    temperature 97.5 97.1    pulse 74 77    respirations 16 16    Blood pressure 114/60 121/65    Pulse oximeter 95% room air 98% room air       ALLERGIES: Patient has no known allergies. DIET : Adult Oral Nutrition Supplement;  Low Calorie/High Protein Oral Supplement  Adult Oral Nutrition Supplement; Wound Healing Oral Supplement  ADULT DIET; Regular; 5 carb choices (75 gm/meal); Low Fat/Low Chol/High Fiber/2 gm Na    Current Lab and Diagnostic Tests:   Recent Results (from the past 24 hour(s))   POCT Glucose    Collection Time: 07/29/21 11:37 AM   Result Value Ref Range    Meter Glucose 305 (H) 74 - 99 mg/dL   POCT Glucose    Collection Time: 07/29/21  5:15 PM   Result Value Ref Range    Meter Glucose 136 (H) 74 - 99 mg/dL   POCT Glucose    Collection Time: 07/29/21 10:02 PM   Result Value Ref Range    Meter Glucose 230 (H) 74 - 99 mg/dL   POCT Glucose    Collection Time: 07/29/21 11:27 PM   Result Value Ref Range    Meter Glucose 213 (H) 74 - 99 mg/dL   Basic Metabolic Panel    Collection Time: 07/30/21  5:20 AM   Result Value Ref Range    Sodium 133 132 - 146 mmol/L    Potassium 4.4 3.5 - 5.0 mmol/L    Chloride 96 (L) 98 - 107 mmol/L    CO2 25 22 - 29 mmol/L    Anion Gap 12 7 - 16 mmol/L    Glucose 156 (H) 74 - 99 mg/dL    BUN 16 6 - 20 mg/dL    CREATININE 0.6 (L) 0.7 - 1.2 mg/dL    GFR Non-African American >60 >=60 mL/min/1.73    GFR African American >60     Calcium 9.5 8.6 - 10.2 mg/dL   CBC    Collection Time: 07/30/21  5:20 AM   Result Value Ref Range    WBC 9.1 4.5 - 11.5 E9/L    RBC 2.77 (L) 3.80 - 5.80 E12/L    Hemoglobin 8.1 (L) 12.5 - 16.5 g/dL    Hematocrit 25.5 (L) 37.0 - 54.0 %    MCV 92.1 80.0 - 99.9 fL    MCH 29.2 26.0 - 35.0 pg    MCHC 31.8 (L) 32.0 - 34.5 %    RDW 13.8 11.5 - 15.0 fL    Platelets 160 277 - 511 E9/L    MPV 10.2 7.0 - 12.0 fL   POCT Glucose    Collection Time: 07/30/21  7:11 AM   Result Value Ref Range    Meter Glucose 198 (H) 74 - 99 mg/dL     No results found.     Additional labs or diagnostic studies needed before admission to rehabilitation unit:  None    Medications:   insulin lispro  0-18 Units Subcutaneous TID WC    insulin lispro  0-9 Units Subcutaneous Nightly    glipiZIDE  10 mg Oral QAM AC    insulin glargine  10 Units Subcutaneous Nightly    metFORMIN  500 mg Oral BID WC    gabapentin  200 mg Oral TID    acetaminophen  650 mg Oral 4 times per day    heparin (porcine)  5,000 Units Subcutaneous 3 times per day    sodium chloride flush  5-40 mL Intravenous 2 times per day    atorvastatin  40 mg Oral Nightly    citalopram  40 mg Oral Daily    [Held by provider] lisinopril  40 mg Oral Daily    pantoprazole  40 mg Oral QAM AC      dextrose       oxyCODONE **OR** oxyCODONE, glucose, dextrose, glucagon (rDNA), dextrose, sodium chloride flush, ondansetron **OR** ondansetron    SPECIAL PRECAUTIONS: [x] No current precautions  [] Cardiac  [] Renal [] Sternal [] Respiratory      [] Neurological           [] Hip  [] Spinal [] Seizure  [] Aspiration  [] Isolation precautions:    [] Contact   [] Respiratory   [] Protective     [] Droplet    [x] Weight Bearing precautions:         [x] Non Weight Bearing : left aka        [] Toe Touch Weight Bearing :        [] Partial Weight Bearing :         [x] Weight Bearing as Tolerated : RLE with walking boot        [x] Fall Risk:   [] Recent history of falls [x] Falls risk level (Rich Scale): high      [] Bed Alarm    [] Do not leave alone in the bathroom    [] Chair Alarm   [] Cognitive impairment      [] One to One supervision  [] Sitter / Doneta Kettle sitter   [] Safety enclosure bed  [x] Decreased balance     SPECIAL REHABILITATION NEEDS:   [x] IV Therapy: [x] PRN Adapter  [] Midline  [] PICC      [] Central Line    [] TPN       [] Oxygen: [] Trach [] Bi-PAP [] CPAP  [] Nasal cannula  [] Liters:       [x] Wound Care:   [] Pressure ulcers(stage and location) -    [] Wound vac   [x] Wound or incision care - Left AKA    [x] Pain Management (level of pain, meds): 8-0, Tylenol, Neurontin, Oxycodone, right ankle and left stump    [] Incontinence Bladder [] Nuñez  Insertion date:    []Hemodialysis and  Frequency:   [] Incontinence Bowel    [x] Last bowel movement :  not charted    Substance use history: [x] Yes  [] No   [x] Tobacco  [] Alcohol  [] Other     [] Ethnic  [] Cultural  [] Spiritual  [] Language [] Needs  [] Other than English  [] Hearing Impaired  [] Visually Impaired  [] Speaking Impaired  [] Blind  [] Special equipment:  [] Devices/Splints  [] Type   [x] Brace   [x] Type walking boot right LE  [] Bariatric bed  [] Extra wide commode  [] Extra wide wheelchair [] Extra wide walker  [] Ramírez walker  [] Ramírez wheelchair   [] Transfer lift    [] Other equipment     FUNCTIONAL STATUS PT / [place] / Ethel Garcia:  Verita [place] / EVAL Discipline Initial: 7/23/21 Follow Up: 7/27/21 Current:    Eating OT Modified Independent Set up    Grooming OT Minimum assistance   Stand by Assist.    Bathing OT Max Assist   Moderate Assist      Dressing Upper Extremity OT Moderate Assist   Stand by Assist      Dressing Lower Extremity OT Max Assist   Moderate Assist      Toileting OT Dependent   Max Assist      Toilet Transfers OT NT NT    Tub/Shower Transfers OT NT NT    Homemaking OT nt nt    Bed Mobility PT Max Assist   Stand by Assist      Bed/Wheelchair Transfers PT Max Assist   Moderate Assist x2      Locomotion Walk / Wheelchair  Device:  Distance: PT NT 4 side steps [employer] Moderate Assist    Endurance PT fair Fair+    Expression SP  flat    Social Interaction SP  good    Problem Solving SP fair Fair+    Memory SP good good    Comprehension SP  Fair+    Swallowing SP  good    Bowel Management NSG  None charted    Bladder Management NSG  continent      Comments on Functional Status: Able to tolerate 3 hours of therapy per day split into four 45 minute sessions. Right ankle sprain- walking boot for activity. Left AKA.      [x] Able to participate a minimum of 3 hours per day of therapy intervention    Required treatments/services: [x] Rehabilitation nursing [] Dietitian / nurtition                 [x] Case management  [] Respiratory Therapy      [x] Social work   [] Other     Required Therapy:  Therapy Hours per Day Days per Week Therapeutic Interventions Required   [x] Physical Therapy 1.5 5-7 Gait, transfers, Safety, strength, education, endurance   [x] Occupational Therapy 1.5 5-7 ADLs, IADLs, Safety, strength, education, endurance   [] Speech Pathology      [] Prosthetics / Arthea Beverly       []         Anticipated Discharge Plan:   Anticipated DME Needs:  [x] Home     [] Commode   [] Alone    [] Wheelchair   [x] Supervised    [] Walker   [] Assist    [] Oxygen        [] Hospital Bed  [] Assisted Living    [] Ramp        [x] To Be Determined    Anticipated Home Health Services:  Anticipated Outpatient Services:  [] PT       [] PT  [] OT      [] OT  [] Speech     [] Speech  [] Nursing     [] Dialysis  [] Aide      [x] To Be Determined  [x] To Be Determined    Anticipated support group:  [x] Amputation  [] Multiple Sclerosis  [] Stroke  [] Brain Injury  [] Spinal cord injury  [] Other     Barriers to discharge: impaired mobility, impaired self-care, pain     Discharge Support: [] Patient lives alone and does not have a caregiver available     [x] Patient has a caregiver available     [] Discharge plan has been verified with patient's caregiver      [] Caregiver is in agreement with the discharge plan     Expected functional status for safe discharge: Modified Independent      Patient/support person goals: go home    Expected length of stay: 1-2 weeks    Discussed expected length of stay and agreeable to IRF plan: [x] Yes   [] No    Impairment Group Category: 5.3    Etiological Diagnosis: Left AKA    Primary Rehabilitation Diagnosis: Left AKA    Electronically signed by Zurdo Peña RN on 7/30/2021 at 11:26 AM    Prescreen completed __________________________________ (signature of prescreener)    Date:   7/30/21 Time:  1200    JUSTIFICATION FOR ADMISSION TO ACUTE REHABILITATION:  Patient has suffered decline in functional abilities for gait, transfers, ADL's and IADL's as well as decreased endurance. Patient has functional deficits requiring intensive therapy across multiple disciplines in order to return home safely. Patient will need physician oversight for nutrition and hydration status,safety issues, medications and therapy modalities. PT and OT will work on deficits as noted in evaluations. Case management and social work will provide services for DME and management of a safe home discharge. RECOMMEND LEVEL OF CARE  Recommend inpatient rehabilitation: [x] Yes   [] No  If no indicate reason:  [] Functional level too high  [] Unmotivated  [] No insurance carrier approval [] Unlikely to return to community  [] No medical necessity  [] Patient or family chose other facility  [] Too medically complex  [] Inadequate discharge plan  [] Rehabilitation bed unavailable [] Functional level too low  [] patient or family refused ARU    If patient not accepted for IRF admission, recommended level of care:  [] [address]  [] [facility]  [] [place]   [] Home Care  [] Other      [] LTAC       Physician Assigned:  [] Dr. Kathleen Suero         [x] Dr. Bala Urena              [] Dr. Tammy Ocasio [] Dr. Brenda Young  [] Dr. Lee Logan (if not admitted within 48 hours of initial pre-screen)    Medical Update/Changes: Prescreen updated to reflect current data since initiated. Functional Update/Changes: Therapy notes updated on prescreen graph to reflect current status.     Reviewer Signature:_____________________________________    Date:  7/30/21 Time: 1200    PHYSICIAN ADMISSION DETERMINATION AND REVIEW UPDATE:

## 2021-07-29 NOTE — PROGRESS NOTES
Comprehensive Nutrition Assessment    Type and Reason for Visit:  Reassess    Nutrition Recommendations/Plan: Continue current diet and ONS    Nutrition Assessment:  Pt stable from a nutritional standpoint w/ % PO intakes. Remains at risk w/ increased nutrient needs for surgical healing s/p L AKA. H/o DM. Continue current ONS and will monitor. Malnutrition Assessment:  Malnutrition Status:  No malnutrition    Context:  Acute Illness     Findings of the 6 clinical characteristics of malnutrition:  Energy Intake:  No significant decrease in energy intake  Weight Loss:  Unable to assess     Body Fat Loss:  No significant body fat loss     Muscle Mass Loss:  No significant muscle mass loss    Fluid Accumulation:  No significant fluid accumulation     Strength:  Not Performed    Estimated Daily Nutrient Needs:  Energy (kcal):  9070-2882 (20-25 kcals/kg CBW); Weight Used for Energy Requirements:  Current     Protein (g):  100-120; Weight Used for Protein Requirements:  Ideal (adjusted IBW for AKA (1.5-1.8 gm/kg IBW))        Fluid (ml/day):  4267-9998; Method Used for Fluid Requirements:  1 ml/kcal      Nutrition Related Findings:  A&Ox4, active BS, L AKA, trace edema, -I/Os      Wounds:  Surgical Incision       Current Nutrition Therapies:    Adult Oral Nutrition Supplement; Low Calorie/High Protein Oral Supplement  Adult Oral Nutrition Supplement; Wound Healing Oral Supplement  ADULT DIET; Regular; 5 carb choices (75 gm/meal); Low Fat/Low Chol/High Fiber/2 gm Na    Anthropometric Measures:  · Height: 5' 10\" (177.8 cm)  · Current Body Weight: 171 lb (77.6 kg) (7/29 bed scale)   · Usual Body Weight: 199 lb (90.3 kg) (9/2020 actual per EMR)     · Ideal Body Weight: 166 lbs; % Ideal Body Weight 103 %   · BMI: 24.5  · Adjusted Body Weight: 188.3;  Amputation   · Adjusted BMI: 27    · BMI Categories: Overweight (BMI 25.0-29.9) (adjusted BMI for AKA)       Nutrition Diagnosis:   · Increased nutrient needs related to increase demand for energy/nutrients as evidenced by wounds    Nutrition Interventions:   Food and/or Nutrient Delivery:  Continue Current Diet, Continue Oral Nutrition Supplement  Nutrition Education/Counseling:  Education initiated (reviewed ONS benefits)   Coordination of Nutrition Care:  Continue to monitor while inpatient    Goals:  pt to consume >75% meals/ONS       Nutrition Monitoring and Evaluation:   Food/Nutrient Intake Outcomes:  Food and Nutrient Intake, Supplement Intake  Physical Signs/Symptoms Outcomes:  Biochemical Data, GI Status, Fluid Status or Edema, Nutrition Focused Physical Findings, Skin, Weight     Discharge Planning:     Too soon to determine     Electronically signed by Kimberlee Reed MS, RD, LD on 7/29/21 at 2:51 PM EDT    Contact: [phone]

## 2021-07-30 ENCOUNTER — HOSPITAL ENCOUNTER (INPATIENT)
Age: 51
LOS: 5 days | Discharge: HOME | DRG: 561 | End: 2021-08-04
Attending: PHYSICAL MEDICINE & REHABILITATION | Admitting: PHYSICAL MEDICINE & REHABILITATION
Payer: COMMERCIAL

## 2021-07-30 VITALS
OXYGEN SATURATION: 98 % | TEMPERATURE: 98.8 F | WEIGHT: 171.8 LBS | HEART RATE: 83 BPM | HEIGHT: 70 IN | BODY MASS INDEX: 24.6 KG/M2 | SYSTOLIC BLOOD PRESSURE: 123 MMHG | RESPIRATION RATE: 16 BRPM | DIASTOLIC BLOOD PRESSURE: 71 MMHG

## 2021-07-30 DIAGNOSIS — S78.112A ABOVE KNEE AMPUTATION OF LEFT LOWER EXTREMITY (HCC): Primary | ICD-10-CM

## 2021-07-30 LAB
ANION GAP SERPL CALCULATED.3IONS-SCNC: 12 MMOL/L (ref 7–16)
BUN BLDV-MCNC: 16 MG/DL (ref 6–20)
CALCIUM SERPL-MCNC: 9.5 MG/DL (ref 8.6–10.2)
CHLORIDE BLD-SCNC: 96 MMOL/L (ref 98–107)
CO2: 25 MMOL/L (ref 22–29)
CREAT SERPL-MCNC: 0.6 MG/DL (ref 0.7–1.2)
GFR AFRICAN AMERICAN: >60
GFR NON-AFRICAN AMERICAN: >60 ML/MIN/1.73
GLUCOSE BLD-MCNC: 156 MG/DL (ref 74–99)
HCT VFR BLD CALC: 25.5 % (ref 37–54)
HEMOGLOBIN: 8.1 G/DL (ref 12.5–16.5)
MCH RBC QN AUTO: 29.2 PG (ref 26–35)
MCHC RBC AUTO-ENTMCNC: 31.8 % (ref 32–34.5)
MCV RBC AUTO: 92.1 FL (ref 80–99.9)
METER GLUCOSE: 198 MG/DL (ref 74–99)
METER GLUCOSE: 257 MG/DL (ref 74–99)
METER GLUCOSE: 266 MG/DL (ref 74–99)
METER GLUCOSE: 83 MG/DL (ref 74–99)
METER GLUCOSE: 93 MG/DL (ref 74–99)
PDW BLD-RTO: 13.8 FL (ref 11.5–15)
PLATELET # BLD: 328 E9/L (ref 130–450)
PMV BLD AUTO: 10.2 FL (ref 7–12)
POTASSIUM SERPL-SCNC: 4.4 MMOL/L (ref 3.5–5)
RBC # BLD: 2.77 E12/L (ref 3.8–5.8)
SARS-COV-2, NAAT: NOT DETECTED
SODIUM BLD-SCNC: 133 MMOL/L (ref 132–146)
WBC # BLD: 9.1 E9/L (ref 4.5–11.5)

## 2021-07-30 PROCEDURE — 6370000000 HC RX 637 (ALT 250 FOR IP): Performed by: SURGERY

## 2021-07-30 PROCEDURE — 82962 GLUCOSE BLOOD TEST: CPT

## 2021-07-30 PROCEDURE — 87635 SARS-COV-2 COVID-19 AMP PRB: CPT

## 2021-07-30 PROCEDURE — 6360000002 HC RX W HCPCS: Performed by: SURGERY

## 2021-07-30 PROCEDURE — 1280000000 HC REHAB R&B

## 2021-07-30 PROCEDURE — 2580000003 HC RX 258: Performed by: SURGERY

## 2021-07-30 PROCEDURE — 36415 COLL VENOUS BLD VENIPUNCTURE: CPT

## 2021-07-30 PROCEDURE — 85027 COMPLETE CBC AUTOMATED: CPT

## 2021-07-30 PROCEDURE — 80048 BASIC METABOLIC PNL TOTAL CA: CPT

## 2021-07-30 RX ORDER — OXYCODONE HYDROCHLORIDE 5 MG/1
5 TABLET ORAL EVERY 4 HOURS PRN
Status: CANCELLED | OUTPATIENT
Start: 2021-07-30

## 2021-07-30 RX ORDER — NICOTINE POLACRILEX 4 MG
15 LOZENGE BUCCAL PRN
Status: DISCONTINUED | OUTPATIENT
Start: 2021-07-30 | End: 2021-08-04 | Stop reason: HOSPADM

## 2021-07-30 RX ORDER — SODIUM CHLORIDE 0.9 % (FLUSH) 0.9 %
5-40 SYRINGE (ML) INJECTION EVERY 12 HOURS SCHEDULED
Status: CANCELLED | OUTPATIENT
Start: 2021-07-30

## 2021-07-30 RX ORDER — ATORVASTATIN CALCIUM 40 MG/1
40 TABLET, FILM COATED ORAL NIGHTLY
Status: CANCELLED | OUTPATIENT
Start: 2021-07-30

## 2021-07-30 RX ORDER — HEPARIN SODIUM 10000 [USP'U]/ML
5000 INJECTION, SOLUTION INTRAVENOUS; SUBCUTANEOUS EVERY 8 HOURS SCHEDULED
Status: CANCELLED | OUTPATIENT
Start: 2021-07-30

## 2021-07-30 RX ORDER — ATORVASTATIN CALCIUM 40 MG/1
40 TABLET, FILM COATED ORAL NIGHTLY
Status: DISCONTINUED | OUTPATIENT
Start: 2021-07-30 | End: 2021-08-04 | Stop reason: HOSPADM

## 2021-07-30 RX ORDER — INSULIN GLARGINE 100 [IU]/ML
10 INJECTION, SOLUTION SUBCUTANEOUS NIGHTLY
Status: CANCELLED | OUTPATIENT
Start: 2021-07-30

## 2021-07-30 RX ORDER — OXYCODONE HYDROCHLORIDE 5 MG/1
5 TABLET ORAL EVERY 4 HOURS PRN
Status: DISCONTINUED | OUTPATIENT
Start: 2021-07-30 | End: 2021-08-04 | Stop reason: HOSPADM

## 2021-07-30 RX ORDER — INSULIN GLARGINE 100 [IU]/ML
10 INJECTION, SOLUTION SUBCUTANEOUS NIGHTLY
Status: DISCONTINUED | OUTPATIENT
Start: 2021-07-30 | End: 2021-08-04 | Stop reason: HOSPADM

## 2021-07-30 RX ORDER — NICOTINE POLACRILEX 4 MG
15 LOZENGE BUCCAL PRN
Status: CANCELLED | OUTPATIENT
Start: 2021-07-30

## 2021-07-30 RX ORDER — LISINOPRIL 20 MG/1
40 TABLET ORAL DAILY
Status: DISCONTINUED | OUTPATIENT
Start: 2021-07-31 | End: 2021-08-04 | Stop reason: HOSPADM

## 2021-07-30 RX ORDER — OXYCODONE HYDROCHLORIDE 10 MG/1
10 TABLET ORAL EVERY 4 HOURS PRN
Status: CANCELLED | OUTPATIENT
Start: 2021-07-30

## 2021-07-30 RX ORDER — HEPARIN SODIUM 10000 [USP'U]/ML
5000 INJECTION, SOLUTION INTRAVENOUS; SUBCUTANEOUS EVERY 8 HOURS SCHEDULED
Status: DISCONTINUED | OUTPATIENT
Start: 2021-07-30 | End: 2021-07-31

## 2021-07-30 RX ORDER — CITALOPRAM 20 MG/1
40 TABLET ORAL DAILY
Status: CANCELLED | OUTPATIENT
Start: 2021-07-31

## 2021-07-30 RX ORDER — ONDANSETRON 4 MG/1
4 TABLET, ORALLY DISINTEGRATING ORAL EVERY 8 HOURS PRN
Status: DISCONTINUED | OUTPATIENT
Start: 2021-07-30 | End: 2021-08-04 | Stop reason: HOSPADM

## 2021-07-30 RX ORDER — GABAPENTIN 100 MG/1
200 CAPSULE ORAL 3 TIMES DAILY
Status: DISCONTINUED | OUTPATIENT
Start: 2021-07-30 | End: 2021-08-04 | Stop reason: HOSPADM

## 2021-07-30 RX ORDER — DEXTROSE MONOHYDRATE 25 G/50ML
12.5 INJECTION, SOLUTION INTRAVENOUS PRN
Status: CANCELLED | OUTPATIENT
Start: 2021-07-30

## 2021-07-30 RX ORDER — SODIUM CHLORIDE 0.9 % (FLUSH) 0.9 %
5-40 SYRINGE (ML) INJECTION PRN
Status: CANCELLED | OUTPATIENT
Start: 2021-07-30

## 2021-07-30 RX ORDER — ONDANSETRON 2 MG/ML
4 INJECTION INTRAMUSCULAR; INTRAVENOUS EVERY 6 HOURS PRN
Status: CANCELLED | OUTPATIENT
Start: 2021-07-30

## 2021-07-30 RX ORDER — ONDANSETRON 2 MG/ML
4 INJECTION INTRAMUSCULAR; INTRAVENOUS EVERY 6 HOURS PRN
Status: DISCONTINUED | OUTPATIENT
Start: 2021-07-30 | End: 2021-08-04 | Stop reason: HOSPADM

## 2021-07-30 RX ORDER — PANTOPRAZOLE SODIUM 40 MG/1
40 TABLET, DELAYED RELEASE ORAL
Status: CANCELLED | OUTPATIENT
Start: 2021-07-31

## 2021-07-30 RX ORDER — DEXTROSE MONOHYDRATE 50 MG/ML
100 INJECTION, SOLUTION INTRAVENOUS PRN
Status: DISCONTINUED | OUTPATIENT
Start: 2021-07-30 | End: 2021-08-04 | Stop reason: HOSPADM

## 2021-07-30 RX ORDER — ACETAMINOPHEN 325 MG/1
650 TABLET ORAL EVERY 6 HOURS SCHEDULED
Status: CANCELLED | OUTPATIENT
Start: 2021-07-30

## 2021-07-30 RX ORDER — GLIPIZIDE 5 MG/1
10 TABLET ORAL
Status: CANCELLED | OUTPATIENT
Start: 2021-07-31

## 2021-07-30 RX ORDER — DEXTROSE MONOHYDRATE 50 MG/ML
100 INJECTION, SOLUTION INTRAVENOUS PRN
Status: CANCELLED | OUTPATIENT
Start: 2021-07-30

## 2021-07-30 RX ORDER — CITALOPRAM 20 MG/1
40 TABLET ORAL DAILY
Status: DISCONTINUED | OUTPATIENT
Start: 2021-07-31 | End: 2021-08-04 | Stop reason: HOSPADM

## 2021-07-30 RX ORDER — ONDANSETRON 4 MG/1
4 TABLET, ORALLY DISINTEGRATING ORAL EVERY 8 HOURS PRN
Status: CANCELLED | OUTPATIENT
Start: 2021-07-30

## 2021-07-30 RX ORDER — PANTOPRAZOLE SODIUM 40 MG/1
40 TABLET, DELAYED RELEASE ORAL
Status: DISCONTINUED | OUTPATIENT
Start: 2021-07-31 | End: 2021-08-04 | Stop reason: HOSPADM

## 2021-07-30 RX ORDER — GABAPENTIN 100 MG/1
200 CAPSULE ORAL 3 TIMES DAILY
Status: CANCELLED | OUTPATIENT
Start: 2021-07-30

## 2021-07-30 RX ORDER — POLYETHYLENE GLYCOL 3350 17 G/17G
17 POWDER, FOR SOLUTION ORAL DAILY PRN
Status: DISCONTINUED | OUTPATIENT
Start: 2021-07-30 | End: 2021-08-04 | Stop reason: HOSPADM

## 2021-07-30 RX ORDER — DEXTROSE MONOHYDRATE 25 G/50ML
12.5 INJECTION, SOLUTION INTRAVENOUS PRN
Status: DISCONTINUED | OUTPATIENT
Start: 2021-07-30 | End: 2021-08-04 | Stop reason: HOSPADM

## 2021-07-30 RX ORDER — GLIPIZIDE 5 MG/1
10 TABLET ORAL
Status: DISCONTINUED | OUTPATIENT
Start: 2021-07-31 | End: 2021-08-04 | Stop reason: HOSPADM

## 2021-07-30 RX ORDER — ACETAMINOPHEN 325 MG/1
650 TABLET ORAL EVERY 4 HOURS PRN
Status: DISCONTINUED | OUTPATIENT
Start: 2021-07-30 | End: 2021-08-04 | Stop reason: HOSPADM

## 2021-07-30 RX ORDER — LISINOPRIL 20 MG/1
40 TABLET ORAL DAILY
Status: CANCELLED | OUTPATIENT
Start: 2021-07-31

## 2021-07-30 RX ORDER — ACETAMINOPHEN 325 MG/1
650 TABLET ORAL EVERY 6 HOURS SCHEDULED
Status: DISCONTINUED | OUTPATIENT
Start: 2021-07-30 | End: 2021-08-04 | Stop reason: HOSPADM

## 2021-07-30 RX ORDER — OXYCODONE HYDROCHLORIDE 10 MG/1
10 TABLET ORAL EVERY 4 HOURS PRN
Status: DISCONTINUED | OUTPATIENT
Start: 2021-07-30 | End: 2021-08-04 | Stop reason: HOSPADM

## 2021-07-30 RX ADMIN — OXYCODONE 5 MG: 5 TABLET ORAL at 12:09

## 2021-07-30 RX ADMIN — INSULIN LISPRO 3 UNITS: 100 INJECTION, SOLUTION INTRAVENOUS; SUBCUTANEOUS at 08:56

## 2021-07-30 RX ADMIN — OXYCODONE 5 MG: 5 TABLET ORAL at 16:49

## 2021-07-30 RX ADMIN — CITALOPRAM 40 MG: 20 TABLET, FILM COATED ORAL at 08:55

## 2021-07-30 RX ADMIN — METFORMIN HYDROCHLORIDE 500 MG: 500 TABLET ORAL at 16:49

## 2021-07-30 RX ADMIN — ACETAMINOPHEN 650 MG: 325 TABLET ORAL at 12:06

## 2021-07-30 RX ADMIN — GABAPENTIN 200 MG: 100 CAPSULE ORAL at 21:14

## 2021-07-30 RX ADMIN — Medication 10 ML: at 09:00

## 2021-07-30 RX ADMIN — OXYCODONE 5 MG: 5 TABLET ORAL at 07:15

## 2021-07-30 RX ADMIN — ACETAMINOPHEN 650 MG: 325 TABLET ORAL at 07:12

## 2021-07-30 RX ADMIN — METFORMIN HYDROCHLORIDE 500 MG: 500 TABLET ORAL at 08:55

## 2021-07-30 RX ADMIN — ACETAMINOPHEN 650 MG: 325 TABLET ORAL at 18:26

## 2021-07-30 RX ADMIN — HEPARIN SODIUM 5000 UNITS: 10000 INJECTION INTRAVENOUS; SUBCUTANEOUS at 07:11

## 2021-07-30 RX ADMIN — GLIPIZIDE 10 MG: 5 TABLET ORAL at 08:55

## 2021-07-30 RX ADMIN — INSULIN GLARGINE 10 UNITS: 100 INJECTION, SOLUTION SUBCUTANEOUS at 21:15

## 2021-07-30 RX ADMIN — OXYCODONE HYDROCHLORIDE 10 MG: 10 TABLET ORAL at 21:14

## 2021-07-30 RX ADMIN — PANTOPRAZOLE SODIUM 40 MG: 40 TABLET, DELAYED RELEASE ORAL at 07:12

## 2021-07-30 RX ADMIN — HEPARIN SODIUM 5000 UNITS: 10000 INJECTION INTRAVENOUS; SUBCUTANEOUS at 21:19

## 2021-07-30 RX ADMIN — INSULIN LISPRO 5 UNITS: 100 INJECTION, SOLUTION INTRAVENOUS; SUBCUTANEOUS at 21:18

## 2021-07-30 RX ADMIN — ATORVASTATIN CALCIUM 40 MG: 40 TABLET, FILM COATED ORAL at 21:14

## 2021-07-30 RX ADMIN — GABAPENTIN 200 MG: 100 CAPSULE ORAL at 08:55

## 2021-07-30 RX ADMIN — INSULIN LISPRO 9 UNITS: 100 INJECTION, SOLUTION INTRAVENOUS; SUBCUTANEOUS at 12:09

## 2021-07-30 ASSESSMENT — PAIN SCALES - GENERAL
PAINLEVEL_OUTOF10: 5
PAINLEVEL_OUTOF10: 7
PAINLEVEL_OUTOF10: 5
PAINLEVEL_OUTOF10: 3
PAINLEVEL_OUTOF10: 5
PAINLEVEL_OUTOF10: 5
PAINLEVEL_OUTOF10: 3
PAINLEVEL_OUTOF10: 5

## 2021-07-30 ASSESSMENT — PAIN DESCRIPTION - DESCRIPTORS
DESCRIPTORS: CONSTANT;DISCOMFORT
DESCRIPTORS: ACHING;DISCOMFORT;SORE
DESCRIPTORS: ACHING;DISCOMFORT;SORE

## 2021-07-30 ASSESSMENT — PAIN DESCRIPTION - LOCATION
LOCATION: LEG

## 2021-07-30 ASSESSMENT — PAIN DESCRIPTION - PAIN TYPE
TYPE: ACUTE PAIN
TYPE: ACUTE PAIN;SURGICAL PAIN
TYPE: ACUTE PAIN;SURGICAL PAIN

## 2021-07-30 ASSESSMENT — PAIN DESCRIPTION - ORIENTATION
ORIENTATION: LEFT

## 2021-07-30 ASSESSMENT — PAIN DESCRIPTION - FREQUENCY: FREQUENCY: INTERMITTENT

## 2021-07-30 ASSESSMENT — PAIN DESCRIPTION - ONSET: ONSET: ON-GOING

## 2021-07-30 NOTE — H&P
PM&R Admission History & Physical Examination    Patient: Prosper Martinez  Age/sex: 48 y.o. male  Medical Record #: [id number]  Admission to Acute Rehab Unit: Date: 7/30/2021 diagnosis: Above knee amputation of left lower extremity [facility]) [E45.952E]  Admitting Physician: Nusrat Javier MD  Consulting physician: Kathie David  Primary care provider: Cody Thompson DO    Procedures performed on/related to this admission:  Left above-knee amputation    Chief complaint:   Impairments and acitivities limitations in ADLs, mobility, secondary to left above-knee amputation    HPI:   Prosper Martinez is a 48 y.o. male with past medical history significant for diabetes hypertension and peripheral vascular disease who presented to [facility] on 7/22/2021 with an occluded femoral bypass. The limb was unable to be salvaged. He underwent a left above-knee amputation. Postop he also had a sprain of the right ankle where he has diabetic neuropathy. He is at a mod to max assist level functionally     Hospital course was complicated by ankle sprain*. Present status: Stable  Pain: Moderate  PO intake: Fair  Micturition: Voiding  DVT prophylaxis with heparin.    Weight bearing status: As tolerated    Past Medical History:   Diagnosis Date    Anxiety     Atherosclerosis of native arteries of extremity with rest pain ([address] 7/15/2021    Chronic obstructive pulmonary disease ([address] 7/30/2019    Diabetes ([address]     Femoral-popliteal bypass graft occlusion, left, initial encounter ([address] 7/19/2021    GERD (gastroesophageal reflux disease)     Gout     Hyperlipidemia     Hypertension     Leg pain     Leg pain     Postoperative anemia due to acute blood loss 8/18/2019    PVD (peripheral vascular disease) ([address] 1/15/2018    S/P femoral-popliteal bypass surgery 9/9/2019    Type 2 diabetes mellitus with diabetic peripheral angiopathy without gangrene, without long-term current use of insulin ([address] 1/15/2018       Past Surgical History:   Procedure Laterality Date    FEMORAL BYPASS Left 8/16/2019    FEMORAL POPLITEAL BYPASS WITH FEMORAL ENDARTERECTOMY LEFT LEG performed by Jaky Beltran MD at [place] [address] Right 1/31/2020    RIGHT FEMORAL ENDARTERECTOMY performed by Jaky Beltran MD at [address] [place] [address] Left 7/22/2021    ABOVE KNEE AMPUTATION-LEFT LEG performed by Jaky Beltran MD at [facility] HISTORY  10/04/2016    Dr Everton Cam - athrectomy/pci right fem/pop    OTHER SURGICAL HISTORY  06/19/2018    Dr Everton Cam - PCI w/ athrectomy RLE Common Illiac to Popliteal    VASCULAR SURGERY  07/2019    ANGIOGRAM    WISDOM TOOTH EXTRACTION         Family History   Problem Relation Age of Onset    Asthma Mother     Other Father         vascular problems    Cancer Father         prostate, lung    Diabetes Father        No Known Allergies    Scheduled Meds:  acetaminophen, 650 mg, 4 times per day  atorvastatin, 40 mg, Nightly  [START ON 7/31/2021] citalopram, 40 mg, Daily  gabapentin, 200 mg, TID  [START ON 7/31/2021] glipiZIDE, 10 mg, QAM AC  heparin (porcine), 5,000 Units, 3 times per day  insulin glargine, 10 Units, Nightly  insulin lispro, 0-18 Units, TID WC  insulin lispro, 0-9 Units, Nightly  [START ON 7/31/2021] lisinopril, 40 mg, Daily  metFORMIN, 500 mg, BID WC  [START ON 7/31/2021] pantoprazole, 40 mg, QAM AC        PRN Meds  ondansetron, 4 mg, Q8H PRN   Or  ondansetron, 4 mg, Q6H PRN  dextrose, 100 mL/hr, PRN  dextrose, 12.5 g, PRN  glucagon (rDNA), 1 mg, PRN  glucose, 15 g, PRN  oxyCODONE, 5 mg, Q4H PRN   Or  oxyCODONE, 10 mg, Q4H PRN  acetaminophen, 650 mg, Q4H PRN  polyethylene glycol, 17 g, Daily PRN            Social History:  Tobacco/EtOh/Illicits: Prior smoker w  Social supports: Wife  Home situation: Lives in [facility]      Functional History:  Home DME: None  Premorbid ADL's: Independent  Premorbid Mobility: Independent  Day of Admission:  Mod to max assist  Date of Admission: 7/30/2021    Physical Therapy(    Initial Evaluation  Date: 7/23/21 Treatment  Date: 7/30/2021 Short Term/ Long Term   Goals   AM-PAC 6 Clicks 5/68 90/34     Was pt agreeable to Eval/treatment? Yes yes     Does pt have pain? 7-8/10 LLE 5/10 L LE      Bed Mobility  Rolling: NT  Supine to sit: MaxA x 2  Sit to supine: MaxA x 2  Scooting: MaxA Supine to sit independent  Scooting to edge of bed independent   Codey   Transfers Sit to stand: MaxA x 2  Stand to sit: MaxA x 2  Stand pivot: NT Sit to stand SBA Stand to sit SBA  Stand pivot with SBA with ww  Codey with AAD   Ambulation   NT 12 feet x2 with ww with CG/SBA >50 feet with Codey with AAD   Stair negotiation: ascended and descended NT NT >2 steps with 2 rail ModA   ROM BUE:  Defer to OT note  RLE:  WFL       Strength BUE:  Defer to OT note  RLE:  4-/5   Increase by 1/3 MMT grade   Balance Sitting EOB:  Codey  Dynamic Standing:  NT   Sitting EOB:  Independent  Dynamic Standing:  Codey with AAD         I      Occupational Therapy(  Initial Eval Status  Date: 7/23/21 Treatment Status  Date: 7/27/21 STGs = LTGs  Time frame: 10-14 days    Feeding Modified [place]   Setup     Grooming Minimal Assist  Simulated, seated   SBA;     Pt able to sit up in the chair to complete washing face.  Pt stated wife is bringing his toothbrush and he will brush teeth later.        Modified [place]   Seated   UB Dressing Moderate Assist  SBA;     To complete silvano/doff of gown while in chair with assist with monitor.     Modified [place]    LB Dressing Maximal Assist   Simulated to don/doff R sock with min A, anticipate increased assistance with additional LB dressing tasks Mod A- CAM boot  Sup - sock Minimal Assist using AE as needed and modified techniques   Bathing Maximal Assist Mod A;     To perform bathing tasks while on the EOB.     Minimal Assist    Toileting Dependent   Nuñez catheter Setup - urinal    Minimal Assist    Bed Mobility Supine to sit: Maximal Assist   Sit to supine: Maximal Assist   SBA- supine<->sit     Patient showing good safety while transferring from supine to sitting position.        Supine to sit: Minimal Assist   Sit to supine: Minimal Assist    Functional Transfers Sit<>stand: Max A x2  x2 attempt to achieve full standing  Mod A of 2;      To transfer from sit to standing position with verbal prompting for proper hand placement and use of w/w.     Sit<>stand: Minimal Assist   Stand pivot: Minimal Assist  Bed<>bsc/chair using fww   Functional Mobility NT  Unable to safely compelte  SPT: Mod A of 2 to side step to chair with w/w.      3-4 steps at bedside with w/w,  Mod A of 1 and verbal prompting for proper completion.     Minimal Assist  Short functional distance using fww, bed<>bsc/chair    Balance Sitting:     Static: SBA    Dynamic: CGA  Standing: Min A x2 with B UE support using fww Sitting:     Static: Sup    Dynamic: SBA     Standing:  Mod A of 2     Activity Tolerance Fair-  Fair+ Fair+   Visual/  Perceptual Glasses: yes            S  Review Of Systems:  Constitutional: No Fever, chills  Skin: No rash, ulcers, or other lesions  HEENT: No HA, blurry vision  Respiratory: No Cough, SOB  Cardiovascular: No CP  Gastrointestinal: No nausea, vomiting, diarrhea, constipation, abdominal discomfort; + continent   Genitourinary: No Dysuria, frequency, urgency; + continent   Musculoskeletal: No weakness; rest as per HPI  Neurologic: No numbness or tingling; rest as per HPI  Physical Examination:  Vitals:   Vitals:    07/30/21 1515   Weight: 167 lb 3.2 oz (75.8 kg)   Height: 5' 10\" (1.778 m)       GEN: NAD, conversational   HEENT: NC/AT, PERRLA, EOMI   RESP: CTAB, no R/R/W   CV: +S1 S2, RR  ABD: soft, NT, ND, normal BS : no alvarez  EXT: Normal ROM, No clubbing/cyanosis, left above-knee amputation SKIN: No erythema,   Neuro Exam:  Level of alertness: Alert  Affect: appropriate  Perceives auditory stimuli: Yes   Perceives visual stimuli: Yes  Phonation:  intact  Orientation:             Date: intact             Place: intact             President: intact    Cranial Nerves:  I: olfactory not tested  II: Full to confrontation  III, IV, VI: extra occular muscles intact  Pupils (II, III): ERRL  V: Facial Sensation/Jaw clench:  intact  VII: Facial Motor: intact  VIII. Hearing:  intact  XI/X: Palate:  intact  XI: Shoulder shrug/SCM: intact  XII: Tongue:  intact      Neglect testing: No evidence of tactile or visual neglect     Cerebellar:  DOMENIC's:  intact  F - N: intact    Sensory:    LT: Diminished right lower extremity         Proprioception: Diminished right lower extremity    Motor:   Tone was  normal    Motor Findings  Strength: Right  Strength: Left    Deltoid  4 4   Biceps  4 4   Triceps  4 4   Wrist Extensors  4 4   FDP 4 4   Finger abductors 4 4   Iliospoas  4 3   Quadriceps  4 0   Tibialis anterior  4 0   EHL 4 0   Gastroc soleus  4 0       DTRs:  Reflex Right Left   Biceps 2+ 2+   Triceps 2+ 2+   Brachioradialis 2+ 2+   Patella 2+ 2+   Achilles  2+ 2+   Babinski  negative    Clonus Negative Negative   Sue Negative Negative         Laboratory:  Lab Results   Component Value Date    WBC 9.1 07/30/2021    HGB 8.1 (L) 07/30/2021    HCT 25.5 (L) 07/30/2021    MCV 92.1 07/30/2021     07/30/2021     Lab Results   Component Value Date     07/30/2021    K 4.4 07/30/2021    K 4.2 07/20/2021    CL 96 07/30/2021    CO2 25 07/30/2021    BUN 16 07/30/2021    CREATININE 0.6 07/30/2021    GLUCOSE 156 07/30/2021    CALCIUM 9.5 07/30/2021      Lab Results   Component Value Date    ALT 12 04/19/2021    AST 13 04/19/2021    ALKPHOS 96 04/19/2021    BILITOT <0.2 04/19/2021     No results found for: VOL, APPEARANCE, COLORU, LABSPEC, LABPH, LEUKBLD, NITRU, GLUCOSEU, KETUA, UROBILINOGEN, KETUA, UROBILINOGEN, BILIRUBINUR, OCBU  Lab Results   Component Value Date    LABA1C 7.5 (H) 07/28/2021     No results found for: EAG    Microbiology:   No results for input(s): BC in the last 72 hours. No results for input(s): Pedrito Elliott in the last 72 hours. No results for input(s): LABURIN in the last 72 hours. No results for input(s): CULTRESP in the last 72 hours. Pertinent Imaging (radiologist interpretation unless otherwise stated): No orders to display       DIAGNOSIS: Left above-knee amputation  Sprain of the right ankle  Severe peripheral vascular disease  Diabetic neuropathy  Hypertension  Type 2 diabetes mellitus    IMPRESSION/INDIVIDUAL PLAN OF CARE:  Shana López is a 48 y.o. right handed male with PMH significant for hypertension diabetes and peripheral vascular disease, who presented on 7/30/2021 s/p a left above-knee amputation,  Hospital course was complicated by a sprained right ankle and diabetic neuropathy. Patient has impairments in:  emonstrating, impaired strength, impaired ROM, impaired balance, impaired safety awareness, decreased endurance. Patient has activities limitations in self care, mobility,  and is admitted to [facility] for acute comprehensive rehabilitation. I. Rehabilitation Necessity/Diagnosis:   Medical impact on function as a result of impaired functional mobility, ambulation, bed mobility, transfers, self-care/ADL's, strength, endurance, range of motion/flexibility, coordination and impaired gait/balance. Treatment plan will include a comprehensive rehabilitation program with intense therapies for 3 hours/day, 5 days/week. 1. Physical therapy to address bed mobidility, car and mat transfer, progressive ambulation with use of least restrictive assistive device, optimization of gait biomechanics, truncal strengthening, lower extremity strengthening, coordination, range of motion/flexibility, wheelchair and cushion evaluation, durable medical equipment evaluation, patient and family instruction and endurance training.   2. Occupational therapy to address feeding, grooming, upper and lower body dressing and bathing, toileting, tub/toilet/bed transfers, durable medical equipment evaluation, upper extremity strengthening, range of motion/flexibility, dexterity, coordination and patient and family instruction. 3. Rehabilitation nursing 24 hours per day to monitor bowel and bladder function, work on bowel routine, voiding trials/alvarez catheter management as per bladder management protocol, assess falls risk upon admission and periodically thereafter, implement and revise falls prevention strategies, maintain skin integrity through initial and daily pressure sore risk assessment (Scott scale), implement and revise pressure sore prevention strategies, educate patient and family members regarding medication administration, ADL's, transfers, and mobility and continue therapy carryover with ADL's, transfers, and mobility  4. Social work and case management consults for discharge planning/disposition issues, as well as coordination and communication of patient progress between family and providers. 5. Rehab psychology - as needed for adjustment, coping  6. Recreational therapy for community reintegration activities. This patient is sufficiently stable at this time of admission to [facility] to be able to participate in an intensive rehabilitation program described above and requires physician supervision by a rehabilitation physician as outlined below in Medical Management section. II. Medical Management:  # Neuro - s/p diabetic neuropathy that may have contributed to the sprained ankle. We will address that with appropriate bracing    # Ortho - s/p sprained ankle he is currently in a cam boot. I think there is a good possibility that we can progress him to an Aircast he has fairly good movement there with good strength but may need some proprioceptive feedback at the ankle      # Pulm -maintain oxygenation and pulmonary status    # GI/bowel/FEN -   - Diet: Regular, thin liquids.   - Bowel program: Colace 100 BID, s- GI prophylaxis: Pepcid  -   - MVI daily. # Pain control - Tylenol PRN for mild pain, oxycodone for more severe pain    # DVT prophylaxis - S and chemoprophylaxis with heparin    # Skin -  - maintain skin integrity. - turns q2H. # Renal/urinary -   - Renal: No evidence of renal failure on last BMP- Bladder: check PVR's times three    # Heme -   -  Anemia: Hgb stable we will recheck Monday Wednesday Friday  # Cardiovascular System-maintain cardiovascular stability and blood pressure control    # Endocrine -monitor and continue to address diabetes control  # Psych -patient may need rehab psychology for adjustment    # Disposition/social - likely discharge home, will discuss in team rounds. # Code status: Full Code was discussed with patient    III. Estimated length of stay: 2 weeks    IV. Goals -independent functioning primarily at a wheelchair level  We will also work on some short distance ambulation  He is limited by the ankle sprain  We should be able to make some improvement with that as well    V. Anticipated discharge setting: Home    VI. Prognosis:  Rehab Prognosis: Good  Medical Prognosis: Good    CMS Required Post-Admission Physician Evaluation Elements  History and Physical, including medical history, functional history and active comorbidities as in above text. Post -Admission Physician Evaluation comparison with pre-admission screen:  I concur with the preadmission screen   Medication Reconciliation CMS Requirements:  Drug Regimen Review:  Upon admission, a complete drug regimen review identify potential clinically significant medication issues requiring immediate attention by a physician.     Medication Intervention:   Monitor the anemia and treat as needed  Controlled diabetes  Control pain  We will eventually decrease the pain medications    Electronically signed by Maykel Leon MD on 7/30/2021 at 4:14 PM       Please note that the above documentation was prepared using voice recognition software. Every attempt was made to ensure accuracy but there may be spelling, grammatical, and contextual errors.

## 2021-07-30 NOTE — PROGRESS NOTES
Physical Therapy  Treatment Note    Name: Padma Vallejo  : 1970  MRN: [id number]      Date of Service: 2021    Evaluating PT:  Leila Villanueva, PT, DPT [id number]    Room #:  3305/4408-G  Diagnosis:  Peripheral vascular disease, unspecified [I73.9]  Arterial occlusion, lower extremity ([facility] [I70.209]  PMHx/PSHx:  COPD, DM, HLD, HTN, PVD  Procedure/Surgery:   L AKA  Precautions:  Falls,  WBAT with walking boot RLE  Equipment Needs:  TBD    SUBJECTIVE:    Pt lives with wife and adult daughter in a 1 story home with 1 stairs to enter and no rail. Pt ambulated without device and was independent PTA. OBJECTIVE:   Initial Evaluation  Date: 21 Treatment  Date: 2021 Short Term/ Long Term   Goals   AM-PAC 6 Clicks  68/57    Was pt agreeable to Eval/treatment? Yes yes    Does pt have pain? -8/10 LLE 5/10 L LE     Bed Mobility  Rolling: NT  Supine to sit: MaxA x 2  Sit to supine: MaxA x 2  Scooting: MaxA Supine to sit independent  Scooting to edge of bed independent   Codey   Transfers Sit to stand: MaxA x 2  Stand to sit: MaxA x 2  Stand pivot: NT Sit to stand SBA Stand to sit SBA  Stand pivot with SBA with ww  Codey with AAD   Ambulation   NT 12 feet x2 with ww with CG/SBA >50 feet with [place] with AAD   Stair negotiation: ascended and descended NT NT >2 steps with 2 rail ModA   ROM BUE:  Defer to OT note  RLE:  WFL     Strength BUE:  Defer to OT note  RLE:  4-/5  Increase by 1/3 MMT grade   Balance Sitting EOB:  Codey  Dynamic Standing:  NT  Sitting EOB:  Independent  Dynamic Standing:  Codey with AAD       I    Patient education  Pt educated on safety with functional mobility to protect L residual limb. Patient response to education:   Pt verbalized understanding Pt demonstrated skill Pt requires further education in this area   x X with verbal cues  x     ASSESSMENT:    Comments: Nursing cleared pt for physical therapy. Pt in bed upon arrival and agreed to participate in therapy.  Pt completed functional mobility as noted above. Assistance required to don R walking boot ( sitting on edge of bed) . Verbal cues for proper hand placement to improve safety and balance. Pt reported bathroom needs and assisted pt in/out of bathroom. Encouraged pt to increase distance with ambulation however pt declined stating fatigue. Decreased endurance and somewhat self limiting. Pt returned to  with call light in reach. Treatment:  Patient practiced and was instructed in the following treatment:     Therapeutic Activities Completed:  o Functional mobility as noted above:   - Bed mobility - pt able to complete with independence  - Transfer training - Verbal instruction for hand placement and technique to improve safety and balance   - Gait training - verbal instruction for walker approximation and technique/sequenicng with gait to improve safety and balance. PLAN:    Patient is making good progress towards established goals. Will continue with current POC.       Time in 1010  Time out 1035    Total Treatment Time  25  minutes     CPT codes:  [] Gait training 81448 0 minutes  [] Manual therapy 18384 0 minutes  [x] Therapeutic activities 03858 25  minutes  [] Therapeutic exercises 95113 0 minutes  [] Neuromuscular reeducation 88714 0 minutes    Medeiros Shankar [id number]

## 2021-07-30 NOTE — PLAN OF CARE
Problem: Skin Integrity:  Goal: Will show no infection signs and symptoms  Description: Will show no infection signs and symptoms  Outcome: Met This Shift     Problem: Skin Integrity:  Goal: Absence of new skin breakdown  Description: Absence of new skin breakdown  Outcome: Met This Shift     Problem: Bleeding:  Goal: Will show no signs and symptoms of excessive bleeding  Description: Will show no signs and symptoms of excessive bleeding  Outcome: Met This Shift

## 2021-07-30 NOTE — PROGRESS NOTES
Patient oriented to room and new admission folder given. Patient guide reviewed and explanation of Patient Rights and Responsibilities completed.

## 2021-07-30 NOTE — CARE COORDINATION
Nohemi received for Acute Rehab. Dr Ashish zamora served for discharge-readmit orders.  Indra Manning -[phone]

## 2021-07-30 NOTE — PRE-CERTIFICATION NOTE
Checked precert status on [facility]. Remains pending at this time.  Resubmitted clinicals via portal.

## 2021-07-30 NOTE — PROGRESS NOTES
Vascular Surgery Progress Note    Pt is being seen in f/u today regarding POD s/p L AKA    Subjective  Pt s/e. Pain controlled with oral meds. More sore with activity. Has walking boot for R ankle. States that R ankle pain is getting better. Case management arranging placement for Acute Rehab    Current Medications:    dextrose        HYDROmorphone **OR** [DISCONTINUED] HYDROmorphone, oxyCODONE **OR** oxyCODONE, glucose, dextrose, glucagon (rDNA), dextrose, sodium chloride flush, ondansetron **OR** ondansetron    insulin lispro  0-18 Units Subcutaneous TID WC    insulin lispro  0-9 Units Subcutaneous Nightly    glipiZIDE  10 mg Oral QAM AC    insulin glargine  10 Units Subcutaneous Nightly    metFORMIN  500 mg Oral BID WC    gabapentin  200 mg Oral TID    acetaminophen  650 mg Oral 4 times per day    heparin (porcine)  5,000 Units Subcutaneous 3 times per day    sodium chloride flush  5-40 mL Intravenous 2 times per day    atorvastatin  40 mg Oral Nightly    citalopram  40 mg Oral Daily    [Held by provider] lisinopril  40 mg Oral Daily    pantoprazole  40 mg Oral QAM AC        PHYSICAL EXAM:    /72   Pulse 84   Temp 97.1 °F (36.2 °C) (Temporal)   Resp 16   Ht 5' 10\" (1.778 m)   Wt 171 lb 12.8 oz (77.9 kg)   SpO2 94%   BMI 24.65 kg/m²     Intake/Output Summary (Last 24 hours) at 7/30/2021 0659  Last data filed at 7/30/2021 0403  Gross per 24 hour   Intake 960 ml   Output 1600 ml   Net -640 ml          Gen: awake, alert and oriented x3, no apparent distress  CVS: regular rate and rhythm  Resp: No increased work of breathing  Abd: Soft, non-tender, non-distended  R LE: 2+ fem, 5/5 motor. Less TTP R lateral ankle  L LE: s/p AKA. Incision is c/d/i with staples in place, well approximated. Dressing changed, scant drainage.   2+ fem pulse,, ecchymosis of lateral thigh, compartments soft    LABS:    Lab Results   Component Value Date    WBC 9.1 07/30/2021    HGB 8.1 (L) 07/30/2021    HCT 25.5 (L) 07/30/2021     07/30/2021    PROTIME 11.0 07/20/2021    INR 1.0 07/20/2021    APTT 29.2 07/21/2021    K 4.6 07/29/2021    BUN 16 07/29/2021    CREATININE 0.6 (L) 07/29/2021       A/P  48 y.o. male with LLE occlusive disease and thrombosed bypass graft. S/p Angiogram, plasty of L SFA/popliteal arteries, insertion of lysis catheter 7/20, s/p 7/22 L AKA    - prn pain control, Cont gabapentin, prn oxy.  Stop IV as not using   - local wound care  - diet as tolerated  - PT/OT evals  - prosthetic consult placed  - Ortho following for R ankle pain - rec boot  - acute blood loss anemia - H/H stable  - SSI and lantus for BS control, improving overnight  - discharge planning - ok for Acute rehab when bed available    Will be d/w Dr. Zofia Carrizales DO  Electronically signed by Camille Opitz, DO on 7/28/21 at 6:44 AM EDT

## 2021-07-30 NOTE — LETTER
PORTABLE PATIENT PROFILE  [facility]  [id number]    MEDICAL DIAGNOSIS/CONDITION:   Patient Active Problem List   Diagnosis    Type 2 diabetes mellitus with diabetic peripheral angiopathy without gangrene, without long-term current use of insulin ([address]    Tobacco abuse counseling    PVD (peripheral vascular disease) with claudication ([address]    Pure hypercholesterolemia    Diabetic mononeuropathy associated with type 2 diabetes mellitus ([address]    Chronic obstructive pulmonary disease ([address]    S/P femoral-popliteal bypass surgery    Swelling of joint of pelvic region or thigh, left    Atherosclerosis of native artery of left lower extremity with rest pain ([address]    PVD (peripheral vascular disease) ([address]    Atherosclerosis of native arteries of extremity with rest pain ([address]    Femoral-popliteal bypass graft occlusion, left, initial encounter ([address]    Arterial occlusion, lower extremity ([address]    Above knee amputation of left lower extremity ([address]       INSURANCE INFORMATION:  Payor: U[employer] /  /  /     ADVANCED DIRECTIVES:   Primary Decision Maker (Healthcare Proxy)  Patient's Healthcare Decision Maker is[de-identified] Legal Next of Kin  [unfilled]     EMERGENCY CONTACT:       RISK FACTORS:   Social History     Tobacco Use    Smoking status: Former Smoker     Packs/day: 1.50     Years: 28.00     Pack years: 42.00     Types: Cigarettes     Start date: 80     Quit date: 10/15/2016     Years since quittin.8    Smokeless tobacco: Former User     Types: Snuff     Quit date: 2004   Substance Use Topics    Alcohol use: Not Currently     Alcohol/week: 12.0 standard drinks     Types: 12 Cans of beer per week     Comment: HAS NOT DRANK SINCE 19       ALLERGIES:  No Known Allergies    IMMUNIZATIONS:    There is no immunization history on file for this patient.     SWALLOWING:   Difficulty Chewing or Swallowing Food: No    VISION AND HEARING:   Sensory Problems  Visual impairment: Glasses    PHYSICIANS INVOLVED WITH CARE:    Kayode Spain MD  No ref.  provider found  Ender Huang MD

## 2021-07-30 NOTE — DISCHARGE INSTR - COC
Continuity of Care Form    Patient Name: Delores Parson   :  1970  MRN:  [id number]    Admit date:  2021  Discharge date:  2021    Code Status Order: Full Code   Advance Directives:     Admitting Physician:  Kraig Hanks MD  PCP: Kareem Apple DO    Discharging Nurse: [facility] Unit/Room#: 4095/9525-A  Discharging Unit Phone Number: [phone]    Emergency Contact:   Extended Emergency Contact Information  Primary Emergency Contact: Jyothi Ramirez  Address: [address]           [facility], [address]  [facility] Phone: [phone]  Relation: Spouse  Hearing or visual needs: None  Other needs: None  Preferred language: English   needed? No  Secondary Emergency Contact: JamesAngelita  Address: [address] [place] [address] Phone: [phone]  Relation: Parent  Preferred language: English   needed? No    Past Surgical History:  Past Surgical History:   Procedure Laterality Date    FEMORAL BYPASS Left 2019    FEMORAL POPLITEAL BYPASS WITH FEMORAL ENDARTERECTOMY LEFT LEG performed by Kraig Hanks MD at [facility] Right 2020    RIGHT FEMORAL ENDARTERECTOMY performed by Kraig Hanks MD at [address] Left 2021    ABOVE KNEE AMPUTATION-LEFT LEG performed by Kraig Hanks MD at [facility] HISTORY  10/04/2016    Dr Maria M Patterson - athrectomy/pci right fem/pop    OTHER SURGICAL HISTORY  2018    Dr Maria M Patterson - PCI w/ athrectomy RLE Common Illiac to Popliteal    VASCULAR SURGERY  2019    ANGIOGRAM    WISDOM TOOTH EXTRACTION         Immunization History: There is no immunization history on file for this patient.     Active Problems:  Patient Active Problem List   Diagnosis Code    Type 2 diabetes mellitus with diabetic peripheral angiopathy without gangrene, without long-term current use of insulin ([facility] E11.51    Tobacco abuse counseling Z71.6    PVD (peripheral vascular disease) with claudication ([facility]) I73.9    Pure hypercholesterolemia E78.00    Diabetic mononeuropathy associated with type 2 diabetes mellitus ([facility]) E11.41    Chronic obstructive pulmonary disease ([facility]) J44.9    S/P femoral-popliteal bypass surgery Z95.828    Swelling of joint of pelvic region or thigh, left M25.452    Atherosclerosis of native artery of left lower extremity with rest pain ([facility]) I70.222    PVD (peripheral vascular disease) ([facility]) I73.9    Atherosclerosis of native arteries of extremity with rest pain ([facility]) I70.229    Femoral-popliteal bypass graft occlusion, left, initial encounter [facility]) T82.898A    Arterial occlusion, lower extremity ([facility]) I70.209       Isolation/Infection:   Isolation          No Isolation        Patient Infection Status     Infection Onset Added Last Indicated Last Indicated By Review Planned Expiration Resolved Resolved By    COVID-19 Rule Out 07/30/21 07/30/21 07/30/21 COVID-19 (Ordered) 08/06/21 08/13/21      Resolved    COVID-19 Rule Out 07/16/21 07/16/21 07/16/21 COVID-19 Ambulatory (Ordered)   07/16/21 Rule-Out Test Resulted    COVID-19 Rule Out 03/03/21 03/03/21 03/03/21 COVID-19 Ambulatory (Ordered)   03/04/21 Rule-Out Test Resulted    COVID-19 Rule Out 06/11/20 06/11/20 06/11/20 COVID-19 Ambulatory (Ordered)   06/13/20 Rule-Out Test Resulted    Not detected 6/11/2020          Nurse Assessment:  Last Vital Signs: /65   Pulse 77   Temp 97.1 °F (36.2 °C) (Temporal)   Resp 16   Ht 5' 10\" (1.778 m)   Wt 171 lb 12.8 oz (77.9 kg)   SpO2 98%   BMI 24.65 kg/m²     Last documented pain score (0-10 scale): Pain Level: 5  Last Weight:   Wt Readings from Last 1 Encounters:   07/29/21 171 lb 12.8 oz (77.9 kg)     Mental Status:  oriented and alert    IV Access:  - None    Nursing Mobility/ADLs:  Walking   Assisted  Transfer  Assisted  Bathing  Independent  Dressing  Assisted  Toileting [address]  Independent  Med Delivery   whole    Wound Care Documentation and Therapy:        Elimination:  Continence:   · Bowel: Yes  · Bladder: Yes  Urinary Catheter: None   Colostomy/Ileostomy/Ileal Conduit: No       Date of Last BM: 07/29/2021    Intake/Output Summary (Last 24 hours) at 7/30/2021 1308  Last data filed at 7/30/2021 0736  Gross per 24 hour   Intake 720 ml   Output 1500 ml   Net -780 ml     I/O last 3 completed shifts: In: 12 [P.O.:960]  Out: 1600 [Urine:1600]    Safety Concerns: At Risk for Falls    Impairments/Disabilities:      None    Nutrition Therapy:  Current Nutrition Therapy:   - Oral Diet:  Carb Control 4 carbs/meal (1800kcals/day)    Routes of Feeding: Oral  Liquids: No Restrictions  Daily Fluid Restriction: no  Last Modified Barium Swallow with Video (Video Swallowing Test): not done    Treatments at the Time of Hospital Discharge:   Respiratory Treatments: ***  Oxygen Therapy:  is not on home oxygen therapy.   Ventilator:    - No ventilator support    Rehab Therapies: Physical Therapy, Occupational Therapy and Orthotics/Prosthetics  Weight Bearing Status/Restrictions: Non-weight bearing on left leg  Other Medical Equipment (for information only, NOT a DME order):  wheelchair, walker, bath bench, bedside commode and hospital bed  Other Treatments: ***    Patient's personal belongings (please select all that are sent with patient):  None    RN SIGNATURE:  Electronically signed by Thomas Howard on 7/30/21 at 1:14 PM EDT    CASE MANAGEMENT/SOCIAL WORK SECTION    Inpatient Status Date: ***    Readmission Risk Assessment Score:  Readmission Risk              Risk of Unplanned Readmission:  11           Discharging to Facility/ Agency   · Name:   · Address:  · Phone:  · Fax:    Dialysis Facility (if applicable)   · Name:  · Address:  · Dialysis Schedule:  · Phone:  · Fax:    / signature: {Esignature:[id number]}    PHYSICIAN SECTION    Prognosis: {Prognosis:3584426817}    Condition at Discharge: Nayely Heath Patient Condition:742775890}    Rehab Potential (if transferring to Rehab): {Prognosis:2340060682}    Recommended Labs or Other Treatments After Discharge: ***    Physician Certification: I certify the above information and transfer of Kelly Damon  is necessary for the continuing treatment of the diagnosis listed and that he requires {Admit to Appropriate Level of Care:68733} for {GREATER/LESS:604857925} 30 days.      Update Admission H&P: {CHP DME Changes in IJVYJ:948366805}    PHYSICIAN SIGNATURE:  {Esignature:869422940}

## 2021-07-31 LAB
ANION GAP SERPL CALCULATED.3IONS-SCNC: 2 MMOL/L (ref 7–16)
BASOPHILS ABSOLUTE: 0.05 E9/L (ref 0–0.2)
BASOPHILS RELATIVE PERCENT: 0.5 % (ref 0–2)
BUN BLDV-MCNC: 20 MG/DL (ref 6–20)
CALCIUM SERPL-MCNC: 9.4 MG/DL (ref 8.6–10.2)
CHLORIDE BLD-SCNC: 97 MMOL/L (ref 98–107)
CO2: 34 MMOL/L (ref 22–29)
CREAT SERPL-MCNC: 0.6 MG/DL (ref 0.7–1.2)
EOSINOPHILS ABSOLUTE: 0.31 E9/L (ref 0.05–0.5)
EOSINOPHILS RELATIVE PERCENT: 3.1 % (ref 0–6)
GFR AFRICAN AMERICAN: >60
GFR NON-AFRICAN AMERICAN: >60 ML/MIN/1.73
GLUCOSE BLD-MCNC: 156 MG/DL (ref 74–99)
HCT VFR BLD CALC: 26.4 % (ref 37–54)
HEMOGLOBIN: 8.7 G/DL (ref 12.5–16.5)
IMMATURE GRANULOCYTES #: 0.12 E9/L
IMMATURE GRANULOCYTES %: 1.2 % (ref 0–5)
LYMPHOCYTES ABSOLUTE: 2.45 E9/L (ref 1.5–4)
LYMPHOCYTES RELATIVE PERCENT: 24.7 % (ref 20–42)
MCH RBC QN AUTO: 29.8 PG (ref 26–35)
MCHC RBC AUTO-ENTMCNC: 33 % (ref 32–34.5)
MCV RBC AUTO: 90.4 FL (ref 80–99.9)
METER GLUCOSE: 170 MG/DL (ref 74–99)
METER GLUCOSE: 173 MG/DL (ref 74–99)
METER GLUCOSE: 178 MG/DL (ref 74–99)
METER GLUCOSE: 232 MG/DL (ref 74–99)
MONOCYTES ABSOLUTE: 0.82 E9/L (ref 0.1–0.95)
MONOCYTES RELATIVE PERCENT: 8.3 % (ref 2–12)
NEUTROPHILS ABSOLUTE: 6.15 E9/L (ref 1.8–7.3)
NEUTROPHILS RELATIVE PERCENT: 62.2 % (ref 43–80)
PDW BLD-RTO: 14.1 FL (ref 11.5–15)
PLATELET # BLD: 424 E9/L (ref 130–450)
PMV BLD AUTO: 9.1 FL (ref 7–12)
POTASSIUM REFLEX MAGNESIUM: 4.5 MMOL/L (ref 3.5–5)
POTASSIUM SERPL-SCNC: 4.5 MMOL/L (ref 3.5–5)
RBC # BLD: 2.92 E12/L (ref 3.8–5.8)
SODIUM BLD-SCNC: 133 MMOL/L (ref 132–146)
WBC # BLD: 9.9 E9/L (ref 4.5–11.5)

## 2021-07-31 PROCEDURE — 97535 SELF CARE MNGMENT TRAINING: CPT

## 2021-07-31 PROCEDURE — 82962 GLUCOSE BLOOD TEST: CPT

## 2021-07-31 PROCEDURE — 6370000000 HC RX 637 (ALT 250 FOR IP)

## 2021-07-31 PROCEDURE — 80048 BASIC METABOLIC PNL TOTAL CA: CPT

## 2021-07-31 PROCEDURE — 97162 PT EVAL MOD COMPLEX 30 MIN: CPT

## 2021-07-31 PROCEDURE — 97530 THERAPEUTIC ACTIVITIES: CPT

## 2021-07-31 PROCEDURE — 6360000002 HC RX W HCPCS: Performed by: SURGERY

## 2021-07-31 PROCEDURE — 1280000000 HC REHAB R&B

## 2021-07-31 PROCEDURE — 6370000000 HC RX 637 (ALT 250 FOR IP): Performed by: PHYSICAL MEDICINE & REHABILITATION

## 2021-07-31 PROCEDURE — 6370000000 HC RX 637 (ALT 250 FOR IP): Performed by: SURGERY

## 2021-07-31 PROCEDURE — 36415 COLL VENOUS BLD VENIPUNCTURE: CPT

## 2021-07-31 PROCEDURE — 6360000002 HC RX W HCPCS: Performed by: PHYSICAL MEDICINE & REHABILITATION

## 2021-07-31 PROCEDURE — 85025 COMPLETE CBC W/AUTO DIFF WBC: CPT

## 2021-07-31 PROCEDURE — 97166 OT EVAL MOD COMPLEX 45 MIN: CPT

## 2021-07-31 RX ORDER — MECOBALAMIN 5000 MCG
5 TABLET,DISINTEGRATING ORAL NIGHTLY
Status: DISCONTINUED | OUTPATIENT
Start: 2021-07-31 | End: 2021-08-04 | Stop reason: HOSPADM

## 2021-07-31 RX ORDER — DOCUSATE SODIUM 100 MG/1
100 CAPSULE, LIQUID FILLED ORAL 2 TIMES DAILY
Status: DISCONTINUED | OUTPATIENT
Start: 2021-07-31 | End: 2021-08-04 | Stop reason: HOSPADM

## 2021-07-31 RX ORDER — HEPARIN SODIUM 10000 [USP'U]/ML
5000 INJECTION, SOLUTION INTRAVENOUS; SUBCUTANEOUS EVERY 12 HOURS
Status: DISCONTINUED | OUTPATIENT
Start: 2021-07-31 | End: 2021-08-04 | Stop reason: HOSPADM

## 2021-07-31 RX ORDER — SENNA PLUS 8.6 MG/1
1 TABLET ORAL NIGHTLY
Status: DISCONTINUED | OUTPATIENT
Start: 2021-07-31 | End: 2021-08-04 | Stop reason: HOSPADM

## 2021-07-31 RX ORDER — MECOBALAMIN 5000 MCG
TABLET,DISINTEGRATING ORAL
Status: COMPLETED
Start: 2021-07-31 | End: 2021-07-31

## 2021-07-31 RX ADMIN — LISINOPRIL 40 MG: 20 TABLET ORAL at 10:20

## 2021-07-31 RX ADMIN — INSULIN GLARGINE 10 UNITS: 100 INJECTION, SOLUTION SUBCUTANEOUS at 20:38

## 2021-07-31 RX ADMIN — Medication: at 20:27

## 2021-07-31 RX ADMIN — ACETAMINOPHEN 650 MG: 325 TABLET ORAL at 00:01

## 2021-07-31 RX ADMIN — OXYCODONE HYDROCHLORIDE 10 MG: 10 TABLET ORAL at 10:19

## 2021-07-31 RX ADMIN — INSULIN LISPRO 3 UNITS: 100 INJECTION, SOLUTION INTRAVENOUS; SUBCUTANEOUS at 06:39

## 2021-07-31 RX ADMIN — HEPARIN SODIUM 5000 UNITS: 10000 INJECTION INTRAVENOUS; SUBCUTANEOUS at 06:20

## 2021-07-31 RX ADMIN — ACETAMINOPHEN 650 MG: 325 TABLET ORAL at 12:02

## 2021-07-31 RX ADMIN — SENNOSIDES 8.6 MG: 8.6 TABLET, FILM COATED ORAL at 20:18

## 2021-07-31 RX ADMIN — METFORMIN HYDROCHLORIDE 500 MG: 500 TABLET ORAL at 16:30

## 2021-07-31 RX ADMIN — INSULIN LISPRO 3 UNITS: 100 INJECTION, SOLUTION INTRAVENOUS; SUBCUTANEOUS at 12:02

## 2021-07-31 RX ADMIN — GABAPENTIN 200 MG: 100 CAPSULE ORAL at 14:22

## 2021-07-31 RX ADMIN — GLIPIZIDE 10 MG: 5 TABLET ORAL at 06:18

## 2021-07-31 RX ADMIN — ATORVASTATIN CALCIUM 40 MG: 40 TABLET, FILM COATED ORAL at 20:18

## 2021-07-31 RX ADMIN — METFORMIN HYDROCHLORIDE 500 MG: 500 TABLET ORAL at 10:20

## 2021-07-31 RX ADMIN — ACETAMINOPHEN 650 MG: 325 TABLET ORAL at 17:42

## 2021-07-31 RX ADMIN — DOCUSATE SODIUM 100 MG: 100 CAPSULE ORAL at 20:18

## 2021-07-31 RX ADMIN — GABAPENTIN 200 MG: 100 CAPSULE ORAL at 20:18

## 2021-07-31 RX ADMIN — DOCUSATE SODIUM 100 MG: 100 CAPSULE ORAL at 12:02

## 2021-07-31 RX ADMIN — HEPARIN SODIUM 5000 UNITS: 10000 INJECTION INTRAVENOUS; SUBCUTANEOUS at 20:38

## 2021-07-31 RX ADMIN — CITALOPRAM 40 MG: 20 TABLET, FILM COATED ORAL at 10:20

## 2021-07-31 RX ADMIN — INSULIN LISPRO 3 UNITS: 100 INJECTION, SOLUTION INTRAVENOUS; SUBCUTANEOUS at 20:18

## 2021-07-31 RX ADMIN — OXYCODONE HYDROCHLORIDE 10 MG: 10 TABLET ORAL at 20:22

## 2021-07-31 RX ADMIN — GABAPENTIN 200 MG: 100 CAPSULE ORAL at 10:20

## 2021-07-31 RX ADMIN — ACETAMINOPHEN 650 MG: 325 TABLET ORAL at 06:18

## 2021-07-31 RX ADMIN — OXYCODONE 5 MG: 5 TABLET ORAL at 14:24

## 2021-07-31 RX ADMIN — PANTOPRAZOLE SODIUM 40 MG: 40 TABLET, DELAYED RELEASE ORAL at 06:18

## 2021-07-31 RX ADMIN — INSULIN LISPRO 3 UNITS: 100 INJECTION, SOLUTION INTRAVENOUS; SUBCUTANEOUS at 16:30

## 2021-07-31 ASSESSMENT — PAIN DESCRIPTION - LOCATION
LOCATION: LEG

## 2021-07-31 ASSESSMENT — PAIN SCALES - WONG BAKER
WONGBAKER_NUMERICALRESPONSE: 2
WONGBAKER_NUMERICALRESPONSE: 4
WONGBAKER_NUMERICALRESPONSE: 0
WONGBAKER_NUMERICALRESPONSE: 0
WONGBAKER_NUMERICALRESPONSE: 2

## 2021-07-31 ASSESSMENT — PAIN SCALES - GENERAL
PAINLEVEL_OUTOF10: 3
PAINLEVEL_OUTOF10: 5
PAINLEVEL_OUTOF10: 3
PAINLEVEL_OUTOF10: 5
PAINLEVEL_OUTOF10: 5
PAINLEVEL_OUTOF10: 4
PAINLEVEL_OUTOF10: 0
PAINLEVEL_OUTOF10: 7
PAINLEVEL_OUTOF10: 5
PAINLEVEL_OUTOF10: 0

## 2021-07-31 ASSESSMENT — PAIN DESCRIPTION - PAIN TYPE
TYPE: ACUTE PAIN
TYPE: SURGICAL PAIN;PHANTOM PAIN
TYPE: ACUTE PAIN

## 2021-07-31 ASSESSMENT — PAIN DESCRIPTION - ORIENTATION
ORIENTATION: LEFT

## 2021-07-31 ASSESSMENT — PAIN DESCRIPTION - ONSET
ONSET: ON-GOING
ONSET: ON-GOING

## 2021-07-31 ASSESSMENT — PAIN DESCRIPTION - PROGRESSION
CLINICAL_PROGRESSION: NOT CHANGED
CLINICAL_PROGRESSION: NOT CHANGED

## 2021-07-31 ASSESSMENT — PAIN DESCRIPTION - DESCRIPTORS
DESCRIPTORS: ACHING;DISCOMFORT

## 2021-07-31 ASSESSMENT — PAIN DESCRIPTION - FREQUENCY
FREQUENCY: CONTINUOUS
FREQUENCY: CONTINUOUS

## 2021-07-31 NOTE — PROGRESS NOTES
Occupational Therapy  OCCUPATIONAL THERAPY INITIAL EVALUATION      Date:2021  Patient Name: Deshawn Frias  MRN: [id number]  : 1970  [facility]    Discharge Recommendations: Home with assist as needed   Frequency / Duration: BID 5-7 tx/wk;1-2 weeks   Recommended Adaptive Equipment: TBA     Referring Physician:    Specific OT orders: OT eval and treat     Diagnosis: Left Above Knee Amputation                      Avulsion Fracture of Navicular of Right Foot       Surgery/Procedure: L AKA 21      Past Medical History: DM,COPD,PVD    Precautions: falls, WBAT RLE with walking boot     Pain Level:   Reports 5/10 pain phantom limb pain     Home Living: Pt lives with wife and daughter  in a 1 story home  with 1 steps to enter and no hand  rail(s)  Bathroom setup: walk in shower   Equipment owned: ww, shower chair     Prior Level of Function: pt was independent with ADLs; Independent  with IADLs. Pt was not using a device  for ambulation.   Driving: pt does drive   Occupation: pt is not currently working       Cognition: A&O: 4/4    Follows multi  step commands    Memory: good    Comprehension good    Problem solving: good    Judgement/safety: good                Communication skills: WFL            Vision: WFL                Glasses:for reading                                                    Hearing: WFL     Perception: WFL     UE Assessment:  Hand Dominance: right    Rt UE ROM: WFL  Rt UE Strength: 4/5      Lt UE ROM: WFL   Lt UE Strength: 4/5       Sensation:WFL   Tone: WFL   Edema: none in BUE's      Functional Mobility Assessment:  Supine to sit SBA   Sit to supine: SBA     Sit to stand: Min A   Stand to sit: Min A     Commode Transfer: Min A   Tub / Shower Transfer: TBA      Functional  Mobility: Min A short distance in pt's room with ww     Balance:  Static sitting: SBA  Dynamic sitting: SBA     Static standing: Min A  Dynamic standing: Min/Mod A with ww     Activities of Daily Living:  Feeding: independent     Grooming: set up     oral care: set up     Bathing: UB- set up                  LB - Mod A     Toileting: Mod A     UB Dressing: set up     LB Dressing: Min A - assist to stand and pull up pants     *footwear: Mod A - pt donned right slipper sock crossing leg over . Assist to don walking Cam boot to RLE     Homemaking: TBA     Endurance: good with min  activity    Assessment of current deficits   [x]Functional mobility   [x]ADLs [x]Strength  []Cognition  [x]Functional transfers  [x]IADLs [x]Safety Awareness  [x]Endurance  []Fine Motor Coordination  [x]Balance      []Vision/perception  []Sensation    []Gross Motor Coordination  []ROM  []Delirium                  []Communication     Plan of Care: 5-7 x wk for 1-2 weeks   [x]ADL retraining: adapted techniques and AE recommendations to increase independence within precautions    [x]Energy conservation techniques to improve tolerance for self care routine   [x]Functional transfer/mobility training for fall prevention & DME recommendations         [x]Patient/family education to increase safety and functional independence   [x]Environmental modifications for safe mobility and completion of ADLs                   [x]Therapeutic activity to improve functional skills                                   [x]Therapeutic exercise to improve BUE strength    [x]Balance retraining ex's for postural control with dynamic standing  challenges        Patient / Family Goal:  To be able to get around on my own    Long Term Goals: 1-2 weeks  1: Improve grooming including oral care to Mod I standing at sink   2. Improve UB and LB bathing to Mod I   3. Improve LB dressing including footwear to Mod I   4. Improve toilet transfers to Mod I   5. Improve toileting to Mod I   6. Pt to complete tub/shower transfer at [facility] with appropriate DME  7. Pt to complete light homemaking at [facility] seated and standing with ww   8. Improve BUE muscle strength to 4+/5  9.  Pt to

## 2021-07-31 NOTE — PROGRESS NOTES
Physical Therapy    Facility/Department: [facility] 5SE REHAB  Initial Assessment    NAME: Ankush Souza  : 1970  MRN: [id number]    Date of Service: 2021    Evaluating Therapist: Chen Sandoval, PT, DPT, NCS, [id number]      ROOM: [address] [place] [address]  DIAGNOSIS: Left Above Knee Amputation  PRECAUTIONS: falls, WBAT with walking boot RLE  HPI: Pt is a 48 y.o. male with past medical history significant for diabetes, COPD, hypertension, and peripheral vascular disease who presented to [facility] on 2021 with an occluded left femoral bypass. The limb was unable to be salvaged. He underwent a left above-knee amputation on 21. Post operative course was complicated by R ankle pain, pt was evaluated by orthopedic services, avulsion fracture of navicular noted on imaging and walking boot ordered. Social:  Pt lives with wife and adult daughter in a 1 floor plan 1 step without rails to enter. Daughter works part time and wife works from home. Prior to admission: Pt ambulated without AD, was functionally independent. Initial Evaluation  Date: 21 AM     PM    Short Term Goals Long Term Goals    Was pt agreeable to Eval/treatment? Yes       Does pt have pain?  No c/o pain       Bed Mobility  Rolling: SBA  Supine to sit: SBA  Sit to supine: SBA  Scooting: SBA   Supervision Mod I   Transfers Sit to stand: Codey  Stand to sit: Codey  Stand pivot: Codey with [employer]       SBA   Mod I     Ambulation    50 feet with [employer] with Codey (WC follow)   100 feet with [employer] with SBA     150 feet with [employer] with Mod I       Walking 10 feet on uneven surface NT   10 feet with [employer] with SBA 10 feet with [employer] with Mod I   Wheel Chair Mobility 150 feet with BUE with SBA    400 feet with BUE/RLE with Mod I   Car Transfers Codey with [employer]   SBA Mod I   Stair negotiation: ascended and descended  4 steps with 2 rail with [employer] (ascend backwards)   8 steps with 2 rail with SBA 12 steps with 2 rail with SBA   Curb Step:   ascended and descended NT 2 inch step with [employer] and SBA 4 inch step with [employer] and SBA   Picking up object off the floor NT   NA NA   BLE ROM WNL       BLE Strength RLE grossly 4/5  L hip strength at least 3/5       Balance  Static and dynamic standing balance Codey with [employer]           Date Family Teach Completed        Is additional Family Teaching Needed? Y or N Y       Hindering Progress Walking boot, mild debility       PT recommended ELOS 5-6 days       Team's Discharge Plan        Therapist at Team Meeting          Pt is alert and Oriented x 3  Sensation: Pt reports neuropathy to RLE  Edema: mild L residual limb edema noted  Endurance: fair     ASSESSMENT  Pt displays functional ability as noted in the objective portion of this evaluation. Comments:  Pt seated EOB upon room entry, pleasant and agreeable to activity. Pt complaining of how cumbersome his current walking boot is and how he would prefer to use boot from previous injury which a family member had brought from home. Ortho services notified of pt's request via perfect serve, will await answer. Pt agreeable to activity with current boot, exhibits good safety and postural control with all standing tasks. Occasional steadying assistance required which backing up to chair. Pt educated on hip flexion contracture prevention via sidelying hip extension stretch, instructed to perform TID. Pt returned to room with all needs met following session. Pt exhibits good potential for goal attainment and return home with family support. Patient education  Pt educated on safe hand placement with transfers    Patient response to education:   Pt verbalized understanding Pt demonstrated skill Pt requires further education in this area   yes yes no     Rehab potential is Good for reaching above PT goals. Pts/ family goals   1. Return home    Patient and or family understand(s) diagnosis, prognosis, and plan of care.   yes    PLAN  PT care will be provided in accordance with the objectives noted above. Whenever appropriate, clear delegation orders will be provided for nursing staff. Exercises and functional mobility practice will be used as well as appropriate assistive devices or modalities to obtain goals. Patient and family education will also be administered as needed. Frequency of treatments will be 2x/day M-F and 1x/day Sat or Sun x 5-6 days.     Time in: 0815  Time out: 0900    Jackie Chus, PT, DPT, NCS  [id number]

## 2021-08-01 LAB
METER GLUCOSE: 136 MG/DL (ref 74–99)
METER GLUCOSE: 148 MG/DL (ref 74–99)
METER GLUCOSE: 215 MG/DL (ref 74–99)
METER GLUCOSE: 295 MG/DL (ref 74–99)

## 2021-08-01 PROCEDURE — 1280000000 HC REHAB R&B

## 2021-08-01 PROCEDURE — 97110 THERAPEUTIC EXERCISES: CPT

## 2021-08-01 PROCEDURE — 97530 THERAPEUTIC ACTIVITIES: CPT

## 2021-08-01 PROCEDURE — 6360000002 HC RX W HCPCS: Performed by: PHYSICAL MEDICINE & REHABILITATION

## 2021-08-01 PROCEDURE — 6370000000 HC RX 637 (ALT 250 FOR IP): Performed by: SURGERY

## 2021-08-01 PROCEDURE — 82962 GLUCOSE BLOOD TEST: CPT

## 2021-08-01 PROCEDURE — 6370000000 HC RX 637 (ALT 250 FOR IP): Performed by: PHYSICAL MEDICINE & REHABILITATION

## 2021-08-01 PROCEDURE — 97535 SELF CARE MNGMENT TRAINING: CPT

## 2021-08-01 RX ADMIN — ACETAMINOPHEN 650 MG: 325 TABLET ORAL at 23:57

## 2021-08-01 RX ADMIN — DOCUSATE SODIUM 100 MG: 100 CAPSULE ORAL at 08:54

## 2021-08-01 RX ADMIN — ACETAMINOPHEN 650 MG: 325 TABLET ORAL at 06:14

## 2021-08-01 RX ADMIN — GABAPENTIN 200 MG: 100 CAPSULE ORAL at 21:04

## 2021-08-01 RX ADMIN — OXYCODONE HYDROCHLORIDE 10 MG: 10 TABLET ORAL at 08:53

## 2021-08-01 RX ADMIN — DOCUSATE SODIUM 100 MG: 100 CAPSULE ORAL at 21:05

## 2021-08-01 RX ADMIN — INSULIN GLARGINE 10 UNITS: 100 INJECTION, SOLUTION SUBCUTANEOUS at 21:14

## 2021-08-01 RX ADMIN — PANTOPRAZOLE SODIUM 40 MG: 40 TABLET, DELAYED RELEASE ORAL at 06:14

## 2021-08-01 RX ADMIN — ATORVASTATIN CALCIUM 40 MG: 40 TABLET, FILM COATED ORAL at 21:04

## 2021-08-01 RX ADMIN — CITALOPRAM 40 MG: 20 TABLET, FILM COATED ORAL at 08:54

## 2021-08-01 RX ADMIN — HEPARIN SODIUM 5000 UNITS: 10000 INJECTION INTRAVENOUS; SUBCUTANEOUS at 08:54

## 2021-08-01 RX ADMIN — OXYCODONE 5 MG: 5 TABLET ORAL at 14:05

## 2021-08-01 RX ADMIN — LISINOPRIL 40 MG: 20 TABLET ORAL at 08:54

## 2021-08-01 RX ADMIN — ACETAMINOPHEN 650 MG: 325 TABLET ORAL at 12:30

## 2021-08-01 RX ADMIN — GLIPIZIDE 10 MG: 5 TABLET ORAL at 07:13

## 2021-08-01 RX ADMIN — Medication 5 MG: at 21:06

## 2021-08-01 RX ADMIN — ACETAMINOPHEN 650 MG: 325 TABLET ORAL at 17:06

## 2021-08-01 RX ADMIN — INSULIN LISPRO 5 UNITS: 100 INJECTION, SOLUTION INTRAVENOUS; SUBCUTANEOUS at 21:13

## 2021-08-01 RX ADMIN — INSULIN LISPRO 6 UNITS: 100 INJECTION, SOLUTION INTRAVENOUS; SUBCUTANEOUS at 07:13

## 2021-08-01 RX ADMIN — GABAPENTIN 200 MG: 100 CAPSULE ORAL at 14:04

## 2021-08-01 RX ADMIN — METFORMIN HYDROCHLORIDE 500 MG: 500 TABLET ORAL at 17:05

## 2021-08-01 RX ADMIN — METFORMIN HYDROCHLORIDE 500 MG: 500 TABLET ORAL at 08:54

## 2021-08-01 RX ADMIN — HEPARIN SODIUM 5000 UNITS: 10000 INJECTION INTRAVENOUS; SUBCUTANEOUS at 21:14

## 2021-08-01 RX ADMIN — OXYCODONE HYDROCHLORIDE 10 MG: 10 TABLET ORAL at 19:04

## 2021-08-01 RX ADMIN — INSULIN LISPRO 3 UNITS: 100 INJECTION, SOLUTION INTRAVENOUS; SUBCUTANEOUS at 12:34

## 2021-08-01 RX ADMIN — GABAPENTIN 200 MG: 100 CAPSULE ORAL at 08:54

## 2021-08-01 RX ADMIN — SENNOSIDES 8.6 MG: 8.6 TABLET, FILM COATED ORAL at 21:05

## 2021-08-01 ASSESSMENT — PAIN SCALES - GENERAL
PAINLEVEL_OUTOF10: 3
PAINLEVEL_OUTOF10: 6
PAINLEVEL_OUTOF10: 5
PAINLEVEL_OUTOF10: 6
PAINLEVEL_OUTOF10: 6
PAINLEVEL_OUTOF10: 0
PAINLEVEL_OUTOF10: 5
PAINLEVEL_OUTOF10: 6
PAINLEVEL_OUTOF10: 5

## 2021-08-01 ASSESSMENT — PAIN DESCRIPTION - ORIENTATION
ORIENTATION: LEFT

## 2021-08-01 ASSESSMENT — PAIN DESCRIPTION - PAIN TYPE
TYPE: ACUTE PAIN
TYPE: ACUTE PAIN

## 2021-08-01 ASSESSMENT — PAIN DESCRIPTION - LOCATION
LOCATION: LEG
LOCATION: LEG

## 2021-08-01 ASSESSMENT — PAIN DESCRIPTION - ONSET
ONSET: ON-GOING
ONSET: ON-GOING

## 2021-08-01 ASSESSMENT — PAIN DESCRIPTION - PROGRESSION
CLINICAL_PROGRESSION: NOT CHANGED
CLINICAL_PROGRESSION: NOT CHANGED

## 2021-08-01 ASSESSMENT — PAIN DESCRIPTION - FREQUENCY
FREQUENCY: CONTINUOUS
FREQUENCY: CONTINUOUS

## 2021-08-01 ASSESSMENT — PAIN DESCRIPTION - DESCRIPTORS
DESCRIPTORS: ACHING;DISCOMFORT;DULL
DESCRIPTORS: ACHING;DISCOMFORT

## 2021-08-01 ASSESSMENT — PAIN SCALES - WONG BAKER
WONGBAKER_NUMERICALRESPONSE: 0
WONGBAKER_NUMERICALRESPONSE: 6

## 2021-08-01 NOTE — PROGRESS NOTES
Progress Note  Date:2021       RVVF:[id number]  Patient Marissa Gale     YOB: 1970     Age:50 y.o. Subjective    Subjective:  Symptoms:  Stable. He reports weakness. Diet:  Adequate intake. Activity level: Impaired due to weakness. Pain:  He complains of pain that is mild. Review of Systems   Neurological: Positive for weakness. Objective         Vitals Last 24 Hours:  TEMPERATURE:  Temp  Av.1 °F (36.7 °C)  Min: 98.1 °F (36.7 °C)  Max: 98.1 °F (36.7 °C)  RESPIRATIONS RANGE: Resp  Av  Min: 16  Max: 20  PULSE OXIMETRY RANGE: SpO2  Av %  Min: 97 %  Max: 97 %  PULSE RANGE: Pulse  Av.5  Min: 86  Max: 101  BLOOD PRESSURE RANGE: Systolic (23LEK), UMS:892 , Min:105 , RUW:184   ; Diastolic (52NLI), QYE:47, Min:76, Max:80    I/O (24Hr): Intake/Output Summary (Last 24 hours) at 2021 0956  Last data filed at 2021 1836  Gross per 24 hour   Intake 840 ml   Output 600 ml   Net 240 ml     Objective:  General Appearance: In no acute distress. Vital signs: (most recent): Blood pressure 114/76, pulse 101, temperature 98.1 °F (36.7 °C), temperature source Oral, resp. rate 16, height 5' 10\" (1.778 m), weight 167 lb 3.2 oz (75.8 kg), SpO2 97 %. Vital signs are normal.    Output: Producing urine and producing stool. Lungs:  Normal effort and normal respiratory rate. Breath sounds clear to auscultation. Heart: Normal rate. Regular rhythm. S1 normal and S2 normal.    Abdomen: Abdomen is soft. Bowel sounds are normal.   There is no abdominal tenderness.      Extremities: (Left above-knee amputation)    Labs/Imaging/Diagnostics    Labs:  CBC:  Recent Labs     21  0520 21  0533   WBC 9.1 9.9   RBC 2.77* 2.92*   HGB 8.1* 8.7*   HCT 25.5* 26.4*   MCV 92.1 90.4   RDW 13.8 14.1    424     CHEMISTRIES:  Recent Labs     21  0520 21  0533    133   K 4.4 4.5  4.5   CL 96* 97*   CO2 25 34*   BUN 16 20   CREATININE 0.6* 0.6*   GLUCOSE 156* 156*     PT/INR:No results for input(s): PROTIME, INR in the last 72 hours. APTT:No results for input(s): APTT in the last 72 hours. LIVER PROFILE:No results for input(s): AST, ALT, BILIDIR, BILITOT, ALKPHOS in the last 72 hours. Imaging Last 24 Hours:  No results found. Assessment//Plan           Hospital Problems         Last Modified POA    Above knee am[facility] left lower [facility] ([facility] 7/30/2021 Yes        Assessment:    Condition: In stable condition. Improving. (Left above-knee amputation). Plan:   (Doing well so far  Residual limb is healing  The right ankle pain has improved).        Electronically signed by Gerardo Alfredo MD on 8/1/21 at 9:56 AM EDT

## 2021-08-01 NOTE — PROGRESS NOTES
Physical Therapy    Facility/Department: [address] REHAB  Daily Treatment Note    NAME: Evelyn Weber  : 1970  MRN: [id number]    Date of Service: 2021    Evaluating Therapist: Gael Walter, PT, DPT, NCS, [id number]      ROOM: [address] [place] [address]  DIAGNOSIS: Left Above Knee Amputation  PRECAUTIONS: falls, WBAT with walking boot RLE (either boots in pt's room OK via ortho perfect serve )  HPI: Pt is a 48 y.o. male with past medical history significant for diabetes, COPD, hypertension, and peripheral vascular disease who presented to [facility] on 2021 with an occluded left femoral bypass. The limb was unable to be salvaged. He underwent a left above-knee amputation on 21. Post operative course was complicated by R ankle pain, pt was evaluated by orthopedic services, avulsion fracture of navicular noted on imaging and walking boot ordered. Social:  Pt lives with wife and adult daughter in a 1 floor plan 1 step without rails to enter. Daughter works part time and wife works from home. Prior to admission: Pt ambulated without AD, was functionally independent. Initial Evaluation  Date: 21 AM     PM    Short Term Goals Long Term Goals    Was pt agreeable to Eval/treatment? Yes Yes      Does pt have pain?  No c/o pain Moderate c/o R ankle pain      Bed Mobility  Rolling: SBA  Supine to sit: SBA  Sit to supine: SBA  Scooting: SBA Supine<>Sit Supervision  Scooting/Rolling Supervision  Supervision Mod I   Transfers Sit to stand: Codey  Stand to sit: Codey  Stand pivot: Codey with [employer]     Sit<>Stand CGA  Stand-pivot CGA with [employer] (walking boot RLE)  Low Pivot transfer CGA/Codey from WC<>mat table  SBA   Mod I     Ambulation    50 feet with [employer] with Codey (WC follow) 50 feet x 2 reps with [employer] with SBA/CGA (walking boot RLE  100 feet with WW with SBA     150 feet with WW with Mod I       Walking 10 feet on uneven surface NT NT  10 feet with [employer] with SBA 10 feet with [employer] with Mod I   Wheel Chair Mobility 150 feet with BUE with  feet x 2 reps with BUE with supervision   400 feet with BUE/RLE with Mod I   Car Transfers Codey with [employer] CGA with [employer]  SBA Mod I   Stair negotiation: ascended and descended  4 steps with 2 rail with Codey (ascend backwards)  4 steps with 2 rail with Codey (ascend backwards)  8 steps with 2 rail with SBA 12 steps with 2 rail with SBA   Curb Step:   ascended and descended NT NT  2 inch step with [employer] and SBA 4 inch step with [employer] and SBA   Picking up object off the floor NT NT  NA NA   BLE ROM WNL WNL      BLE Strength RLE grossly 4/5  L hip strength at least 3/5 RLE grossly 4/5  L hip strength at least 3/5      Balance  Static and dynamic standing balance Codey with [employer]   Static and dynamic standing balance CGA with [employer]        Date Family Teach Completed        Is additional Family Teaching Needed? Y or N Y Y      Hindering Progress Walking boot, mild debility R ankle pain, mild debility      PT recommended ELOS 5-6 days       Team's Discharge Plan        Therapist at Team Meeting          Therapeutic Exercise:   AM:   Sidelying hip extension stretch 30\" x 5 reps RLE     Patient education  Pt educated on safe hand placement with all transfers    Patient response to education:   Pt verbalized understanding Pt demonstrated skill Pt requires further education in this area   yes partial yes     Additional Comments: Pt remains pleasant and motivated to progress activity. Alternative walking boot utilized today (cleared by ortho resident via perfect serve 7/31) however pt reports moderately increased ankle pain with use of this boot. Pt reports he plans on using original boot due to pain increase. Pt continues to exhibit good safety awareness and appropriate level of caution with all standing tasks. Reviewed low pivot transfer as alternative transfer method. Pt returned to room with all needs met following session. Pt is making good progress,  Anticipate goal attainment relatively quickly. Skin was inspected: grossly intact    AM  Time in: 1140  Time out: 1210    Pt is making good progress toward established Physical Therapy goals. Continue with physical therapy current plan of care.     Tammy Baptiste, PT, DPT, NCS  [id number]

## 2021-08-02 LAB
HCT VFR BLD CALC: 29.3 % (ref 37–54)
HEMOGLOBIN: 9.5 G/DL (ref 12.5–16.5)
MCH RBC QN AUTO: 29.9 PG (ref 26–35)
MCHC RBC AUTO-ENTMCNC: 32.4 % (ref 32–34.5)
MCV RBC AUTO: 92.1 FL (ref 80–99.9)
METER GLUCOSE: 112 MG/DL (ref 74–99)
METER GLUCOSE: 125 MG/DL (ref 74–99)
METER GLUCOSE: 150 MG/DL (ref 74–99)
METER GLUCOSE: 171 MG/DL (ref 74–99)
PDW BLD-RTO: 15.4 FL (ref 11.5–15)
PLATELET # BLD: 522 E9/L (ref 130–450)
PMV BLD AUTO: 8.3 FL (ref 7–12)
RBC # BLD: 3.18 E12/L (ref 3.8–5.8)
WBC # BLD: 11.6 E9/L (ref 4.5–11.5)

## 2021-08-02 PROCEDURE — 6360000002 HC RX W HCPCS: Performed by: PHYSICAL MEDICINE & REHABILITATION

## 2021-08-02 PROCEDURE — 6370000000 HC RX 637 (ALT 250 FOR IP): Performed by: SURGERY

## 2021-08-02 PROCEDURE — 85027 COMPLETE CBC AUTOMATED: CPT

## 2021-08-02 PROCEDURE — 6370000000 HC RX 637 (ALT 250 FOR IP): Performed by: PHYSICAL MEDICINE & REHABILITATION

## 2021-08-02 PROCEDURE — 97535 SELF CARE MNGMENT TRAINING: CPT

## 2021-08-02 PROCEDURE — 36415 COLL VENOUS BLD VENIPUNCTURE: CPT

## 2021-08-02 PROCEDURE — 97110 THERAPEUTIC EXERCISES: CPT

## 2021-08-02 PROCEDURE — 97530 THERAPEUTIC ACTIVITIES: CPT

## 2021-08-02 PROCEDURE — 82962 GLUCOSE BLOOD TEST: CPT

## 2021-08-02 PROCEDURE — 1280000000 HC REHAB R&B

## 2021-08-02 RX ADMIN — GABAPENTIN 200 MG: 100 CAPSULE ORAL at 14:34

## 2021-08-02 RX ADMIN — ACETAMINOPHEN 650 MG: 325 TABLET ORAL at 11:19

## 2021-08-02 RX ADMIN — ACETAMINOPHEN 650 MG: 325 TABLET ORAL at 17:51

## 2021-08-02 RX ADMIN — GLIPIZIDE 10 MG: 5 TABLET ORAL at 06:12

## 2021-08-02 RX ADMIN — INSULIN LISPRO 2 UNITS: 100 INJECTION, SOLUTION INTRAVENOUS; SUBCUTANEOUS at 21:18

## 2021-08-02 RX ADMIN — INSULIN LISPRO 3 UNITS: 100 INJECTION, SOLUTION INTRAVENOUS; SUBCUTANEOUS at 06:53

## 2021-08-02 RX ADMIN — CITALOPRAM 40 MG: 20 TABLET, FILM COATED ORAL at 07:45

## 2021-08-02 RX ADMIN — HEPARIN SODIUM 5000 UNITS: 10000 INJECTION INTRAVENOUS; SUBCUTANEOUS at 21:13

## 2021-08-02 RX ADMIN — OXYCODONE HYDROCHLORIDE 10 MG: 10 TABLET ORAL at 21:07

## 2021-08-02 RX ADMIN — GABAPENTIN 200 MG: 100 CAPSULE ORAL at 21:03

## 2021-08-02 RX ADMIN — METFORMIN HYDROCHLORIDE 500 MG: 500 TABLET ORAL at 07:45

## 2021-08-02 RX ADMIN — ATORVASTATIN CALCIUM 40 MG: 40 TABLET, FILM COATED ORAL at 21:03

## 2021-08-02 RX ADMIN — OXYCODONE HYDROCHLORIDE 10 MG: 10 TABLET ORAL at 07:47

## 2021-08-02 RX ADMIN — Medication 5 MG: at 21:03

## 2021-08-02 RX ADMIN — HEPARIN SODIUM 5000 UNITS: 10000 INJECTION INTRAVENOUS; SUBCUTANEOUS at 07:48

## 2021-08-02 RX ADMIN — METFORMIN HYDROCHLORIDE 500 MG: 500 TABLET ORAL at 16:59

## 2021-08-02 RX ADMIN — LISINOPRIL 40 MG: 20 TABLET ORAL at 07:45

## 2021-08-02 RX ADMIN — OXYCODONE HYDROCHLORIDE 10 MG: 10 TABLET ORAL at 16:59

## 2021-08-02 RX ADMIN — GABAPENTIN 200 MG: 100 CAPSULE ORAL at 07:45

## 2021-08-02 RX ADMIN — DOCUSATE SODIUM 100 MG: 100 CAPSULE ORAL at 07:45

## 2021-08-02 RX ADMIN — SENNOSIDES 8.6 MG: 8.6 TABLET, FILM COATED ORAL at 21:03

## 2021-08-02 RX ADMIN — INSULIN GLARGINE 10 UNITS: 100 INJECTION, SOLUTION SUBCUTANEOUS at 21:17

## 2021-08-02 RX ADMIN — DOCUSATE SODIUM 100 MG: 100 CAPSULE ORAL at 21:03

## 2021-08-02 RX ADMIN — PANTOPRAZOLE SODIUM 40 MG: 40 TABLET, DELAYED RELEASE ORAL at 06:12

## 2021-08-02 RX ADMIN — ACETAMINOPHEN 650 MG: 325 TABLET ORAL at 06:12

## 2021-08-02 ASSESSMENT — PAIN DESCRIPTION - ORIENTATION
ORIENTATION: LEFT
ORIENTATION: LEFT

## 2021-08-02 ASSESSMENT — PAIN DESCRIPTION - LOCATION
LOCATION: LEG
LOCATION: LEG

## 2021-08-02 ASSESSMENT — PAIN SCALES - GENERAL
PAINLEVEL_OUTOF10: 2
PAINLEVEL_OUTOF10: 4
PAINLEVEL_OUTOF10: 6
PAINLEVEL_OUTOF10: 4
PAINLEVEL_OUTOF10: 5

## 2021-08-02 ASSESSMENT — PAIN DESCRIPTION - ONSET
ONSET: ON-GOING
ONSET: ON-GOING

## 2021-08-02 ASSESSMENT — PAIN DESCRIPTION - DESCRIPTORS
DESCRIPTORS: DISCOMFORT
DESCRIPTORS: DISCOMFORT

## 2021-08-02 ASSESSMENT — PAIN DESCRIPTION - FREQUENCY
FREQUENCY: INTERMITTENT
FREQUENCY: INTERMITTENT

## 2021-08-02 ASSESSMENT — PAIN DESCRIPTION - PROGRESSION
CLINICAL_PROGRESSION: NOT CHANGED
CLINICAL_PROGRESSION: NOT CHANGED

## 2021-08-02 NOTE — PROGRESS NOTES
Physical Therapy    Facility/Department: [facility] RE[facility]  Weekly Team Note    NAME: Jennifer Benito  : 1970  MRN: [id number]    Date of Service: 2021    Supervising PT:  Brenda Felipe PT, DPT [id number]    ROOM: [address] [place] [address]  DIAGNOSIS: Left Above Knee Amputation  PRECAUTIONS: falls, WBAT with walking boot RLE (either boots in pt's room OK via ortho perfect serve )  HPI: Pt is a 48 y.o. male with past medical history significant for diabetes, COPD, hypertension, and peripheral vascular disease who presented to [facility] on 2021 with an occluded left femoral bypass. The limb was unable to be salvaged. He underwent a left above-knee amputation on 21. Post operative course was complicated by R ankle pain, pt was evaluated by orthopedic services, avulsion fracture of navicular noted on imaging and walking boot ordered. Social:  Pt lives with wife and adult daughter in a 1 floor plan 1 step (reports < 4\") without rails to enter. Daughter works part time and wife works from home. Prior to admission: Pt ambulated without AD, was functionally independent.      Initial Evaluation  Date: 21  Comments:    Short Term Goals Long Term Goals    Bed Mobility  Rolling: SBA  Supine to sit: SBA  Sit to supine: SBA  Scooting: SBA Supine<>Sit Supervision  Scooting/Rolling Supervision  Supervision Mod I   Transfers Sit to stand: Codey  Stand to sit: Codey  Stand pivot: Codey with [employer]     Sit<>Stand SBA  Stand-pivot SBA with [employer] (walking boot RLE)   Cues for pacing SBA   Mod I     Ambulation    50 feet with [employer] with Codey (WC follow) 40' x 5 reps with [employer] with SBA (walking boot RLE)  Cues for pacing    Strength/endurance deficits  Limited by RLE pain 100 feet with WW with SBA     150 feet with WW with Mod I       Wheel Chair Mobility 150 feet with BUE with ' Modified Independent     400 feet with BUE/RLE with Mod I   Car Transfers Codey with [employer] SBA  SBA Mod I   Stair negotiation: ascended and descended  4 steps with 2 rail with Codey (ascend backwards) Platform step 4\" x 4 reps with [employer] (posterior approach) Cues for pacing 8 steps with 2 rail with SBA 12 steps with 2 rail with SBA   BLE ROM WNL WNL      BLE Strength RLE grossly 4/5  L hip strength at least 3/5 RLE grossly 4/5  L hip strength at least 3/5      Balance  Static and dynamic standing balance Codey with [employer]   Static and dynamic standing balance CGA with [employer] Static and dynamic standing balance SBA with [employer]       Date Family Teach Completed        Is additional Family Teaching Needed? Y or N Y Y      Hindering Progress Walking boot, mild debility R ankle pain, mild debility      PT recommended ELOS 5-6 days 1 day      Team's Discharge Plan  Discharge Friday 8/6/21      Therapist at [facility], Christopher Asif, PT, DPT [id number]            Date:  8/2/21  Supporting factors:  Cognitively intact, Prior level of function  Barriers to discharge:  Weakness, balance, and endurance deficits  Additional comments:  Pt performs all mobility without assist. Pt voicing desire to d/c soon 8/4. Pt would benefit from continued PT to maximize strength and endurance. Pt tachycardic with short ambulation bouts. Educated on therapeutic exercise (hip extension) to perform to avoid contracture of hip flexors. Pt feels he has the assist he needs from his spouse ( works from home). Pt has 1 floor set up with threshold to enter.    DME:  Manual W/c  After Care:  Taty Oliveira, PT, DPT  [id number]

## 2021-08-02 NOTE — CARE COORDINATION
Social Work Assessment Summary    PCP: Dr. Ricketts Larger    Patient Resides: with his wife Jamie Astudillo) and his daughter Florida Hsu (24), Felicity leaves for [place] in 2 weeks. Home Architecture : 1 story house with 0 steps to enter with zero rails. The bathroom is a walk in shower and the bedroom is located on the first floor. Will you return to your own home? yes      Primary Caregiver: Brenda Blanton (wife- age 46). Healthy and drives. Works from home for [employer]. They have (1) daughter- Florida Hsu (24) leaves for [place] in 2 wks. Level of Function PTA : Independent. Employment: Retired early 2 years ago from a [employer]. DME Pta  :WW,Shower Chair, Glucometer    Community Agency Involvement PTA :Pt has a prior history with [facility], would like to use them in the future. He is a [place]- not registered with [facility]. Do they have a medical profile: no    Family Teaching: TBS    Strength: \"Detiremined\"    Preference:  \"To be able to get around on his own\"      NAME RELATION HOME # WORK # CELL #   Brenda Blanton Wife [phone]                     Height: 5\"10  Weight: Celina Calcemelyllandreas 13 Intern  Electronically signed by MEHRDAD Nino on 8/2/2021 at 8:34 AM

## 2021-08-02 NOTE — PROGRESS NOTES
Progress Note  Date:2021       [facility]:[id number]  Patient Nuria Abdullahi     YOB: 1970     Age:50 y.o. Subjective    Subjective:  Symptoms:  Stable. He reports weakness. Diet:  Adequate intake. Activity level: Impaired due to weakness. Pain:  He complains of pain that is moderate. Review of Systems   Neurological: Positive for weakness. Objective         Vitals Last 24 Hours:  TEMPERATURE:  Temp  Av.2 °F (36.2 °C)  Min: 96.6 °F (35.9 °C)  Max: 98.1 °F (36.7 °C)  RESPIRATIONS RANGE: Resp  Av.7  Min: 16  Max: 18  PULSE OXIMETRY RANGE: SpO2  Av.5 %  Min: 98 %  Max: 99 %  PULSE RANGE: Pulse  Av  Min: 93  Max: 101  BLOOD PRESSURE RANGE: Systolic (02XCK), KJJ:153 , Min:106 , YGH:381   ; Diastolic (00AAB), HPP:79, Min:63, Max:76    I/O (24Hr): Intake/Output Summary (Last 24 hours) at 2021 0818  Last data filed at 2021 0135  Gross per 24 hour   Intake 1200 ml   Output 3325 ml   Net -2125 ml     Objective:  General Appearance: In no acute distress. Vital signs: (most recent): Blood pressure 116/63, pulse 94, temperature 96.8 °F (36 °C), temperature source Temporal, resp. rate 16, height 5' 10\" (1.778 m), weight 167 lb 3.2 oz (75.8 kg), SpO2 99 %. Vital signs are normal.    Output: Producing urine and producing stool. Lungs:  Normal effort and normal respiratory rate. Breath sounds clear to auscultation. Heart: Normal rate. Regular rhythm. S1 normal and S2 normal.    Abdomen: Abdomen is soft. Bowel sounds are normal.   There is no abdominal tenderness. Extremities: (Left above-knee amputation)  Neurological: Patient is alert.       Labs/Imaging/Diagnostics    Labs:  CBC:  Recent Labs     21  0533   WBC 9.9   RBC 2.92*   HGB 8.7*   HCT 26.4*   MCV 90.4   RDW 14.1        CHEMISTRIES:  Recent Labs     21  0533      K 4.5  4.5   CL 97*   CO2 34*   BUN 20   CREATININE 0.6*   GLUCOSE 156*     PT/INR:No results for input(s): PROTIME, INR in the last 72 hours. APTT:No results for input(s): APTT in the last 72 hours. LIVER PROFILE:No results for input(s): AST, ALT, BILIDIR, BILITOT, ALKPHOS in the last 72 hours. Imaging Last 24 Hours:  No results found. Assessment//Plan           Hospital Problems         Last Modified POA    Above knee amputation of left lower extremity ([facility] [address] 7/30/2021 Yes          Initial Evaluation  Date: 7/31/21 AM     PM    Short Term Goals Long Term Goals    Was pt agreeable to Eval/treatment? Yes Yes         Does pt have pain?  No c/o pain Moderate c/o R ankle pain         Bed Mobility  Rolling: SBA  Supine to sit: SBA  Sit to supine: SBA  Scooting: SBA Supine<>Sit Supervision  Scooting/Rolling Supervision   Supervision Mod I   Transfers Sit to stand: Codey  Stand to sit: Codey  Stand pivot: Codey with [employer]       Sit<>Stand CGA  Stand-pivot CGA with [employer] (walking boot RLE)  Low Pivot transfer CGA/Codey from WC<>mat table   SBA    Mod I      Ambulation    50 feet with WW with Codey (WC follow) 50 feet x 2 reps with [employer] with SBA/CGA (walking boot RLE   100 feet with [employer] with SBA       150 feet with [employer] with Mod I         Walking 10 feet on uneven surface NT NT   10 feet with [employer] with SBA 10 feet with [employer] with Mod I   Wheel Chair Mobility 150 feet with BUE with  feet x 2 reps with BUE with supervision     400 feet with BUE/RLE with Mod I   Car Transfers Codey with [employer] CGA with [employer]   SBA Mod I   Stair negotiation: ascended and descended  4 steps with 2 rail with [employer]y (ascend backwards)  4 steps with 2 rail with [employer] (ascend backwards)   8 steps with 2 rail with SBA 12 steps with 2 rail with SBA   Curb Step:   ascended and descended NT NT   2 inch step with [employer] and SBA 4 inch step with [employer] and SBA   Picking up object off the floor NT NT   NA NA   BLE ROM WNL WNL         BLE Strength RLE grossly 4/5  L hip strength at least 3/5 RLE grossly 4/5  L hip strength at least 3/5         Balance  Static and dynamic standing balance Codey with [employer]    Static and dynamic standing balance CGA with [employer]             Assessment:    Condition: In stable condition. Improving. (Left above-knee amputation  Right ankle sprain). Plan:   Encourage ambulation. (Patient did have some pain at the right ankle with weightbearing  The left residual limb is healing well  I will continue with his current boot for additional support there  Pain is adequately controlled).        Electronically signed by Army Jacque MD on 8/2/21 at 8:18 AM MELISSAT

## 2021-08-02 NOTE — PROGRESS NOTES
Occupational Therapy  OCCUPATIONAL THERAPY DAILY NOTE    Date:2021  Patient Name: Prosper Martinez  MRN: [id number]  : 1970  Room: [address] [place] [address]-A     Patient Active Problem List   Diagnosis    Type 2 diabetes mellitus with diabetic peripheral angiopathy without gangrene, without long-term current use of insulin ([address]    Tobacco abuse counseling    PVD (peripheral vascular disease) with claudication ([address]    Pure hypercholesterolemia    Diabetic mononeuropathy associated with type 2 diabetes mellitus ([address]    Chronic obstructive pulmonary disease ([address]    S/P femoral-popliteal bypass surgery    Swelling of joint of pelvic region or thigh, left    Atherosclerosis of native artery of left lower extremity with rest pain ([address]    PVD (peripheral vascular disease) ([address]    Atherosclerosis of native arteries of extremity with rest pain ([address]    Femoral-popliteal bypass graft occlusion, left, initial encounter ([address]    Arterial occlusion, lower extremity ([address]    Above knee amputation of left lower extremity ([address]       Precautions: falls, WBAT RLE with walking boot     Functional Assessment:   Date Status AE  Comments   Feeding 21 Independent     Grooming 21 S/U     Oral care 21 S/U     Bathing 21 UE- S/U  LE- Mod A     UB Dressing 21 S/U   Pt donned pullover shirt seated   LB Dressing 21 Min A     Footwear 21 Min A  Pt donned walking boot by bringing R LE on bed. Min cues for proper donning/ positioning tehcnique of boot provided   Homemaking         Functional Transfers / Balance:   Date Status DME  Comments   Sit Balance 21 SBA  Demonstrated during exercises   Stand Balance 21 SBA ww Demonstrated during transfers   x Tub  ? Shower   Transfer 21 Min A     Commode   Transfer 21 CGA W/c, grab bar Educated pt on proper w/c placement to facilitate transfer safety.   Per pt he is going to hand grab bars installed in bathroom at home   Functional   Mobility

## 2021-08-02 NOTE — DISCHARGE SUMMARY
Physician Discharge Summary     Patient ID:  Jesus Colbert  [id number]  48 y.o.  1970    Admit date: 7/20/2021    Discharge date and time: 7/30/2021  2:42 PM     Admitting Physician: Mihai Vazquez MD     Admission Diagnoses: Peripheral vascular disease, unspecified [I73.9]  Arterial occlusion, lower extremity ([facility] [address] [I70.209]    Discharge Diagnoses: Active Problems:    Atherosclerosis of native arteries of extremity with rest pain ([facility] [address]    Femoral-popliteal bypass graft occlusion, left, initial encounter ([facility] [address]    Arterial occlusion, lower extremity ([facility] [address]  Resolved Problems:    * No resolved hospital problems. *      Admission Condition: stable    Discharged Condition: stable      Hospital Course:  Jesus Colbert is a 48 y.o. male who presented for elective arteriogram with possible intervention for LLE rest pain. Arteriogram showed thrombosed fem-pop bypass graft on left s/p L common femoral SFA infusion catheter. Ultimately underwent L AKA on 7/22 due to LLE occlusive disease. The patient's course was otherwise uneventful. He progressed well, pain was controlled on PO medications. He was tolerating a regular diet with no nausea or vomiting, and was in a suitable condition for discharge to ARU in stable condition. Consults:   IP CONSULT TO PHYSICAL MEDICINE REHAB  INPATIENT CONSULT TO ORTHOTIST/PROSTHETIST  IP CONSULT TO ORTHOPEDIC SURGERY  INPATIENT CONSULT TO ORTHOTIST/PROSTHETIST  INPATIENT CONSULT TO ORTHOTIST/PROSTHETIST  IP CONSULT TO PHYSICAL MEDICINE REHAB    Significant Diagnostic Studies:   No results found. Discharge Exam:     Gen: awake, alert and oriented x3, no apparent distress  CVS: regular rate and rhythm  Resp: No increased work of breathing  Abd: Soft, non-tender, non-distended  R LE: 2+ fem, 5/5 motor. Less TTP R lateral ankle  L LE: s/p AKA. Incision is c/d/i with staples in place, well approximated. Dressing changed, scant drainage.   2+ fem pulse,, ecchymosis of lateral thigh, compartments soft    Disposition: ARU    In process/preliminary results:  Outstanding Order Results       No orders found from 6/21/2021 to 7/21/2021. Patient Instructions:   Discharge Medication List as of 7/30/2021  2:43 PM        CONTINUE these medications which have NOT CHANGED    Details   citalopram (CELEXA) 40 MG tablet Take 1 tablet by mouth daily Must keep appt on 9-21-20, Disp-30 tablet, R-0Normal      clopidogrel (PLAVIX) 75 MG tablet TAKE 1 TABLET BY MOUTH  DAILY, Disp-30 tablet, R-11Requesting 1 year supplyNormal      atorvastatin (LIPITOR) 40 MG tablet Take 1 tablet by mouth daily, Disp-90 tablet, R-2Normal      glipiZIDE (GLUCOTROL XL) 10 MG extended release tablet Take 1 tablet by mouth daily, Disp-90 tablet, R-0Normal      lisinopril (PRINIVIL;ZESTRIL) 40 MG tablet Take 1 tablet by mouth daily, Disp-90 tablet, R-1Normal      cilostazol (PLETAL) 100 MG tablet TAKE 1 TABLET BY MOUTH TWO  TIMES DAILY, Disp-180 tablet, R-3Normal      gabapentin (NEURONTIN) 100 MG capsule Take 100 mg by mouth 2 times daily.  Historical Med      metFORMIN (GLUCOPHAGE) 500 MG tablet Take 500 mg by mouth 2 times daily (with meals) Historical Med      omeprazole (PRILOSEC) 20 MG delayed release capsule Take 20 mg by mouth Daily Historical Med      aspirin (ASPIRIN CHILDRENS) 81 MG chewable tablet Take 1 tablet by mouth daily, Disp-30 tablet, R-3OTC                 Follow up:   [address] [address]  [phone]           Signed:  Anjali Weinberg DO  8/1/2021  10:54 PM    Annia Berry MD

## 2021-08-02 NOTE — CONSULTS
Orthopaedic Consultation  KELL Thao MD    Reason for Consult: Right ankle pain     HISTORY OF PRESENT ILLNESS:                   Patient is a 48 y.o. male with right ankle pain. Patient underwent left BKA procedure with vascular surgery 3 days ago. He denies recent trauma to the right ankle but notes that he notices right ankle pain after waking from surgery. Patient has put weight through his right lower extremity 1 time since surgery but does not remember if it was painful. Denies numbness/tingling/paresthesias.   Denies any other orthopedic complaints at this time.       Past Medical History:    Past Medical History             Diagnosis Date    Anxiety      Atherosclerosis of native arteries of extremity with rest pain ([address] 7/15/2021    Chronic obstructive pulmonary disease ([address] 7/30/2019    Diabetes ([address]      Femoral-popliteal bypass graft occlusion, left, initial encounter [facility] 7/19/2021    GERD (gastroesophageal reflux disease)      Gout      Hyperlipidemia      Hypertension      Leg pain      Leg pain      Postoperative anemia due to acute blood loss 8/18/2019    PVD (peripheral vascular disease) ([address] 1/15/2018    S/P femoral-popliteal bypass surgery 9/9/2019    Type 2 diabetes mellitus with diabetic peripheral angiopathy without gangrene, without long-term current use of insulin ([address] 1/15/2018         Past Surgical History:    Past Surgical History             Procedure Laterality Date    FEMORAL BYPASS Left 8/16/2019     FEMORAL POPLITEAL BYPASS WITH FEMORAL ENDARTERECTOMY LEFT LEG performed by Marie Quinones MD at [place] [address] Right 1/31/2020     RIGHT FEMORAL ENDARTERECTOMY performed by Marie Quinones MD at [address] [place] [address] Left 7/22/2021     ABOVE KNEE AMPUTATION-LEFT LEG performed by Marie Quinones MD at [address] [place] [address]   10/04/2016     Dr Alphonso Raphael - athrectomy/pci right fem/pop    OTHER SURGICAL HISTORY   06/19/2018     Dr Genaro Weinberg - PCI w/ athrectomy RLE Common Illiac to Popliteal    VASCULAR SURGERY   07/2019     ANGIOGRAM    WISDOM TOOTH EXTRACTION             Current Medications:     Current Hospital Medications   Current Facility-Administered Medications: heparin (porcine) injection 5,000 Units, 5,000 Units, Subcutaneous, 3 times per day  oxyCODONE (ROXICODONE) immediate release tablet 5 mg, 5 mg, Oral, Q4H PRN  insulin lispro (HUMALOG) injection vial 0-6 Units, 0-6 Units, Subcutaneous, TID WC  insulin lispro (HUMALOG) injection vial 0-3 Units, 0-3 Units, Subcutaneous, Nightly  glucose (GLUTOSE) 40 % oral gel 15 g, 15 g, Oral, PRN  dextrose 50 % IV solution, 12.5 g, Intravenous, PRN  glucagon (rDNA) injection 1 mg, 1 mg, Intramuscular, PRN  dextrose 5 % solution, 100 mL/hr, Intravenous, PRN  naloxone (NARCAN) injection 0.4 mg, 0.4 mg, Intravenous, PRN  HYDROmorphone (DILAUDID) 0.2 mg/mL PCA, , Intravenous, Continuous  sodium chloride flush 0.9 % injection 5-40 mL, 5-40 mL, Intravenous, 2 times per day  sodium chloride flush 0.9 % injection 5-40 mL, 5-40 mL, Intravenous, PRN  ondansetron (ZOFRAN-ODT) disintegrating tablet 4 mg, 4 mg, Oral, Q8H PRN **OR** ondansetron (ZOFRAN) injection 4 mg, 4 mg, Intravenous, Q6H PRN  atorvastatin (LIPITOR) tablet 40 mg, 40 mg, Oral, Nightly  citalopram (CELEXA) tablet 40 mg, 40 mg, Oral, Daily  gabapentin (NEURONTIN) capsule 100 mg, 100 mg, Oral, BID  [Held by provider] lisinopril (PRINIVIL;ZESTRIL) tablet 40 mg, 40 mg, Oral, Daily  pantoprazole (PROTONIX) tablet 40 mg, 40 mg, Oral, QAM AC     Allergies:  Patient has no known allergies.     Social History:   TOBACCO:   reports that he quit smoking about 4 years ago. His smoking use included cigarettes. He started smoking about 33 years ago. He has a 42.00 pack-year smoking history. He quit smokeless tobacco use about 17 years ago. His smokeless tobacco use included snuff.   ETOH:   reports previous alcohol use of about 12.0 standard drinks of alcohol per week. DRUGS:   reports no history of drug use. ACTIVITIES OF DAILY LIVING:    OCCUPATION:    Family History:   Family History             Problem Relation Age of Onset    Asthma Mother      Other Father           vascular problems    Cancer Father           prostate, lung    Diabetes Father              REVIEW OF SYSTEMS:  CONSTITUTIONAL:  negative for fevers, chills  EYES:  negative for acute changes  HEENT:  negative for acute changes  RESPIRATORY:  negative for dyspnea  CARDIOVASCULAR:  negative for chest pain, palpitations  GASTROINTESTINAL:  negative for nausea, vomiting  GENITOURINARY:  negative for frequency  HEMATOLOGIC/LYMPHATIC:  negative for bleeding and petechiae  MUSCULOSKELETAL:  positive for right ankle pain  NEUROLOGICAL:  negative for head trauma or LOC  BEHAVIOR/PSYCH:  negative for increased agitation and anxiety     PHYSICAL EXAM:    VITALS:  /77   Pulse 115   Temp 100.5 °F (38.1 °C) (Oral)   Resp 18   Ht 5' 10\" (1.778 m)   Wt 195 lb (88.5 kg)   SpO2 96%   BMI 27.98 kg/m²   CONSTITUTIONAL:  awake, alert, cooperative, no apparent distress, and appears stated age     MUSCULOSKELETAL:  Right lower Extremity:  · Mild ecchymosis and edema about the lateral malleolus  · Positive TTP about the lateral malleolus  · Negative TTP about the navicular region  · Compartments soft and compressible  · Calf soft and non tender  · +PF/DF/EHL  · +2/4 DP & PT pulses, Brisk Cap refill, Toes warm and perfused  · Distal sensation grossly intact to Peroneals, Sural, Saphenous, and tibial nrs     Secondary Exam:   · Bilateral UE: No obvious signs of trauma. -TTP to fingers, hand, wrist, forearm, elbow, humerus, shoulder or clavicle. -- Patient able to flex/extend fingers, wrist, elbow and shoulder with active and passive ROM without pain, +2/4 Radial pulse, cap refill <3sec, +AIN/PIN/Radial/Ulnar/Median N, distal sensation grossly intact to C4-T1 dermatomes, compartments soft and compressible.     · Left LE: S/P BKA, dressing clean/dry/intact.     · Pelvis: -TTP, -Log roll, -Heel strike      DATA:    CBC:         Lab Results   Component Value Date     WBC 10.9 07/25/2021     RBC 2.36 07/25/2021     HGB 7.2 07/25/2021     HCT 20.8 07/25/2021     MCV 88.1 07/25/2021     MCH 30.5 07/25/2021     MCHC 34.6 07/25/2021     RDW 13.5 07/25/2021      07/25/2021     MPV 9.9 07/25/2021      PT/INR:          Lab Results   Component Value Date     PROTIME 11.0 07/20/2021     INR 1.0 07/20/2021         Radiology Review:  X-ray right foot: Avulsion over the dorsum of the navicular bone with questionable chronicity. No other fractures or dislocations appreciated     IMPRESSION:  · Mild right ankle sprain     PLAN:  · No acute orthopedic surgical intervention indicated at this time.   Patient's injury can be managed nonoperatively  · Weightbearing as tolerated right lower extremity while wearing walking boot  · Walking boot to right lower extremity  · Rest, ice, elevate, compress right lower extremity  · Multimodal pain control  · Follow-up in the office in 2 weeks

## 2021-08-02 NOTE — PROGRESS NOTES
with [employer] with SBA     150 feet with [employer] with Mod I       Walking 10 feet on uneven surface NT NT  10 feet with [employer] with SBA 10 feet with [employer] with Mod I   Wheel Chair Mobility 150 feet with BUE with SBA 48 feetwith BUE with supervision 400' Modified Independent    400 feet with BUE/RLE with Mod I   Car Transfers Codey with [employer] NT  SBA Mod I   Stair negotiation: ascended and descended  4 steps with 2 rail with [employer] (ascend backwards) Platform step 4\" x 4 reps with [employer] (posterior approach) SBA Platform step 4\" x 4 reps with [employer] (posterior approach) SBA 8 steps with 2 rail with SBA 12 steps with 2 rail with SBA   Curb Step:   ascended and descended NT NT  2 inch step with [employer] and SBA 4 inch step with [employer] and SBA   Picking up object off the floor NT NT  NA NA   BLE ROM WNL WNL      BLE Strength RLE grossly 4/5  L hip strength at least 3/5 RLE grossly 4/5  L hip strength at least 3/5      Balance  Static and dynamic standing balance Codey with [employer]   Static and dynamic standing balance CGA with [employer]        Date Family Teach Completed        Is additional Family Teaching Needed? Y or N Y Y      Hindering Progress Walking boot, mild debility R ankle pain, mild debility      PT recommended ELOS 5-6 days       Team's Discharge Plan        Therapist at Team Meeting          Therapeutic Exercise:   AM:   Standing hip extension 1x 15 RLE [employer]  Sidelying hip extension stretch 30\" x 5 reps RLE   PM:   Standing hip extension 1x 15 RLE [employer]  Therapeutic activity- transferred from w/c to toilet with SBA, performed pericare unassisted. Patient education  Pt educated on safe hand placement with all transfers    Patient response to education:   Pt verbalized understanding Pt demonstrated skill Pt requires further education in this area   yes yes yes     Additional Comments: Pt performing all functional mobility without assist. Pt demos good balance, cues for pacing. Pt tachycardic to 124 with reports of claudication pain in RLE.  Pt states he had this pain previously due to vascular issues. Pain in RLE is limiting factor. Pt reporting no concerns with mobility level, and would like to d/c 8/4. This voiced to SW and OT. SW to reach out to schedule family teach. Recommending manual w/c for home use PRN, and community mobility. Skin was inspected: grossly intact    AM  Time in: 0830  Time out: 0915    Time in:  1345  Time out: 1430      Pt is making good progress toward established Physical Therapy goals. Continue with physical therapy current plan of care.     Raymundo Goldberg, PT, DPT  [id number]

## 2021-08-02 NOTE — PROGRESS NOTES
Occupational Therapy  OCCUPATIONAL THERAPY DAILY NOTE    Date:2021  Patient Name: Deshawn Frias  MRN: [id number]  : 1970  Room: [address] [place] [address]     Referring Physician:    Diagnosis: Left Above Knee Amputation                      Avulsion Fracture of Navicular of Right Foot   Surgery/Procedure: L AKA 21    Past Medical History: DM,COPD,PVD  Precautions: falls, WBAT RLE with walking boot     Functional Assessment:   Date Status AE  Comments   Feeding 21 Independent      Grooming 21 s/u  seated          Oral Care 21 Mod I  seated    Bathing 21 Min A  Sponge bathing both seated & standing   UB Dressing 21 s/u      LB Dressing 21 Minimal Assist      Footwear 21 Sup  Assist to don sock & Cam boot R foot   Toileting 21 SBA     Homemaking 21 TBA        Functional Transfers / Balance:   Date Status DME  Comments   Sit Balance 21 Supervision      Stand Balance 21 SBA rw    [] Tub  [] Shower   Transfer 21 TBA      Commode   Transfer 21 SBA Rw/3:1 vc's for safety   Functional   Mobility 21 SBA  rw Short distance ambulation    Other:         Functional Exercises / Activity:   Pt demo Sup wc propulsion on the unit & throughout the OT gym. Pt tolerate BUE strengthening exercises using a 5# dowel lalo 20 rps x 2 sets with rests in between. Pt demo SBA sit<>stand. Pt demo SBA functional mobility short distance ambulation in the OT gym & around his room. Pt demo Mod I to doff his R cam boot. Pt demo independent sit>supine    Pt demo independent supine>sit. Pt demo Sup stand pivot transfer. Pt demo SBA functional mobility using a rw short distance in his room.  Pt tolerated BUE strengthening & FMC/dexterity & took rests as needed    Sensory / Neuromuscular Re-Education:      Cognitive Skills:   Status Comments   Problem   Solving good  During transfers    Memory good  \"   Sequencing good  \"   Safety good  \"     Visual Perception:    Education:  Pt educated with regards to desensitizing his stump & pt verbalize understanding information    [] Family teach completed on:    Pain Level: Pt c/o L stump pain when standing    Additional Notes:       Patient has made good  progress during treatment sessions toward set goals. Therapy emphasis to obtain goals: ADL retraining,transfers training, functional mobility, therex, endurance/balance retraining, homemaking & pt/family education       [x] Continue with current OT Plan of care.   [] Prepare for Discharge     DISCHARGE RECOMMENDATIONS  Recommended DME:  Wc, 3:1   Post Discharge Care:   []Home Independently  []Home with 24hr Care / Supervision []Home with Partial Supervision []Home with Home Health OT []Home with Out Pt OT []Other: ___   Comments:         Time in Time out Tx Time Breakdown  Variance:   First Session  729 1000 [x] Individual Tx- 45  [] Concurrent Tx -  [] Co-Tx -   [] Group Tx -   [] Time Missed -     Second Session [phone] [] Individual Tx-   [x] Concurrent Tx - 45  [] Co-Tx -   [] Group Tx -   [] Time Missed -     Third Session    [] Individual Tx-   [] Concurrent Tx -  [] Co-Tx -   [] Group Tx -   [] Time Missed -         Total Tx Time: [address], OTR/L [id number]

## 2021-08-03 LAB
METER GLUCOSE: 151 MG/DL (ref 74–99)
METER GLUCOSE: 153 MG/DL (ref 74–99)
METER GLUCOSE: 154 MG/DL (ref 74–99)
METER GLUCOSE: 91 MG/DL (ref 74–99)

## 2021-08-03 PROCEDURE — 82962 GLUCOSE BLOOD TEST: CPT

## 2021-08-03 PROCEDURE — 97535 SELF CARE MNGMENT TRAINING: CPT

## 2021-08-03 PROCEDURE — 6370000000 HC RX 637 (ALT 250 FOR IP): Performed by: PHYSICAL MEDICINE & REHABILITATION

## 2021-08-03 PROCEDURE — 6370000000 HC RX 637 (ALT 250 FOR IP): Performed by: SURGERY

## 2021-08-03 PROCEDURE — 97530 THERAPEUTIC ACTIVITIES: CPT

## 2021-08-03 PROCEDURE — 1280000000 HC REHAB R&B

## 2021-08-03 PROCEDURE — 6360000002 HC RX W HCPCS: Performed by: PHYSICAL MEDICINE & REHABILITATION

## 2021-08-03 RX ADMIN — ACETAMINOPHEN 650 MG: 325 TABLET ORAL at 06:24

## 2021-08-03 RX ADMIN — LISINOPRIL 40 MG: 20 TABLET ORAL at 08:57

## 2021-08-03 RX ADMIN — GLIPIZIDE 10 MG: 5 TABLET ORAL at 06:24

## 2021-08-03 RX ADMIN — METFORMIN HYDROCHLORIDE 500 MG: 500 TABLET ORAL at 16:57

## 2021-08-03 RX ADMIN — GABAPENTIN 200 MG: 100 CAPSULE ORAL at 20:49

## 2021-08-03 RX ADMIN — HEPARIN SODIUM 5000 UNITS: 10000 INJECTION INTRAVENOUS; SUBCUTANEOUS at 20:51

## 2021-08-03 RX ADMIN — DOCUSATE SODIUM 100 MG: 100 CAPSULE ORAL at 08:57

## 2021-08-03 RX ADMIN — ATORVASTATIN CALCIUM 40 MG: 40 TABLET, FILM COATED ORAL at 20:49

## 2021-08-03 RX ADMIN — Medication 5 MG: at 20:53

## 2021-08-03 RX ADMIN — OXYCODONE HYDROCHLORIDE 10 MG: 10 TABLET ORAL at 20:56

## 2021-08-03 RX ADMIN — INSULIN GLARGINE 10 UNITS: 100 INJECTION, SOLUTION SUBCUTANEOUS at 20:52

## 2021-08-03 RX ADMIN — SENNOSIDES 8.6 MG: 8.6 TABLET, FILM COATED ORAL at 20:57

## 2021-08-03 RX ADMIN — HEPARIN SODIUM 5000 UNITS: 10000 INJECTION INTRAVENOUS; SUBCUTANEOUS at 08:58

## 2021-08-03 RX ADMIN — GABAPENTIN 200 MG: 100 CAPSULE ORAL at 13:00

## 2021-08-03 RX ADMIN — INSULIN LISPRO 3 UNITS: 100 INJECTION, SOLUTION INTRAVENOUS; SUBCUTANEOUS at 16:19

## 2021-08-03 RX ADMIN — INSULIN LISPRO 3 UNITS: 100 INJECTION, SOLUTION INTRAVENOUS; SUBCUTANEOUS at 06:56

## 2021-08-03 RX ADMIN — CITALOPRAM 40 MG: 20 TABLET, FILM COATED ORAL at 08:57

## 2021-08-03 RX ADMIN — OXYCODONE HYDROCHLORIDE 10 MG: 10 TABLET ORAL at 11:57

## 2021-08-03 RX ADMIN — DOCUSATE SODIUM 100 MG: 100 CAPSULE ORAL at 20:49

## 2021-08-03 RX ADMIN — PANTOPRAZOLE SODIUM 40 MG: 40 TABLET, DELAYED RELEASE ORAL at 06:24

## 2021-08-03 RX ADMIN — GABAPENTIN 200 MG: 100 CAPSULE ORAL at 08:58

## 2021-08-03 RX ADMIN — ACETAMINOPHEN 650 MG: 325 TABLET ORAL at 16:30

## 2021-08-03 RX ADMIN — INSULIN LISPRO 2 UNITS: 100 INJECTION, SOLUTION INTRAVENOUS; SUBCUTANEOUS at 20:51

## 2021-08-03 RX ADMIN — ACETAMINOPHEN 650 MG: 325 TABLET ORAL at 11:58

## 2021-08-03 RX ADMIN — METFORMIN HYDROCHLORIDE 500 MG: 500 TABLET ORAL at 08:57

## 2021-08-03 ASSESSMENT — PAIN SCALES - GENERAL
PAINLEVEL_OUTOF10: 2
PAINLEVEL_OUTOF10: 6
PAINLEVEL_OUTOF10: 5
PAINLEVEL_OUTOF10: 2
PAINLEVEL_OUTOF10: 2
PAINLEVEL_OUTOF10: 6
PAINLEVEL_OUTOF10: 7
PAINLEVEL_OUTOF10: 3

## 2021-08-03 ASSESSMENT — PAIN DESCRIPTION - LOCATION
LOCATION: LEG
LOCATION: LEG

## 2021-08-03 ASSESSMENT — PAIN DESCRIPTION - ORIENTATION
ORIENTATION: LEFT
ORIENTATION: LEFT

## 2021-08-03 ASSESSMENT — PAIN DESCRIPTION - FREQUENCY
FREQUENCY: INTERMITTENT
FREQUENCY: INTERMITTENT

## 2021-08-03 ASSESSMENT — PAIN DESCRIPTION - PAIN TYPE
TYPE: ACUTE PAIN
TYPE: ACUTE PAIN

## 2021-08-03 ASSESSMENT — PAIN DESCRIPTION - ONSET: ONSET: ON-GOING

## 2021-08-03 ASSESSMENT — PAIN DESCRIPTION - DESCRIPTORS
DESCRIPTORS: DISCOMFORT;RADIATING
DESCRIPTORS: DISCOMFORT;RADIATING

## 2021-08-03 ASSESSMENT — PAIN DESCRIPTION - PROGRESSION: CLINICAL_PROGRESSION: NOT CHANGED

## 2021-08-03 NOTE — PLAN OF CARE
Problem: Falls - Risk of:  Goal: Will remain free from falls  Description: Will remain free from falls  Outcome: Met This Shift  Goal: Absence of physical injury  Description: Absence of physical injury  Outcome: Met This Shift     Problem: Pain:  Goal: Pain level will decrease  Description: Pain level will decrease  Outcome: Met This Shift  Goal: Control of acute pain  Description: Control of acute pain  Outcome: Met This Shift  Goal: Control of chronic pain  Description: Control of chronic pain  Outcome: Met This Shift     Problem: Skin Integrity:  Goal: Will show no infection signs and symptoms  Description: Will show no infection signs and symptoms  Outcome: Met This Shift  Goal: Absence of new skin breakdown  Description: Absence of new skin breakdown  Outcome: Met This Shift     Problem: Neurological  Goal: Maximum potential motor/sensory/cognitive function  Outcome: Met This Shift     Problem: Musculor/Skeletal Functional Status  Goal: Highest potential functional level  Outcome: Met This Shift  Goal: Absence of falls  Outcome: Met This Shift

## 2021-08-03 NOTE — PROGRESS NOTES
Occupational Therapy  OCCUPATIONAL THERAPY DAILY NOTE    Date:8/3/2021  Patient Name: Dayne Ngo  MRN: [id number]  : 1970  Room: [address] [place] [address]     Referring Physician:    Diagnosis: Left Above Knee Amputation                      Avulsion Fracture of Navicular of Right Foot   Surgery/Procedure: L AKA 21    Past Medical History: DM,COPD,PVD  Precautions: falls, WBAT RLE with walking boot     Functional Assessment:   Date Status AE  Comments   Feeding 21 Independent      Grooming 8/3/21 I/mod I seated Pt performed grooming tasks on shower stall bench including washing hair. Pt applied deodorant seated up in w/c          Oral Care 8/3/21 Mod I  seated Pt brushed teeth seated at sink. Bathing 8/3/21 Sup Shower  Pt completed UB/LB bathing seated on shower stall bench with Left residual limb covered w/ plastic bags  per consult with doctor. UB Dressing 8/3/21 Independent  To silvano t- shirt seated up in w/c    LB Dressing 8/3/21 S/SBA  To silvano shorts while seated up in w/c then stood at sink for clothing mgmt. Footwear 8/3/21 Mod I   Assist to silvano Cam boot R foot and Mod I to silvano gripper sock on foot. Toileting 21 SBA     Homemaking 8/3/21 SBA- simple hmkg W/c ww Pt completed simple task for bed making while seated up in w/c during bed making activity with fitted sheet and pillow cases. Pt completed item retrieval from fridge, cabinets and pantry using ww for balance with one cue for walker mgmt. Functional Transfers / Balance:   Date Status DME  Comments   Sit Balance 8/3/21 Sup   Demo'd during ADL's on shower stall bench and up in w/c along with functional transfer training in OT apt on various surfaces. Stand Balance 8/3/21 Sup rw  Sink  Demo'd during fxl mobility, transfer training and ADL's to increase dyn/static skills for functional tasks.     [x] Tub  [x] Shower   Transfer 8/3/21 Sup ww Pt completed both shower stall w/c<>grab bar level then ext tub bench transfers at Matthew Brinks level. Pt needing cues for proper technique in regards to hand/trunk placement to increase safety. Commode   Transfer 8/3/21 Sup Rw/3:1 Pt completed 3:1 commode at w. Walker level with one cue for hand placement. Functional   Mobility 8/3/21 Sup  rw Pt ambulated  in OT apt using w. Olga Ply for balance and safety. Other: firm recliner, sofa, dining chair w/arms, queen size/hospital beds 8/3/21 Sup ww Pt completed functional transfer training on/off various surfaces in OT apt to increase skills at w. Walker level      Functional Exercises / Activity:   Pt engaged in am ADL's including shower approved by physician this morning including covering Left residual limb. See above for details. Wife present during ADL/shower for family training session. Sensory / Neuromuscular Re-Education:      Cognitive Skills:   Status Comments   Problem   Solving good  During functional transfer training, ADL';s, shower stall/ext tub bench transfers and fxl mobility. Memory good  \"   Sequencing good  \"   Safety good  \"     Visual Perception:    Education:  Pt/wife educated with safety during shower stall transfers w/c<>grab bar level along with bathing and dressing tasks to increase awareness. [x] Family teach completed on:  8/3/21 Wife present for FT teach this am to address shower stall transfers, standing balance and ADL's. Discussed grab bars in shower stall to increase pt safety and has shower chair at home. Pain Level: R foot  pain and soreness 5/10    Additional Notes:   8/3/21 Consulted with  intern in regards to update in ADL's stats this am following ADL/shower stall transfers to increase awareness since patient wants to go home tomorrow (Wednesday). Patient has made good  progress during treatment sessions toward set goals.  Therapy emphasis to obtain goals: ADL retraining,transfers training, functional mobility, therex, endurance/balance retraining, homemaking & pt/family education       [x] Continue with current OT Plan of care. [] Prepare for Discharge     Long Term Goals: 1-2 weeks  1: Improve grooming including oral care to Mod I standing at sink   2. Improve UB and LB bathing to Mod I   3. Improve LB dressing including footwear to Mod I   4. Improve toilet transfers to Mod I   5. Improve toileting to Mod I   6. Pt to complete tub/shower transfer at [facility] with appropriate DME  7. Pt to complete light homemaking at [facility] seated and standing with ww   8. Improve BUE muscle strength to 4+/5  9. Pt to demonstrate good follow through with wt bearing precautions and safety during ADL's and functional activities   8/2/21- Pt plan of care updated on this date.  Pt tentative length of stay is 8/6/21 - Juan Daniel Adams OTR/L [id number]    DISCHARGE RECOMMENDATIONS  Recommended DME:  Ana Neal, 3:1   Post Discharge Care:   []Home Independently  []Home with 24hr Care / Supervision []Home with Partial Supervision []Home with Home Health OT []Home with Out Pt OT []Other: ___   Comments:         Time in Time out Tx Time Breakdown  Variance:   First Session  7:40am 8:25am [x] Individual Tx- 45  [] Concurrent Tx -  [] Co-Tx -   [] Group Tx -   [] Time Missed -     Second Session 9:15am 10;00am [x] Individual Tx- 45  [] Concurrent Tx -   [] Co-Tx -   [] Group Tx -   [] Time Missed -     Third Session    [] Individual Tx-   [] Concurrent Tx -  [] Co-Tx -   [] Group Tx -   [] Time Missed -         Total Tx Time:90  Massiel Babb MCCULLOUGH/L [id number]      I have read & agree with the above status    Juan Daniel Adams OTR/L [id number]

## 2021-08-03 NOTE — PATIENT CARE CONFERENCE
[facility] [facility] NOTE/PATIENT PLAN OF CARE    The physician was present and led this team conference    Date: 8/3/2021  Admission date: 2021  Patient Name: Orville Burnette        MRN: [id number]    : 1970  (48 y.o.)  Gender: male   Rehab diagnosis/surgery with date:  left AKA of 21 due to thrombosed left lower extremity bypass graft  Impairment Group Code:  5.3      MEDICAL/FUNCTIONAL HISTORY/STATUS:  has a right ankle sprain also, has a CAM boot for right lower, history of diabetes with peripheral neuropathy, COPD, GERD, hypertension , acute blood loss anemia    Consultations/Labs/X-rays: none recent      MEDICATION UPDATE:  see MAR-on Lantus insulin, sliding scale and Metformin, OXY - IR for pain      NURSING :    Bowel:   Always Continent  [x]   Occasionally incontinent  []   Frequently incontinent  []   Always incontinent  []   Not occurred  []     Bladder:   Always Continent  [x]    Incontinent less than daily[]   Incontinent  daily []   Always incontinent  []   No urine output    []   Indwelling catheter []       Toilet Hygiene:   Current level : standby assist  Short term Toilet hygiene goal: Modified independent  Long term toilet hygiene goal:  Modified independent    Skin integrity: left stump incision intact  Pain: left stump phantom pain, OXY IR     NUTRITION    Diet  general  Liquid consistency   thin    SOCIAL INFORMATION:  Lives with: spouse and daughter  Prior community services:  none  Home Architecture:  1 floor, no entry steps, walk in shower  Prior Level of function:  independent  DME:  wheeled walker, shower chair, glucometer    FAMILY / PATIENT EDUCATION:  spouse here today    PHYSICAL THERAPY    Bed mobility:   Current level: supervision  Short term bed mobility goal: supervision  Long term bed mobility goal: Modified independent    Chair/bed transfers:  Current level: standby assist with wheeled walker and right CAM boot  Short term Chair/bed transfers goal: met  Long term Chair/bed transfers goal: Modified independent      Ambulation:   Current level: 40 ft with a wheeled walker, standby assist, right CAM boot  Short term ambulation goal: 100 ft at standby assist  Long term ambulation goal: 150ft at Modified independent    Wheelchair Mobility:  Current level: 400 ft at Modified independent  Short term wheelchair goal: met  Long term wheelchair goal: met      Car transfers:   Current level: standby assist  Short term car transfers goal: standby assist  Long term car transfers goal:Modified independent    Stairs:   Current level : 4 inch platform step,  with wheeled walker at standby assist  Short term stairs goal: met  Long term stairs goal: 12 at standby assist      Other comments: gets tachycardic  with short bouts of ambulation      OCCUPATIONAL THERAPY:      Tub/shower:   Current level: will do this am      Feeding:  Current level: independent  Short term feeding goal: independent  Long term feeding goal: independent    Grooming:   Current level: Modified independent  Short term grooming goal: Modified independent  Long term grooming goal: Modified independent    Bathing:  Current level: min assist  Short term bathing goal: Contact guard assist  Long term bathing goal: supervision    Homemaking:   Current level: to be assessed    Upper body dressing:  Current level: supervision  Short term upper body dressing goal: Modified independent  Long term upper body dressing goal: independent    Lower body dressing:                                                                          Current level: min assist             Short term lower body dressing goal: Modified independent          Long term lower body dressing goal: Modified independent            Footwear  Current level: did sock and cam boot at supervision  Short term goal: met  Long term goal:Modified independent      Toilet transfer:   Current level: standby assist with a wheeled walker  to 3 in 1   Short term toilet transfer goal: Modified independent  Long term toilet transfer goal: Modified independent      Patient/family's personal goals: \"get around on my own\"  Factors supporting goal achievement:  motivated, making gains  Factors hindering goal achievement:  decreased endurance    Discharge Plan   Estimated Length of Stay: 8/6            Destination: home  Services at Discharge: home health  for PT, OT, nursing, aide  Equipment at Discharge: wheelchair, 3 in 1      INTERDISCIPLINARY TEAM/PHYSICIAN [address] [place] [address] Turnpike:  finalize discharge for 8/6/21    I approve the established interdisciplinary plan of care as documented within the medical record of Torres Maki. Electronically signed by Sharmaine Francis RN  on 8/3/2021 at 9:38 AM  The following interdisciplinary team members were present:  WARD Vaughan LSW  Asim Wood, PT, DPT  Joanna Latham, OTR

## 2021-08-03 NOTE — PROGRESS NOTES
Occupational Therapy  OCCUPATIONAL THERAPY DAILY NOTE    Date:8/3/2021  Patient Name: Oscar Lenz  MRN: [id number]  : 1970  Room: [address] [place] [address]     Referring Physician:    Diagnosis: Left Above Knee Amputation                      Avulsion Fracture of Navicular of Right Foot   Surgery/Procedure: L AKA 21    Past Medical History: DM,COPD,PVD  Precautions: falls, WBAT RLE with walking boot     Functional Assessment:   Date Status AE  Comments   Feeding 21 Independent      Grooming 21 s/u  seated          Oral Care 21 Mod I  seated    Bathing 21 Min A  Sponge bathing both seated & standing   UB Dressing 21 s/u      LB Dressing 21 Minimal Assist      Footwear 21 Sup  Assist to don sock & Cam boot R foot   Toileting 21 SBA     Homemaking 21 TBA        Functional Transfers / Balance:   Date Status DME  Comments   Sit Balance 21 Supervision      Stand Balance 21 SBA rw    [] Tub  [] Shower   Transfer 21 TBA      Commode   Transfer 21 SBA Rw/3:1 vc's for safety   Functional   Mobility 21 SBA  rw Short distance ambulation    Other:         Functional Exercises / Activity:     Sensory / Neuromuscular Re-Education:      Cognitive Skills:   Status Comments   Problem   Solving good  During transfers    Memory good  \"   Sequencing good  \"   Safety good  \"     Visual Perception:    Education:  Pt educated with regards to desensitizing his stump & pt verbalize understanding information    [] Family teach completed on:    Pain Level: Pt c/o L stump pain when standing    Additional Notes:       Patient has made good  progress during treatment sessions toward set goals. Therapy emphasis to obtain goals: ADL retraining,transfers training, functional mobility, therex, endurance/balance retraining, homemaking & pt/family education       [x] Continue with current OT Plan of care.   [] Prepare for Discharge     Long Term Goals: 1-2 weeks  1: Improve grooming including oral care to Mod I standing at sink   2. Improve UB and LB bathing to Mod I   3. Improve LB dressing including footwear to Mod I   4. Improve toilet transfers to Mod I   5. Improve toileting to Mod I   6. Pt to complete tub/shower transfer at [facility] with appropriate DME  7. Pt to complete light homemaking at [facility] seated and standing with ww   8. Improve BUE muscle strength to 4+/5  9. Pt to demonstrate good follow through with wt bearing precautions and safety during ADL's and functional activities   8/2/21- Pt plan of care updated on this date.  Pt tentative length of stay is 8/6/21 - Joe Ayon OTR/JANINE [id number]    DISCHARGE RECOMMENDATIONS  Recommended DME:  Jeanine Khan, 3:1   Post Discharge Care:   []Home Independently  []Home with 24hr Care / Supervision []Home with Partial Supervision []Home with Home Health OT []Home with Out Pt OT []Other: ___   Comments:         Time in Time out Tx Time Breakdown  Variance:   First Session    [] Individual Tx-   [] Concurrent Tx -  [] Co-Tx -   [] Group Tx -   [] Time Missed -     Second Session   [] Individual Tx-   [] Concurrent Tx -   [] Co-Tx -   [] Group Tx -   [] Time Missed -     Third Session    [] Individual Tx-   [] Concurrent Tx -  [] Co-Tx -   [] Group Tx -   [] Time Missed -         Total Tx Time:  Min

## 2021-08-03 NOTE — PROGRESS NOTES
Progress Note  Date:8/3/2021       [id number]  Patient Hayde Spain     YOB: 1970     Age:50 y.o. Subjective    Subjective:  Symptoms:  Stable. He reports weakness. Diet:  Adequate intake. Activity level: Impaired due to weakness. Pain:  He complains of pain that is moderate. Review of Systems   Neurological: Positive for weakness. Objective         Vitals Last 24 Hours:  TEMPERATURE:  No data recorded  RESPIRATIONS RANGE: No data recorded  PULSE OXIMETRY RANGE: No data recorded  PULSE RANGE: No data recorded  BLOOD PRESSURE RANGE: No data recorded.  ; No data recorded. I/O (24Hr): Intake/Output Summary (Last 24 hours) at 8/3/2021 0817  Last data filed at 8/3/2021 4495  Gross per 24 hour   Intake 1080 ml   Output 1300 ml   Net -220 ml     Objective:  General Appearance: In no acute distress. Vital signs: (most recent): Blood pressure 116/63, pulse 94, temperature 96.8 °F (36 °C), temperature source Temporal, resp. rate 16, height 5' 10\" (1.778 m), weight 167 lb 3.2 oz (75.8 kg), SpO2 99 %. Vital signs are normal.    Output: Producing urine and producing stool. Lungs:  Normal effort and normal respiratory rate. Breath sounds clear to auscultation. Heart: Normal rate. Regular rhythm. S1 normal and S2 normal.    Abdomen: Abdomen is soft. Bowel sounds are normal.   There is no abdominal tenderness. Extremities: (Left above-knee amputation)  Neurological: Patient is alert. Labs/Imaging/Diagnostics    Labs:  CBC:  Recent Labs     08/02/21  1115   WBC 11.6*   RBC 3.18*   HGB 9.5*   HCT 29.3*   MCV 92.1   RDW 15.4*   *     CHEMISTRIES:No results for input(s): NA, K, CL, CO2, BUN, CREATININE, GLUCOSE, PHOS, MG in the last 72 hours. Invalid input(s): CA  PT/INR:No results for input(s): PROTIME, INR in the last 72 hours. APTT:No results for input(s): APTT in the last 72 hours.   LIVER PROFILE:No results for input(s): AST, ALT, BILIDIR, BEBA BEJARANO in the last 72 hours. Imaging Last 24 Hours:  No results found. Assessment//Plan           Hospital Problems         Last Modified POA    Above knee amputation of left lower extremity ([facility] [address] 7/30/2021 Yes        Assessment:    Condition: In stable condition. Improving. (Left above-knee amputation  Right ankle sprain  Diabetic neuropathy). Plan:   Encourage ambulation. (He is tolerating therapy well overall  Still using the full cam boot on the right  Wife is here to go to therapy with him today  I am going to review these issues further with the therapists in team  In particular I want to address some of the right ankle limitations  His hemoglobin is improving).        Electronically signed by Florentin Emerson MD on 8/3/21 at 8:17 AM EDT

## 2021-08-03 NOTE — PROGRESS NOTES
Physical Therapy    Facility/Department: [address] REHAB  Daily Treatment Note    NAME: Brenda Santana  : 1970  MRN: [id number]    Date of Service: 8/3/2021    Supervising PT:  Charanjit Dick PT, DPT [id number]    ROOM: [address] [place] [address]  DIAGNOSIS: Left Above Knee Amputation  PRECAUTIONS: falls, WBAT with walking boot RLE (either boots in pt's room OK via ortho perfect serve )  HPI: Pt is a 48 y.o. male with past medical history significant for diabetes, COPD, hypertension, and peripheral vascular disease who presented to [facility] on 2021 with an occluded left femoral bypass. The limb was unable to be salvaged. He underwent a left above-knee amputation on 21. Post operative course was complicated by R ankle pain, pt was evaluated by orthopedic services, avulsion fracture of navicular noted on imaging and walking boot ordered. DME recommended: Manual W/c  Social:  Pt lives with wife and adult daughter in a 1 floor plan 1 step (reports < 4\") without rails to enter. Daughter works part time and wife works from home. Prior to admission: Pt ambulated without AD, was functionally independent. Initial Evaluation  Date: 21 AM     PM    Short Term Goals Long Term Goals    Was pt agreeable to Eval/treatment? Yes Yes Declined     Does pt have pain?  No c/o pain Moderate c/o R ankle pain      Bed Mobility  Rolling: SBA  Supine to sit: SBA  Sit to supine: SBA  Scooting: SBA Supine<>Sit Supervision  Scooting/Rolling Supervision  Supervision Mod I   Transfers Sit to stand: Codey  Stand to sit: Codey  Stand pivot: Codey with [employer]     Sit<>Stand Supervision  Stand-pivot Supervision with [employer] (walking boot RLE)      SBA   Mod I     Ambulation    50 feet with [employer] with Codey (WC follow) 40' x 3, 10' x 1 with WW with Supervision (walking boot RLE)  100 feet with WW with SBA     150 feet with WW with Mod I       Walking 10 feet on uneven surface NT NT  10 feet with WW with SBA 10 feet with WW with Mod I

## 2021-08-03 NOTE — CARE COORDINATION
Per Team: d/c: 8/4/21. Updated Pt and Pt's wife, Heather Baez. LTG: SBA/ Mod I/ Independent. Pt is NOT on ABX or O2. Pt is highly motivated. DME: 3-IN-1 Commode and Wheelchair & Cushion. Pt has WWalker already- Referral made to [facility] Per Pt choice    Aftercare: Nursing, HHA, PT, OT, SP- Referral made to [facility] per Pt choice    FT: 8/3/21 AM with Heather Baez    The Plan for Transition of Care is related to the following treatment goals: To go home with Aftercare    The Patient and/or patient representative Philip Mott was provided with a choice of provider and agrees   with the discharge plan. [x] Yes [] No    Freedom of choice list was provided with basic dialogue that supports the patient's individualized plan of care/goals, treatment preferences and shares the quality data associated with the providers.  [x] Yes [] No      MADELEINE Aguirre Intern  Maarnaldo Fabian, [place]  8/3/2021

## 2021-08-04 ENCOUNTER — TELEPHONE (OUTPATIENT)
Dept: VASCULAR SURGERY | Age: 51
End: 2021-08-04

## 2021-08-04 VITALS
RESPIRATION RATE: 18 BRPM | HEIGHT: 70 IN | SYSTOLIC BLOOD PRESSURE: 119 MMHG | BODY MASS INDEX: 23.94 KG/M2 | TEMPERATURE: 97.9 F | DIASTOLIC BLOOD PRESSURE: 77 MMHG | WEIGHT: 167.2 LBS | OXYGEN SATURATION: 96 % | HEART RATE: 111 BPM

## 2021-08-04 LAB — METER GLUCOSE: 143 MG/DL (ref 74–99)

## 2021-08-04 PROCEDURE — 6370000000 HC RX 637 (ALT 250 FOR IP): Performed by: SURGERY

## 2021-08-04 PROCEDURE — 97530 THERAPEUTIC ACTIVITIES: CPT

## 2021-08-04 PROCEDURE — 6360000002 HC RX W HCPCS: Performed by: PHYSICAL MEDICINE & REHABILITATION

## 2021-08-04 PROCEDURE — 82962 GLUCOSE BLOOD TEST: CPT

## 2021-08-04 RX ORDER — INSULIN GLARGINE 100 [IU]/ML
10 INJECTION, SOLUTION SUBCUTANEOUS NIGHTLY
Qty: 1 VIAL | Refills: 3 | Status: SHIPPED | OUTPATIENT
Start: 2021-08-04

## 2021-08-04 RX ORDER — ATORVASTATIN CALCIUM 40 MG/1
40 TABLET, FILM COATED ORAL DAILY
Qty: 90 TABLET | Refills: 2 | Status: SHIPPED | OUTPATIENT
Start: 2021-08-04 | End: 2021-11-02

## 2021-08-04 RX ORDER — OMEPRAZOLE 20 MG/1
20 CAPSULE, DELAYED RELEASE ORAL DAILY
Qty: 30 CAPSULE | Refills: 0 | Status: SHIPPED | OUTPATIENT
Start: 2021-08-04

## 2021-08-04 RX ORDER — CITALOPRAM 40 MG/1
40 TABLET ORAL DAILY
Qty: 30 TABLET | Refills: 0 | Status: SHIPPED | OUTPATIENT
Start: 2021-08-04 | End: 2021-09-03

## 2021-08-04 RX ORDER — GABAPENTIN 100 MG/1
200 CAPSULE ORAL 3 TIMES DAILY
Qty: 180 CAPSULE | Refills: 5 | Status: SHIPPED | OUTPATIENT
Start: 2021-08-04 | End: 2021-08-23

## 2021-08-04 RX ORDER — LISINOPRIL 40 MG/1
40 TABLET ORAL DAILY
Qty: 90 TABLET | Refills: 1 | Status: SHIPPED | OUTPATIENT
Start: 2021-08-04 | End: 2021-11-02

## 2021-08-04 RX ORDER — PSEUDOEPHEDRINE HCL 30 MG
100 TABLET ORAL 2 TIMES DAILY
Qty: 60 CAPSULE | Refills: 0 | Status: SHIPPED | OUTPATIENT
Start: 2021-08-04

## 2021-08-04 RX ORDER — GLIPIZIDE 10 MG/1
10 TABLET, FILM COATED, EXTENDED RELEASE ORAL DAILY
Qty: 90 TABLET | Refills: 0 | Status: SHIPPED | OUTPATIENT
Start: 2021-08-04 | End: 2021-11-30 | Stop reason: SDUPTHER

## 2021-08-04 RX ORDER — OXYCODONE HYDROCHLORIDE 5 MG/1
5 TABLET ORAL EVERY 6 HOURS PRN
Qty: 60 TABLET | Refills: 0 | Status: SHIPPED | OUTPATIENT
Start: 2021-08-04 | End: 2021-08-18

## 2021-08-04 RX ADMIN — HEPARIN SODIUM 5000 UNITS: 10000 INJECTION INTRAVENOUS; SUBCUTANEOUS at 08:56

## 2021-08-04 RX ADMIN — LISINOPRIL 40 MG: 20 TABLET ORAL at 08:55

## 2021-08-04 RX ADMIN — ACETAMINOPHEN 650 MG: 325 TABLET ORAL at 06:18

## 2021-08-04 RX ADMIN — ACETAMINOPHEN 650 MG: 325 TABLET ORAL at 00:12

## 2021-08-04 RX ADMIN — CITALOPRAM 40 MG: 20 TABLET, FILM COATED ORAL at 08:55

## 2021-08-04 RX ADMIN — GABAPENTIN 200 MG: 100 CAPSULE ORAL at 08:56

## 2021-08-04 RX ADMIN — INSULIN LISPRO 3 UNITS: 100 INJECTION, SOLUTION INTRAVENOUS; SUBCUTANEOUS at 06:44

## 2021-08-04 RX ADMIN — GLIPIZIDE 10 MG: 5 TABLET ORAL at 06:18

## 2021-08-04 RX ADMIN — METFORMIN HYDROCHLORIDE 500 MG: 500 TABLET ORAL at 08:55

## 2021-08-04 RX ADMIN — PANTOPRAZOLE SODIUM 40 MG: 40 TABLET, DELAYED RELEASE ORAL at 06:18

## 2021-08-04 ASSESSMENT — PAIN DESCRIPTION - LOCATION
LOCATION: LEG
LOCATION: LEG

## 2021-08-04 ASSESSMENT — PAIN SCALES - GENERAL
PAINLEVEL_OUTOF10: 5
PAINLEVEL_OUTOF10: 0
PAINLEVEL_OUTOF10: 5

## 2021-08-04 ASSESSMENT — PAIN DESCRIPTION - PAIN TYPE
TYPE: SURGICAL PAIN
TYPE: SURGICAL PAIN

## 2021-08-04 ASSESSMENT — PAIN DESCRIPTION - DESCRIPTORS
DESCRIPTORS: THROBBING
DESCRIPTORS: THROBBING

## 2021-08-04 ASSESSMENT — PAIN DESCRIPTION - ORIENTATION
ORIENTATION: LEFT
ORIENTATION: LEFT

## 2021-08-04 NOTE — PROGRESS NOTES
Vascular phy has been notified of pt discharging, only instructions are to keep clean and dry, and to contact office with any complications, office will call to f/u with pt

## 2021-08-04 NOTE — PROGRESS NOTES
Physical Therapy    Facility/Department: [address] REHAB  Discharge Summary    NAME: Orville Burnette  : 1970  MRN: [id number]    Date of Service: 2021    Supervising PT:  Eliseo Tucker, PT, DPT [id number]    ROOM: [address] [place] [address]  DIAGNOSIS: Left Above Knee Amputation  PRECAUTIONS: falls, WBAT with walking boot RLE (either boots in pt's room OK via ortho perfect serve )  HPI: Pt is a 48 y.o. male with past medical history significant for diabetes, COPD, hypertension, and peripheral vascular disease who presented to [facility] on 2021 with an occluded left femoral bypass. The limb was unable to be salvaged. He underwent a left above-knee amputation on 21. Post operative course was complicated by R ankle pain, pt was evaluated by orthopedic services, avulsion fracture of navicular noted on imaging and walking boot ordered. DME recommended: Manual W/c  Social:  Pt lives with wife and adult daughter in a 1 floor plan 1 step (reports < 4\") without rails to enter. Daughter works part time and wife works from home. Prior to admission: Pt ambulated without AD, was functionally independent.      Initial Evaluation  Date: 21 Discharge Date 21 (declined AM session ) Short Term Goals Long Term Goals    Bed Mobility  Rolling: SBA  Supine to sit: SBA  Sit to supine: SBA  Scooting: SBA Supine<>Sit Supervision  Scooting/Rolling Supervision Supervision Mod I   Transfers Sit to stand: Codey  Stand to sit: Codey  Stand pivot: Codey with [address]     Sit<>Stand Supervision  Stand-pivot Supervision with [address] (walking boot RLE)   SBA   Mod I     Ambulation    50 feet with [address] with Codey (WC follow) 40' x 3, 10' x 1 with [address] with Supervision (walking boot RLE) 100 feet with WW with SBA     150 feet with [address] with Mod I       Walking 10 feet on uneven surface NT NT 10 feet with [address] with SBA 10 feet with [address] with Mod I   Wheel Chair Mobility 150 feet with BUE with ' Modified Independent    400 feet with BUE/RLE with Mod I   Car Transfers Codey with [employer] NT SBA Mod I   Stair negotiation: ascended and descended  4 steps with 2 rail with Codey (ascend backwards) Platform step 4\" x 4 reps with [employer] (posterior approach) Supervision 8 steps with 2 rail with SBA 12 steps with 2 rail with SBA   Curb Step:   ascended and descended NT NT 2 inch step with [employer] and SBA 4 inch step with [employer] and SBA   Picking up object off the floor NT NT NA NA   BLE ROM WNL WNL     BLE Strength RLE grossly 4/5  L hip strength at least 3/5 RLE grossly 4/5  L hip strength at least 3/5     Balance  Static and dynamic standing balance Codey with [employer]   Static and dynamic standing balance Supervision       Date Family Teach Completed       Is additional Family Teaching Needed? Y or N Y Y     Hindering Progress Walking boot, mild debility R ankle pain, mild debility     PT recommended ELOS 5-6 days      Team's Discharge Plan       Therapist at Team Meeting           Comments: Pt declined to perform PT 8/3 PM and 8/4 AM. Pt discharging this date. Pt made good progress throughout short acute rehabilitation stay. Pt would have benefited from participating in last two sessions to continue improving strength and endurance deficits, while reinforcing safety with [employer]. Plan is to d/c to 1 level home with threshold to enter. Pt has assist from spouse if needed. Pt to continue HHPT. Recommended DME listed above.        Teresa Rosa, PT, DPT  [id number]

## 2021-08-04 NOTE — PROGRESS NOTES
Occupational Therapy  OCCUPATIONAL THERAPY DAILY NOTE & DISCHARGE SUMMARY    Date:2021  Patient Name: Ele Duarte  MRN: [id number]  : 1970  Room: [address] [place] [address]     Referring Physician:    Diagnosis: Left Above Knee Amputation                      Avulsion Fracture of Navicular of Right Foot   Surgery/Procedure: L AKA 21    Past Medical History: DM,COPD,PVD  Precautions: falls, WBAT RLE with walking boot     Functional Assessment:   Date Status AE  Comments   Feeding 21 Independent      Grooming 8/3/21 I/mod I seated Pt performed grooming tasks on shower stall bench including washing hair. Pt applied deodorant seated up in w/c          Oral Care 8/3/21 Mod I  seated Pt brushed teeth seated at sink. Bathing 8/3/21 Sup Shower  Pt completed UB/LB bathing seated on shower stall bench with Left residual limb covered w/ plastic bags  per consult with doctor. UB Dressing 8/3/21 Independent  To silvano t- shirt seated up in w/c    LB Dressing 21 Mod I   To silvano shorts while seated up in w/c then stood at sink for clothing mgmt. Footwear 8/3/21 Mod I   Assist to silvano Cam boot R foot and Mod I to silvano gripper sock on foot. Toileting 21 Mod I      Homemaking 8/3/21 SBA- simple hmkg W/c ww Pt completed simple task for bed making while seated up in w/c during bed making activity with fitted sheet and pillow cases. Pt completed item retrieval from fridge, cabinets and pantry using ww for balance with one cue for walker mgmt. Functional Transfers / Balance:   Date Status DME  Comments   Sit Balance 21 Independent  Demo'd during ADL's on shower stall bench and up in w/c along with functional transfer training in OT apt on various surfaces. Stand Balance 8/3/21 Sup rw  Sink  Demo'd during fxl mobility, transfer training and ADL's to increase dyn/static skills for functional tasks.     [x] Tub  [x] Shower   Transfer 8/3/21 Sup ww Pt completed both shower stall w/c<>grab bar level then ext tub bench transfers at w. Walker level. Pt needing cues for proper technique in regards to hand/trunk placement to increase safety. Commode   Transfer 8/3/21 Sup Rw/3:1 Pt completed 3:1 commode at w. Walker level with one cue for hand placement. Functional   Mobility 8/3/21 Sup  rw Pt ambulated  in OT apt using w. Fernanda Wright for balance and safety. Other: firm recliner, sofa, dining chair w/arms, queen size/hospital beds 8/3/21 Sup ww Pt completed functional transfer training on/off various surfaces in OT apt to increase skills at w. Walker level      Functional Exercises / Activity:       Sensory / Neuromuscular Re-Education:      Cognitive Skills:   Status Comments   Problem   Solving good  During functional transfer training, ADL';s, shower stall/ext tub bench transfers and fxl mobility. Memory good  \"   Sequencing good  \"   Safety good  \"     Visual Perception:    Education:  Pt educated with regards to energy conservation & safety when he returns home. Pt verbalize understanding education provided     [x] Family teach completed on:  8/3/21 Wife present for FT teach this am to address shower stall transfers, standing balance and ADL's. Discussed grab bars in shower stall to increase pt safety and has shower chair at home. Pain Level: no c/o pain    Additional Notes:   8/3/21 Consulted with  intern in regards to update in ADL's stats this am following ADL/shower stall transfers to increase awareness since patient wants to go home tomorrow (Wednesday). Patient has made good  progress during treatment sessions toward set goals. Therapy emphasis to obtain goals: ADL retraining,transfers training, functional mobility, therex, endurance/balance retraining, homemaking & pt/family education       [x] Continue with current OT Plan of care. [] Prepare for Discharge     Long Term Goals: 1-2 weeks  1: Improve grooming including oral care to Mod I standing at sink   2.  Improve UB and LB bathing to Mod I   3. Improve LB dressing including footwear to Mod I   4. Improve toilet transfers to Mod I   5. Improve toileting to Mod I   6. Pt to complete tub/shower transfer at [facility] with appropriate DME  7. Pt to complete light homemaking at [facility] seated and standing with ww   8. Improve BUE muscle strength to 4+/5  9. Pt to demonstrate good follow through with wt bearing precautions and safety during ADL's and functional activities   8/2/21- Pt plan of care updated on this date. Pt tentative length of stay is 8/6/21 - Pao Manning OTR/L [id number]    DISCHARGE RECOMMENDATIONS  Recommended DME:  [facility], 3:1   OCCUPATIONAL THERAPY DISCHARGE SUMMARY    Discontinue Occupational Therapy intervention. Pt has:   [] met all set goals. [] made good progress toward set goals. [x] has made slow progress toward goals and would benefit from rehab setting change.  [] had a medical decline and therefore was transferred off the Rehab unit.      The Patient has received education on:  [x]Transfer Safety [x]Compensatory tech for ADLs [x]AE training [x]DME training [x]Energy Conservation []Home / Kitchen Safety  [x]Fall Prevention  []Other:    Family training was completed: yes    Recommendations: Fatmata OTR/L [id number]    Post Discharge Care:   []Home Independently  []Home with 24hr Care / Supervision []Home with Partial Supervision []Home with Home Health OT []Home with Out Pt OT []Other: ___   Comments:         Time in Time out Tx Time Breakdown  Variance:   First Session  [id number] [x] Individual Tx- 15  [] Concurrent Tx -  [] Co-Tx -   [] Group Tx -   [] Time Missed -     Second Session   [x] Individual Tx-   [] Concurrent Tx -   [] Co-Tx -   [] Group Tx -   [] Time Missed -     Third Session    [] Individual Tx-   [] Concurrent Tx -  [] Co-Tx -   [] Group Tx -   [] Time Missed -         Total Tx Time: [address]      Pao Manning OTR/L [id number]

## 2021-08-04 NOTE — PROGRESS NOTES
Pt left with skin intact, surgical wound clean and dry, pt stated to this nurse that he is not in pain at this time, all meds given per order

## 2021-08-04 NOTE — TELEPHONE ENCOUNTER
Patient's wife is questioning whether Alexei Stubbs should take Pletal and Plavix. His discharge instructions from rehab said to stop taking them.     Would conitnue to take both plavix and pletal as previously ordred    Have him fu in 4 weeks for staple removal    thasnpk

## 2021-08-04 NOTE — PROGRESS NOTES
CLINICAL PHARMACY NOTE: MEDS TO BEDS    Total # of Prescriptions Filled: 11   The following medications were delivered to the patient:  · METFORMIN 500 MG   · OMEPRAZOLE 20 MG   · GABAPENTIN 100 MG   · CELEXA 40 MG   · GLUCOTROL ER 10 MG   · PEN NEEDLES   · LISINOPRIL 40 MG   · LIPITOR 40 MG   · OXYCODONE HCL 5 MG   · HUMALOG KWIKPEN  · LANTUS SOLOSTAR    Additional Documentation:  ,

## 2021-08-04 NOTE — PLAN OF CARE
Problem: Falls - Risk of:  Goal: Will remain free from falls  Description: Will remain free from falls  8/4/2021 0924 by Yesika Danielson RN  Outcome: Completed  8/4/2021 0137 by Robin Mcdermott RN  Outcome: Met This Shift  Goal: Absence of physical injury  Description: Absence of physical injury  8/4/2021 0924 by Yesika Danielson RN  Outcome: Completed  8/4/2021 0137 by Robin Mcdermott RN  Outcome: Met This Shift     Problem: Pain:  Description: Pain management should include both nonpharmacologic and pharmacologic interventions.   Goal: Pain level will decrease  Description: Pain level will decrease  8/4/2021 0924 by Yesika Danielson RN  Outcome: Completed  8/4/2021 0137 by Robin Mcdermott RN  Outcome: Met This Shift  Goal: Control of acute pain  Description: Control of acute pain  8/4/2021 0924 by Yesika Danielson RN  Outcome: Completed  8/4/2021 0137 by Robin Mcdermott RN  Outcome: Met This Shift  Goal: Control of chronic pain  Description: Control of chronic pain  8/4/2021 0924 by Yesika Danielson RN  Outcome: Completed  8/4/2021 0137 by Robin Mcdermott RN  Outcome: Met This Shift     Problem: Skin Integrity:  Goal: Will show no infection signs and symptoms  Description: Will show no infection signs and symptoms  8/4/2021 0924 by Yesika Danielson RN  Outcome: Completed  8/4/2021 0137 by Robin Mcdermott RN  Outcome: Met This Shift  Goal: Absence of new skin breakdown  Description: Absence of new skin breakdown  8/4/2021 0924 by Yesika Danielson RN  Outcome: Completed  8/4/2021 0137 by Robin Mcdermott RN  Outcome: Met This Shift     Problem: Neurological  Goal: Maximum potential motor/sensory/cognitive function  8/4/2021 0924 by Yesika Danielson RN  Outcome: Completed  8/4/2021 0137 by Robin Mcdermott RN  Outcome: Met This Shift     Problem: Musculor/Skeletal Functional Status  Goal: Highest potential functional level  8/4/2021 0924 by Yesika Danielson RN  Outcome: Completed  8/4/2021 0137 by Robin Mcdermott RN  Outcome: Met This Shift  Goal: Absence of falls  8/4/2021 0924 by Yang Troy Flora Frias RN  Outcome: Completed  8/4/2021 0137 by Linda Daniel RN  Outcome: Met This Shift

## 2021-08-04 NOTE — PLAN OF CARE
Problem: Falls - Risk of:  Goal: Will remain free from falls  Description: Will remain free from falls  8/4/2021 0924 by Saadia Cole RN  Outcome: Completed  8/4/2021 0137 by Amy Tate RN  Outcome: Met This Shift  Goal: Absence of physical injury  Description: Absence of physical injury  8/4/2021 0924 by Saadia Cole RN  Outcome: Completed  8/4/2021 0137 by Amy Tate RN  Outcome: Met This Shift     Problem: Pain:  Description: Pain management should include both nonpharmacologic and pharmacologic interventions.   Goal: Pain level will decrease  Description: Pain level will decrease  8/4/2021 0924 by Saadia Cole RN  Outcome: Completed  8/4/2021 0137 by Amy Tate RN  Outcome: Met This Shift  Goal: Control of acute pain  Description: Control of acute pain  8/4/2021 0924 by Saadia Cole RN  Outcome: Completed  8/4/2021 0137 by Amy Tate RN  Outcome: Met This Shift  Goal: Control of chronic pain  Description: Control of chronic pain  8/4/2021 0924 by Saadia Cole RN  Outcome: Completed  8/4/2021 0137 by Amy Tate RN  Outcome: Met This Shift     Problem: Skin Integrity:  Goal: Will show no infection signs and symptoms  Description: Will show no infection signs and symptoms  8/4/2021 0924 by Saadia Cole RN  Outcome: Completed  8/4/2021 0137 by Amy Tate RN  Outcome: Met This Shift  Goal: Absence of new skin breakdown  Description: Absence of new skin breakdown  8/4/2021 0924 by Saadia Cole RN  Outcome: Completed  8/4/2021 0137 by Amy Tate RN  Outcome: Met This Shift     Problem: Neurological  Goal: Maximum potential motor/sensory/cognitive function  8/4/2021 0924 by Saadia Cole RN  Outcome: Completed  8/4/2021 0137 by Amy Tate RN  Outcome: Met This Shift     Problem: Musculor/Skeletal Functional Status  Goal: Highest potential functional level  8/4/2021 0924 by Saadia Cole RN  Outcome: Completed  8/4/2021 0137 by Amy Tate RN  Outcome: Met This Shift  Goal: Absence of falls  8/4/2021 0924 by Taya Xie Felice Pedroza RN  Outcome: Completed  8/4/2021 0137 by Margarito Reza RN  Outcome: Met This Shift

## 2021-08-05 ENCOUNTER — TELEPHONE (OUTPATIENT)
Dept: VASCULAR SURGERY | Age: 51
End: 2021-08-05

## 2021-08-05 NOTE — TELEPHONE ENCOUNTER
Spoke with patient's wife, continue taking Pletal and Plavix, she did see the message in [address]

## 2021-08-05 NOTE — DISCHARGE SUMMARY
[address]                  [address] [facility],  [address]                               DISCHARGE SUMMARY    PATIENT NAME: Usman Livingston                       :        1970  MED REC NO:   [id number]                            ROOM:       [facility]  ACCOUNT NO:   [de-identified]                           ADMIT DATE: 2021  PROVIDER:     Darrick Delgado MD                  DISCHARGE DATE:  2021    AGE:  48. SEX:  Male. CHIEF COMPLAINT:  Left above-knee amputation. HISTORY OF PRESENT ILLNESS:  The patient was admitted to [facility]  with left leg ischemia on 2021. He had had a thrombectomy done,  but did not improve and he had to have a left above-knee amputation  done, I believe, on 2021. He was stabilized and improved. He did  develop a sprain of the right ankle and had some neuropathy there. He  was admitted to acute rehab on 2021. HOSPITAL COURSE:  On admission, the patient was felt to be a good rehab  candidate. He did pretty well with therapy, but he was very anxious to  be discharged out of the hospital.  He was able to transfer to a  wheelchair. He was able to ambulate about 40 feet with a walking boot  on the right side. He was minimal assist with his ADLs, but he felt  that he could continue to improve at home and was discharged home on  2021. Discharge condition is good. Discharge is to home. LABORATORY DATA:  BMP was unremarkable. Sugars were running between 90  and 300. Hemoglobin was 9.5, hematocrit 29.3. MEDICATIONS:  1. Oxycodone 5 mg as needed for pain. 2.  Lipitor 40 mg nightly. 3.  Celexa 40 mg daily. 4.  Colace 100 mg b.i.d.  5.  Gabapentin 200 mg t.i.d.  6.  Glucotrol 10 mg daily. 7.  Lantus 10 units nightly with sliding scale. 8.  Prinivil 40 mg daily. 9.  Glucophage 500 mg b.i.d. DIAGNOSES:  1. Left above-knee amputation. 2.  Diabetic neuropathy.   3.  Sprain of the right ankle. 4.  Type 2 diabetes mellitus, insulin dependent, uncontrolled. 5.  Hypertension. 6.  Peripheral vascular disease.         Bonita Hatfield MD    D: 08/04/2021 8:59:58       T: 08/04/2021 9:07:22     [email or web address]  Job#: [id number]     Doc#: [id number]    CC:

## 2021-08-20 RX ORDER — CILOSTAZOL 100 MG/1
100 TABLET ORAL 2 TIMES DAILY
COMMUNITY
End: 2021-08-20 | Stop reason: SDUPTHER

## 2021-08-22 RX ORDER — CILOSTAZOL 100 MG/1
100 TABLET ORAL 2 TIMES DAILY
Qty: 60 TABLET | Refills: 5 | Status: SHIPPED
Start: 2021-08-22 | End: 2021-09-14

## 2021-08-23 ENCOUNTER — OFFICE VISIT (OUTPATIENT)
Dept: VASCULAR SURGERY | Age: 51
End: 2021-08-23
Payer: COMMERCIAL

## 2021-08-23 VITALS — HEIGHT: 70 IN | BODY MASS INDEX: 25.05 KG/M2 | WEIGHT: 175 LBS

## 2021-08-23 DIAGNOSIS — S78.112A ABOVE KNEE AMPUTATION OF LEFT LOWER EXTREMITY (HCC): ICD-10-CM

## 2021-08-23 DIAGNOSIS — I73.9 PVD (PERIPHERAL VASCULAR DISEASE) WITH CLAUDICATION (HCC): Primary | ICD-10-CM

## 2021-08-23 PROCEDURE — L8440 SHRINKER BELOW KNEE: HCPCS | Performed by: SURGERY

## 2021-08-23 PROCEDURE — 99024 POSTOP FOLLOW-UP VISIT: CPT | Performed by: SURGERY

## 2021-08-23 NOTE — PATIENT INSTRUCTIONS
high cholesterol. · Be physically active. Try to do moderate activity at least 2½ hours a week. Or try to do vigorous activity at least 1¼ hours a week. You may want to walk or try other activities, such as running, swimming, cycling, or playing tennis or team sports. · Eat a variety of heart-healthy foods. ? Eat fruits, vegetables, whole grains, beans, and other high-fiber foods. ? Eat lean proteins, such as seafood, lean meats, beans, nuts, and soy products. ? Eat healthy fats, such as canola and olive oil. ? Choose foods that are low in saturated fat and avoid trans fat. ? Limit sodium and alcohol. ? Limit drinks and foods with added sugar. How is PAD treated? Treatment for PAD focuses on relieving symptoms and lowering your risk of heart attack and stroke. Making healthy lifestyle changes can help you lower this risk. · If you smoke, quit. Quitting is the best thing you can do when you have PAD. Medicines and counseling can help you quit for good. · Get regular exercise (if your doctor says it's safe). Try walking, swimming, or biking for at least 30 minutes on most, if not all, days of the week. If you have leg symptoms when you exercise, your doctor might recommend a specialized exercise program that may relieve symptoms. The goal is to be able to walk farther without pain. · Eat heart-healthy foods, such as vegetables, fruits, nuts, beans, fish, and whole grains. Limit foods that have a lot of salt, fat, and sugar. · Stay at a healthy weight. Lose weight if you need to. You may need medicines to help lower your risk of heart attack and stroke. These include medicine to prevent blood clots, improve cholesterol, or lower blood pressure. You also may take a medicine that can help ease pain while you are walking. People who have severe PAD may have bypass surgery or other procedures (such as angioplasty) to restore proper blood flow to the legs.   Follow-up care is a key part of your treatment and safety. Be sure to make and go to all appointments, and call your doctor if you are having problems. It's also a good idea to know your test results and keep a list of the medicines you take. Where can you learn more? Go to [email or web address] org and sign in to your farmbuy account. Enter W427 in the [employer] box to learn more about \"Learning About Peripheral Arterial Disease (PAD). \"     If you do not have an account, please click on the \"Sign Up Now\" link. Current as of: August 31, 2020               Content Version: 12.9  © 2763-1962 Healthwise, Incorporated. Care instructions adapted under license by [facility] ([facility]). If you have questions about a medical condition or this instruction, always ask your healthcare professional. [address] any warranty or liability for your use of this information.

## 2021-08-23 NOTE — PROGRESS NOTES
Vascular Surgery Outpatient Followup    PCP : Alyse Vega DO    Previous lower extremity procedures  10/4/16 R EIA plasty  R CFA, SFA, Popliteal atherectomy/plasty  R SFA, Popliteal plasty with DCB   6/19/18 R CFA, SFA, popliteal therectomy  R CFA plasty 6x150  R SFA, popliteal plasty 6x150 DCB   7/3/18 L CFA plasty with 7x60 DCB, 8x40 evercross  L profunda plasty 5x60 evercross   7/23/19 Left angiogram, Left Common femoral and profunda plasty with 6x80 evercross, 9x80 evercross    8/16/19 Left distal external iliac and femoral endarterectomy  Left Deep femoral endarterectomy with profundoplasty   Left fem ak pop bypass with 8 mm ringed propatent PTFE graft  Angiogram with plasty of bk pop with 5x150 [facility]   1/31/20 Right distal external iliac and femoral endarterectomy with repair with bovine pericardial patch   Deep femoral endarterectomy with profundoplasty    6/16/20 R profunda plasty 5x60  R sfa, pop atherectomy, 6x250 DCB   3/16/21 R sfa pop atherectomy   7/20/21 L LE angiogram  L SFA/pop plasty  Infusion catheter placement   7/21/21 LLE angiogram, removal of lysis catheter   7/22/21 L AKA       HISTORY OF PRESENT ILLNESS:    The patient is a 48 y.o. male who is here in regards to follow up of their PVD. L AKA is healing well. Wounds healing well. No longer has claudication sxs in R LE. Since intervention 3/16/21 he had been stable at 150 yards in his right lower extremity which remained lifestyle limiting but stable. Previously since 12/2020 he had right calf claudication at 50 yards. Prior to that he had no significant claudication. He has no rest pain or ulceration. He remains on asa, plavix, pletal, statin. He is currently not working. He is on disability.       Does have R ankle pain - had fx - wears boot at about 80 feet but better than when in hospital    Past Medical History:        Diagnosis Date    Anxiety     Atherosclerosis of native arteries of extremity with rest pain ([facility] 7/15/2021    Chronic obstructive pulmonary disease ([facility] 7/30/2019    Diabetes ([facility]     Femoral-popliteal bypass graft occlusion, left, initial encounter ([facility] 7/19/2021    GERD (gastroesophageal reflux disease)     Gout     Hyperlipidemia     Hypertension     Leg pain     Leg pain     Postoperative anemia due to acute blood loss 8/18/2019    PVD (peripheral vascular disease) ([facility] 1/15/2018    S/P femoral-popliteal bypass surgery 9/9/2019    Type 2 diabetes mellitus with diabetic peripheral angiopathy without gangrene, without long-term current use of insulin ([facility] 1/15/2018     Past Surgical History:        Procedure Laterality Date    FEMORAL BYPASS Left 8/16/2019    FEMORAL POPLITEAL BYPASS WITH FEMORAL ENDARTERECTOMY LEFT LEG performed by Evi Jones MD at [place] [address] Right 1/31/2020    RIGHT FEMORAL ENDARTERECTOMY performed by Evi Jones MD at [address] [place] [address] Left 7/22/2021    ABOVE KNEE AMPUTATION-LEFT LEG performed by Evi Jones MD at [address] [place] [address]  10/04/2016    Dr Juan Daniel Thacker - athrectomy/pci right fem/pop    OTHER SURGICAL HISTORY  06/19/2018    Dr Juan Daniel Thacker - PCI w/ athrectomy RLE Common Illiac to Popliteal    VASCULAR SURGERY  07/2019    ANGIOGRAM    WISDOM TOOTH EXTRACTION       Current Medications:   Current Outpatient Medications   Medication Sig Dispense Refill    cilostazol (PLETAL) 100 MG tablet Take 1 tablet by mouth 2 times daily 60 tablet 5    pregabalin (LYRICA) 150 MG capsule Take 1 capsule by mouth 2 times daily for 30 days. 60 capsule 1    HYDROcodone-acetaminophen (NORCO) 5-325 MG per tablet Take 1 tablet by mouth every 6 hours as needed for Pain for up to 30 days. Intended supply: 3 days.  Take lowest dose possible to manage pain 120 tablet 0    atorvastatin (LIPITOR) 40 MG tablet Take 1 tablet by mouth daily 90 tablet 2    lisinopril (PRINIVIL;ZESTRIL) 40 MG tablet Take 1 tablet by mouth daily 90 tablet 1    citalopram (CELEXA) 40 MG tablet Take 1 tablet by mouth daily Must keep appt on 20 30 tablet 0    glipiZIDE (GLUCOTROL XL) 10 MG extended release tablet Take 1 tablet by mouth daily 90 tablet 0    insulin glargine (LANTUS) 100 UNIT/ML injection vial Inject 10 Units into the skin nightly 1 vial 3    insulin lispro (HUMALOG) 100 UNIT/ML injection vial Inject 0-18 Units into the skin 3 times daily (with meals) 1 vial 3    insulin lispro (HUMALOG) 100 UNIT/ML injection vial Inject 0-9 Units into the skin nightly 1 vial 3    metFORMIN (GLUCOPHAGE) 500 MG tablet Take 1 tablet by mouth 2 times daily (with meals) 60 tablet 3    docusate sodium (COLACE, DULCOLAX) 100 MG CAPS Take 100 mg by mouth 2 times daily 60 capsule 0    omeprazole (PRILOSEC) 20 MG delayed release capsule Take 1 capsule by mouth Daily 30 capsule 0    aspirin (ASPIRIN CHILDRENS) 81 MG chewable tablet Take 1 tablet by mouth daily 30 tablet 3     No current facility-administered medications for this visit. Allergies:  Patient has no known allergies.   Social History     Socioeconomic History    Marital status:      Spouse name: Not on file    Number of children: Not on file    Years of education: Not on file    Highest education level: Not on file   Occupational History    Not on file   Tobacco Use    Smoking status: Former Smoker     Packs/day: 1.50     Years: 28.00     Pack years: 42.00     Types: Cigarettes     Start date: 80     Quit date: 10/15/2016     Years since quittin.8    Smokeless tobacco: Former User     Types: Snuff     Quit date: 2004   Vaping Use    Vaping Use: Never used   Substance and Sexual Activity    Alcohol use: Not Currently     Alcohol/week: 12.0 standard drinks     Types: 12 Cans of beer per week     Comment: HAS NOT DRANK SINCE 19    Drug use: Never    Sexual activity: Not on file   Other Topics Concern    Not on file   Social History Narrative    Occ [employer]; 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     Social Determinants of Health     Financial Resource Strain:     Difficulty of Paying Living Expenses:    Food Insecurity:     Worried About Running Out of Food in the Last Year:     [address] in the Last Year:    Transportation Needs:     Lack of Transportation (Medical):      Lack of Transportation (Non-Medical):    Physical Activity:     Days of Exercise per Week:     Minutes of Exercise per Session:    Stress:     Feeling of Stress :    Social Connections:     Frequency of Communication with Friends and Family:     Frequency of Social Gatherings with Friends and Family:     Attends L[employer] Services:     Active Member of Clubs or Organizations:     Attends Club or Organization Meetings:     Marital Status:    Intimate Partner Violence:     Fear of Current or Ex-Partner:     Emotionally Abused:     Physically Abused:     Sexually Abused:      Family History   Problem Relation Age of Onset    Asthma Mother     Other Father         vascular problems    Cancer Father         prostate, lung    Diabetes Father      Labs  Lab Results   Component Value Date    WBC 11.6 (H) 08/02/2021    HGB 9.5 (L) 08/02/2021    HCT 29.3 (L) 08/02/2021     (H) 08/02/2021    PROTIME 11.0 07/20/2021    INR 1.0 07/20/2021    APTT 29.2 07/21/2021    K 4.5 07/31/2021    K 4.5 07/31/2021    BUN 20 07/31/2021    CREATININE 0.6 (L) 07/31/2021     PHYSICAL EXAM:    Ht 5' 10\" (1.778 m)   Wt 175 lb (79.4 kg)   BMI 25.11 kg/m²   CONSTITUTIONAL:   Awake, alert, cooperative  PSYCHIATRIC :  Oriented to time, place and person     Appropriate insight to disease process  EYES: Lids and lashes normal  ENT:  External ears and nose without lesions   Hearing deficits absent  NECK: Supple, symmetrical, trachea midline   Carotid bruit absent  LUNGS:  No increased work of breathing                 Clear to auscultation  CARDIOVASCULAR:  regular rate and rhythm   ABDOMEN:  soft, non-distended, non-tender  SKIN:   Normal skin color   Texture and turgor normal, no induration  EXTREMITIES:   R LE Edema absent, no open wounds  L LE Aka well healed  R posterior tibial monophasic   R dorsalis pedis monophasic     RADIOLOGY:    A/P PVD with hx of lifestyle limiting claudication  S/P R common femoral endarterectomy, profundoplasty  S/P L femoral endarterectomy, fem pop bypass  S/p L AKA  · His R LE claudication has resolved  · His L AKA is healed -  ordered  · Continue medical management with asa, plavix, pletal and statin  · Emphasized importance of continued Tobacco cessation  · Last smoked in 2016  · they understood that with tobacco use they are at increased risk of worsening of their pvd and increased risk of limb loss  · Discussed with patient pathophysiology of pvd, claudication, tissues loss, rest pain, and potential for limb loss  · Discussed with patient symptoms of new onset claudication, tissue loss, rest pain, changes in lower extremity coolness, pain and they understood to go to ER immediately if any symptoms developed  · Emphasized walking program  · Understands that long term significant concerns due to profound degree of his arterial disease  · Fu in 2 months    Rafael Clinton MD

## 2021-09-15 RX ORDER — CILOSTAZOL 100 MG/1
TABLET ORAL
Qty: 60 TABLET | Refills: 5 | Status: SHIPPED
Start: 2021-09-15 | End: 2021-11-30

## 2021-10-22 ENCOUNTER — TELEPHONE (OUTPATIENT)
Dept: VASCULAR SURGERY | Age: 51
End: 2021-10-22

## 2021-11-08 ENCOUNTER — OFFICE VISIT (OUTPATIENT)
Dept: VASCULAR SURGERY | Age: 51
End: 2021-11-08
Payer: COMMERCIAL

## 2021-11-08 VITALS — BODY MASS INDEX: 25.05 KG/M2 | HEIGHT: 70 IN | WEIGHT: 175 LBS

## 2021-11-08 DIAGNOSIS — I73.9 PVD (PERIPHERAL VASCULAR DISEASE) WITH CLAUDICATION (HCC): Primary | ICD-10-CM

## 2021-11-08 DIAGNOSIS — S78.112A ABOVE KNEE AMPUTATION OF LEFT LOWER EXTREMITY (HCC): ICD-10-CM

## 2021-11-08 PROCEDURE — 99213 OFFICE O/P EST LOW 20 MIN: CPT | Performed by: SURGERY

## 2021-11-08 NOTE — PROGRESS NOTES
Vascular Surgery Outpatient Followup    PCP : Alpesh Bradley DO  Podiatrist : Dr. Giles Mccann    Previous lower extremity procedures  10/4/16 R EIA plasty  R CFA, SFA, Popliteal atherectomy/plasty  R SFA, Popliteal plasty with DCB   6/19/18 R CFA, SFA, popliteal therectomy  R CFA plasty 6x150  R SFA, popliteal plasty 6x150 DCB   7/3/18 L CFA plasty with 7x60 DCB, 8x40 evercross  L profunda plasty 5x60 evercross   7/23/19 Left angiogram, Left Common femoral and profunda plasty with 6x80 evercross, 9x80 evercross    8/16/19 Left distal external iliac and femoral endarterectomy  Left Deep femoral endarterectomy with profundoplasty   Left fem ak pop bypass with 8 mm ringed propatent PTFE graft  Angiogram with plasty of bk pop with 5x150 [facility]   1/31/20 Right distal external iliac and femoral endarterectomy with repair with bovine pericardial patch   Deep femoral endarterectomy with profundoplasty    6/16/20 R profunda plasty 5x60  R sfa, pop atherectomy, 6x250 DCB   3/16/21 R sfa pop atherectomy   7/20/21 L LE angiogram  L SFA/pop plasty  Infusion catheter placement   7/21/21 LLE angiogram, removal of lysis catheter   7/22/21 L AKA       HISTORY OF PRESENT ILLNESS:    The patient is a 46 y.o. male who is here in regards to follow up of their PVD. He has not used his prosthetic yet. He is supposed to fu with [facility] this week in regards to. He has been fitted, they did make adjustments. L AKA remains healed. Intermittently has had some oozing in left groin inferior and medial to previous incision. Currently no issues. Currently does not have claudication sxs in R LE, though he is not walking much. He is walking with a walker. Since intervention 3/16/21 he had been stable at 150 yards in his right lower extremity which remained lifestyle limiting but stable. Previously since 12/2020 he had right calf claudication at 50 yards. Prior to that he had no significant claudication.       He has no rest pain or ulceration. He remains on asa, plavix, pletal, statin. He is currently not working. He is on disability. He no longer has much R ankle pain (only with walking sometimes) - had fx. He is no longer wearing the boot. He has not seen ortho in fu.       Past Medical History:        Diagnosis Date    Anxiety     Atherosclerosis of native arteries of extremity with rest pain [facility] 7/15/2021    Chronic obstructive pulmonary disease ([address] 7/30/2019    Diabetes ([address]     Femoral-popliteal bypass graft occlusion, left, initial encounter ([facility] 7/19/2021    GERD (gastroesophageal reflux disease)     Gout     Hyperlipidemia     Hypertension     Leg pain     Leg pain     Postoperative anemia due to acute blood loss 8/18/2019    PVD (peripheral vascular disease) ([facility] 1/15/2018    S/P femoral-popliteal bypass surgery 9/9/2019    Type 2 diabetes mellitus with diabetic peripheral angiopathy without gangrene, without long-term current use of insulin ([facility] 1/15/2018     Past Surgical History:        Procedure Laterality Date    FEMORAL BYPASS Left 8/16/2019    FEMORAL POPLITEAL BYPASS WITH FEMOR[facility]AR[facility]    RIGHT FEMORAL ENDARTERECTOMY performed by Kayla Lea MD at [address] Left 7/22/2021    ABOVE KNEE AMPUTATION-LEFT LEG performed by [facility]la Lea MD at [facility] HISTORY  10/04/2016    Dr Era Prader - athrectomy/pci right fem/pop    OTHER SURGICAL HISTORY  06/19/2018    Dr Era Prader - PCI w/ athrectomy RLE Common Illiac to Popliteal    VASCULAR SURGERY  07/2019    ANGIOGRAM    WISDOM TOOTH EXTRACTION       Current Medications:   Current Outpatient Medications   Medication Sig Dispense Refill    cilostazol (PLETAL) 100 MG tablet TAKE 1 TABLET BY MOUTH TWICE A DAY 60 tablet 5    Blood Glucose Monitoring Suppl (ONE TOUCH ULTRA 2) w/Device KIT TEST BLOOD SUGAR THREE TIMES DAILY WITH MEALS      FoodynTOUCH ULTRA strip TEST BLOOD SUGAR THREE TIMES DAILY WITH MEALS      TRUEPLUS PEN NEEDLES 31G X 6 MM MISC       Lancets (ONETOUCH DELICA PLUS [id number]) MISC TEST BLOOD SUGAR THREE TIMES DAILY WITH MEALS      clopidogrel (PLAVIX) 75 MG tablet Take 75 mg by mouth daily      insulin glargine (LANTUS) 100 UNIT/ML injection vial Inject 10 Units into the skin nightly 1 vial 3    insulin lispro (HUMALOG) 100 UNIT/ML injection vial Inject 0-18 Units into the skin 3 times daily (with meals) 1 vial 3    metFORMIN (GLUCOPHAGE) 500 MG tablet Take 1 tablet by mouth 2 times daily (with meals) 60 tablet 3    docusate sodium (COLACE, DULCOLAX) 100 MG CAPS Take 100 mg by mouth 2 times daily 60 capsule 0    omeprazole (PRILOSEC) 20 MG delayed release capsule Take 1 capsule by mouth Daily 30 capsule 0    aspirin (ASPIRIN CHILDRENS) 81 MG chewable tablet Take 1 tablet by mouth daily 30 tablet 3    pregabalin (LYRICA) 150 MG capsule Take 1 capsule by mouth 2 times daily for 30 days. 60 capsule 1    atorvastatin (LIPITOR) 40 MG tablet Take 1 tablet by mouth daily 90 tablet 2    lisinopril (PRINIVIL;ZESTRIL) 40 MG tablet Take 1 tablet by mouth daily 90 tablet 1    citalopram (CELEXA) 40 MG tablet Take 1 tablet by mouth daily Must keep appt on 9-21-20 30 tablet 0    glipiZIDE (GLUCOTROL XL) 10 MG extended release tablet Take 1 tablet by mouth daily 90 tablet 0     No current facility-administered medications for this visit. Allergies:  Patient has no known allergies.   Social History     Socioeconomic History    Marital status:      Spouse name: Not on file    Number of children: Not on file    Years of education: Not on file    Highest education level: Not on file   Occupational History    Not on file   Tobacco Use    Smoking status: Former Smoker     Packs/day: 1.50     Years: 28.00     Pack years: 42.00     Types: Cigarettes     Start date: 1988 Quit date: 10/15/2016     Years since quittin.0    Smokeless tobacco: Former User     Types: Snuff     Quit date: 2004   Vaping Use    Vaping Use: Never used   Substance and Sexual Activity    Alcohol use: Not Currently     Alcohol/week: 12.0 standard drinks     Types: 12 Cans of beer per week     Comment: HAS NOT DRANK SINCE 19    Drug use: Never    Sexual activity: Not on file   Other Topics Concern    Not on file   Social History Narrative    Occ [employer]; 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     Social Determinants of Health     Financial Resource Strain: Unknown    Difficulty of Paying Living Expenses: Patient refused   Food Insecurity: Unknown    Worried About Running Out of Food in the Last Year: Patient refused    [address] in the Last Year: Patient refused   Transportation Needs:     Lack of Transportation (Medical): Not on file    Lack of Transportation (Non-Medical):  Not on file   Physical Activity:     Days of Exercise per Week: Not on file    Minutes of Exercise per Session: Not on file   Stress:     Feeling of Stress : Not on file   Social Connections:     Frequency of Communication with Friends and Family: Not on file    Frequency of Social Gatherings with Friends and Family: Not on file    Attends [employer] Services: Not on file    Active Member of [employer] or Organizations: Not on file    Attends Club or Organization Meetings: Not on file    Marital Status: Not on file   Intimate Partner Violence:     Fear of Current or Ex-Partner: Not on file    Emotionally Abused: Not on file    Physically Abused: Not on file    Sexually Abused: Not on file   Housing Stability:     Unable to Pay for Housing in the Last Year: Not on file    Number of [place] in the Last Year: Not on file    Unstable Housing in the Last Year: Not on file     Family History   Problem Relation Age of Onset    Asthma Mother     Other Father         vascular problems    Cancer Father         prostate, lung    Diabetes Father      Labs  Lab Results   Component Value Date    WBC 8.9 08/30/2021    HGB 11.9 (L) 08/30/2021    HCT 36.1 (L) 08/30/2021     08/30/2021    PROTIME 11.0 07/20/2021    INR 1.0 07/20/2021    APTT 29.2 07/21/2021    K 4.9 08/30/2021    BUN 14 08/30/2021    CREATININE 0.6 (L) 08/30/2021     PHYSICAL EXAM:    Ht 5' 10\" (1.778 m)   Wt 175 lb (79.4 kg)   BMI 25.11 kg/m²   CONSTITUTIONAL:   Awake, alert, cooperative  PSYCHIATRIC :  Oriented to time, place and person     Appropriate insight to disease process  EYES: Lids and lashes normal  ENT:  External ears and nose without lesions   Hearing deficits absent  NECK: Supple, symmetrical, trachea midline   Carotid bruit absent  LUNGS:  No increased work of breathing                 Clear to auscultation  CARDIOVASCULAR:  regular rate and rhythm   ABDOMEN:  soft, non-distended, non-tender  SKIN:   Normal skin color   Texture and turgor normal, no induration  EXTREMITIES:   R LE Edema absent, no open wounds  L LE Aka well healed  R posterior tibial monophasic   R dorsalis pedis monophasic     RADIOLOGY:    A/P PVD with hx of lifestyle limiting claudication  S/P R common femoral endarterectomy, profundoplasty  S/P L femoral endarterectomy, fem pop bypass  S/p L AKA  · His R LE claudication remains resolved  · His L AKA is healed supposed to be fitted with prosthetic this week  · Continue medical management with asa, plavix, pletal and statin  · Emphasized importance of continued Tobacco cessation  · Last smoked in 2016  · they understood that with tobacco use they are at increased risk of worsening of their pvd and increased risk of limb loss  · Discussed with patient pathophysiology of pvd, claudication, tissues loss, rest pain, and potential for limb loss  · Discussed with patient symptoms of new onset claudication, tissue loss, rest pain, changes in lower extremity coolness, pain and they understood to go to ER immediately if any symptoms developed  · Emphasized walking program  · Understands that long term significant concerns due to profound degree of his arterial disease  · Fu with Dr Leighann Padilla in regards to foot care - he has seen him before    Nayeli Longoria MD

## 2021-11-30 RX ORDER — CILOSTAZOL 100 MG/1
TABLET ORAL
Qty: 180 TABLET | Refills: 3 | Status: SHIPPED
Start: 2021-11-30 | End: 2022-08-23

## 2021-12-15 RX ORDER — CLOPIDOGREL BISULFATE 75 MG/1
75 TABLET ORAL DAILY
Qty: 90 TABLET | Refills: 3 | Status: SHIPPED
Start: 2021-12-15 | End: 2022-04-21 | Stop reason: SDUPTHER

## 2022-04-24 RX ORDER — CLOPIDOGREL BISULFATE 75 MG/1
75 TABLET ORAL DAILY
Qty: 90 TABLET | Refills: 3 | Status: SHIPPED | OUTPATIENT
Start: 2022-04-24 | End: 2023-04-24

## 2022-05-06 ENCOUNTER — TELEPHONE (OUTPATIENT)
Dept: VASCULAR SURGERY | Age: 52
End: 2022-05-06

## 2022-05-09 ENCOUNTER — HOSPITAL ENCOUNTER (OUTPATIENT)
Dept: CARDIOLOGY | Age: 52
Discharge: HOME | End: 2022-05-09
Payer: COMMERCIAL

## 2022-05-09 ENCOUNTER — OFFICE VISIT (OUTPATIENT)
Dept: VASCULAR SURGERY | Age: 52
End: 2022-05-09
Payer: COMMERCIAL

## 2022-05-09 VITALS — WEIGHT: 170 LBS | HEIGHT: 70 IN | BODY MASS INDEX: 24.34 KG/M2

## 2022-05-09 DIAGNOSIS — S78.112A ABOVE KNEE AMPUTATION OF LEFT LOWER EXTREMITY (HCC): ICD-10-CM

## 2022-05-09 DIAGNOSIS — I73.9 PVD (PERIPHERAL VASCULAR DISEASE) WITH CLAUDICATION (HCC): ICD-10-CM

## 2022-05-09 DIAGNOSIS — I73.9 PVD (PERIPHERAL VASCULAR DISEASE) (HCC): Primary | ICD-10-CM

## 2022-05-09 PROCEDURE — 99213 OFFICE O/P EST LOW 20 MIN: CPT | Performed by: NURSE PRACTITIONER

## 2022-05-09 PROCEDURE — 93923 UPR/LXTR ART STDY 3+ LVLS: CPT

## 2022-05-09 NOTE — PROGRESS NOTES
Vascular Surgery Outpatient Followup    PCP : Briana Dimas DO  Podiatrist : Dr. Olga Frederick    Previous lower extremity procedures  10/4/16 R EIA plasty  R CFA, SFA, Popliteal atherectomy/plasty  R SFA, Popliteal plasty with DCB   6/19/18 R CFA, SFA, popliteal therectomy  R CFA plasty 6x150  R SFA, popliteal plasty 6x150 DCB   7/3/18 L CFA plasty with 7x60 DCB, 8x40 evercross  L profunda plasty 5x60 evercross   7/23/19 Left angiogram, Left Common femoral and profunda plasty with 6x80 evercross, 9x80 evercross    8/16/19 Left distal external iliac and femoral endarterectomy  Left Deep femoral endarterectomy with profundoplasty   Left fem ak pop bypass with 8 mm ringed propatent PTFE graft  Angiogram with plasty of bk pop with 5x150 [facility]   1/31/20 Right distal external iliac and femoral endarterectomy with repair with bovine pericardial patch   Deep femoral endarterectomy with profundoplasty    6/16/20 R profunda plasty 5x60  R sfa, pop atherectomy, 6x250 DCB   3/16/21 R sfa pop atherectomy   7/20/21 L LE angiogram  L SFA/pop plasty  Infusion catheter placement   7/21/21 LLE angiogram, removal of lysis catheter   7/22/21 L AKA       HISTORY OF PRESENT ILLNESS:    The patient is a 46 y.o. male who is here in regards to follow up of their PVD. He is walking on his left prosthesis with a walker. He is going to trial using a cane for ambulation later this week. Currently does not have claudication sxs in R LE, though he is not walking as much as he was prior to his amputation. He is walking with a walker. Since intervention 3/16/21 he had been stable at 150 yards in his right lower extremity which remained lifestyle limiting but stable. Previously since 12/2020 he had right calf claudication at 50 yards. Prior to that he had no significant claudication. He has no rest pain or ulceration. He remains on asa, plavix, pletal, statin. He is currently not working. He is on disability.       Past Medical History:        Diagnosis Date    Anxiety     [facility] arteries [facility] 7/15/2021    Chronic obstructive pulmonary disease [facility] 7/30/2019    Diabetes [facility]     Femoral-popliteal bypass graft occlusion, left, initial encounter ([facility] 7/19/2021    GERD (gastroesophageal reflux disease)     Gout     Hyperlipidemia     Hypertension     Leg pain     Leg pain     Postoperative anemia due to acute blood loss 8/18/2019    PVD [facility]peripheral vascular disease) [facility] 1/15/2018    S/P femoral-popliteal bypass surgery 9/9/2019    Type 2 diabetes mellitus with diabetic peripheral angiopathy without gangrene, without long-term current use of insulin ([facility] 1/15/2018     Past Surgical History:        Procedure Laterality Date    FEMORAL BYPASS Left 8/16/2019    FEMORAL POPLITEAL BYPASS WITH FEMORAL ENDARTERECTOMY LEFT LEG performed by Abby Fraire MD at [address] Right 1/31/2020    RIGHT FEMORAL ENDARTERECTOMY performed by Abby Fraire MD at [address] [place] [address] Left 7/22/2021    ABOVE KNEE AMPUTATION-LEFT LEG performed by Abby Fraire MD at [address] [address] HISTORY  10/04/2016    Dr Renée Self - athrectomy/pci right fem/pop    OTHER SURGICAL HISTORY  06/19/2018    Dr Renée Self - PCI w/ athrectomy RLE Common Illiac to Popliteal    VASCULAR SURGERY  07/2019    ANGIOGRAM    WISDOM TOOTH EXTRACTION       Current Medications:   Current Outpatient Medications   Medication Sig Dispense Refill    clopidogrel (PLAVIX) 75 MG tablet Take 1 tablet by mouth daily 90 tablet 3    pregabalin (LYRICA) 150 MG capsule Take 1 capsule by mouth 2 times daily for 90 days. 180 capsule 0    ONETOUCH ULTRA strip 1 each by In Vitro route 3 times daily As needed.  300 each 5    insulin lispro, 1 Unit Dial, (HUMALOG KWIKPEN) 100 UNIT/ML SOPN 5 pens - sliding scale 0-18 units 3 times a day 270 mL 2    cilostazol (PLETAL) 100 MG tablet TAKE 1 TABLET BY MOUTH TWO  TIMES DAILY 180 tablet 3    Blood Glucose Monitoring Suppl (ONE TOUCH ULTRA 2) w/Device KIT TEST BLOOD SUGAR THREE TIMES DAILY WITH MEALS      TRUEPLUS PEN NEEDLES 31G X 6 MM MISC       Lancets (ONETOUCH DELICA PLUS [id number]) MISC TEST BLOOD SUGAR THREE TIMES DAILY WITH MEALS      insulin glargine (LANTUS) 100 UNIT/ML injection vial Inject 10 Units into the skin nightly 1 vial 3    insulin lispro (HUMALOG) 100 UNIT/ML injection vial Inject 0-18 Units into the skin 3 times daily (with meals) 1 vial 3    metFORMIN (GLUCOPHAGE) 500 MG tablet Take 1 tablet by mouth 2 times daily (with meals) 60 tablet 3    docusate sodium (COLACE, DULCOLAX) 100 MG CAPS Take 100 mg by mouth 2 times daily 60 capsule 0    omeprazole (PRILOSEC) 20 MG delayed release capsule Take 1 capsule by mouth Daily 30 capsule 0    aspirin (ASPIRIN CHILDRENS) 81 MG chewable tablet Take 1 tablet by mouth daily 30 tablet 3    glipiZIDE (GLUCOTROL XL) 10 MG extended release tablet Take 1 tablet by mouth 2 times daily 180 tablet 1    atorvastatin (LIPITOR) 40 MG tablet Take 1 tablet by mouth daily 90 tablet 2    lisinopril (PRINIVIL;ZESTRIL) 40 MG tablet Take 1 tablet by mouth daily 90 tablet 1    citalopram (CELEXA) 40 MG tablet Take 1 tablet by mouth daily Must keep appt on 20 30 tablet 0     No current facility-administered medications for this visit. Allergies:  Patient has no known allergies.   Social History     Socioeconomic History    Marital status:      Spouse name: Not on file    Number of children: Not on file    Years of education: Not on file    Highest education level: Not on file   Occupational History    Not on file   Tobacco Use    Smoking status: Former Smoker     Packs/day: 1.50     Years: 28.00     Pack years: 42.00     Types: Cigarettes     Start date: 80     Quit date: 10/15/2016     Years since quittin.5    Smokeless tobacco: Former User     Types: Snuff Quit date: 1/1/2004   Vaping Use    Vaping Use: Never used   Substance and Sexual Activity    Alcohol use: Not Currently     Alcohol/week: 12.0 standard drinks     Types: 12 Cans of beer per week     Comment: HAS NOT DRANK SINCE 12/13/19    Drug use: Never    Sexual activity: Not on file   Other Topics Concern    Not on file   Social History Narrative    Occ [employer]; 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     Social Determinants of Health     Financial Resource Strain: Unknown    Difficulty of Paying Living Expenses: Patient refused   Food Insecurity: Unknown    Worried About Running Out of Food in the Last Year: Patient refused    [address] in the Last Year: Patient refused   Transportation Needs:     Lack of Transportation (Medical): Not on file    Lack of Transportation (Non-Medical):  Not on file   Physical Activity:     Days of Exercise per Week: Not on file    Minutes of Exercise per Session: Not on file   Stress:     Feeling of Stress : Not on file   Social Connections:     Frequency of Communication with Friends and Family: Not on file    Frequency of Social Gatherings with Friends and Family: Not on file    Attends [employer] Services: Not on file    Active Member of [address] [place] [address] or Organizations: Not on file    Attends Club or Organization Meetings: Not on file    Marital Status: Not on file   Intimate Partner Violence:     Fear of Current or Ex-Partner: Not on file    Emotionally Abused: Not on file    Physically Abused: Not on file    Sexually Abused: Not on file   Housing Stability:     Unable to Pay for Housing in the Last Year: Not on file    Number of [place] in the Last Year: Not on file    Unstable Housing in the Last Year: Not on file     Family History   Problem Relation Age of Onset    Asthma Mother     Other Father         vascular problems    Cancer Father         prostate, lung    Diabetes Father      Labs  Lab Results   Component Value Date    WBC 8.9 08/30/2021    HGB 11.9 (L) 08/30/2021    HCT 36.1 (L) 08/30/2021     08/30/2021    PROTIME 11.0 07/20/2021    INR 1.0 07/20/2021    APTT 29.2 07/21/2021    K 4.9 08/30/2021    BUN 14 08/30/2021    CREATININE 0.6 (L) 08/30/2021     PHYSICAL EXAM:    Ht 5' 10\" (1.778 m)   Wt 170 lb (77.1 kg)   BMI 24.39 kg/m²   CONSTITUTIONAL:   Awake, alert, cooperative  PSYCHIATRIC :  Oriented to time, place and person     Appropriate insight to disease process  EYES: Lids and lashes normal  ENT:  External ears and nose without lesions   Hearing deficits absent  NECK: Supple, symmetrical, trachea midline   Carotid bruit absent  LUNGS:  No increased work of breathing                 Clear to auscultation  CARDIOVASCULAR:  regular rate and rhythm   ABDOMEN:  soft, non-distended, non-tender  SKIN:   Normal skin color   Texture and turgor normal, no induration  EXTREMITIES:   R LE Edema absent, no open wounds  L LE Aka well healed  R posterior tibial monophasic   R dorsalis pedis monophasic     RADIOLOGY:    MADI R 0.47 (0.46)  TBI 0.33 (0.11)    A/P PVD with hx of lifestyle limiting claudication  S/P R common femoral endarterectomy, profundoplasty  S/P L femoral endarterectomy, fem pop bypass  S/p L AKA  · Arterial studies stable   · His R LE claudication remains resolved  · His L AKA is healed   · He is ambulating on his prosthesis with a walker  · Continue medical management with asa, plavix, pletal and statin  · Emphasized importance of continued Tobacco cessation  · Last smoked in 2016  · they understood that with tobacco use they are at increased risk of worsening of their pvd and increased risk of limb loss  · Discussed with patient pathophysiology of pvd, claudication, tissues loss, rest pain, and potential for limb loss  · Discussed with patient symptoms of new onset claudication, tissue loss, rest pain, changes in lower extremity coolness, pain and they understood to go to ER immediately if any symptoms developed  · Emphasized walking program  · Understands that long term significant concerns due to profound degree of his arterial disease  · Fu 6 months with arterial studies  · The patient understands to call sooner with any new or worsening symptoms    Pt seen and plan reviewed with Dr. Andreia Pierce.      DRAKE Fitzpatrick - CNP

## 2022-08-23 RX ORDER — CILOSTAZOL 100 MG/1
100 TABLET ORAL 2 TIMES DAILY
Qty: 60 TABLET | Refills: 5 | Status: SHIPPED
Start: 2022-08-23 | End: 2022-09-27

## 2022-09-27 RX ORDER — CILOSTAZOL 100 MG/1
TABLET ORAL
Qty: 180 TABLET | Refills: 3 | Status: SHIPPED | OUTPATIENT
Start: 2022-09-27

## 2022-11-11 ENCOUNTER — TELEPHONE (OUTPATIENT)
Dept: VASCULAR SURGERY | Age: 52
End: 2022-11-11

## 2022-11-14 ENCOUNTER — HOSPITAL ENCOUNTER (OUTPATIENT)
Dept: CARDIOLOGY | Age: 52
Discharge: HOME | End: 2022-11-14
Payer: COMMERCIAL

## 2022-11-14 ENCOUNTER — OFFICE VISIT (OUTPATIENT)
Dept: VASCULAR SURGERY | Age: 52
End: 2022-11-14
Payer: COMMERCIAL

## 2022-11-14 DIAGNOSIS — I73.9 PVD (PERIPHERAL VASCULAR DISEASE) (HCC): ICD-10-CM

## 2022-11-14 DIAGNOSIS — I73.9 PVD (PERIPHERAL VASCULAR DISEASE) WITH CLAUDICATION (HCC): Primary | ICD-10-CM

## 2022-11-14 PROCEDURE — 99213 OFFICE O/P EST LOW 20 MIN: CPT

## 2022-11-14 PROCEDURE — 93923 UPR/LXTR ART STDY 3+ LVLS: CPT

## 2022-11-14 NOTE — PROGRESS NOTES
Vascular Surgery Outpatient Followup    PCP : Rod Martinez DO  Podiatrist : Dr. Yu Llanes    Previous lower extremity procedures  10/4/16 R EIA plasty  R CFA, SFA, Popliteal atherectomy/plasty  R SFA, Popliteal plasty with DCB   6/19/18 R CFA, SFA, popliteal therectomy  R CFA plasty 6x150  R SFA, popliteal plasty 6x150 DCB   7/3/18 L CFA plasty with 7x60 DCB, 8x40 evercross  L profunda plasty 5x60 evercross   7/23/19 Left angiogram, Left Common femoral and profunda plasty with 6x80 evercross, 9x80 evercross    8/16/19 Left distal external iliac and femoral endarterectomy  Left Deep femoral endarterectomy with profundoplasty   Left fem ak pop bypass with 8 mm ringed propatent PTFE graft  Angiogram with plasty of bk pop with 5x150 DCB   1/31/20 Right distal external iliac and femoral endarterectomy with repair with bovine pericardial patch   Deep femoral endarterectomy with profundoplasty    6/16/20 R profunda plasty 5x60  R sfa, pop atherectomy, 6x250 DCB   3/16/21 R sfa pop atherectomy   7/20/21 L LE angiogram  L SFA/pop plasty  Infusion catheter placement   7/21/21 LLE angiogram, removal of lysis catheter   7/22/21 L AKA       HISTORY OF PRESENT ILLNESS:    The patient is a 46 y.o. male who is here in regards to follow up of their PVD. He is walking on his left prosthesis with a cane. He is not able to walk far because of balance issues. He can walk further distances with a walker. Currently does not have claudication sxs in R LE. Since intervention 3/16/21 he had been stable at 150 yards in his right lower extremity which remained lifestyle limiting but stable. Previously since 12/2020 he had right calf claudication at 50 yards. Prior to that he had no significant claudication. He has no rest pain or ulceration. He remains on asa, plavix, pletal, statin. He is currently not working. He is on disability.       Past Medical History:        Diagnosis Date    Anxiety     Atherosclerosis of native arteries of extremity with rest pain ([facility] 7/15/2021    Chronic obstructive pulmonary disease ([facility] 7/30/2019    Diabetes ([facility]     Femoral-popliteal bypass graft occlusion, left, initial encounter ([facility] 7/19/2021    GERD (gastroesophageal reflux disease)     Gout     Hyperlipidemia     Hypertension     Leg pain     Leg pain     Postoperative anemia due to acute blood loss 8/18/2019    PVD (peripheral vascular disease) ([facility] 1/15/2018    S/P femoral-popliteal bypass surgery 9/9/2019    Type 2 diabetes mellitus with diabetic peripheral angiopathy without gangrene, without long-term current use of insulin ([facility] 1/15/2018     Past Surgical History:        Procedure Laterality Date    FEMORAL BYPASS Left 8/16/2019    FEMORAL POPLITEAL BYPASS WITH FEMORAL ENDARTERECTOMY LEFT LEG performed by Ke Winters MD at [address] [facility] Right 1/31/2020    RIGHT FEMORAL ENDARTERECTOMY performed by Ke Winters MD at [address] Left 7/22/2021    ABOVE KNEE AMPUTATION-LEFT LEG performed by Ke Winters MD at [address]  10/04/2016    Dr Brooklynn Tavares athrectomy/pci right fem/pop    OTHER SURGICAL HISTORY  06/19/2018    Dr Brooklynn Tavares PCI w/ athrectomy RLE Common Illiac to Popliteal    VASCULAR SURGERY  07/2019    ANGIOGRAM    WISDOM TOOTH EXTRACTION       Current Medications:   Current Outpatient Medications   Medication Sig Dispense Refill    pregabalin (LYRICA) 150 MG capsule Take 1 capsule by mouth 2 times daily for 90 days. 180 capsule 0    cilostazol (PLETAL) 100 MG tablet TAKE 1 TABLET BY MOUTH  TWICE DAILY 180 tablet 3    clopidogrel (PLAVIX) 75 MG tablet Take 1 tablet by mouth daily 90 tablet 3    ONETOUCH ULTRA strip 1 each by In Vitro route 3 times daily As needed.  300 each 5    insulin lispro, 1 Unit Dial, (HUMALOG KWIKPEN) 100 UNIT/ML SOPN 5 pens - sliding scale 0-18 units 3 times a day 270 mL 2    Blood Glucose Monitoring Suppl (ONE TOUCH ULTRA 2) w/Device KIT TEST BLOOD SUGAR THREE TIMES DAILY WITH MEALS      TRUEPLUS PEN NEEDLES 31G X 6 MM MISC       Lancets (ONETOUCH DELICA PLUS [id number]) MISC TEST BLOOD SUGAR THREE TIMES DAILY WITH MEALS      atorvastatin (LIPITOR) 40 MG tablet Take 1 tablet by mouth daily 90 tablet 2    lisinopril (PRINIVIL;ZESTRIL) 40 MG tablet Take 1 tablet by mouth daily 90 tablet 1    citalopram (CELEXA) 40 MG tablet Take 1 tablet by mouth daily Must keep appt on 20 30 tablet 0    insulin glargine (LANTUS) 100 UNIT/ML injection vial Inject 10 Units into the skin nightly 1 vial 3    insulin lispro (HUMALOG) 100 UNIT/ML injection vial Inject 0-18 Units into the skin 3 times daily (with meals) 1 vial 3    metFORMIN (GLUCOPHAGE) 500 MG tablet Take 1 tablet by mouth 2 times daily (with meals) 60 tablet 3    omeprazole (PRILOSEC) 20 MG delayed release capsule Take 1 capsule by mouth Daily 30 capsule 0    aspirin (ASPIRIN CHILDRENS) 81 MG chewable tablet Take 1 tablet by mouth daily 30 tablet 3    glipiZIDE (GLUCOTROL XL) 10 MG extended release tablet Take 1 tablet by mouth 2 times daily 180 tablet 1     No current facility-administered medications for this visit. Allergies:  Patient has no known allergies.   Social History     Socioeconomic History    Marital status:      Spouse name: Not on file    Number of children: Not on file    Years of education: Not on file    Highest education level: Not on file   Occupational History    Not on file   Tobacco Use    Smoking status: Former     Packs/day: 1.50     Years: 28.00     Pack years: 42.00     Types: Cigarettes     Start date: 80     Quit date: 10/15/2016     Years since quittin.0    Smokeless tobacco: Former     Types: Snuff     Quit date: 2004   Vaping Use    Vaping Use: Never used   Substance and Sexual Activity    Alcohol use: Not Currently     Alcohol/week: 12.0 standard drinks     Types: 12 Cans of beer per week     Comment: HAS NOT DRANK SINCE 12/13/19    Drug use: Never    Sexual activity: Not on file   Other Topics Concern    Not on file   Social History Narrative    Occ [employer]; 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     Social Determinants of Health     Financial Resource Strain: Not on file   Food Insecurity: Not on file   Transportation Needs: Not on file   Physical Activity: Not on file   Stress: Not on file   Social Connections: Not on file   Intimate Partner Violence: Not on file   Housing Stability: Not on file     Family History   Problem Relation Age of Onset    Asthma Mother     Other Father         vascular problems    Cancer Father         prostate, lung    Diabetes Father      Labs  Lab Results   Component Value Date    WBC 8.9 08/30/2021    HGB 11.9 (L) 08/30/2021    HCT 36.1 (L) 08/30/2021     08/30/2021    PROTIME 11.0 07/20/2021    INR 1.0 07/20/2021    APTT 29.2 07/21/2021    K 4.9 08/30/2021    BUN 14 08/30/2021    CREATININE 0.6 (L) 08/30/2021     PHYSICAL EXAM:    There were no vitals taken for this visit.   CONSTITUTIONAL:   Awake, alert, cooperative  PSYCHIATRIC :  Oriented to time, place and person     Appropriate insight to disease process  EYES: Lids and lashes normal  ENT:  External ears and nose without lesions   Hearing deficits absent  NECK: Supple, symmetrical, trachea midline   Carotid bruit absent  LUNGS:  No increased work of breathing                 Clear to auscultation  CARDIOVASCULAR:  regular rate and rhythm   ABDOMEN:  soft, non-distended, non-tender  SKIN:   Normal skin color   Texture and turgor normal, no induration  EXTREMITIES:   R LE Edema absent, no open wounds  L LE Aka well healed  R posterior tibial monophasic   R dorsalis pedis monophasic     RADIOLOGY:    MADI R 0.52 (0.47)  TBI 0.22 (0.33)    A/P PVD with hx of lifestyle limiting claudication  S/P R common femoral endarterectomy, profundoplasty  S/P L femoral endarterectomy, fem pop bypass  S/p L AKA  Arterial studies stable   His R LE claudication remains resolved  His L AKA is healed   He is ambulating on his prosthesis with a walker  Continue medical management with asa, plavix, pletal and statin  Emphasized importance of continued Tobacco cessation  Last smoked in 2016  they understood that with tobacco use they are at increased risk of worsening of their pvd and increased risk of limb loss  Discussed with patient pathophysiology of pvd, claudication, tissues loss, rest pain, and potential for limb loss  Discussed with patient symptoms of new onset claudication, tissue loss, rest pain, changes in lower extremity coolness, pain and they understood to go to ER immediately if any symptoms developed  Emphasized walking program  Understands that long term significant concerns due to profound degree of his arterial disease  Fu 6 months with arterial studies  The patient understands to call sooner with any new or worsening symptoms    Pt seen and plan reviewed with Dr. Paige Casiano.      Abner Almaguer, DRAKE - CNP

## 2023-03-22 RX ORDER — CLOPIDOGREL BISULFATE 75 MG/1
75 TABLET ORAL DAILY
Qty: 90 TABLET | Refills: 3 | Status: SHIPPED | OUTPATIENT
Start: 2023-03-22 | End: 2024-03-21

## 2023-04-17 PROBLEM — E11.51 TYPE 2 DIABETES MELLITUS WITH DIABETIC PERIPHERAL ANGIOPATHY WITHOUT GANGRENE, WITHOUT LONG-TERM CURRENT USE OF INSULIN (HCC): Chronic | Status: RESOLVED | Noted: 2018-01-15 | Resolved: 2023-04-17

## 2023-04-17 PROBLEM — I73.9 PVD (PERIPHERAL VASCULAR DISEASE) (HCC): Status: RESOLVED | Noted: 2021-03-09 | Resolved: 2023-04-17

## 2023-04-17 PROBLEM — I70.209 ARTERIAL OCCLUSION, LOWER EXTREMITY (HCC): Status: RESOLVED | Noted: 2021-07-20 | Resolved: 2023-04-17

## 2023-04-17 PROBLEM — Z79.4 TYPE 2 DIABETES MELLITUS WITH DIABETIC PERIPHERAL ANGIOPATHY WITHOUT GANGRENE, WITH LONG-TERM CURRENT USE OF INSULIN (HCC): Status: ACTIVE | Noted: 2023-04-17

## 2023-04-17 PROBLEM — I70.229 ATHEROSCLEROSIS OF NATIVE ARTERIES OF EXTREMITY WITH REST PAIN (HCC): Status: RESOLVED | Noted: 2021-07-15 | Resolved: 2023-04-17

## 2023-04-17 PROBLEM — E11.51 TYPE 2 DIABETES MELLITUS WITH DIABETIC PERIPHERAL ANGIOPATHY WITHOUT GANGRENE, WITH LONG-TERM CURRENT USE OF INSULIN (HCC): Status: ACTIVE | Noted: 2023-04-17

## 2023-04-17 PROBLEM — T82.898A FEMORAL-POPLITEAL BYPASS GRAFT OCCLUSION, LEFT, INITIAL ENCOUNTER (HCC): Status: RESOLVED | Noted: 2021-07-19 | Resolved: 2023-04-17

## 2023-04-17 PROBLEM — Z71.6 TOBACCO ABUSE COUNSELING: Chronic | Status: RESOLVED | Noted: 2018-01-15 | Resolved: 2023-04-17

## 2023-05-12 ENCOUNTER — TELEPHONE (OUTPATIENT)
Dept: VASCULAR SURGERY | Age: 53
End: 2023-05-12

## 2023-06-19 ENCOUNTER — OFFICE VISIT (OUTPATIENT)
Dept: VASCULAR SURGERY | Age: 53
End: 2023-06-19
Payer: MEDICARE

## 2023-06-19 ENCOUNTER — HOSPITAL ENCOUNTER (OUTPATIENT)
Dept: CARDIOLOGY | Age: 53
Discharge: HOME | End: 2023-06-19
Payer: COMMERCIAL

## 2023-06-19 DIAGNOSIS — I73.9 PVD (PERIPHERAL VASCULAR DISEASE) WITH CLAUDICATION (HCC): ICD-10-CM

## 2023-06-19 DIAGNOSIS — I73.9 PVD (PERIPHERAL VASCULAR DISEASE) (HCC): Primary | ICD-10-CM

## 2023-06-19 PROCEDURE — G8419 CALC BMI OUT NRM PARAM NOF/U: HCPCS | Performed by: PHYSICIAN ASSISTANT

## 2023-06-19 PROCEDURE — 93923 UPR/LXTR ART STDY 3+ LVLS: CPT

## 2023-06-19 PROCEDURE — 3017F COLORECTAL CA SCREEN DOC REV: CPT | Performed by: PHYSICIAN ASSISTANT

## 2023-06-19 PROCEDURE — G8427 DOCREV CUR MEDS BY ELIG CLIN: HCPCS | Performed by: PHYSICIAN ASSISTANT

## 2023-06-19 PROCEDURE — 99213 OFFICE O/P EST LOW 20 MIN: CPT | Performed by: PHYSICIAN ASSISTANT

## 2023-06-19 PROCEDURE — 1036F TOBACCO NON-USER: CPT | Performed by: PHYSICIAN ASSISTANT

## 2023-06-19 NOTE — PROGRESS NOTES
Unknown    Difficulty of Paying Living Expenses: Patient refused   Food Insecurity: Unknown    Worried About Running Out of Food in the Last Year: Patient refused    [address] in the Last Year: Patient refused   Transportation Needs: Unknown    Lack of Transportation (Medical): Not on file    Lack of Transportation (Non-Medical): Patient refused   Physical Activity: Not on file   Stress: Not on file   Social Connections: Not on file   Intimate Partner Violence: Not on file   Housing Stability: Unknown    Unable to Pay for Housing in the Last Year: Not on file    Number of [place] in the Last Year: Not on file    Unstable Housing in the Last Year: Patient refused     Family History   Problem Relation Age of Onset    Asthma Mother     Other Father         vascular problems    Cancer Father         prostate, lung    Diabetes Father      Labs  Lab Results   Component Value Date    WBC 9.5 04/17/2023    HGB 13.7 04/17/2023    HCT 42.9 04/17/2023     04/17/2023    PROTIME 11.0 07/20/2021    INR 1.0 07/20/2021    APTT 29.2 07/21/2021    K 4.8 04/17/2023    BUN 23 (H) 04/17/2023    CREATININE 1.0 04/17/2023     PHYSICAL EXAM:    There were no vitals taken for this visit.   CONSTITUTIONAL:   Awake, alert, cooperative  PSYCHIATRIC :  Oriented to time, place and person     Appropriate insight to disease process  EYES: Lids and lashes normal  ENT:  External ears and nose without lesions   Hearing deficits absent  NECK: Supple, symmetrical, trachea midline   Carotid bruit absent  LUNGS:  No increased work of breathing                 Clear to auscultation  CARDIOVASCULAR:  regular rate and rhythm   ABDOMEN:  soft, non-distended, non-tender  SKIN:   Normal skin color   Texture and turgor normal, no induration  EXTREMITIES:   R LE Edema absent, no open wounds  L LE Aka well healed  R posterior tibial monophasic   R dorsalis pedis monophasic     RADIOLOGY:    MADI R 0.38 (0.52)  TBI 0.17 (0.22)    A/P PVD with hx of

## 2023-06-21 ENCOUNTER — TELEPHONE (OUTPATIENT)
Dept: VASCULAR SURGERY | Age: 53
End: 2023-06-21

## 2023-06-21 DIAGNOSIS — S78.112A ABOVE KNEE AMPUTATION OF LEFT LOWER EXTREMITY (HCC): Primary | ICD-10-CM

## 2023-07-12 RX ORDER — CILOSTAZOL 100 MG/1
TABLET ORAL
Qty: 180 TABLET | Refills: 3 | Status: SHIPPED | OUTPATIENT
Start: 2023-07-12

## 2024-01-26 ENCOUNTER — HOSPITAL ENCOUNTER (OUTPATIENT)
Dept: CARDIOLOGY | Age: 54
End: 2024-01-26
Attending: SURGERY
Payer: MEDICARE

## 2024-01-26 DIAGNOSIS — I73.9 PVD (PERIPHERAL VASCULAR DISEASE) WITH CLAUDICATION (HCC): ICD-10-CM

## 2024-01-26 LAB
VAS LEFT ARM BP: 148 MMHG
VAS RIGHT ABI: 0.55
VAS RIGHT ARM BP: 152 MMHG
VAS RIGHT DORSALIS PEDIS BP: 84 MMHG
VAS RIGHT PTA BP: 83 MMHG
VAS RIGHT TBI: 0.28
VAS RIGHT TOE PRESSURE: 43 MMHG

## 2024-01-26 PROCEDURE — 93923 UPR/LXTR ART STDY 3+ LVLS: CPT

## 2024-01-30 PROCEDURE — 93923 UPR/LXTR ART STDY 3+ LVLS: CPT | Performed by: SURGERY

## 2024-02-05 ENCOUNTER — OFFICE VISIT (OUTPATIENT)
Dept: VASCULAR SURGERY | Age: 54
End: 2024-02-05
Payer: MEDICARE

## 2024-02-05 VITALS — WEIGHT: 170 LBS | BODY MASS INDEX: 24.39 KG/M2

## 2024-02-05 DIAGNOSIS — I73.9 PVD (PERIPHERAL VASCULAR DISEASE) WITH CLAUDICATION (HCC): Primary | ICD-10-CM

## 2024-02-05 DIAGNOSIS — S78.112A ABOVE KNEE AMPUTATION OF LEFT LOWER EXTREMITY (HCC): ICD-10-CM

## 2024-02-05 PROCEDURE — G8484 FLU IMMUNIZE NO ADMIN: HCPCS

## 2024-02-05 PROCEDURE — 3017F COLORECTAL CA SCREEN DOC REV: CPT

## 2024-02-05 PROCEDURE — G8427 DOCREV CUR MEDS BY ELIG CLIN: HCPCS

## 2024-02-05 PROCEDURE — 1036F TOBACCO NON-USER: CPT

## 2024-02-05 PROCEDURE — G8420 CALC BMI NORM PARAMETERS: HCPCS

## 2024-02-05 PROCEDURE — 99213 OFFICE O/P EST LOW 20 MIN: CPT

## 2024-02-05 NOTE — PROGRESS NOTES
auscultation  CARDIOVASCULAR:  regular rate and rhythm   ABDOMEN:  soft, non-distended, non-tender  SKIN:   Normal skin color   Texture and turgor normal, no induration  EXTREMITIES:   R LE Edema absent, no open wounds  L LE AKA well healed  R posterior tibial monophasic   R dorsalis pedis monophasic     RADIOLOGY:    MADI R 0.55 (0.38)   TBI 0.28 (0.17)    A/P PVD with hx of lifestyle limiting claudication  S/P R common femoral endarterectomy, profundoplasty  S/P L femoral endarterectomy, fem pop bypass  S/p L AKA  Arterial studies improved since last exam  He remains asymptomatic  His R LE claudication remains resolved but this is partially due to not being able to ambulate far  His L AKA is healed   Continue medical management with asa, plavix, pletal and statin  Emphasized importance of continued Tobacco cessation  Last smoked in 2016  they understood that with tobacco use they are at increased risk of worsening of their pvd and increased risk of limb loss  Discussed with patient pathophysiology of pvd, claudication, tissues loss, rest pain, and potential for limb loss  Discussed with patient symptoms of new onset claudication, tissue loss, rest pain, changes in lower extremity coolness, pain and they understood to go to ER immediately if any symptoms developed  Emphasized walking program  Understands that long term significant concerns due to profound degree of his arterial disease  His graft in the left lower extremity could be what is causing his pain with ambulating while the prosthesis is on  He was advised to make a follow up appointment with Atul to see if further adjustments can be made to the prosthetic  If surgical intervention is needed:  Excision of the graft - proximal leg incision to remove graft, may have to make longer incision due to scarring   Revision of AKA - not ideal as would lose more bone making it difficult to walk, also issues with healing  If he continues to have issues after follow

## 2024-06-17 ENCOUNTER — PREP FOR PROCEDURE (OUTPATIENT)
Dept: VASCULAR SURGERY | Age: 54
End: 2024-06-17

## 2024-06-17 ENCOUNTER — OFFICE VISIT (OUTPATIENT)
Dept: VASCULAR SURGERY | Age: 54
End: 2024-06-17
Payer: MEDICARE

## 2024-06-17 DIAGNOSIS — Z95.828 S/P FEMORAL-POPLITEAL BYPASS SURGERY: Primary | ICD-10-CM

## 2024-06-17 DIAGNOSIS — S78.112A ABOVE KNEE AMPUTATION OF LEFT LOWER EXTREMITY (HCC): ICD-10-CM

## 2024-06-17 DIAGNOSIS — I73.9 PERIPHERAL BLOOD VESSEL DISORDER (HCC): ICD-10-CM

## 2024-06-17 PROCEDURE — 99213 OFFICE O/P EST LOW 20 MIN: CPT

## 2024-06-17 PROCEDURE — 3017F COLORECTAL CA SCREEN DOC REV: CPT

## 2024-06-17 PROCEDURE — G8427 DOCREV CUR MEDS BY ELIG CLIN: HCPCS

## 2024-06-17 PROCEDURE — 1036F TOBACCO NON-USER: CPT

## 2024-06-17 PROCEDURE — G8420 CALC BMI NORM PARAMETERS: HCPCS

## 2024-06-18 PROBLEM — T87.89 PAIN OF AMPUTATION STUMP OF LEFT LOWER EXTREMITY (HCC): Status: ACTIVE | Noted: 2024-06-18

## 2024-06-18 PROBLEM — M79.605 PAIN OF AMPUTATION STUMP OF LEFT LOWER EXTREMITY (HCC): Status: ACTIVE | Noted: 2024-06-18

## 2024-06-26 VITALS — BODY MASS INDEX: 24.39 KG/M2 | HEIGHT: 70 IN

## 2024-06-26 NOTE — PROGRESS NOTES
[facility]   PRE-ADMISSION TESTING GENERAL INSTRUCTIONS  PAT Phone Number: [phone]      GENERAL INSTRUCTIONS:    [x] Antibacterial Soap Shower the Night before AND the morning of surgery.  [x] Do not wear makeup, lotions, powders, deodorant the morning of surgery.  [x] No solid food after midnight. You may have SIPS of clear liquids up until 2 hours before your arrival time to the hospital.   [x] You may brush your teeth, gargle, but do not swallow water.   [x] No tobacco products, illegal drugs, or alcohol within 24 hours of your surgery.  [x] Jewelry or valuables should not be brought to the hospital. All body and/or tongue piercing's must be removed prior to arriving to hospital. No contact lens or hair pins.   [x] Arrange transportation with a responsible adult  to and from the hospital. Arrange for someone to be with you for the remainder of the day and for 24 hours after your procedure due to having had anesthesia.          -Who will be your  for transportation? Jyothi  [x] Bring insurance card and photo ID.  [x] Bring copy of living will or healthcare power of  papers to be placed in your electronic record.  [x] Transfusion (Green) Bracelet: Please bring with you to hospital, day of surgery.     PARKING INSTRUCTIONS:     [x] ARRIVAL DATE & TIME: 7/12 @ 0945am  [x] Times are subject to change. We will contact you the business day before surgery if that were to occur.  [x] Enter into the [facility] Entrance. Two people may accompany you. Masks are not required.  [x] Parking Lot \"I\" is where you will park. It is located on the corner of [address] and [address]. The entrance is on [address].   Only one vehicle - per patient, is permitted in parking lot.   Upon entering the parking lot, a voucher ticket will print.    EDUCATION INSTRUCTIONS:           [x] Pre-admission Testing educational folder given.  [x] Incentive Spirometry,coughing &

## 2024-07-05 ENCOUNTER — HOSPITAL ENCOUNTER (OUTPATIENT)
Dept: PREADMISSION TESTING | Age: 54
Discharge: HOME | End: 2024-07-05

## 2024-07-05 DIAGNOSIS — Z01.812 PRE-OPERATIVE LABORATORY EXAMINATION: Primary | ICD-10-CM

## 2024-07-05 DIAGNOSIS — Z01.810 PREOPERATIVE CARDIOVASCULAR EXAMINATION: ICD-10-CM

## 2024-07-08 ENCOUNTER — HOSPITAL ENCOUNTER (OUTPATIENT)
Dept: PREADMISSION TESTING | Age: 54
Discharge: HOME | End: 2024-07-08
Payer: MEDICARE

## 2024-07-08 VITALS
RESPIRATION RATE: 20 BRPM | WEIGHT: 165 LBS | SYSTOLIC BLOOD PRESSURE: 98 MMHG | HEART RATE: 89 BPM | DIASTOLIC BLOOD PRESSURE: 55 MMHG | BODY MASS INDEX: 23.68 KG/M2 | OXYGEN SATURATION: 96 % | TEMPERATURE: 98 F

## 2024-07-08 DIAGNOSIS — Z01.812 PRE-OPERATIVE LABORATORY EXAMINATION: ICD-10-CM

## 2024-07-08 DIAGNOSIS — Z01.810 PREOPERATIVE CARDIOVASCULAR EXAMINATION: ICD-10-CM

## 2024-07-08 LAB
ABO + RH BLD: NORMAL
ANION GAP SERPL CALCULATED.3IONS-SCNC: 10 MMOL/L (ref 7–16)
ARM BAND NUMBER: NORMAL
BLOOD BANK SAMPLE EXPIRATION: NORMAL
BLOOD GROUP ANTIBODIES SERPL: NEGATIVE
BUN SERPL-MCNC: 17 MG/DL (ref 6–20)
CALCIUM SERPL-MCNC: 8.7 MG/DL (ref 8.6–10.2)
CHLORIDE SERPL-SCNC: 106 MMOL/L (ref 98–107)
CO2 SERPL-SCNC: 22 MMOL/L (ref 22–29)
CREAT SERPL-MCNC: 1.5 MG/DL (ref 0.7–1.2)
EKG ATRIAL RATE: 82 BPM
EKG P AXIS: 74 DEGREES
EKG P-R INTERVAL: 146 MS
EKG Q-T INTERVAL: 380 MS
EKG QRS DURATION: 74 MS
EKG QTC CALCULATION (BAZETT): 443 MS
EKG R AXIS: 56 DEGREES
EKG T AXIS: 64 DEGREES
EKG VENTRICULAR RATE: 82 BPM
ERYTHROCYTE [DISTWIDTH] IN BLOOD BY AUTOMATED COUNT: 12.2 % (ref 11.5–15)
GFR, ESTIMATED: 56 ML/MIN/1.73M2
GLUCOSE SERPL-MCNC: 232 MG/DL (ref 74–99)
HCT VFR BLD AUTO: 35.9 % (ref 37–54)
HGB BLD-MCNC: 12.7 G/DL (ref 12.5–16.5)
INR PPP: 1
MCH RBC QN AUTO: 33.4 PG (ref 26–35)
MCHC RBC AUTO-ENTMCNC: 35.4 G/DL (ref 32–34.5)
MCV RBC AUTO: 94.5 FL (ref 80–99.9)
PLATELET # BLD AUTO: 221 K/UL (ref 130–450)
PMV BLD AUTO: 10.2 FL (ref 7–12)
POTASSIUM SERPL-SCNC: 5.2 MMOL/L (ref 3.5–5)
PROTHROMBIN TIME: 11.2 SEC (ref 9.3–12.4)
RBC # BLD AUTO: 3.8 M/UL (ref 3.8–5.8)
SODIUM SERPL-SCNC: 138 MMOL/L (ref 132–146)
WBC OTHER # BLD: 6.8 K/UL (ref 4.5–11.5)

## 2024-07-08 PROCEDURE — 93010 ELECTROCARDIOGRAM REPORT: CPT | Performed by: INTERNAL MEDICINE

## 2024-07-08 PROCEDURE — 93005 ELECTROCARDIOGRAM TRACING: CPT | Performed by: NURSE PRACTITIONER

## 2024-07-08 PROCEDURE — 86900 BLOOD TYPING SEROLOGIC ABO: CPT

## 2024-07-08 PROCEDURE — 80048 BASIC METABOLIC PNL TOTAL CA: CPT

## 2024-07-08 PROCEDURE — 86901 BLOOD TYPING SEROLOGIC RH(D): CPT

## 2024-07-08 PROCEDURE — 86850 RBC ANTIBODY SCREEN: CPT

## 2024-07-08 PROCEDURE — 85027 COMPLETE CBC AUTOMATED: CPT

## 2024-07-08 PROCEDURE — 87081 CULTURE SCREEN ONLY: CPT

## 2024-07-08 PROCEDURE — 85610 PROTHROMBIN TIME: CPT

## 2024-07-08 PROCEDURE — 36415 COLL VENOUS BLD VENIPUNCTURE: CPT

## 2024-07-08 NOTE — PROGRESS NOTES
Spoke with Dr. Go patient here for pre admission testing appointment. Potassium result today abnormal 5.2.  Per Dr. Go repeat BMP day of surgery.

## 2024-07-09 LAB
MICROORGANISM SPEC CULT: NORMAL
SPECIMEN DESCRIPTION: NORMAL

## 2024-07-11 ENCOUNTER — ANESTHESIA EVENT (OUTPATIENT)
Dept: OPERATING ROOM | Age: 54
End: 2024-07-11
Payer: MEDICARE

## 2024-07-12 ENCOUNTER — HOSPITAL ENCOUNTER (OUTPATIENT)
Age: 54
Setting detail: SURGERY ADMIT
Discharge: HOME | End: 2024-07-12
Attending: SURGERY | Admitting: SURGERY
Payer: MEDICARE

## 2024-07-12 ENCOUNTER — ANESTHESIA (OUTPATIENT)
Dept: OPERATING ROOM | Age: 54
End: 2024-07-12
Payer: MEDICARE

## 2024-07-12 VITALS
BODY MASS INDEX: 23.62 KG/M2 | RESPIRATION RATE: 10 BRPM | TEMPERATURE: 97 F | OXYGEN SATURATION: 94 % | WEIGHT: 165 LBS | DIASTOLIC BLOOD PRESSURE: 91 MMHG | HEIGHT: 70 IN | SYSTOLIC BLOOD PRESSURE: 126 MMHG | HEART RATE: 82 BPM

## 2024-07-12 DIAGNOSIS — T87.89 PAIN OF AMPUTATION STUMP OF LEFT LOWER EXTREMITY (HCC): Primary | ICD-10-CM

## 2024-07-12 DIAGNOSIS — Z01.812 PRE-OPERATIVE LABORATORY EXAMINATION: ICD-10-CM

## 2024-07-12 DIAGNOSIS — M79.605 PAIN OF AMPUTATION STUMP OF LEFT LOWER EXTREMITY (HCC): Primary | ICD-10-CM

## 2024-07-12 LAB
ANION GAP SERPL CALCULATED.3IONS-SCNC: 13 MMOL/L (ref 7–16)
BUN SERPL-MCNC: 16 MG/DL (ref 6–20)
CALCIUM SERPL-MCNC: 9.1 MG/DL (ref 8.6–10.2)
CHLORIDE SERPL-SCNC: 106 MMOL/L (ref 98–107)
CO2 SERPL-SCNC: 21 MMOL/L (ref 22–29)
CREAT SERPL-MCNC: 0.8 MG/DL (ref 0.7–1.2)
GFR, ESTIMATED: >90 ML/MIN/1.73M2
GLUCOSE BLD-MCNC: 117 MG/DL (ref 74–99)
GLUCOSE SERPL-MCNC: 122 MG/DL (ref 74–99)
POTASSIUM SERPL-SCNC: 4.6 MMOL/L (ref 3.5–5)
SODIUM SERPL-SCNC: 140 MMOL/L (ref 132–146)

## 2024-07-12 PROCEDURE — 2709999900 HC NON-CHARGEABLE SUPPLY: Performed by: SURGERY

## 2024-07-12 PROCEDURE — 2500000003 HC RX 250 WO HCPCS: Performed by: STUDENT IN AN ORGANIZED HEALTH CARE EDUCATION/TRAINING PROGRAM

## 2024-07-12 PROCEDURE — 82962 GLUCOSE BLOOD TEST: CPT

## 2024-07-12 PROCEDURE — 2500000003 HC RX 250 WO HCPCS

## 2024-07-12 PROCEDURE — 7100000001 HC PACU RECOVERY - ADDTL 15 MIN: Performed by: SURGERY

## 2024-07-12 PROCEDURE — 2580000003 HC RX 258: Performed by: NURSE PRACTITIONER

## 2024-07-12 PROCEDURE — 35903 EXCISION GRAFT EXTREMITY: CPT | Performed by: SURGERY

## 2024-07-12 PROCEDURE — 2580000003 HC RX 258: Performed by: SURGERY

## 2024-07-12 PROCEDURE — 7100000000 HC PACU RECOVERY - FIRST 15 MIN: Performed by: SURGERY

## 2024-07-12 PROCEDURE — 3700000000 HC ANESTHESIA ATTENDED CARE: Performed by: SURGERY

## 2024-07-12 PROCEDURE — 6360000002 HC RX W HCPCS: Performed by: STUDENT IN AN ORGANIZED HEALTH CARE EDUCATION/TRAINING PROGRAM

## 2024-07-12 PROCEDURE — 2580000003 HC RX 258

## 2024-07-12 PROCEDURE — 80048 BASIC METABOLIC PNL TOTAL CA: CPT

## 2024-07-12 PROCEDURE — 3600000005 HC SURGERY LEVEL 5 BASE: Performed by: SURGERY

## 2024-07-12 PROCEDURE — 6360000002 HC RX W HCPCS: Performed by: SURGERY

## 2024-07-12 PROCEDURE — 7100000010 HC PHASE II RECOVERY - FIRST 15 MIN: Performed by: SURGERY

## 2024-07-12 PROCEDURE — 3700000001 HC ADD 15 MINUTES (ANESTHESIA): Performed by: SURGERY

## 2024-07-12 PROCEDURE — A4217 STERILE WATER/SALINE, 500 ML: HCPCS | Performed by: SURGERY

## 2024-07-12 PROCEDURE — 3600000015 HC SURGERY LEVEL 5 ADDTL 15MIN: Performed by: SURGERY

## 2024-07-12 PROCEDURE — 6360000002 HC RX W HCPCS

## 2024-07-12 PROCEDURE — 7100000011 HC PHASE II RECOVERY - ADDTL 15 MIN: Performed by: SURGERY

## 2024-07-12 RX ORDER — CEFAZOLIN SODIUM 1 G/3ML
INJECTION, POWDER, FOR SOLUTION INTRAMUSCULAR; INTRAVENOUS PRN
Status: DISCONTINUED | OUTPATIENT
Start: 2024-07-12 | End: 2024-07-12 | Stop reason: SDUPTHER

## 2024-07-12 RX ORDER — FENTANYL CITRATE 50 UG/ML
INJECTION, SOLUTION INTRAMUSCULAR; INTRAVENOUS PRN
Status: DISCONTINUED | OUTPATIENT
Start: 2024-07-12 | End: 2024-07-12 | Stop reason: SDUPTHER

## 2024-07-12 RX ORDER — SODIUM CHLORIDE 9 MG/ML
INJECTION, SOLUTION INTRAVENOUS CONTINUOUS PRN
Status: DISCONTINUED | OUTPATIENT
Start: 2024-07-12 | End: 2024-07-12 | Stop reason: SDUPTHER

## 2024-07-12 RX ORDER — HYDROMORPHONE HYDROCHLORIDE 1 MG/ML
0.25 INJECTION, SOLUTION INTRAMUSCULAR; INTRAVENOUS; SUBCUTANEOUS EVERY 5 MIN PRN
Status: DISCONTINUED | OUTPATIENT
Start: 2024-07-12 | End: 2024-07-12 | Stop reason: HOSPADM

## 2024-07-12 RX ORDER — OXYCODONE HYDROCHLORIDE AND ACETAMINOPHEN 5; 325 MG/1; MG/1
1 TABLET ORAL EVERY 6 HOURS PRN
Qty: 30 TABLET | Refills: 0 | Status: SHIPPED | OUTPATIENT
Start: 2024-07-12 | End: 2024-07-20

## 2024-07-12 RX ORDER — SODIUM CHLORIDE 0.9 % (FLUSH) 0.9 %
5-40 SYRINGE (ML) INJECTION EVERY 12 HOURS SCHEDULED
Status: DISCONTINUED | OUTPATIENT
Start: 2024-07-12 | End: 2024-07-12 | Stop reason: HOSPADM

## 2024-07-12 RX ORDER — KETOROLAC TROMETHAMINE 30 MG/ML
INJECTION, SOLUTION INTRAMUSCULAR; INTRAVENOUS PRN
Status: DISCONTINUED | OUTPATIENT
Start: 2024-07-12 | End: 2024-07-12 | Stop reason: SDUPTHER

## 2024-07-12 RX ORDER — SODIUM CHLORIDE 0.9 % (FLUSH) 0.9 %
5-40 SYRINGE (ML) INJECTION PRN
Status: DISCONTINUED | OUTPATIENT
Start: 2024-07-12 | End: 2024-07-12 | Stop reason: HOSPADM

## 2024-07-12 RX ORDER — ROCURONIUM BROMIDE 10 MG/ML
INJECTION, SOLUTION INTRAVENOUS PRN
Status: DISCONTINUED | OUTPATIENT
Start: 2024-07-12 | End: 2024-07-12 | Stop reason: SDUPTHER

## 2024-07-12 RX ORDER — PROPOFOL 10 MG/ML
INJECTION, EMULSION INTRAVENOUS PRN
Status: DISCONTINUED | OUTPATIENT
Start: 2024-07-12 | End: 2024-07-12 | Stop reason: SDUPTHER

## 2024-07-12 RX ORDER — NALOXONE HYDROCHLORIDE 0.4 MG/ML
INJECTION, SOLUTION INTRAMUSCULAR; INTRAVENOUS; SUBCUTANEOUS PRN
Status: DISCONTINUED | OUTPATIENT
Start: 2024-07-12 | End: 2024-07-12 | Stop reason: HOSPADM

## 2024-07-12 RX ORDER — HYDROMORPHONE HYDROCHLORIDE 1 MG/ML
0.5 INJECTION, SOLUTION INTRAMUSCULAR; INTRAVENOUS; SUBCUTANEOUS EVERY 5 MIN PRN
Status: COMPLETED | OUTPATIENT
Start: 2024-07-12 | End: 2024-07-12

## 2024-07-12 RX ORDER — DEXAMETHASONE SODIUM PHOSPHATE 10 MG/ML
INJECTION INTRAMUSCULAR; INTRAVENOUS PRN
Status: DISCONTINUED | OUTPATIENT
Start: 2024-07-12 | End: 2024-07-12 | Stop reason: SDUPTHER

## 2024-07-12 RX ORDER — MIDAZOLAM HYDROCHLORIDE 1 MG/ML
INJECTION INTRAMUSCULAR; INTRAVENOUS PRN
Status: DISCONTINUED | OUTPATIENT
Start: 2024-07-12 | End: 2024-07-12 | Stop reason: SDUPTHER

## 2024-07-12 RX ORDER — PROCHLORPERAZINE EDISYLATE 5 MG/ML
5 INJECTION INTRAMUSCULAR; INTRAVENOUS
Status: DISCONTINUED | OUTPATIENT
Start: 2024-07-12 | End: 2024-07-12 | Stop reason: HOSPADM

## 2024-07-12 RX ORDER — ONDANSETRON 2 MG/ML
INJECTION INTRAMUSCULAR; INTRAVENOUS PRN
Status: DISCONTINUED | OUTPATIENT
Start: 2024-07-12 | End: 2024-07-12 | Stop reason: SDUPTHER

## 2024-07-12 RX ORDER — VASOPRESSIN 20 U/ML
INJECTION PARENTERAL PRN
Status: DISCONTINUED | OUTPATIENT
Start: 2024-07-12 | End: 2024-07-12 | Stop reason: SDUPTHER

## 2024-07-12 RX ORDER — SODIUM CHLORIDE 9 MG/ML
INJECTION, SOLUTION INTRAVENOUS PRN
Status: DISCONTINUED | OUTPATIENT
Start: 2024-07-12 | End: 2024-07-12 | Stop reason: HOSPADM

## 2024-07-12 RX ORDER — LIDOCAINE HYDROCHLORIDE 20 MG/ML
INJECTION, SOLUTION INTRAVENOUS PRN
Status: DISCONTINUED | OUTPATIENT
Start: 2024-07-12 | End: 2024-07-12 | Stop reason: SDUPTHER

## 2024-07-12 RX ADMIN — KETOROLAC TROMETHAMINE 30 MG: 30 INJECTION, SOLUTION INTRAMUSCULAR; INTRAVENOUS at 13:04

## 2024-07-12 RX ADMIN — PHENYLEPHRINE HYDROCHLORIDE 100 MCG: 10 INJECTION INTRAVENOUS at 11:42

## 2024-07-12 RX ADMIN — SODIUM CHLORIDE: 9 INJECTION, SOLUTION INTRAVENOUS at 10:27

## 2024-07-12 RX ADMIN — CEFAZOLIN 2 G: 1 INJECTION, POWDER, FOR SOLUTION INTRAMUSCULAR; INTRAVENOUS at 11:52

## 2024-07-12 RX ADMIN — PHENYLEPHRINE HYDROCHLORIDE 100 MCG: 10 INJECTION INTRAVENOUS at 11:47

## 2024-07-12 RX ADMIN — FENTANYL CITRATE 100 MCG: 50 INJECTION, SOLUTION INTRAMUSCULAR; INTRAVENOUS at 11:33

## 2024-07-12 RX ADMIN — PROPOFOL 50 MG: 10 INJECTION, EMULSION INTRAVENOUS at 12:57

## 2024-07-12 RX ADMIN — VASOPRESSIN 2 UNITS: 20 INJECTION INTRAVENOUS at 12:04

## 2024-07-12 RX ADMIN — PROPOFOL 150 MG: 10 INJECTION, EMULSION INTRAVENOUS at 11:33

## 2024-07-12 RX ADMIN — HYDROMORPHONE HYDROCHLORIDE 0.5 MG: 1 INJECTION, SOLUTION INTRAMUSCULAR; INTRAVENOUS; SUBCUTANEOUS at 14:20

## 2024-07-12 RX ADMIN — VASOPRESSIN 2 UNITS: 20 INJECTION INTRAVENOUS at 11:57

## 2024-07-12 RX ADMIN — HYDROMORPHONE HYDROCHLORIDE 0.5 MG: 1 INJECTION, SOLUTION INTRAMUSCULAR; INTRAVENOUS; SUBCUTANEOUS at 13:49

## 2024-07-12 RX ADMIN — PHENYLEPHRINE HYDROCHLORIDE 100 MCG: 10 INJECTION INTRAVENOUS at 11:40

## 2024-07-12 RX ADMIN — LIDOCAINE HYDROCHLORIDE 100 MG: 20 INJECTION, SOLUTION INTRAVENOUS at 11:33

## 2024-07-12 RX ADMIN — SODIUM CHLORIDE: 9 INJECTION, SOLUTION INTRAVENOUS at 11:26

## 2024-07-12 RX ADMIN — ONDANSETRON 4 MG: 2 INJECTION INTRAMUSCULAR; INTRAVENOUS at 12:53

## 2024-07-12 RX ADMIN — ROCURONIUM BROMIDE 40 MG: 10 INJECTION, SOLUTION INTRAVENOUS at 11:33

## 2024-07-12 RX ADMIN — VASOPRESSIN 1 UNITS: 20 INJECTION INTRAVENOUS at 11:47

## 2024-07-12 RX ADMIN — SUGAMMADEX 150 MG: 100 INJECTION, SOLUTION INTRAVENOUS at 13:16

## 2024-07-12 RX ADMIN — FENTANYL CITRATE 25 MCG: 50 INJECTION, SOLUTION INTRAMUSCULAR; INTRAVENOUS at 12:11

## 2024-07-12 RX ADMIN — FENTANYL CITRATE 50 MCG: 50 INJECTION, SOLUTION INTRAMUSCULAR; INTRAVENOUS at 12:15

## 2024-07-12 RX ADMIN — MIDAZOLAM 2 MG: 1 INJECTION INTRAMUSCULAR; INTRAVENOUS at 11:26

## 2024-07-12 RX ADMIN — FENTANYL CITRATE 25 MCG: 50 INJECTION, SOLUTION INTRAMUSCULAR; INTRAVENOUS at 12:12

## 2024-07-12 RX ADMIN — DEXAMETHASONE SODIUM PHOSPHATE 5 MG: 10 INJECTION INTRAMUSCULAR; INTRAVENOUS at 11:37

## 2024-07-12 ASSESSMENT — PAIN SCALES - GENERAL
PAINLEVEL_OUTOF10: 7
PAINLEVEL_OUTOF10: 7

## 2024-07-12 ASSESSMENT — PAIN - FUNCTIONAL ASSESSMENT: PAIN_FUNCTIONAL_ASSESSMENT: 0-10

## 2024-07-12 ASSESSMENT — PAIN DESCRIPTION - DESCRIPTORS
DESCRIPTORS: ACHING;DISCOMFORT
DESCRIPTORS: ACHING;DISCOMFORT

## 2024-07-12 ASSESSMENT — COPD QUESTIONNAIRES: CAT_SEVERITY: MILD

## 2024-07-12 ASSESSMENT — PAIN DESCRIPTION - ORIENTATION
ORIENTATION: LEFT
ORIENTATION: LEFT

## 2024-07-12 ASSESSMENT — PAIN DESCRIPTION - LOCATION
LOCATION: LEG
LOCATION: LEG

## 2024-07-12 NOTE — OP NOTE
Operative Note      Patient: Deepak Ramirez  YOB: 1970  MRN: [id number]    Date of Procedure: 7/12/2024    Preop Dx  L AKA stump pain    Post-Op Diagnosis: Same       Procedure(s):  excision left femoral-popliteal bypass graft  Ligation of popliteal artery    Surgeon(s):  Negrito Scott MD    Assistant:   Surgical Assistant: Jose Rock  Resident: Sherron Delatorre MD    Anesthesia: General    Estimated Blood Loss (mL): 200     Complications: None    Specimens:   * No specimens in log *    Implants:  * No implants in log *      Drains: * No LDAs found *    Findings:  Infection Present At Time Of Surgery (PATOS) (choose all levels that have infection present):  No infection present  Other Findings:     DESCRIPTION OF PROCEDURE: The patient was identified and the procedure was confirmed.  The wound and surrounding area was prepped and draped in sterile fashion.    With the patient in supine position an incision was made in the mid to upper thigh.  Graft was dissected free and ligated proximally after being cut and over sewn with 3.0 prolene.      The graft was mobilized distally but due to it going more distal than expected a second incision was made more distally on the stump to get the remainder of the graft out.      The graft was dissected down to the popliteal artery.  It was completely removed from the popliteal artery.  The popliteal artery proximall and distally was oversewn with 3.0 prolene in a running locking fashion.      Hemostasis was obtained.        Wound was copiously irrigated with normal saline solution.  There was minimal bleeding that was controlled with pressure.     Layered closure of the wound using vicryl suture.  Dermabond was than applied.      The patient tolerated the procedure and was transferred to the recovery area in satisfactory condition.   Electronically signed by Negrito Scott MD on 7/12/2024 at 12:57 PM

## 2024-07-12 NOTE — H&P
Vascular Surgery History & Physical Exam      Chief Complaint: Left AKA stump pain, femoral to popliteal artery bypass graft    HISTORY OF PRESENT ILLNESS:                The patient is a 53 y.o. male who presents to the hospital for elective removal of left femoral - popliteal artery bypass graft.  The patient denies any problems since the last office visit.     IMPRESSION:       PLAN:   Excision of left femoral to popliteal artery bypass graft    I reviewed the procedure with the patient and family as available.  I discussed the procedure, risks, benefits, complications, and alternatives of the procedure.  They understand and consent.  All questions were answered    ROS : All others Negative if blank [], Positive if [x]  General   [] Fevers   [] Chills   [] Weight Loss   Skin   [] Tissue Loss   Eyes   [] Wears Glasses/Contacts   [] Vision Changes   Respiratory    [] Shortness of breath   Cardiovascular   [] Chest Pain   [] Shortness of breath with exertion   Gastrointestinal   [] Abdominal Pain     Past Medical History:   Diagnosis Date    Anxiety     Atherosclerosis of native arteries of extremity with rest pain ([facility]) 7/15/2021    Chronic obstructive pulmonary disease ([facility]) 7/30/2019    Diabetes ([facility])     Femoral-popliteal bypass graft occlusion, left, initial encounter ([facility]) 7/19/2021    GERD (gastroesophageal reflux disease)     Gout     Hyperlipidemia     Hypertension     Leg pain     Leg pain     Postoperative anemia due to acute blood loss 8/18/2019    PVD (peripheral vascular disease) ([facility]) 1/15/2018    S/P femoral-popliteal bypass surgery 9/9/2019    Type 2 diabetes mellitus with diabetic peripheral angiopathy without gangrene, without long-term current use of insulin ([facility]) 1/15/2018     Past Surgical History:   Procedure Laterality Date    FEMORAL BYPASS Left 8/16/2019    FEMORAL POPLITEAL BYPASS WITH FEMORAL ENDARTERECTOMY LEFT LEG performed by Negrito Scott MD at [facility]    FEMORAL  ENDARTERECTOMY Right 1/31/2020    RIGHT FEMORAL ENDARTERECTOMY [place] [facility] [place]    [place]G SURGERY Left 7/22/2021    ABOVE KNEE AMPUTATION-LEFT LEG [place] [facility] [place]    OTHER SURGICAL HISTORY  10/04/2016    Dr Scott - athrectomy/pci right fem/pop    OTHER SURGICAL HISTORY  06/19/2018    Dr Scott - PCI w/ athrectomy RLE Common Illiac to Popliteal    VASCULAR SURGERY  07/2019    ANGIOGRAM    WISDOM TOOTH EXTRACTION       Current Medications:   No current facility-administered medications for this encounter.    Current Outpatient Medications:     pregabalin (LYRICA) 150 MG capsule, Take 1 capsule by mouth 2 times daily for 180 days. Max Daily Amount: 300 mg, Disp: 180 capsule, Rfl: 1    atorvastatin (LIPITOR) 40 MG tablet, Take 1 tablet by mouth daily, Disp: 90 tablet, Rfl: 1    citalopram (CELEXA) 40 MG tablet, Take 1 tablet by mouth daily, Disp: 90 tablet, Rfl: 1    clopidogrel (PLAVIX) 75 MG tablet, Take 1 tablet by mouth daily, Disp: 90 tablet, Rfl: 1    glipiZIDE (GLUCOTROL XL) 10 MG extended release tablet, Take 1 tablet by mouth 2 times daily, Disp: 180 tablet, Rfl: 1    omeprazole (PRILOSEC) 20 MG delayed release capsule, Take 1 capsule by mouth Daily, Disp: 90 capsule, Rfl: 1    lisinopril (PRINIVIL;ZESTRIL) 40 MG tablet, Take 1 tablet by mouth daily, Disp: 90 tablet, Rfl: 1    metFORMIN (GLUCOPHAGE) 500 MG tablet, Take 1 tablet by mouth 2 times daily (with meals), Disp: 180 tablet, Rfl: 1    cilostazol (PLETAL) 100 MG tablet, TAKE 1 TABLET BY MOUTH  TWICE DAILY, Disp: 180 tablet, Rfl: 3    ONETOUCH ULTRA strip, 1 each by In Vitro route 3 times daily As needed., Disp: 300 each, Rfl: 5    Blood Glucose Monitoring Suppl (ONE TOUCH ULTRA 2) w/Device KIT, TEST BLOOD SUGAR THREE TIMES DAILY WITH MEALS, Disp: , Rfl:     TRUEPLUS PEN NEEDLES 31G X 6 MM MISC, , Disp: , Rfl:     Lancets (ONETOUCH DELICA PLUS [id number]) MISC, TEST BLOOD SUGAR

## 2024-07-12 NOTE — ANESTHESIA PRE PROCEDURE
1.778 m (5' 10\")                                              BP Readings from Last 3 Encounters:   07/12/24 132/84   07/08/24 (!) 98/55   11/20/23 130/80       NPO Status: Time of last liquid consumption: 0900> 8hrs                        Time of last solid consumption: 1930                        Date of last liquid consumption: 07/12/24                        Date of last solid food consumption: 07/11/24    BMI:   Wt Readings from Last 3 Encounters:   07/12/24 74.8 kg (165 lb)   07/08/24 74.8 kg (165 lb)   02/05/24 77.1 kg (170 lb)     Body mass index is 23.68 kg/m².    CBC:   Lab Results   Component Value Date/Time    WBC 6.8 07/08/2024 11:05 AM    RBC 3.80 07/08/2024 11:05 AM    HGB 12.7 07/08/2024 11:05 AM    HCT 35.9 07/08/2024 11:05 AM    MCV 94.5 07/08/2024 11:05 AM    RDW 12.2 07/08/2024 11:05 AM     07/08/2024 11:05 AM       CMP:   Lab Results   Component Value Date/Time     07/08/2024 11:05 AM    K 5.2 07/08/2024 11:05 AM    K 4.5 07/31/2021 05:33 AM     07/08/2024 11:05 AM    CO2 22 07/08/2024 11:05 AM    BUN 17 07/08/2024 11:05 AM    CREATININE 1.5 07/08/2024 11:05 AM    GFRAA >60 08/30/2021 03:54 PM    LABGLOM 56 07/08/2024 11:05 AM    LABGLOM >60 11/20/2023 02:51 PM    GLUCOSE 232 07/08/2024 11:05 AM    CALCIUM 8.7 07/08/2024 11:05 AM    BILITOT 0.2 11/20/2023 02:51 PM    ALKPHOS 104 11/20/2023 02:51 PM    AST 23 11/20/2023 02:51 PM    ALT 71 11/20/2023 02:51 PM       POC Tests:   Recent Labs     07/12/24  1020   POCGLU 117*       Coags:   Lab Results   Component Value Date/Time    PROTIME 11.2 07/08/2024 11:05 AM    INR 1.0 07/08/2024 11:05 AM    APTT 29.2 07/21/2021 08:40 AM       HCG (If Applicable): No results found for: \"PREGTESTUR\", \"PREGSERUM\", \"HCG\", \"HCGQUANT\"     ABGs:   Lab Results   Component Value Date/Time    PO2ART 165.5 01/31/2020 10:42 AM    RYF7CBL 44.5 01/31/2020 10:42 AM    UCK5DDD 21.9 01/31/2020 10:42 AM        Type & Screen (If Applicable):  No results found

## 2024-07-12 NOTE — PROGRESS NOTES
CLINICAL PHARMACY NOTE: MEDS TO BEDS    Total # of Prescriptions Filled: 1   The following medications were delivered to the patient:  Oxycodone/apap 5-325    Additional Documentation:   Wife picked up in the pharmacy

## 2024-07-12 NOTE — ANESTHESIA POSTPROCEDURE EVALUATION
Department of Anesthesiology  Postprocedure Note    Patient: Deepak Ramirez  MRN: [id number]  YOB: 1970  Date of evaluation: 7/12/2024    Procedure Summary       Date: 07/12/24 Room / Location: [address]    Anesthesia Start: 1126 Anesthesia Stop: 1331    Procedure: excision left femoral-popliteal bypass graft (Left: Leg Upper) Diagnosis:       Peripheral blood vessel disorder (HCC)      (Peripheral blood vessel disorder (HCC) [I73.9])    Surgeons: Negrito Scott MD Responsible Provider: Elizabeth Hernandez MD    Anesthesia Type: General ASA Status: 4            Anesthesia Type: General    Meek Phase I: Meek Score: 9    Meek Phase II:      Anesthesia Post Evaluation    Patient location during evaluation: PACU  Patient participation: complete - patient participated  Level of consciousness: awake  Airway patency: patent  Nausea & Vomiting: no nausea and no vomiting  Cardiovascular status: hemodynamically stable  Respiratory status: acceptable  Hydration status: euvolemic  Pain management: adequate    No notable events documented.

## 2024-07-26 RX ORDER — CILOSTAZOL 100 MG/1
TABLET ORAL
Qty: 180 TABLET | Refills: 3 | Status: SHIPPED | OUTPATIENT
Start: 2024-07-26

## 2024-07-29 ENCOUNTER — OFFICE VISIT (OUTPATIENT)
Dept: VASCULAR SURGERY | Age: 54
End: 2024-07-29

## 2024-07-29 VITALS — TEMPERATURE: 98.2 F

## 2024-07-29 DIAGNOSIS — G89.18 POSTOPERATIVE PAIN OF EXTREMITY: Primary | ICD-10-CM

## 2024-07-29 DIAGNOSIS — M79.609 POSTOPERATIVE PAIN OF EXTREMITY: Primary | ICD-10-CM

## 2024-07-29 PROCEDURE — 99024 POSTOP FOLLOW-UP VISIT: CPT | Performed by: SURGERY

## 2024-07-29 RX ORDER — OXYCODONE HYDROCHLORIDE 5 MG/1
5 TABLET ORAL EVERY 6 HOURS PRN
Qty: 28 TABLET | Refills: 0 | Status: SHIPPED | OUTPATIENT
Start: 2024-07-29 | End: 2024-08-05

## 2024-07-29 NOTE — PROGRESS NOTES
7/29/2024    Deepak Ramirez  1970    PCP : Alyse Vega DO  Podiatrist : Dr. NICKI Mancilla     Previous lower extremity procedures  10/4/16 R EIA plasty  R CFA, SFA, Popliteal atherectomy/plasty  R SFA, Popliteal plasty with DCB   6/19/18 R CFA, SFA, popliteal therectomy  R CFA plasty 6x150  R SFA, popliteal plasty 6x150 DCB   7/3/18 L CFA plasty with 7x60 DCB, 8x40 evercross  L profunda plasty 5x60 evercross   7/23/19 Left angiogram, Left Common femoral and profunda plasty with 6x80 evercross, 9x80 evercross    8/16/19 Left distal external iliac and femoral endarterectomy  Left Deep femoral endarterectomy with profundoplasty   Left fem ak pop bypass with 8 mm ringed propatent PTFE graft  Angiogram with plasty of bk pop with 5x150 DCB   1/31/20 Right distal external iliac and femoral endarterectomy with repair with bovine pericardial patch   Deep femoral endarterectomy with profundoplasty    6/16/20 R profunda plasty 5x60  R sfa, pop atherectomy, 6x250 DCB   3/16/21 R sfa pop atherectomy   7/20/21 L LE angiogram  L SFA/pop plasty  Infusion catheter placement   7/21/21 LLE angiogram, removal of lysis catheter   7/22/21 L AKA   7/12/24 Excision L fem-pop bypass graft  Ligation of L popliteal artery         Patient returns for post operative evaluation. It was done for L AKA stump pain and currently patient has had some improvement in this issue. He continues to have pain in the left AKA stump, but due to extensive bruising following surgery. The discomfort feels different than his previous stump pain.     The patient denies any unexpected problems since the procedure.     Physical Exam:    Gen Awake and Alert  CVS RRR   Left LE s/p AKA   - The incisions are healing well without any evidence of infection   - Resolving ecchymosis     A/P S/P excision of the L fem-pop bypass and ligation of the popliteal artery  Pt has some improvement but still with pain due to extensive dissection during graft excision  This should

## 2024-09-16 ENCOUNTER — OFFICE VISIT (OUTPATIENT)
Dept: VASCULAR SURGERY | Age: 54
End: 2024-09-16

## 2024-09-16 VITALS — WEIGHT: 165 LBS | BODY MASS INDEX: 23.68 KG/M2

## 2024-09-16 DIAGNOSIS — S78.112A ABOVE KNEE AMPUTATION OF LEFT LOWER EXTREMITY (HCC): Primary | ICD-10-CM

## 2024-09-16 PROCEDURE — 99024 POSTOP FOLLOW-UP VISIT: CPT | Performed by: SURGERY

## 2024-10-07 ENCOUNTER — OFFICE VISIT (OUTPATIENT)
Dept: VASCULAR SURGERY | Age: 54
End: 2024-10-07

## 2024-10-07 DIAGNOSIS — S78.112A ABOVE KNEE AMPUTATION OF LEFT LOWER EXTREMITY (HCC): Primary | ICD-10-CM

## 2024-10-07 PROCEDURE — 99024 POSTOP FOLLOW-UP VISIT: CPT | Performed by: SURGERY

## 2024-10-07 NOTE — PROGRESS NOTES
10/7/2024    Deepak Ramirez  1970    PCP : Alyse Vega DO  Podiatrist : Dr. NICKI Mancilla     Previous lower extremity procedures  10/4/16 R EIA plasty  R CFA, SFA, Popliteal atherectomy/plasty  R SFA, Popliteal plasty with DCB   6/19/18 R CFA, SFA, popliteal therectomy  R CFA plasty 6x150  R SFA, popliteal plasty 6x150 DCB   7/3/18 L CFA plasty with 7x60 DCB, 8x40 evercross  L profunda plasty 5x60 evercross   7/23/19 Left angiogram, Left Common femoral and profunda plasty with 6x80 evercross, 9x80 evercross    8/16/19 Left distal external iliac and femoral endarterectomy  Left Deep femoral endarterectomy with profundoplasty   Left fem ak pop bypass with 8 mm ringed propatent PTFE graft  Angiogram with plasty of bk pop with 5x150 DCB   1/31/20 Right distal external iliac and femoral endarterectomy with repair with bovine pericardial patch   Deep femoral endarterectomy with profundoplasty    6/16/20 R profunda plasty 5x60  R sfa, pop atherectomy, 6x250 DCB   3/16/21 R sfa pop atherectomy   7/20/21 L LE angiogram  L SFA/pop plasty  Infusion catheter placement   7/21/21 LLE angiogram, removal of lysis catheter   7/22/21 L AKA   7/12/24 Excision L fem-pop bypass graft  Ligation of L popliteal artery     Patient returns for post operative evaluation. It was done for L AKA stump pain.      Since last seen he has had a dramatic improvement in his left AKA pain.  He is happy with improvement.  He denies any drainage from open wound.       Physical Exam:    Gen Awake and Alert  CVS RRR   Left LE s/p AKA   - The incisions are healing well without any evidence of infection   - open area has closed      A/P S/P excision of the L fem-pop bypass and ligation of the popliteal artery  Pain in stump much improved  Wound is healed  Ok for fitting for prosthetic - spoke with Atul re  Continue Medical management with ASA, plavix, pletal, and statin  pt is a diabetic - emphasized importance of well controlled blood sugars  Discussed

## 2025-02-05 DIAGNOSIS — I73.9 PVD (PERIPHERAL VASCULAR DISEASE) WITH CLAUDICATION (HCC): ICD-10-CM

## 2025-02-05 DIAGNOSIS — Z95.828 S/P FEMORAL-POPLITEAL BYPASS SURGERY: Primary | ICD-10-CM

## 2025-02-24 ENCOUNTER — HOSPITAL ENCOUNTER (OUTPATIENT)
Dept: CARDIOLOGY | Age: 55
Discharge: HOME | End: 2025-02-26
Attending: SURGERY
Payer: COMMERCIAL

## 2025-02-24 ENCOUNTER — OFFICE VISIT (OUTPATIENT)
Dept: VASCULAR SURGERY | Age: 55
End: 2025-02-24
Payer: COMMERCIAL

## 2025-02-24 DIAGNOSIS — M79.605 CHRONIC PAIN OF LEFT LOWER EXTREMITY: ICD-10-CM

## 2025-02-24 DIAGNOSIS — G89.29 CHRONIC PAIN OF LEFT LOWER EXTREMITY: ICD-10-CM

## 2025-02-24 DIAGNOSIS — I73.9 PVD (PERIPHERAL VASCULAR DISEASE): Primary | Chronic | ICD-10-CM

## 2025-02-24 DIAGNOSIS — I73.9 PVD (PERIPHERAL VASCULAR DISEASE) WITH CLAUDICATION: ICD-10-CM

## 2025-02-24 LAB
VAS LEFT ARM BP: 169 MMHG
VAS RIGHT ABI: 0.54
VAS RIGHT ARM BP: 178 MMHG
VAS RIGHT DORSALIS PEDIS BP: 96 MMHG
VAS RIGHT PTA BP: 88 MMHG
VAS RIGHT TBI: 0.43
VAS RIGHT TOE PRESSURE: 76 MMHG

## 2025-02-24 PROCEDURE — G8427 DOCREV CUR MEDS BY ELIG CLIN: HCPCS

## 2025-02-24 PROCEDURE — 93923 UPR/LXTR ART STDY 3+ LVLS: CPT

## 2025-02-24 PROCEDURE — G8420 CALC BMI NORM PARAMETERS: HCPCS

## 2025-02-24 PROCEDURE — 99213 OFFICE O/P EST LOW 20 MIN: CPT

## 2025-02-24 PROCEDURE — 3017F COLORECTAL CA SCREEN DOC REV: CPT

## 2025-02-24 PROCEDURE — 1036F TOBACCO NON-USER: CPT

## 2025-02-24 NOTE — PROGRESS NOTES
daily 90 tablet 1    clopidogrel (PLAVIX) 75 MG tablet Take 1 tablet by mouth daily 90 tablet 1    glipiZIDE (GLUCOTROL XL) 10 MG extended release tablet Take 1 tablet by mouth 2 times daily 180 tablet 1    omeprazole (PRILOSEC) 20 MG delayed release capsule Take 1 capsule by mouth Daily 90 capsule 1    lisinopril (PRINIVIL;ZESTRIL) 40 MG tablet Take 1 tablet by mouth daily 90 tablet 1    metFORMIN (GLUCOPHAGE) 500 MG tablet Take 1 tablet by mouth 2 times daily (with meals) 180 tablet 1    ONETOUCH ULTRA strip 1 each by In Vitro route 3 times daily As needed. 300 each 5    Blood Glucose Monitoring Suppl (ONE TOUCH ULTRA 2) w/Device KIT TEST BLOOD SUGAR THREE TIMES DAILY WITH MEALS      TRUEPLUS PEN NEEDLES 31G X 6 MM MISC       Lancets (ONETOUCH DELICA PLUS [id number]) MISC TEST BLOOD SUGAR THREE TIMES DAILY WITH MEALS      insulin lispro (HUMALOG) 100 UNIT/ML injection vial Inject 0-18 Units into the skin 3 times daily (with meals) (Patient taking differently: Inject 0-18 Units into the skin 3 times daily (with meals) Sliding scale) 1 vial 3    aspirin (ASPIRIN CHILDRENS) 81 MG chewable tablet Take 1 tablet by mouth daily 30 tablet 3    pregabalin (LYRICA) 150 MG capsule Take 1 capsule by mouth 2 times daily for 180 days. Max Daily Amount: 300 mg 180 capsule 1     No current facility-administered medications for this visit.     Allergies:  Patient has no known allergies.  Social History     Socioeconomic History    Marital status:      Spouse name: Not on file    Number of children: Not on file    Years of education: Not on file    Highest education level: Not on file   Occupational History    Not on file   Tobacco Use    Smoking status: Former     Current packs/day: 0.00     Average packs/day: 1.5 packs/day for 28.8 years (43.2 ttl pk-yrs)     Types: Cigarettes     Start date:      Quit date: 10/15/2016     Years since quittin.3    Smokeless tobacco: Former     Types: Snuff     Quit date: 2004

## 2025-06-17 RX ORDER — CILOSTAZOL 100 MG/1
100 TABLET ORAL 2 TIMES DAILY
Qty: 180 TABLET | Refills: 3 | Status: SHIPPED | OUTPATIENT
Start: 2025-06-17

## (undated) DEVICE — BNDG,ELSTC,MATRIX,STRL,6"X5YD,LF,HOOK&LP: Brand: MEDLINE

## (undated) DEVICE — BASIN NEURO

## (undated) DEVICE — Z DISCONTINUED PER MEDLINE USE 2425483 TAPE UMB L30IN DIA1/8IN WHT COT NONABSORBABLE W/O NDL FOR

## (undated) DEVICE — GOWN,SIRUS,FABRNF,XL,20/CS: Brand: MEDLINE

## (undated) DEVICE — BLADE CLIPPER GEN PURP NS

## (undated) DEVICE — CHLORAPREP 26ML ORANGE

## (undated) DEVICE — LOOP VES W25MM THK1MM MAXI RED SIL FLD REPELLENT 100 PER

## (undated) DEVICE — INTENDED FOR TISSUE SEPARATION, AND OTHER PROCEDURES THAT REQUIRE A SHARP SURGICAL BLADE TO PUNCTURE OR CUT.: Brand: BARD-PARKER ® STAINLESS STEEL BLADES

## (undated) DEVICE — 1LYRTR 16FR10ML100%SILTMPS SNP: Brand: MEDLINE INDUSTRIES, INC.

## (undated) DEVICE — 3M™ STERI-DRAPE™ ISOLATION BAG, 10 PER CARTON / 4 CARTONS PER CASE, 1003: Brand: 3M™ STERI-DRAPE™

## (undated) DEVICE — Device

## (undated) DEVICE — GAUZE,SPONGE,4"X4",16PLY,XRAY,STRL,LF: Brand: MEDLINE

## (undated) DEVICE — SET SURG INSTR ART III

## (undated) DEVICE — 3M™ IOBAN™ 2 ANTIMICROBIAL INCISE DRAPE 6650EZ: Brand: IOBAN™ 2

## (undated) DEVICE — CLOTH SURG PREP PREOPERATIVE CHLORHEXIDINE GLUC 2% READYPREP

## (undated) DEVICE — MAGNETIC INSTR DRAPE 20X16: Brand: MEDLINE INDUSTRIES, INC.

## (undated) DEVICE — Z DUP USE 2257490 ADHESIVE SKIN CLSRE 036ML TPCL 2CTL CNCRLTE HIGH VSCSTY DRMB

## (undated) DEVICE — EXTRAS AMPUTATION II

## (undated) DEVICE — SKIN AFFIX SURG ADHESIVE 72/CS 0.55ML: Brand: MEDLINE

## (undated) DEVICE — CLIP INT SM TI EZ LD LIG SYS WECK HORZ

## (undated) DEVICE — CLAMP INSERT: Brand: STEALTH® CLAMP INSERT

## (undated) DEVICE — DILATOR ART

## (undated) DEVICE — TOWEL,OR,DSP,ST,BLUE,STD,6/PK,12PK/CS: Brand: MEDLINE

## (undated) DEVICE — TRAY,SKIN SCRUB,DRY,W/GAUZE: Brand: MEDLINE

## (undated) DEVICE — EXTRAS AMPUTATION I

## (undated) DEVICE — SPONGE,LAP,18"X18",DLX,XR,ST,5/PK,40/PK: Brand: MEDLINE

## (undated) DEVICE — 3M™ IOBAN™ 2 ANTIMICROBIAL INCISE DRAPE 6640EZ: Brand: IOBAN™ 2

## (undated) DEVICE — PATIENT RETURN ELECTRODE, SINGLE-USE, CONTACT QUALITY MONITORING, ADULT, WITH 9FT CORD, FOR PATIENTS WEIGING OVER 33LBS. (15KG): Brand: MEGADYNE

## (undated) DEVICE — SYSTEM CATH 20GA L1IN OD1.0668-1.143MM ID0.7874-0.8636MM 383516

## (undated) DEVICE — PENCIL,CAUTERY,ROCKER,PTFE,15'CORD: Brand: MEDLINE INDUSTRIES, INC.

## (undated) DEVICE — DRESSING FOAM W22XL25CM FILVE LAYR FOAM DP DEF SAFETAC

## (undated) DEVICE — APPLIER LIG CLP M L11IN TI STR RNG HNDL FOR 20 CLP DISP

## (undated) DEVICE — SURGICAL PROCEDURE PACK VASC MAJ CUST

## (undated) DEVICE — APPLICATOR MEDICATED 26 CC SOLUTION HI LT ORNG CHLORAPREP

## (undated) DEVICE — PACK,UNIV, II AURORA: Brand: MEDLINE

## (undated) DEVICE — LABEL MED 4 IN SURG PANEL W/ PEN STRL

## (undated) DEVICE — TOTAL TRAY, 16FR 10ML SIL FOLEY, URN: Brand: MEDLINE

## (undated) DEVICE — WIPE,1ML,SUREPREP RAPID DRY,NO-STG,FILM: Brand: MEDLINE

## (undated) DEVICE — DOUBLE BASIN SET: Brand: MEDLINE INDUSTRIES, INC.

## (undated) DEVICE — Z INACTIVE USE 2641837 CLIP LIG M BLU TI HRT SHP WIRE HORZ 600 PER BX

## (undated) DEVICE — BANDAGE,GAUZE,4.5"X4.1YD,STERILE,LF: Brand: MEDLINE

## (undated) DEVICE — DRAPE,EXTREMITY,89X128,STERILE: Brand: MEDLINE

## (undated) DEVICE — GEL US 20GM NONIRRITATING OVERWRAPPED FILE PCH TRNSMIT

## (undated) DEVICE — MEDIA CONTRAST RX ISOVUE-300 61% 30ML VIALS

## (undated) DEVICE — GLOVE SURG L12IN SZ 65FNGR THK94MIL TRNSLUC YEL LTX

## (undated) DEVICE — SCANLAN® VASCU-STATT® SINGLE-USE BULLDOG CLAMP - MIDI ANGLED 45° (WHITE), CLAMPING PRESSURE 25-30 G (2/STERILE PKG): Brand: SCANLAN® VASCU-STATT® SINGLE-USE BULLDOG CLAMP

## (undated) DEVICE — SAW RECEIP STRYKER SYS 8

## (undated) DEVICE — ELECTRODE PT RET AD L9FT HI MOIST COND ADH HYDRGEL CORDED

## (undated) DEVICE — SET INSTR ART 1

## (undated) DEVICE — BLANKET WRM W35.4XL86.6IN FULL UNDERBODY + FORC AIR

## (undated) DEVICE — DRESSING,GAUZE,XEROFORM,CURAD,5"X9",ST: Brand: CURAD

## (undated) DEVICE — MICROPUNCTURE INTRODUCER SET SILHOUETTE TRANSITIONLESS WITH STAINLESS STEEL WIRE GUIDE: Brand: MICROPUNCTURE

## (undated) DEVICE — CATHETER,URETHRAL,REDRUBBER,STERILE,22FR: Brand: MEDLINE

## (undated) DEVICE — MAJOR VASCULAR: Brand: MEDLINE INDUSTRIES, INC.

## (undated) DEVICE — 3M(TM) MEDIPORE(TM) +PAD SOFT CLOTH ADHESIVE WOUND DRESSING 3569: Brand: 3M™ MEDIPORE™

## (undated) DEVICE — Z DISCONTINUED PER MEDLINE USE 2741942 DRESSING AQUACEL 6 IN ALG W9XL15CM SIL CVR WTRPRF VIR BACT BARR ANTIMIC

## (undated) DEVICE — CATHETER PTCA L130CM BLLN L150MM DIA5MM SHTH 6FR DRUG COAT

## (undated) DEVICE — 1.5L THIN WALL CAN: Brand: CRD

## (undated) DEVICE — CATHETER ETER IV 20GA L1IN POLYUR STR RADPQ INTROCAN SFTY

## (undated) DEVICE — SURGICAL PROCEDURE PACK BASIC

## (undated) DEVICE — GOWN,SIRUS,FABRNF,L,20/CS: Brand: MEDLINE

## (undated) DEVICE — CATHETER ETER URETH 24FR L16IN RED RUB INTMIT ROB MOD BARDX

## (undated) DEVICE — PAD,ABDOMINAL,5"X9",ST,LF,25/BX: Brand: MEDLINE INDUSTRIES, INC.

## (undated) DEVICE — RADIFOCUS GLIDEWIRE ADVANTAGE GUIDEWIRE: Brand: GLIDEWIRE ADVANTAGE

## (undated) DEVICE — 3M™ BAIR HUGGER® UNDERBODY BLANKET, FULL ACCESS, 10 PER CASE 63500: Brand: BAIR HUGGER™

## (undated) DEVICE — RECIPROCATING BLADE HEAVY DUTY (52.6 X 0.64MM)

## (undated) DEVICE — SPONGE LAP W18XL18IN WHT COT 4 PLY FLD STRUNG RADPQ DISP ST

## (undated) DEVICE — TOWEL,OR,DSP,ST,BLUE,DLX,4/PK,20PK/CS: Brand: MEDLINE

## (undated) DEVICE — GLOVE SURG SZ 75 STD WHT LTX SYN POLYMER BEAD REINF ANTI RL

## (undated) DEVICE — ADVANCE, 35LP LOW PROFILE PTA BALLOON DILATATION CATHETER: Brand: ADVANCE

## (undated) DEVICE — AGENT HEMSTAT W4XL8IN OXIDIZED REGENERATED CELOS ABSRB

## (undated) DEVICE — GLOVE ORANGE PI 7 1/2   MSG9075

## (undated) DEVICE — CONVERTORS STOCKINETTE: Brand: CONVERTORS

## (undated) DEVICE — PICO 7 10CM X 40CM: Brand: PICO™ 7

## (undated) DEVICE — COVER,LIGHT HANDLE,FLX,2/PK: Brand: MEDLINE INDUSTRIES, INC.